# Patient Record
Sex: FEMALE | Race: WHITE | NOT HISPANIC OR LATINO | Employment: OTHER | URBAN - METROPOLITAN AREA
[De-identification: names, ages, dates, MRNs, and addresses within clinical notes are randomized per-mention and may not be internally consistent; named-entity substitution may affect disease eponyms.]

---

## 2017-05-17 ENCOUNTER — APPOINTMENT (OUTPATIENT)
Dept: ADMISSIONS | Facility: MEDICAL CENTER | Age: 47
End: 2017-05-17
Attending: COLON & RECTAL SURGERY
Payer: MEDICAID

## 2017-05-24 ENCOUNTER — HOSPITAL ENCOUNTER (OUTPATIENT)
Facility: MEDICAL CENTER | Age: 47
End: 2017-05-24
Attending: COLON & RECTAL SURGERY | Admitting: COLON & RECTAL SURGERY
Payer: MEDICAID

## 2017-05-24 VITALS
RESPIRATION RATE: 16 BRPM | BODY MASS INDEX: 26.1 KG/M2 | OXYGEN SATURATION: 97 % | HEART RATE: 64 BPM | HEIGHT: 68 IN | WEIGHT: 172.18 LBS | TEMPERATURE: 97.1 F

## 2017-05-24 PROBLEM — K62.82 ANAL DYSPLASIA: Status: ACTIVE | Noted: 2017-05-24

## 2017-05-24 LAB
HCG UR QL: NEGATIVE
SP GR UR REFRACTOMETRY: 1.03

## 2017-05-24 PROCEDURE — 160009 HCHG ANES TIME/MIN: Performed by: COLON & RECTAL SURGERY

## 2017-05-24 PROCEDURE — 160002 HCHG RECOVERY MINUTES (STAT): Performed by: COLON & RECTAL SURGERY

## 2017-05-24 PROCEDURE — 160036 HCHG PACU - EA ADDL 30 MINS PHASE I: Performed by: COLON & RECTAL SURGERY

## 2017-05-24 PROCEDURE — 160035 HCHG PACU - 1ST 60 MINS PHASE I: Performed by: COLON & RECTAL SURGERY

## 2017-05-24 PROCEDURE — 502240 HCHG MISC OR SUPPLY RC 0272: Performed by: COLON & RECTAL SURGERY

## 2017-05-24 PROCEDURE — 501838 HCHG SUTURE GENERAL: Performed by: COLON & RECTAL SURGERY

## 2017-05-24 PROCEDURE — 160046 HCHG PACU - 1ST 60 MINS PHASE II: Performed by: COLON & RECTAL SURGERY

## 2017-05-24 PROCEDURE — A9270 NON-COVERED ITEM OR SERVICE: HCPCS

## 2017-05-24 PROCEDURE — 700102 HCHG RX REV CODE 250 W/ 637 OVERRIDE(OP)

## 2017-05-24 PROCEDURE — 160025 RECOVERY II MINUTES (STATS): Performed by: COLON & RECTAL SURGERY

## 2017-05-24 PROCEDURE — 160047 HCHG PACU  - EA ADDL 30 MINS PHASE II: Performed by: COLON & RECTAL SURGERY

## 2017-05-24 PROCEDURE — A6403 STERILE GAUZE>16 <= 48 SQ IN: HCPCS | Performed by: COLON & RECTAL SURGERY

## 2017-05-24 PROCEDURE — 160027 HCHG SURGERY MINUTES - 1ST 30 MINS LEVEL 2: Performed by: COLON & RECTAL SURGERY

## 2017-05-24 PROCEDURE — 88305 TISSUE EXAM BY PATHOLOGIST: CPT | Mod: 59

## 2017-05-24 PROCEDURE — 160048 HCHG OR STATISTICAL LEVEL 1-5: Performed by: COLON & RECTAL SURGERY

## 2017-05-24 PROCEDURE — 81025 URINE PREGNANCY TEST: CPT

## 2017-05-24 PROCEDURE — 501499 HCHG SURG-I-LOOP, MINI (BLUE): Performed by: COLON & RECTAL SURGERY

## 2017-05-24 PROCEDURE — 700101 HCHG RX REV CODE 250

## 2017-05-24 PROCEDURE — 700111 HCHG RX REV CODE 636 W/ 250 OVERRIDE (IP)

## 2017-05-24 RX ORDER — OXYCODONE HCL 5 MG/5 ML
SOLUTION, ORAL ORAL
Status: COMPLETED
Start: 2017-05-24 | End: 2017-05-24

## 2017-05-24 RX ORDER — SODIUM CHLORIDE, SODIUM LACTATE, POTASSIUM CHLORIDE, CALCIUM CHLORIDE 600; 310; 30; 20 MG/100ML; MG/100ML; MG/100ML; MG/100ML
1000 INJECTION, SOLUTION INTRAVENOUS
Status: COMPLETED | OUTPATIENT
Start: 2017-05-24 | End: 2017-05-24

## 2017-05-24 RX ORDER — LIDOCAINE HYDROCHLORIDE 10 MG/ML
INJECTION, SOLUTION INFILTRATION; PERINEURAL
Status: COMPLETED
Start: 2017-05-24 | End: 2017-05-24

## 2017-05-24 RX ORDER — BUPIVACAINE HYDROCHLORIDE AND EPINEPHRINE 5; 5 MG/ML; UG/ML
INJECTION, SOLUTION EPIDURAL; INTRACAUDAL; PERINEURAL
Status: DISCONTINUED | OUTPATIENT
Start: 2017-05-24 | End: 2017-05-24 | Stop reason: HOSPADM

## 2017-05-24 RX ORDER — LIDOCAINE HYDROCHLORIDE 10 MG/ML
0.5 INJECTION, SOLUTION INFILTRATION; PERINEURAL
Status: DISCONTINUED | OUTPATIENT
Start: 2017-05-24 | End: 2017-05-24 | Stop reason: HOSPADM

## 2017-05-24 RX ORDER — LIDOCAINE AND PRILOCAINE 25; 25 MG/G; MG/G
1 CREAM TOPICAL
Status: DISCONTINUED | OUTPATIENT
Start: 2017-05-24 | End: 2017-05-24 | Stop reason: HOSPADM

## 2017-05-24 RX ORDER — OXYCODONE HYDROCHLORIDE AND ACETAMINOPHEN 5; 325 MG/1; MG/1
1-2 TABLET ORAL EVERY 4 HOURS PRN
Qty: 15 TAB | Refills: 0 | Status: SHIPPED | OUTPATIENT
Start: 2017-05-24 | End: 2018-12-17

## 2017-05-24 RX ORDER — ACETIC ACID 0.25 G/100ML
IRRIGANT IRRIGATION
Status: DISCONTINUED | OUTPATIENT
Start: 2017-05-24 | End: 2017-05-24 | Stop reason: HOSPADM

## 2017-05-24 RX ADMIN — OXYCODONE HYDROCHLORIDE 5 MG: 5 SOLUTION ORAL at 10:00

## 2017-05-24 RX ADMIN — SODIUM CHLORIDE, SODIUM LACTATE, POTASSIUM CHLORIDE, CALCIUM CHLORIDE 1000 ML: 600; 310; 30; 20 INJECTION, SOLUTION INTRAVENOUS at 07:25

## 2017-05-24 RX ADMIN — LIDOCAINE HYDROCHLORIDE: 10 INJECTION, SOLUTION INFILTRATION; PERINEURAL at 07:25

## 2017-05-24 ASSESSMENT — PAIN SCALES - GENERAL
PAINLEVEL_OUTOF10: 0

## 2017-05-24 NOTE — IP AVS SNAPSHOT
5/24/2017    Analia Wu  [address]  [facility] [address]    Dear Analia:    [facility] wants to ensure your discharge home is safe and you or your loved ones have had all of your questions answered regarding your care after you leave the hospital.    Below is a list of resources and contact information should you have any questions regarding your hospital stay, follow-up instructions, or active medical symptoms.    Questions or Concerns Regarding… Contact   Medical Questions Related to Your Discharge  (7 days a week, 8am-5pm) Contact a Nurse Care Coordinator   [phone]   Medical Questions Not Related to Your Discharge  (24 hours a day / 7 days a week)  Contact the Nurse Health Line   [phone]    Medications or Discharge Instructions Refer to your discharge packet   or contact your [facility] Primary Care Provider   [phone]   Follow-up Appointment(s) Schedule your appointment via [employer]   or contact Scheduling [phone]   Billing Review your statement via [employer]  or contact Billing [phone]   Medical Records Review your records via [employer]   or contact Medical Records [phone]     You may receive a telephone call within two days of discharge. This call is to make certain you understand your discharge instructions and have the opportunity to have any questions answered. You can also easily access your medical information, test results and upcoming appointments via the [employer] free online health management tool. You can learn more and sign up at [facility]. For assistance setting up your [employer] account, please call [phone].    Once again, we want to ensure your discharge home is safe and that you have a clear understanding of any next steps in your care. If you have any questions or concerns, please do not hesitate to contact us, we are here for you. Thank you for choosing [facility] for your healthcare needs.    Sincerely,    Your [facility]  Team

## 2017-05-24 NOTE — IP AVS SNAPSHOT
" Home Care Instructions                                                                                                                Name:Analia Wu  Medical Record Number:[id number]  CSN: [id number]    YOB: 1970   Age: 46 y.o.  Sex: female  HT:1.715 m (5' 7.5\") WT: 78.1 kg (172 lb 2.9 oz)          Admit Date: 5/24/2017     Discharge Date:   Today's Date: 5/24/2017  Attending Doctor:  Billy Gamboa M.D.                  Allergies:  Other food; Dilaudid; Latex; Tape; and Other environmental                Discharge Instructions         ACTIVITY: Rest and take it easy for the first 24 hours.  A responsible adult is recommended to remain with you during that time.  It is normal to feel sleepy.  We encourage you to not do anything that requires balance, judgment or coordination.    MILD FLU-LIKE SYMPTOMS ARE NORMAL. YOU MAY EXPERIENCE GENERALIZED MUSCLE ACHES, THROAT IRRITATION, HEADACHE AND/OR SOME NAUSEA.    FOR 24 HOURS DO NOT:  Drive, operate machinery or run household appliances.  Drink beer or alcoholic beverages.   Make important decisions or sign legal documents.    SPECIAL INSTRUCTIONS:   D/C instructions:     1. DIET: Upon discharge from the hospital start with light fluids then resume your normal preoperative diet. Depending on how you are feeling and whether you have nausea or not, you may wish to stay with a bland diet for the first few days. However, you can advance this as quickly as you feel ready. Avoid foods that you know will cause constipation. Stick with soft, bland foods that have been easy to digest in the past.     2. ACTIVITIES: Limit your activity for the first 5-7 days following surgery. You may drive whenever you are off pain medications and are able to perform the activities needed to drive, i.e. turning, bending, twisting, etc.     3. BATHING/WOUND CARE: If your wound contains packing, when the packing falls out, you may leave it out or remove it yourself the day after " surgery. If there is a drain in place this will stay in place until your follow up appointment. You will pass some blood and mucus for the first week or more, with or without bowel movements. Temporary loss of bowel control and changes in rectal sensation is common due to swelling. You can wear a pad or disposable undergarment to protect your clothes. Lumps or tags always form and these are best left alone for twelve months, as they usually resolve with time. You may get the wound wet at any time after leaving the hospital. See instructions for Sitz baths below.     4. BOWEL FUNCTION: It is very important to keep your bowel movements soft. Pain mediation and lack of activity will causes constipation. Take a stool softener either prescribed or over the counter and make sure you are drinking about 64 oz (1/2 gallon) of water everyday. Taking a fiber supplement like Benefiber or metamucil on a daily basis also helps. Continue this practice even after you have fully recovered to keep your stools soft and to avoid straining. If you have not moved your bowels by day 3 after surgery; take Lindsay lax, Milk of magnesia, or another laxative until you have a bowel movement. DO NOT use suppositories or enemas     6. PAIN MEDICATION: You can expect a lot of pain after Anorectal surgery. The effects of the local anesthesia used during your surgery may wear off as early as about six hours following surgery. Make sure you get your pain medication prescription filled. Please take these as directed and do not wait until the pain is excruciating. Frequent Sitz baths (see instructions below) to help with pain and spasms. It is important to remember not to take medications on an empty stomach as this may cause nausea. Topical ointments may be prescribed or over the counter may be mildly helpful as well. All of these methods may help you feel more comfortable, but none of them will completely relieve the pain. You should notice a very slow  gradual improvement over the first few weeks. Only time will help you heal and relieve the pain. If pain becomes severe and you are unable to control it with the above methods you may need to go to the emergency room for IV pain control. If you need a pain medication refill please call the office during business hours.     7.CALL IF YOU HAVE: (1) Fevers to more than 101.50 F, (2) Unusual chest or leg pain, (3) Drainage or fluid from incision that may be foul smelling, increased tenderness or soreness at the wound or the wound edges are no longer together, redness or swelling at the incision site. Please do not hesitate to call with any other questions.     8. APPOINTMENT: Contact our office at [phone] for a follow-up appointment in 3-4 weeks following your procedure.     SITZ BATHS:     First, you prepare some things such as large shallow plastic container, hot water, towels, and blanket. You can find large bowl which is approximately 8 inches depth. Another option is using bathtub but you may not be comfortable since sitz bath is more for use in submerging the buttocks and hip area not the legs. You can also buy the sitz bathtub which is available in the market. The bathtub products come in various options. They are equipped with different features. You just need to find the best product which matches with your needs. By using sitz bathtub, sitz bath at home can be done easier.     If you are using bowl or basin, you should place the bowl or basin in the bathtub or on a towel on the floor. It is better for you to use the easiest one for you to get up from. Next, you can fill the bowl one third full of medium hot water. You can use your elbow to test the water temperature. Make sure that the water temperature is in the comfortable level. You should not use your hands since they can tolerate higher temperature.     During sitz bath, you should keep a towel and blanket since they are handy to solve the spillage.  They are also useful when you need warmth. To start the bath, you can lower yourself in the tub gently. Sit in the tub for 10-20 minutes or until the water cools considerably. If it becomes uncomfortable, you have to get out of the bath.     You can use sitz bath with hot water, cold water, or alternating between the two for maximum healing ability.     If you have any additional questions, please do not hesitate to call the office and speak to either myself or the physician on call.     Office address:   [address] [place] [address], [address], [place] [address]     [facility]   [phone]          You should CALL YOUR PHYSICIAN if you develop:  Fever greater than 101 degrees F.  Pain not relieved by medication, or persistent nausea or vomiting.  Excessive bleeding (blood soaking through dressing) or unexpected drainage from the wound.  Extreme redness or swelling around the incision site, drainage of pus or foul smelling drainage.  Inability to urinate or empty your bladder within 8 hours.  Problems with breathing or chest pain.    You should call 911 if you develop problems with breathing or chest pain.  If you are unable to contact your doctor or surgical center, you should go to the nearest emergency room or urgent care center.  Physician's telephone #: Dr. Gamboa: [phone]    If any questions arise, call your doctor.  If your doctor is not available, please feel free to call the Surgical Center at [phone].  The Center is open Monday through Friday from 7AM to 7PM.  You can also call the [facility] HOTLINE open 24 hours/day, 7 days/week and speak to a nurse at [phone], or toll free at [phone].    A registered nurse may call you a few days after your surgery to see how you are doing after your procedure.    MEDICATIONS: Resume taking daily medication.  Take prescribed pain medication with food.  If no medication is prescribed, you may take non-aspirin pain medication if  needed.  PAIN MEDICATION CAN BE VERY CONSTIPATING.  Take a stool softener or laxative such as senokot, pericolace, or milk of magnesia if needed.    Prescription given for PERCOCET (pain).  Last pain medication given at 10:00 (next dose due anytime after 2:00).    If your physician has prescribed pain medication that includes Acetaminophen (Tylenol), do not take additional Acetaminophen (Tylenol) while taking the prescribed medication.    Depression / Suicide Risk    As you are discharged from this [facility] facility, it is important to learn how to keep safe from harming yourself.    Recognize the warning signs:  · Abrupt changes in personality, positive or negative- including increase in energy   · Giving away possessions  · Change in eating patterns- significant weight changes-  positive or negative  · Change in sleeping patterns- unable to sleep or sleeping all the time   · Unwillingness or inability to communicate  · Depression  · Unusual sadness, discouragement and loneliness  · Talk of wanting to die  · Neglect of personal appearance   · Rebelliousness- reckless behavior  · Withdrawal from people/activities they love  · Confusion- inability to concentrate     If you or a loved one observes any of these behaviors or has concerns about self-harm, here's what you can do:  · Talk about it- your feelings and reasons for harming yourself  · Remove any means that you might use to hurt yourself (examples: pills, rope, extension cords, firearm)  · Get professional help from the community (Mental Health, Substance Abuse, psychological counseling)  · Do not be alone:Call your Safe Contact- someone whom you trust who will be there for you.  · Call your local CRISIS HOTLINE 516-9599 or 982-287-1316  · Call your local Children's Mobile Crisis Response Team [place] (383) 936-2373 or www.Brand[employer]  · Call the toll free National Suicide Prevention Hotlines   · National Suicide Prevention Lifeline 222-605-NVVP  (6508)  · [facility] Network 800-SUICIDE (694-0038)       Medication List      START taking these medications        Instructions    Morning Afternoon Evening Bedtime    oxycodone-acetaminophen 5-325 MG Tabs   Commonly known as:  PERCOCET        Take 1-2 Tabs by mouth every four hours as needed.   Dose:  1-2 Tab                          CONTINUE taking these medications        Instructions    Morning Afternoon Evening Bedtime    citalopram 20 MG Tabs   Commonly known as:  CELEXA        Take 20 mg by mouth every day.   Dose:  20 mg                        montelukast 10 MG Tabs   Commonly known as:  SINGULAIR        Take 10 mg by mouth every day.   Dose:  10 mg                             Where to Get Your Medications      You can get these medications from any pharmacy     Bring a paper prescription for each of these medications    - oxycodone-acetaminophen 5-325 MG Tabs            Medication Information     Above and/or attached are the medications your physician expects you to take upon discharge. Review all of your home medications and newly ordered medications with your doctor and/or pharmacist. Follow medication instructions as directed by your doctor and/or pharmacist. Please keep your medication list with you and share with your physician. Update the information when medications are discontinued, doses are changed, or new medications (including over-the-counter products) are added; and carry medication information at all times in the event of emergency situations.        Resources     Quit Smoking / Tobacco Use:    I understand the use of any tobacco products increases my chance of suffering from future heart disease or stroke and could cause other illnesses which may shorten my life. Quitting the use of tobacco products is the single most important thing I can do to improve my health. For further information on smoking / tobacco cessation call a Toll Free Quit Line at 1-778.237.8121 (*National Cancer  [place]) or 1-114.720.8053 (American Lung Association) or you can access the web based program at www.lungusa.org.    [place] Tobacco Users Help Line:  (739) 806-6598       Toll Free: 1-264.200.2490  Quit Tobacco Program [facility] (076)344-1966    Crisis Hotline:    [place] Crisis Hotline:  9-902-CFZJXFU or 1-128.622.7804    [place] Crisis Hotline:    1-835.206.4499 or 690-966-5331    Discharge Survey:   Thank you for choosing [facility]. We hope we did everything we could to make your hospital stay a pleasant one. You may be receiving a survey and we would appreciate your time and participation in answering the questions. Your input is very valuable to us in our efforts to improve our service to our patients and their families.            Signatures     My signature on this form indicates that:    1. I acknowledge receipt and understanding of these Home Care Instruction.  2. My questions regarding this information have been answered to my satisfaction.  3. I have formulated a plan with my discharge nurse to obtain my prescribed medications for home.    __________________________________      __________________________________                   Patient Signature                                 Guardian/Responsible Adult Signature      __________________________________                 __________       ________                       Nurse Signature                                               Date                 Time

## 2017-05-24 NOTE — DISCHARGE INSTRUCTIONS
ACTIVITY: Rest and take it easy for the first 24 hours.  A responsible adult is recommended to remain with you during that time.  It is normal to feel sleepy.  We encourage you to not do anything that requires balance, judgment or coordination.    MILD FLU-LIKE SYMPTOMS ARE NORMAL. YOU MAY EXPERIENCE GENERALIZED MUSCLE ACHES, THROAT IRRITATION, HEADACHE AND/OR SOME NAUSEA.    FOR 24 HOURS DO NOT:  Drive, operate machinery or run household appliances.  Drink beer or alcoholic beverages.   Make important decisions or sign legal documents.    SPECIAL INSTRUCTIONS:   D/C instructions:     1. DIET: Upon discharge from the hospital start with light fluids then resume your normal preoperative diet. Depending on how you are feeling and whether you have nausea or not, you may wish to stay with a bland diet for the first few days. However, you can advance this as quickly as you feel ready. Avoid foods that you know will cause constipation. Stick with soft, bland foods that have been easy to digest in the past.     2. ACTIVITIES: Limit your activity for the first 5-7 days following surgery. You may drive whenever you are off pain medications and are able to perform the activities needed to drive, i.e. turning, bending, twisting, etc.     3. BATHING/WOUND CARE: If your wound contains packing, when the packing falls out, you may leave it out or remove it yourself the day after surgery. If there is a drain in place this will stay in place until your follow up appointment. You will pass some blood and mucus for the first week or more, with or without bowel movements. Temporary loss of bowel control and changes in rectal sensation is common due to swelling. You can wear a pad or disposable undergarment to protect your clothes. Lumps or tags always form and these are best left alone for twelve months, as they usually resolve with time. You may get the wound wet at any time after leaving the hospital. See instructions for Ethan  baths below.     4. BOWEL FUNCTION: It is very important to keep your bowel movements soft. Pain mediation and lack of activity will causes constipation. Take a stool softener either prescribed or over the counter and make sure you are drinking about 64 oz (1/2 gallon) of water everyday. Taking a fiber supplement like Benefiber or metamucil on a daily basis also helps. Continue this practice even after you have fully recovered to keep your stools soft and to avoid straining. If you have not moved your bowels by day 3 after surgery; take Lindsay lax, Milk of magnesia, or another laxative until you have a bowel movement. DO NOT use suppositories or enemas     6. PAIN MEDICATION: You can expect a lot of pain after Anorectal surgery. The effects of the local anesthesia used during your surgery may wear off as early as about six hours following surgery. Make sure you get your pain medication prescription filled. Please take these as directed and do not wait until the pain is excruciating. Frequent Sitz baths (see instructions below) to help with pain and spasms. It is important to remember not to take medications on an empty stomach as this may cause nausea. Topical ointments may be prescribed or over the counter may be mildly helpful as well. All of these methods may help you feel more comfortable, but none of them will completely relieve the pain. You should notice a very slow gradual improvement over the first few weeks. Only time will help you heal and relieve the pain. If pain becomes severe and you are unable to control it with the above methods you may need to go to the emergency room for IV pain control. If you need a pain medication refill please call the office during business hours.     7.CALL IF YOU HAVE: (1) Fevers to more than 101.50 F, (2) Unusual chest or leg pain, (3) Drainage or fluid from incision that may be foul smelling, increased tenderness or soreness at the wound or the wound edges are no longer  together, redness or swelling at the incision site. Please do not hesitate to call with any other questions.     8. APPOINTMENT: Contact our office at [phone] for a follow-up appointment in 3-4 weeks following your procedure.     SITZ BATHS:     First, you prepare some things such as large shallow plastic container, hot water, towels, and blanket. You can find large bowl which is approximately 8 inches depth. Another option is using bathtub but you may not be comfortable since sitz bath is more for use in submerging the buttocks and hip area not the legs. You can also buy the sitz bathtub which is available in the market. The bathtub products come in various options. They are equipped with different features. You just need to find the best product which matches with your needs. By using sitz bathtub, sitz bath at home can be done easier.     If you are using bowl or basin, you should place the bowl or basin in the bathtub or on a towel on the floor. It is better for you to use the easiest one for you to get up from. Next, you can fill the bowl one third full of medium hot water. You can use your elbow to test the water temperature. Make sure that the water temperature is in the comfortable level. You should not use your hands since they can tolerate higher temperature.     During sitz bath, you should keep a towel and blanket since they are handy to solve the spillage. They are also useful when you need warmth. To start the bath, you can lower yourself in the tub gently. Sit in the tub for 10-20 minutes or until the water cools considerably. If it becomes uncomfortable, you have to get out of the bath.     You can use sitz bath with hot water, cold water, or alternating between the two for maximum healing ability.     If you have any additional questions, please do not hesitate to call the office and speak to either myself or the physician on call.     Office address:    [address], [address], [place] [address]      [facility]   [phone]          You should CALL YOUR PHYSICIAN if you develop:  Fever greater than 101 degrees F.  Pain not relieved by medication, or persistent nausea or vomiting.  Excessive bleeding (blood soaking through dressing) or unexpected drainage from the wound.  Extreme redness or swelling around the incision site, drainage of pus or foul smelling drainage.  Inability to urinate or empty your bladder within 8 hours.  Problems with breathing or chest pain.    You should call 911 if you develop problems with breathing or chest pain.  If you are unable to contact your doctor or surgical center, you should go to the nearest emergency room or urgent care center.  Physician's telephone #: Dr. Gamboa: [phone]    If any questions arise, call your doctor.  If your doctor is not available, please feel free to call the Surgical Center at [phone].  The Center is open Monday through Friday from 7AM to 7PM.  You can also call the [facility] HOTLINE open 24 hours/day, 7 days/week and speak to a nurse at [phone], or toll free at [phone].    A registered nurse may call you a few days after your surgery to see how you are doing after your procedure.    MEDICATIONS: Resume taking daily medication.  Take prescribed pain medication with food.  If no medication is prescribed, you may take non-aspirin pain medication if needed.  PAIN MEDICATION CAN BE VERY CONSTIPATING.  Take a stool softener or laxative such as senokot, pericolace, or milk of magnesia if needed.    Prescription given for PERCOCET (pain).  Last pain medication given at 10:00 (next dose due anytime after 2:00).    If your physician has prescribed pain medication that includes Acetaminophen (Tylenol), do not take additional Acetaminophen (Tylenol) while taking the prescribed medication.    Depression / Suicide Risk    As you are discharged from this [facility] facility, it is important to learn how  to keep safe from harming yourself.    Recognize the warning signs:  · Abrupt changes in personality, positive or negative- including increase in energy   · Giving away possessions  · Change in eating patterns- significant weight changes-  positive or negative  · Change in sleeping patterns- unable to sleep or sleeping all the time   · Unwillingness or inability to communicate  · Depression  · Unusual sadness, discouragement and loneliness  · Talk of wanting to die  · Neglect of personal appearance   · Rebelliousness- reckless behavior  · Withdrawal from people/activities they love  · Confusion- inability to concentrate     If you or a loved one observes any of these behaviors or has concerns about self-harm, here's what you can do:  · Talk about it- your feelings and reasons for harming yourself  · Remove any means that you might use to hurt yourself (examples: pills, rope, extension cords, firearm)  · Get professional help from the community (Mental Health, Substance Abuse, psychological counseling)  · Do not be alone:Call your Safe Contact- someone whom you trust who will be there for you.  · Call your local CRISIS HOTLINE 269-0191 or 154-736-2585  · Call your local Children's Mobile Crisis Response Team [place] (998) 063-8013 or www.[employer]  · Call the toll free National Suicide Prevention Hotlines   · National Suicide Prevention Lifeline 545-674-RJED (4674)  · National Hope Line Network 800-SUICIDE (910-2183)

## 2017-05-24 NOTE — OP REPORT
DATE OF SERVICE:  05/24/2017    PREOPERATIVE DIAGNOSIS:  Perianal high-grade dysplasia.    POSTOPERATIVE DIAGNOSIS:  Perianal high-grade dysplasia.    OPERATION PERFORMED:  1.  Examination under anesthesia.  2.  High resolution anoscopy.  3.  Wide excision of dysplasia area of left posterolateral perianal tissue.  4.  Biopsies of posterior midline and right lateral perianal tissue anoderm.    SURGEON:  Billy Gamboa MD    ANESTHESIOLOGIST:  Samson Ashby MD.    INDICATIONS FOR PROCEDURE:  The patient has a longstanding history of perianal   and vulvar dysplasia with wide excision procedures performed overtime and in   stages.  At the most recent anorectal examination and excision, she had only a   residual positive margin in the left posterolateral perianal tissue.  She   comes today for surveillance high resolution anoscopy with excision and   biopsies of any suspicious areas and with particular emphasis on the left   posterolateral perianal region.    DETAILS OF PROCEDURE:  After an extensive informed consent discussion process,   the patient was brought to the operating room and she was placed in a supine   position on the operating table.  After induction of general anesthesia, she   was repositioned into lithotomy with Yellofin stirrups, sequential compression   stockings and appropriate padding.  The anorectal region was prepped and   draped in usual fashion with preparation of acetic acid, followed by Lugol's   iodine.  A high resolution anoscopy was then performed.  Careful examination   demonstrated no areas that were highly suspicious for neoplasia/dysplasia,   although the small patch of irregular discolored skin in the left   posterolateral area exhibited moderate suspicion.    Bupivacaine with epinephrine was used to infiltrate the skin and subcutaneous   tissues.  This area of the left posterolateral perianal tissues, was an   irregular area of perhaps 1.5 cm in maximum dimension.  This was widely    excised with clear margin using a 15-blade scalpel and passed off the field   for pathology.  The defect was touched up with cautery and closed with   intermittent Vicryl and chromic sutures.    The only other areas of any suspicion and all were an area 1 cm directly   posterior from the dentate line.  A small excisional biopsy was obtained with   the Metzenbaum scissors.  Lastly a small bump, an irregular area just at the   outside of the dentate line on the right perirectal lateral margin was   similarly excised with that Metzenbaum scissors and passed off the field for   pathology.  Additional bupivacaine was applied.  Careful rectal exam was   performed, which showed no other abnormalities.  Examination of the vulva,   which showed no obvious abnormalities.  Ointment and sterile dressings were   applied.    Needle, sponge and instrument counts were correct.    ESTIMATED BLOOD LOSS:  Minimal.    COMPLICATIONS:  None.       ____________________________________     MD VEENA DUKES / VIVIANA    DD:  05/24/2017 08:53:25  DT:  05/24/2017 10:47:10    D#:  [id number]  Job#:  [id number]    cc: MARCO CARTER MD

## 2018-05-11 ENCOUNTER — HOSPITAL ENCOUNTER (OUTPATIENT)
Dept: LAB | Facility: MEDICAL CENTER | Age: 48
End: 2018-05-11
Attending: NURSE PRACTITIONER
Payer: MEDICAID

## 2018-05-11 LAB
ALBUMIN SERPL BCP-MCNC: 4.4 G/DL (ref 3.2–4.9)
ALBUMIN/GLOB SERPL: 1.5 G/DL
ALP SERPL-CCNC: 89 U/L (ref 30–99)
ALT SERPL-CCNC: 14 U/L (ref 2–50)
ANION GAP SERPL CALC-SCNC: 7 MMOL/L (ref 0–11.9)
AST SERPL-CCNC: 18 U/L (ref 12–45)
BASOPHILS # BLD AUTO: 0.6 % (ref 0–1.8)
BASOPHILS # BLD: 0.03 K/UL (ref 0–0.12)
BILIRUB SERPL-MCNC: 0.6 MG/DL (ref 0.1–1.5)
BUN SERPL-MCNC: 11 MG/DL (ref 8–22)
CALCIUM SERPL-MCNC: 10 MG/DL (ref 8.5–10.5)
CHLORIDE SERPL-SCNC: 105 MMOL/L (ref 96–112)
CHOLEST SERPL-MCNC: 209 MG/DL (ref 100–199)
CO2 SERPL-SCNC: 27 MMOL/L (ref 20–33)
CREAT SERPL-MCNC: 0.79 MG/DL (ref 0.5–1.4)
EOSINOPHIL # BLD AUTO: 0.08 K/UL (ref 0–0.51)
EOSINOPHIL NFR BLD: 1.5 % (ref 0–6.9)
ERYTHROCYTE [DISTWIDTH] IN BLOOD BY AUTOMATED COUNT: 51.2 FL (ref 35.9–50)
GLOBULIN SER CALC-MCNC: 3 G/DL (ref 1.9–3.5)
GLUCOSE SERPL-MCNC: 92 MG/DL (ref 65–99)
HCT VFR BLD AUTO: 40.3 % (ref 37–47)
HDLC SERPL-MCNC: 63 MG/DL
HGB BLD-MCNC: 12.2 G/DL (ref 12–16)
IMM GRANULOCYTES # BLD AUTO: 0 K/UL (ref 0–0.11)
IMM GRANULOCYTES NFR BLD AUTO: 0 % (ref 0–0.9)
LDLC SERPL CALC-MCNC: 127 MG/DL
LYMPHOCYTES # BLD AUTO: 1.42 K/UL (ref 1–4.8)
LYMPHOCYTES NFR BLD: 26.1 % (ref 22–41)
MCH RBC QN AUTO: 25.4 PG (ref 27–33)
MCHC RBC AUTO-ENTMCNC: 30.3 G/DL (ref 33.6–35)
MCV RBC AUTO: 84 FL (ref 81.4–97.8)
MONOCYTES # BLD AUTO: 0.52 K/UL (ref 0–0.85)
MONOCYTES NFR BLD AUTO: 9.5 % (ref 0–13.4)
NEUTROPHILS # BLD AUTO: 3.4 K/UL (ref 2–7.15)
NEUTROPHILS NFR BLD: 62.3 % (ref 44–72)
NRBC # BLD AUTO: 0 K/UL
NRBC BLD-RTO: 0 /100 WBC
PLATELET # BLD AUTO: 245 K/UL (ref 164–446)
PMV BLD AUTO: 10.3 FL (ref 9–12.9)
POTASSIUM SERPL-SCNC: 4.2 MMOL/L (ref 3.6–5.5)
PROT SERPL-MCNC: 7.4 G/DL (ref 6–8.2)
RBC # BLD AUTO: 4.8 M/UL (ref 4.2–5.4)
SODIUM SERPL-SCNC: 139 MMOL/L (ref 135–145)
T4 FREE SERPL-MCNC: 0.65 NG/DL (ref 0.53–1.43)
TRIGL SERPL-MCNC: 93 MG/DL (ref 0–149)
TSH SERPL DL<=0.005 MIU/L-ACNC: 2.39 UIU/ML (ref 0.38–5.33)
WBC # BLD AUTO: 5.5 K/UL (ref 4.8–10.8)

## 2018-05-11 PROCEDURE — 85025 COMPLETE CBC W/AUTO DIFF WBC: CPT

## 2018-05-11 PROCEDURE — 36415 COLL VENOUS BLD VENIPUNCTURE: CPT

## 2018-05-11 PROCEDURE — 80061 LIPID PANEL: CPT

## 2018-05-11 PROCEDURE — 84443 ASSAY THYROID STIM HORMONE: CPT

## 2018-05-11 PROCEDURE — 80053 COMPREHEN METABOLIC PANEL: CPT

## 2018-05-11 PROCEDURE — 84439 ASSAY OF FREE THYROXINE: CPT

## 2018-09-05 ENCOUNTER — HOSPITAL ENCOUNTER (OUTPATIENT)
Dept: HOSPITAL 8 - CFH | Age: 48
Discharge: HOME | End: 2018-09-05
Attending: NURSE PRACTITIONER
Payer: MEDICAID

## 2018-09-05 DIAGNOSIS — M48.02: Primary | ICD-10-CM

## 2018-09-05 PROCEDURE — 72050 X-RAY EXAM NECK SPINE 4/5VWS: CPT

## 2018-10-23 ENCOUNTER — HOSPITAL ENCOUNTER (OUTPATIENT)
Dept: HOSPITAL 8 - CFH | Age: 48
Discharge: HOME | End: 2018-10-23
Attending: NEUROLOGICAL SURGERY
Payer: MEDICAID

## 2018-10-23 DIAGNOSIS — M48.02: ICD-10-CM

## 2018-10-23 DIAGNOSIS — M50.221: Primary | ICD-10-CM

## 2018-10-23 DIAGNOSIS — M25.78: ICD-10-CM

## 2018-10-23 PROCEDURE — 72141 MRI NECK SPINE W/O DYE: CPT

## 2018-11-08 ENCOUNTER — PHYSICAL THERAPY (OUTPATIENT)
Dept: PHYSICAL THERAPY | Facility: REHABILITATION | Age: 48
End: 2018-11-08
Attending: NEUROLOGICAL SURGERY
Payer: MEDICAID

## 2018-11-08 DIAGNOSIS — M79.602 PAIN IN LEFT ARM: ICD-10-CM

## 2018-11-08 PROCEDURE — 97162 PT EVAL MOD COMPLEX 30 MIN: CPT

## 2018-11-08 ASSESSMENT — ENCOUNTER SYMPTOMS
ALLEVIATING FACTORS: BATH
QUALITY: TINGLING
PAIN SCALE: 7
EXACERBATED BY: GRIPPING
PAIN LOCATION: LEFT ARM AND HAND
PAIN SCALE AT HIGHEST: 7
PAIN SCALE AT LOWEST: 7
QUALITY: ACHING
PAIN TIMING: CONSTANT

## 2018-11-08 NOTE — OP THERAPY EVALUATION
Outpatient Physical Therapy  INITIAL EVALUATION    [facility] Physical Therapy [address]  [address]  [place] [address]  Phone:  [phone]  Fax:  [phone]    Date of Evaluation: 2018    Patient: Analia Wu  YOB: 1970  MRN: [id number]     Referring Provider: Leon Pozo M.D.  [address]  [facility] [address]  [place] [address]   Referring Diagnosis Pain in right arm [M79.601]     Time Calculation  Start time: 1300  Stop time: 1400 Time Calculation (min): 60 minutes     Physical Therapy Occurrence Codes    Date physical therapy care plan established or reviewed:  18   Date physical therapy treatment started:  18          Chief Complaint: neck and arm problem  Visit Diagnoses     ICD-10-CM   1. Pain in left arm M79.602         Subjective:   History of Present Illness:     Date of onset:  2017    Mechanism of injury:  1 year onset of pain left shoulder, scapula and arm to fingers 3-5  with N/T.  Symptoms have remained the same over 1 year and are constant.  + dizziness, + headaches.  Taking Tramadol in PM .  X-Ray:  Bulging disk per patient(image results not available online)      Cervical anterior fusion  had PT  Sleep disturbance:  Not disrupted  Pain:     Current pain ratin    At best pain ratin    At worst pain ratin    Location:  Left arm and hand    Quality:  Aching and tingling    Pain timing:  Constant    Relieving factors:  Bath (epson salt bath)    Aggravating factors:  Gripping (opening a sealed jar or bottle, turning head)    Progression:  Stable  Social Support:     Lives in:  Apartment    Lives with:  Adult children  Hand dominance:  Left  Diagnostic Tests:     X-ray: abnormal    Treatments:     Previous treatment:  Injection treatment  Activities of Daily Living:     Patient reported ADL status: Independent with all ADLS  Symptoms do not limit ADLS    Patient Goals:     Patient goals for therapy:  Decreased pain  "and increased motion      Past Medical History:   Diagnosis Date   • Anesthesia     nausea and vommiting,  \"shallow breathing\"   • Anxiety    • Arrhythmia    • ASTHMA     inhalers prn   • Carpal tunnel syndrome    • Pain 11-26-12    neck, left knee, 6-7/10   • Panic attack    • Psychiatric problem     depression, PTSD, anxiety   • Seasonal allergies      Past Surgical History:   Procedure Laterality Date   • ANAL FISTULECTOMY  5/24/2017    Procedure: ANAL FISTULECTOMY FOR: WIDE EXCISION ANAL DYSPLASIA;  Surgeon: Billy Villegas M.D.;  Location: SURGERY [facility];  Service:    • RECTAL EXPLORATION  9/2/2016    Procedure: RECTAL EXPLORATION eua, AND BIOPSY;  Surgeon: Billy Villegas M.D.;  Location: SURGERY [facility];  Service:    • VULVECTOMY RADICAL Bilateral 9/2/2016    Procedure: VULVECTOMY RADICAL;  Surgeon: Reese Carroll M.D.;  Location: SURGERY [facility];  Service:    • RECTAL EXPLORATION  1/28/2015    Performed by Billy Villegas M.D. at SURGERY [facility]   • EXAM UNDER ANESTHESIA  5/21/2014    Performed by Billy Villegas M.D. at SURGERY [facility]   • RECTAL EXPLORATION  5/21/2014    Performed by Billy Villegas M.D. at SURGERY [facility]   • WIDE EXCISION  12/19/2012    Performed by Billy Villegas M.D. at [facility]   • WIDE EXCISION  2/29/2012    Performed by BILLY VILLEGAS at SURGERY [facility]   • WIDE EXCISION  9/7/2011    Performed by BILLY VILLEGAS at SURGERY [facility]   • CERVICAL DISK AND FUSION ANTERIOR  6/27/2011    Performed by YEIMI LEVY at SURGERY [facility]   • HEMORRHOIDECTOMY  8/4/2010    Performed by BILLY VILLEGAS at SURGERY [facility]   • WIDE EXCISION  8/4/2010    Performed by BILLY VILLEGAS at [facility]   • VULVECTOMY PARTIAL  October 2007   • RECTAL BIOPSY  2007     Social History   Substance Use Topics   • Smoking status: Former Smoker     Packs/day: 1.00     Years: 25.00     Types: Cigarettes     Quit date: " 10/4/2007   • Smokeless tobacco: Never Used      Comment: 2007   • Alcohol use No     Family and Occupational History     Social History   • Marital status: Single     Spouse name: N/A   • Number of children: N/A   • Years of education: N/A       Objective     Observations   Central spine     Positive for Dowager's hump.    Postural Observations  Seated posture: fair  Standing posture: fair    Additional Postural Observation Details  Prominent C7    Shoulder Screen    Shoulder active range of motion within functional limits.  Shoulder strength within functional limits.  Shoulder joint mobility within functional limits.    Neurological Testing     Reflexes   Left   Deltoid (C5): trace (1+)  Biceps (C5/C6): trace (1+)    Right   Deltoid (C5): normal (2+)  Biceps (C5/C6): normal (2+)    Myotome testing   Cervical (left)   C0-1 (flexion): 5  C1-2 (neck flexion): 5  C3 (neck sidebend): 5  C4 (shoulder shrug): 5  C5 (deltoid): 4-  C6 (biceps): 4-  C7 (triceps): 5  C8 (thumb extension): 5  T1 (intrinsics): 5    Cervical (right)   All right cervical myotomes within normal limits    Dermatome testing   Cervical (left)   C0-1 (top of head): intact  C1-2 (side of head): intact  C3 (lateral neck): intact  C4 (top of AC joint): intact  C5 (lateral deltoid): intact  C6 (thumb): decreased  C7 (middle finger): decreased  C8 (little finger): decreased  T1 (ulnar antecubital): decreased    Cervical (right)   All right cervical dermatomes intact    Active Range of Motion     Additional Active Range of Motion Details  Rot L:  50 increased N/T left arm  Rot R 75    Min limited ff Increased shoulder pain  Mod limited (fixed) extension increased shoulder pain  Cervical fusion C4-5      Joint Play   Spine     Additional joint play details:   Hypomobile and painful C7-T4        Ananda Cervical Test     Sitting repeated motions:   Pre-test symptoms: 5/10 VAS left arm    Retraction in sitting     Symptoms during testing: decreases     Symptoms after testing: better  Repeat retraction in sitting     Symptoms during testing: decreases    Symptoms after testing: better  Retraction with extension in sitting     Symptoms during testing: decreases    Symptoms after testing: no effect  Repeat retraction with extension in sitting     Symptoms during testing: decreases    Symptoms after testing: better        Therapeutic Exercises (CPT 21663):     1. Repeated cervical retraction, 10 x 5    2. Sleeping posture with cervical roll    3. Repeated retraction extension, 2 x 10      Time-based treatments/modalities:          Assessment, Response and Plan:   Assessment details:  Patient presents with 1 year  insidious onset of chronic left UE and hand paresthesia and pain which increases with gripping and using left UE for ADLS. Patient S/P anterior cervical fusion 2012.  Symptoms decrease  with repeated lower cervical/upper thoracic extension/retraction.  Posture dysfunction and sedentary lifestyle are contributing to symptoms..  Prognosis: good    Goals:   Short Term Goals:   Centralization of symptoms  25% decreased symptoms with ADLS using left UE  Short term goal time span:  2-4 weeks      Long Term Goals:    >50% decreased symptoms with ADLS  Long term goal time span:  4-6 weeks    Plan:   Therapy options:  Physical therapy treatment to continue  Planned therapy interventions:  Neuromuscular Re-education (CPT 12758), E Stim Unattended (CPT 06287) and Therapeutic Exercise (CPT 90490)  Frequency:  2x week  Duration in weeks:  8  Duration in visits:  12  Discussed with:  Patient  Plan details:  1-2 x week       Functional Limitation G-Codes and Severity Modifiers  Quickdash General Total Score: 22.73   Current:     Goal:       Referring provider co-signature:  I have reviewed this plan of care and my co-signature certifies the need for services.  Certification Dates:   From 11/8/2018    To 1/5/2019    Physician Signature: ________________________________ Date:  ______________

## 2018-11-27 ENCOUNTER — PHYSICAL THERAPY (OUTPATIENT)
Dept: PHYSICAL THERAPY | Facility: REHABILITATION | Age: 48
End: 2018-11-27
Attending: NEUROLOGICAL SURGERY
Payer: MEDICAID

## 2018-11-27 DIAGNOSIS — M79.602 PAIN IN LEFT ARM: ICD-10-CM

## 2018-11-27 PROCEDURE — 97014 ELECTRIC STIMULATION THERAPY: CPT

## 2018-11-27 PROCEDURE — 97110 THERAPEUTIC EXERCISES: CPT

## 2018-11-27 NOTE — OP THERAPY DAILY TREATMENT
Outpatient Physical Therapy  DAILY TREATMENT     [facility] Physical Therapy [address].  [address]  [place] [address]  Phone:  [phone]  Fax:  [phone]    Date: 11/27/2018    Patient: Analia Wu  YOB: 1970  MRN: [id number]     Time Calculation  Start time: 1400  Stop time: 1445 Time Calculation (min): 45 minutes     Chief Complaint: left arm problem  Visit #: 2    SUBJECTIVE:  Left elbow pain intermittent; depends on the day, being more mindful of posture    OBJECTIVE:  Current objective measures:           Therapeutic Exercises (CPT 64914):     1. Cervical retraction, 4 x 30 sec    2. Foam roller cervical stab with alt shoulder flexion, horizontal abduction, x 15 each    3. Foam roller pec stretch, 3 x 30 sec    4. Cervical retraction/extension seated, x 15    5. [place] orange tband, 4 x 30 sec half roller    6. Wall angels, x 20     Therapeutic Treatments and Modalities:     1. E Stim Unattended (CPT 45092), Congolese scapular retraction prone cervical neutral with MHP    Time-based treatments/modalities:          Pain rating before treatment: 3  Pain rating after treatment: 3    ASSESSMENT:   Response to treatment: repeated retraction/extension abolishes symptoms, improved postural awareness    PLAN/RECOMMENDATIONS:   Plan for treatment: therapy treatment to continue next visit.  Planned interventions for next visit: continue with current treatment.

## 2018-12-11 ENCOUNTER — PHYSICAL THERAPY (OUTPATIENT)
Dept: PHYSICAL THERAPY | Facility: REHABILITATION | Age: 48
End: 2018-12-11
Attending: NEUROLOGICAL SURGERY
Payer: MEDICAID

## 2018-12-11 DIAGNOSIS — M79.602 PAIN IN LEFT ARM: ICD-10-CM

## 2018-12-11 PROCEDURE — 97110 THERAPEUTIC EXERCISES: CPT

## 2018-12-11 PROCEDURE — 97014 ELECTRIC STIMULATION THERAPY: CPT

## 2018-12-11 NOTE — OP THERAPY DAILY TREATMENT
Outpatient Physical Therapy  DAILY TREATMENT     [facility] Physical Therapy [address].  [address]  [place] [address]  Phone:  [phone]  Fax:  [phone]    Date: 12/11/2018    Patient: Analia Wu  YOB: 1970  MRN: [id number]     Time Calculation  Start time: 1400  Stop time: 1450 Time Calculation (min): 50 minutes     Chief Complaint: No chief complaint on file.    Visit #: 3    SUBJECTIVE: been doing well, sick last week, still 1/10 VAS lateral left elbow      OBJECTIVE:  Current objective measures:           Therapeutic Exercises (CPT 17796):     1. Cervical retraction, 4 x 30 sec    2. Foam roller cervical stab with alt shoulder flexion, horizontal abduction, x 15 each    3. Foam roller pec stretch, 3 x 30 sec    4. Cervical retraction/extension seated, x 15    5. [place] orange tband, 4 x 30 sec half roller    6. Wall angels, x 20     7. High, mid, low rows , x 20 pink tband    8. Lat pull with neutral cervical spine, x 15    Therapeutic Treatments and Modalities:     1. E Stim Unattended (CPT 15414), [place] scapular retraction prone cervical neutral with MHP    Time-based treatments/modalities:  Therapeutic exercise minutes (CPT 68302): 30 minutes       Pain rating before treatment: 0  Pain rating after treatment: 0    ASSESSMENT:   Response to treatment: abolished symptoms with repeated cervical retraction/extension in sitting    PLAN/RECOMMENDATIONS:   Plan for treatment: therapy treatment to continue next visit. UPOC for new year auth.  Planned interventions for next visit: continue with current treatment.

## 2018-12-17 DIAGNOSIS — Z01.812 PRE-OPERATIVE LABORATORY EXAMINATION: ICD-10-CM

## 2018-12-17 LAB — HCG SERPL QL: NEGATIVE

## 2018-12-17 PROCEDURE — 84703 CHORIONIC GONADOTROPIN ASSAY: CPT

## 2018-12-17 PROCEDURE — 36415 COLL VENOUS BLD VENIPUNCTURE: CPT

## 2018-12-18 ENCOUNTER — PHYSICAL THERAPY (OUTPATIENT)
Dept: PHYSICAL THERAPY | Facility: REHABILITATION | Age: 48
End: 2018-12-18
Attending: NEUROLOGICAL SURGERY
Payer: MEDICAID

## 2018-12-18 DIAGNOSIS — M79.602 PAIN IN LEFT ARM: ICD-10-CM

## 2018-12-18 PROCEDURE — 97110 THERAPEUTIC EXERCISES: CPT

## 2018-12-18 PROCEDURE — 97014 ELECTRIC STIMULATION THERAPY: CPT

## 2018-12-18 NOTE — OP THERAPY DAILY TREATMENT
Outpatient Physical Therapy  DAILY TREATMENT     [facility] Physical [address]  [address]  [place] [address]  Phone:  [phone]  Fax:  [phone]    Date: 12/18/2018    Patient: Analia Wu  YOB: 1970  MRN: [id number]     Time Calculation  Start time: 1345  Stop time: 1430 Time Calculation (min): 45 minutes     Chief Complaint: No chief complaint on file.    Visit #: 4    SUBJECTIVE: Increased lateral and medial elbow pain secondary to sleeping on arm.      OBJECTIVE:  Current objective measures:           Therapeutic Exercises (CPT 64730):     1. Cervical retraction, 4 x 30 sec    2. Foam roller cervical stab with alt shoulder flexion, horizontal abduction, x 15 each    3. Foam roller pec stretch, 3 x 30 sec    4. Cervical retraction/extension seated, x 15    5. [place] orange tband, 4 x 30 sec half roller    6. Wall angels, x 20     7. High, mid, low rows , x 20 pink tband    8. Lat pull with neutral cervical spine, x 15    Therapeutic Treatments and Modalities:     1. E Stim Unattended (CPT 48019), [place] scapular retraction prone cervical neutral with MHP    Time-based treatments/modalities:  Therapeutic exercise minutes (CPT 50213): 30 minutes       Pain rating before treatment: 1  Pain rating after treatment: 1    ASSESSMENT:   Response to treatment: decreased elbow and forearm paresthesia post treatment.  Patient fatigues quickly    PLAN/RECOMMENDATIONS:   Plan for treatment: therapy treatment to continue next visit.  Planned interventions for next visit: continue with current treatment.

## 2018-12-19 ENCOUNTER — HOSPITAL ENCOUNTER (OUTPATIENT)
Facility: MEDICAL CENTER | Age: 48
End: 2018-12-19
Attending: COLON & RECTAL SURGERY | Admitting: COLON & RECTAL SURGERY
Payer: MEDICAID

## 2018-12-19 VITALS
OXYGEN SATURATION: 95 % | WEIGHT: 183.64 LBS | TEMPERATURE: 96.8 F | BODY MASS INDEX: 27.83 KG/M2 | HEIGHT: 68 IN | HEART RATE: 61 BPM | RESPIRATION RATE: 14 BRPM

## 2018-12-19 LAB — PATHOLOGY CONSULT NOTE: NORMAL

## 2018-12-19 PROCEDURE — 700101 HCHG RX REV CODE 250

## 2018-12-19 PROCEDURE — 700111 HCHG RX REV CODE 636 W/ 250 OVERRIDE (IP)

## 2018-12-19 PROCEDURE — 160035 HCHG PACU - 1ST 60 MINS PHASE I: Performed by: COLON & RECTAL SURGERY

## 2018-12-19 PROCEDURE — A9270 NON-COVERED ITEM OR SERVICE: HCPCS

## 2018-12-19 PROCEDURE — 160002 HCHG RECOVERY MINUTES (STAT): Performed by: COLON & RECTAL SURGERY

## 2018-12-19 PROCEDURE — 160046 HCHG PACU - 1ST 60 MINS PHASE II: Performed by: COLON & RECTAL SURGERY

## 2018-12-19 PROCEDURE — 160048 HCHG OR STATISTICAL LEVEL 1-5: Performed by: COLON & RECTAL SURGERY

## 2018-12-19 PROCEDURE — 160009 HCHG ANES TIME/MIN: Performed by: COLON & RECTAL SURGERY

## 2018-12-19 PROCEDURE — 501838 HCHG SUTURE GENERAL: Performed by: COLON & RECTAL SURGERY

## 2018-12-19 PROCEDURE — 700102 HCHG RX REV CODE 250 W/ 637 OVERRIDE(OP)

## 2018-12-19 PROCEDURE — 160027 HCHG SURGERY MINUTES - 1ST 30 MINS LEVEL 2: Performed by: COLON & RECTAL SURGERY

## 2018-12-19 PROCEDURE — 160025 RECOVERY II MINUTES (STATS): Performed by: COLON & RECTAL SURGERY

## 2018-12-19 PROCEDURE — 88305 TISSUE EXAM BY PATHOLOGIST: CPT

## 2018-12-19 RX ORDER — SODIUM CHLORIDE, SODIUM LACTATE, POTASSIUM CHLORIDE, CALCIUM CHLORIDE 600; 310; 30; 20 MG/100ML; MG/100ML; MG/100ML; MG/100ML
INJECTION, SOLUTION INTRAVENOUS CONTINUOUS
Status: DISCONTINUED | OUTPATIENT
Start: 2018-12-19 | End: 2018-12-19 | Stop reason: HOSPADM

## 2018-12-19 RX ORDER — METOPROLOL TARTRATE 1 MG/ML
1 INJECTION, SOLUTION INTRAVENOUS
Status: DISCONTINUED | OUTPATIENT
Start: 2018-12-19 | End: 2018-12-19 | Stop reason: HOSPADM

## 2018-12-19 RX ORDER — RISPERIDONE 2 MG/1
2 TABLET ORAL
COMMUNITY

## 2018-12-19 RX ORDER — HYDRALAZINE HYDROCHLORIDE 20 MG/ML
5 INJECTION INTRAMUSCULAR; INTRAVENOUS
Status: DISCONTINUED | OUTPATIENT
Start: 2018-12-19 | End: 2018-12-19 | Stop reason: HOSPADM

## 2018-12-19 RX ORDER — BUPIVACAINE HYDROCHLORIDE AND EPINEPHRINE 5; 5 MG/ML; UG/ML
INJECTION, SOLUTION EPIDURAL; INTRACAUDAL; PERINEURAL
Status: DISCONTINUED | OUTPATIENT
Start: 2018-12-19 | End: 2018-12-19 | Stop reason: HOSPADM

## 2018-12-19 RX ORDER — HALOPERIDOL 5 MG/ML
1 INJECTION INTRAMUSCULAR
Status: DISCONTINUED | OUTPATIENT
Start: 2018-12-19 | End: 2018-12-19 | Stop reason: HOSPADM

## 2018-12-19 RX ORDER — LORAZEPAM 2 MG/ML
0.5 INJECTION INTRAMUSCULAR
Status: DISCONTINUED | OUTPATIENT
Start: 2018-12-19 | End: 2018-12-19 | Stop reason: HOSPADM

## 2018-12-19 RX ORDER — ONDANSETRON 2 MG/ML
4 INJECTION INTRAMUSCULAR; INTRAVENOUS
Status: DISCONTINUED | OUTPATIENT
Start: 2018-12-19 | End: 2018-12-19 | Stop reason: HOSPADM

## 2018-12-19 RX ORDER — SCOLOPAMINE TRANSDERMAL SYSTEM 1 MG/1
PATCH, EXTENDED RELEASE TRANSDERMAL
Status: DISCONTINUED
Start: 2018-12-19 | End: 2018-12-19 | Stop reason: HOSPADM

## 2018-12-19 RX ORDER — SODIUM CHLORIDE, SODIUM LACTATE, POTASSIUM CHLORIDE, CALCIUM CHLORIDE 600; 310; 30; 20 MG/100ML; MG/100ML; MG/100ML; MG/100ML
INJECTION, SOLUTION INTRAVENOUS ONCE
Status: COMPLETED | OUTPATIENT
Start: 2018-12-19 | End: 2018-12-19

## 2018-12-19 RX ORDER — LIDOCAINE HYDROCHLORIDE 10 MG/ML
INJECTION, SOLUTION INFILTRATION; PERINEURAL
Status: COMPLETED
Start: 2018-12-19 | End: 2018-12-19

## 2018-12-19 RX ORDER — DIPHENHYDRAMINE HYDROCHLORIDE 50 MG/ML
12.5 INJECTION INTRAMUSCULAR; INTRAVENOUS
Status: DISCONTINUED | OUTPATIENT
Start: 2018-12-19 | End: 2018-12-19 | Stop reason: HOSPADM

## 2018-12-19 RX ADMIN — LIDOCAINE HYDROCHLORIDE 0.5 ML: 10 INJECTION, SOLUTION INFILTRATION; PERINEURAL at 07:15

## 2018-12-19 RX ADMIN — Medication 0.5 ML: at 07:15

## 2018-12-19 RX ADMIN — SODIUM CHLORIDE, SODIUM LACTATE, POTASSIUM CHLORIDE, CALCIUM CHLORIDE: 600; 310; 30; 20 INJECTION, SOLUTION INTRAVENOUS at 07:15

## 2018-12-19 ASSESSMENT — PAIN SCALES - GENERAL
PAINLEVEL_OUTOF10: 0
PAINLEVEL_OUTOF10: 0
PAINLEVEL_OUTOF10: 2
PAINLEVEL_OUTOF10: 0

## 2018-12-19 NOTE — OR NURSING
1010- Patient arrived in PACU II alert and oriented. Vital signs are within normal limits for the patient. Patient reports pain 0/10. Unable to visualize surgical site, dressing in clean and dry. Discussed discharge plan of care and patient expressed understanding. All needs met at this time.    1025- Provided patient is with discharge education. All questions answered.    1035- Patient feels ready to be discharged and meets discharge criteria set by Dr. Gamboa. Patient is going to get dressed. Patient's friend, Rodolfo, is on the way to pick her up. PIV removed.    1053- Patient discharged home via wheelchair.

## 2018-12-19 NOTE — OP REPORT
DATE OF SERVICE:  12/19/2018    PREOPERATIVE DIAGNOSES:  History of anal and perianal high-grade dysplasia   with prior wide excisions.    POSTOPERATIVE DIAGNOSES:  1.  Scar tissue, right posterolateral anal canal, irregular tissue, likely   scar.  2.  Other areas of likely scar tissue, low suspicion for neoplasia.    OPERATIONS PERFORMED:  1.  Examination under anesthesia.  2.  Proctoscopy.  3.  Excision of intrarectal/anal canal, right posterolateral lesion.  4.  Posterior anal margin biopsy.  5.  Left lateral perianal or anodermal biopsying.    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    DETAILS OF PROCEDURE:  After an extensive informed consent discussion process,   the patient was brought to the operating room.  She was placed in a supine   position on the operating table.  After induction of general anesthesia, the   anorectal region was prepped and draped in the usual fashion.  After   administration of intravenous antibiotics, a very careful inspection was   performed.  This demonstrated what appeared to be generally normal healed scar   throughout the extensive prior wide excision regions all around the anus,   anoderm and perianal tissues.  There is a small area in the posterior midline   that was subtly irregular with a bit of pigmentation differential and an   excisional biopsy was obtained here at the anal margin and sent for pathology.    The site was touched up with cautery.  On the left lateral anodermal   approximately 2.5-3 cm from the dentate line, a similar area was excised for   biopsy and the site was touched up with cautery.    On proctoscopy, Ehsan-Miller anoscopy, the mucosa appeared generally normal.    On the right anal canal or distal  rectum or just right posterolateral, there   was somewhat firm and irregular whitish area of tissue that is probably scar   or simply hypertrophic tissue, but this area was excised with Metzenbaum   scissors and sent off the field for pathology.  The site was touched up with   cautery and a single 3-0 chromic suture was placed there for hemostasis.    Bupivacaine with epinephrine was infiltrated into all the biopsy sites.    Careful reinspection demonstrated no other suspicious areas.    COUNTS:  Needle, sponge and instrument counts were correct.    ESTIMATED BLOOD LOSS:  Minimal.    COMPLICATIONS:  None.       ____________________________________     MD VEENA DUKES / VIVIANA    DD:  12/19/2018 09:27:19  DT:  12/19/2018 10:06:01    D#:  [id number]  Job#:  [id number]

## 2018-12-19 NOTE — DISCHARGE INSTRUCTIONS
ACTIVITY: Rest and take it easy for the first 24 hours.  A responsible adult is recommended to remain with you during that time.  It is normal to feel sleepy.  We encourage you to not do anything that requires balance, judgment or coordination.    MILD FLU-LIKE SYMPTOMS ARE NORMAL. YOU MAY EXPERIENCE GENERALIZED MUSCLE ACHES, THROAT IRRITATION, HEADACHE AND/OR SOME NAUSEA.    FOR 24 HOURS DO NOT:  Drive, operate machinery or run household appliances.  Drink beer or alcoholic beverages.   Make important decisions or sign legal documents.    SPECIAL INSTRUCTIONS:   Anorectal Procedure Post-Op Instructions:    D/C instructions:    1. DIET: Upon discharge from the hospital start with light fluids then resume your normal preoperative diet. Depending on how you are feeling and whether you have nausea or not, you may wish to stay with a bland diet for the first few days. However, you can advance this as quickly as you feel ready.  Avoid foods that you know will cause constipation.  Stick with soft, bland foods that have been easy to digest in the past.    2. ACTIVITIES: Limit your activity for the first 5-7 days following surgery.  You may drive whenever you are off pain medications and are able to perform the activities needed to drive, i.e. turning, bending, twisting, etc.    3. BATHING/WOUND CARE: If your wound contains packing, when the packing falls out, you may leave it out or remove it yourself the day after surgery. If there is a drain in place this will stay in place until your follow up appointment. You will pass some blood and mucus for the first week or more, with or without bowel movements.  Temporary loss of bowel control and changes in rectal sensation is common due to swelling.  You can wear a pad or disposable undergarment to protect your clothes.  Lumps or tags always form and these are best left alone for twelve months, as they usually resolve with time.  You may get the wound wet at any time after  leaving the hospital. See instructions for Sitz baths below.    4. BOWEL FUNCTION: It is very important to keep your bowel movements soft.  Pain mediation and lack of activity will causes constipation.  Take a stool softener either prescribed or over the counter and make sure you are drinking about 64 oz (1/2 gallon) of water everyday.  Taking a fiber supplement like Benefiber or metamucil on a daily basis also helps.  Continue this practice even after you have fully recovered to keep your stools soft and to avoid straining.  If you have not moved your bowels by day 3 after surgery; take Lindsay lax, Milk of magnesia, or another laxative until you have a bowel movement.  DO NOT use suppositories or enemas    6. PAIN MEDICATION: You can expect a lot of pain after Anorectal surgery.  The effects of the local anesthesia used during your surgery may wear off as early as about six hours following surgery.  Frequent Sitz baths (see instructions below) to help with pain and spasms. It is important to remember not to take medications on an empty stomach as this may cause nausea.  Topical ointments may be prescribed or over the counter may be mildly helpful as well.  All of these methods may help you feel more comfortable, but none of them will completely relieve the pain.  You should notice a very slow gradual improvement over the first few weeks.  Only time will help you heal and relieve the pain.  If pain becomes severe and you are unable to control it with the above methods you may need to go to the emergency room for IV pain control.  If you need a pain medication refill please call the office during business hours.    7.CALL IF YOU HAVE: (1) Fevers to more than 101.50 F, (2) Unusual chest or leg pain, (3) Drainage or fluid from incision that may be foul smelling, increased tenderness or soreness at the wound or the wound edges are no longer together, redness or swelling at the incision site. Please do not hesitate to call  with any other questions.     8. APPOINTMENT: Contact our office at [phone] for a follow-up appointment in 3-4 weeks following your procedure.    SITZ BATHS:    First, you prepare some things such as large shallow plastic container, hot water, towels, and blanket. You can find large bowl which is approximately 8 inches depth. Another option is using bathtub but you may not be comfortable since sitz bath is more for use in submerging the buttocks and hip area not the legs. You can also buy the sitz bathtub which is available in the market. The bathtub products come in various options. They are equipped with different features. You just need to find the best product which matches with your needs. By using sitz bathtub, sitz bath at home can be done easier.     If you are using bowl or basin, you should place the bowl or basin in the bathtub or on a towel on the floor. It is better for you to use the easiest one for you to get up from. Next, you can fill the bowl one third full of medium hot water. You can use your elbow to test the water temperature. Make sure that the water temperature is in the comfortable level. You should not use your hands since they can tolerate higher temperature.     During sitz bath, you should keep a towel and blanket since they are handy to solve the spillage. They are also useful when you need warmth. To start the bath, you can lower yourself in the tub gently. Sit in the tub for 10-20 minutes or until the water cools considerably. If it becomes uncomfortable, you have to get out of the bath.     You can use sitz bath with hot water, cold water, or alternating between the two for maximum healing ability.     If you have any additional questions, please do not hesitate to call the office and speak to either myself or the physician on call.    Office address:  [address] [place], [place] [address]e [facility], [address], [place] [address]    [facility]  [phone]    DIET: To avoid nausea,  slowly advance diet as tolerated, avoiding spicy or greasy foods for the first day.  Add more substantial food to your diet according to your physician's instructions.  INCREASE FLUIDS AND FIBER TO AVOID CONSTIPATION.    FOLLOW-UP APPOINTMENT:  A follow-up appointment should be arranged with your doctor in 1-2 weeks; call to schedule.    You should CALL YOUR PHYSICIAN if you develop:  Fever greater than 101 degrees F.  Pain not relieved by medication, or persistent nausea or vomiting.  Excessive bleeding (blood soaking through dressing) or unexpected drainage from the wound.  Extreme redness or swelling around the incision site, drainage of pus or foul smelling drainage.  Inability to urinate or empty your bladder within 8 hours.  Problems with breathing or chest pain.    You should call 911 if you develop problems with breathing or chest pain.  If you are unable to contact your doctor or surgical center, you should go to the nearest emergency room or urgent care center.  Physician's telephone #: [phone]    If any questions arise, call your doctor.  If your doctor is not available, please feel free to call the Surgical Center at [phone].  The Center is open Monday through Friday from 7AM to 7PM.  You can also call the [employer] HOTLINE open 24 hours/day, 7 days/week and speak to a nurse at [phone], or toll free at [phone].    A registered nurse may call you a few days after your surgery to see how you are doing after your procedure.    MEDICATIONS: Resume taking daily medication.  Take prescribed pain medication with food.  If no medication is prescribed, you may take non-aspirin pain medication if needed.  PAIN MEDICATION CAN BE VERY CONSTIPATING.  Take a stool softener or laxative such as senokot, pericolace, or milk of magnesia if needed.      If your physician has prescribed pain medication that includes Acetaminophen (Tylenol), do not take additional Acetaminophen (Tylenol) while taking  the prescribed medication.    Depression / Suicide Risk    As you are discharged from this [facility] facility, it is important to learn how to keep safe from harming yourself.    Recognize the warning signs:  · Abrupt changes in personality, positive or negative- including increase in energy   · Giving away possessions  · Change in eating patterns- significant weight changes-  positive or negative  · Change in sleeping patterns- unable to sleep or sleeping all the time   · Unwillingness or inability to communicate  · Depression  · Unusual sadness, discouragement and loneliness  · Talk of wanting to die  · Neglect of personal appearance   · Rebelliousness- reckless behavior  · Withdrawal from people/activities they love  · Confusion- inability to concentrate     If you or a loved one observes any of these behaviors or has concerns about self-harm, here's what you can do:  · Talk about it- your feelings and reasons for harming yourself  · Remove any means that you might use to hurt yourself (examples: pills, rope, extension cords, firearm)  · Get professional help from the community (Mental Health, Substance Abuse, psychological counseling)  · Do not be alone:Call your Safe Contact- someone whom you trust who will be there for you.  · Call your local CRISIS HOTLINE 630-2507 or 050-748-1185  · Call your local Children's Mobile Crisis Response Team [place] (010) 007-3804 or www.Com[employer]  · Call the toll free National Suicide Prevention Hotlines   · National Suicide Prevention Lifeline 128-906-HTUQ (6295)  · National Hope Line Network 800-SUICIDE (791-3602)

## 2018-12-19 NOTE — PROGRESS NOTES
Med rec updated and complete  Allergies reviewed  Pt reports no vitamins.  Pt reports no antibiotics in the last 30 days.

## 2018-12-20 ENCOUNTER — HOSPITAL ENCOUNTER (OUTPATIENT)
Dept: LAB | Facility: MEDICAL CENTER | Age: 48
End: 2018-12-20
Attending: SPECIALIST
Payer: MEDICAID

## 2018-12-20 PROCEDURE — 87624 HPV HI-RISK TYP POOLED RSLT: CPT

## 2018-12-20 PROCEDURE — 88175 CYTOPATH C/V AUTO FLUID REDO: CPT

## 2018-12-21 LAB
CYTOLOGY REG CYTOL: NORMAL
HPV HR 12 DNA CVX QL NAA+PROBE: NEGATIVE
HPV16 DNA SPEC QL NAA+PROBE: NEGATIVE
HPV18 DNA SPEC QL NAA+PROBE: NEGATIVE
SPECIMEN SOURCE: NORMAL

## 2019-01-03 ENCOUNTER — PHYSICAL THERAPY (OUTPATIENT)
Dept: PHYSICAL THERAPY | Facility: REHABILITATION | Age: 49
End: 2019-01-03
Attending: NEUROLOGICAL SURGERY
Payer: MEDICAID

## 2019-01-03 DIAGNOSIS — M79.602 PAIN IN LEFT ARM: ICD-10-CM

## 2019-01-03 PROCEDURE — 97014 ELECTRIC STIMULATION THERAPY: CPT

## 2019-01-03 PROCEDURE — 97110 THERAPEUTIC EXERCISES: CPT

## 2019-01-03 NOTE — OP THERAPY DAILY TREATMENT
Outpatient Physical Therapy  DAILY TREATMENT     [facility] Physical Therapy [address].  [address]  [place] [address]  Phone:  [phone]  Fax:  [phone]    Date: 01/03/2019    Patient: Analia Wu  YOB: 1970  MRN: [id number]     Time Calculation  Start time: 1300  Stop time: 1345 Time Calculation (min): 45 minutes     Chief Complaint: No chief complaint on file.    Visit #: 5    SUBJECTIVE:  Doing well    OBJECTIVE:  Current objective measures: cervical ROM symetrical 80 degrees asymptomatic          Therapeutic Exercises (CPT 82187):     1. Cervical retraction, 4 x 30 sec    2. Foam roller cervical stab with alt shoulder flexion, horizontal abduction, x 15 each    3. Foam roller pec stretch, 3 x 30 sec    4. Cervical retraction/extension seated, x 15    5. [place] orange tband, 4 x 30 sec half roller    6. Wall angels, x 20     7. High, mid, low rows , x 20 pink tband    8. Lat pull with neutral cervical spine, x 15    Therapeutic Treatments and Modalities:     1. E Stim Unattended (CPT 40811), Citizen of [place] scapular retraction prone cervical neutral with MHP    Time-based treatments/modalities:         ASSESSMENT:   Response to treatment: asymptomatic and Independent with home program.  Patient has met all goals and is appropriate for discharge    PLAN/RECOMMENDATIONS:   Plan for treatment: therapy treatment to continue next visit.  Planned interventions for next visit: continue with current treatment.

## 2019-01-03 NOTE — OP THERAPY DISCHARGE SUMMARY
Outpatient Physical Therapy  DISCHARGE SUMMARY NOTE      [facility] Physical Therapy [address]  [address]  [place] [address]  Phone:  [phone]  Fax:  [phone]    Date of Visit: 2019    Patient: Analia Wu  YOB: 1970  MRN: [id number]     Referring Provider: Leon Pozo M.D.  [address]  [facility] [address]  [place] [address]   Referring Diagnosis Pain in left arm [M79.602]     Physical Therapy Occurrence Codes    Date of onset of impairment:  18   Date physical therapy care plan established or reviewed:  18   Date physical therapy treatment started:  18          Functional Limitations and Severity Modifiers      Goal:     Discharge:         Your patient is being discharged from Physical Therapy with the following comments:   · Goals met    Comments:  Patient is asymptomatic with all ADLS and is Independent with home exercise program.  Patient's cervical AROM increased from 50 degrees to 75 degrees     Limitations Remainin% restricted FWD cervical flexion and extension  Upper trap dominance    Recommendations:  Discharge with home program    Sonia Segura PT, MSPT    Date: 1/3/2019

## 2019-03-12 ENCOUNTER — NON-PROVIDER VISIT (OUTPATIENT)
Dept: NEUROLOGY | Facility: MEDICAL CENTER | Age: 49
End: 2019-03-12
Payer: MEDICAID

## 2019-03-12 DIAGNOSIS — M50.20 CERVICAL DISC DISPLACEMENT: ICD-10-CM

## 2019-03-12 DIAGNOSIS — M54.2 NECK PAIN: ICD-10-CM

## 2019-03-12 PROCEDURE — 95886 MUSC TEST DONE W/N TEST COMP: CPT | Performed by: SPECIALIST

## 2019-03-12 PROCEDURE — 95910 NRV CNDJ TEST 7-8 STUDIES: CPT | Performed by: SPECIALIST

## 2019-03-12 NOTE — PROCEDURES
"NERVE CONDUCTION STUDIES AND ELECTROMYOGRAPHY REPORT        03/12/19      Referring provider: ROXANN Bella      SUMMARY OF PATIENT'S CLINICAL HISTORY,PHYSICAL EXAM, AND RATIONALE FOR TESTING:    Ms. Analia Wu 48 y.o. presenting with left upper extremity numbness.    Past Medical History is significant for :   Past Medical History:   Diagnosis Date   • Anesthesia     nausea and vommiting,  \"shallow breathing\"   • Anxiety    • Arrhythmia    • ASTHMA     does not have any inhalers   • Cancer (HCC)     vulva   • Carpal tunnel syndrome    • Infectious disease 2007    Mrsa infection   • Pain 11-26-12    neck, left knee, 6-7/10   • Panic attack    • Psychiatric problem     depression, PTSD, anxiety   • Seasonal allergies          The electrodiagnostic studies were performed to evaluate for possible cervical radiculopathy versus peripheral neuropathy.      ELECTRODIAGNOSTIC EXAMINATION:  Nerve conduction studies (NCS) and electromyography (EMG) are utilized to evaluate direct or indirect damage to the peripheral nervous system. NCS are performed to measure the nerve(s) response(s) to electrostimulation across a given nerve segment. EMG evaluates the passive and active electrical activity of the muscle(s) in question.  Muscles are innervated by specific peripheral nerves and roots. Often times, several nerves the muscle to be examined in order to determine the presence or absence of the disease process. Furthermore, nerves and muscles may need to be tested in a [id number] comparison, as well as in additional extremities, as this may be crucial in characterizing the extent of the disease process, which may be diffuse or isolated and of varying degree of severity. The extent of the neurodiagnostic exam is justified as it may help arrive to a proper diagnosis, which ultimately may contribute to better management of the patient. Therefore, the nerves to muscles examined during the study were medically " necessary.    Unless otherwise noted, temperature of the extremity(s) study was monitored before and during the examination and remained between 32 and 36 degrees C for the upper extremities, and between 30 and 36 degrees C for the lower extremities.      NERVE CONDUCTION STUDIES:  Sensory nerves  - Bilateral Median sensory nerves were examined.The responses were within normal limits.  - Bilateral Ulnar sensory nerves were examined. The responses were within normal limits.  Motor nerves:  - Bilateral Median motor nerves were examined. Recording electrodes placed at the Abductor Pollicis Brevis muscles. The response was abnormal on the left with an onset latency of 4.5 ms and normal on the right with an onset latency of 3.0 ms.  - Bilateral Ulnar motor nerves were examined. Recording electrodes placed at the Abductor Digiti Minimi muscles. The responses were within normal limits.            ELECTROMYOGRAPHY:  The study was performed the concentric needle electrode. Fibrillation and fasciculation activity is graded by convention from none (0) to continuous (4+).  Needle electrode examination was performed in the following muscles: Bilateral deltoid, biceps, triceps, abductor pollicis brevis, and abductor digiti minimi.  The muscles tested demonstrated normal inserctional activity, normal motor unit morphology and recruitment. There were no elements suggestive of active denervation.    Nerve Conduction Studies     Stim Site NR Peak (ms) Norm Peak (ms) O-P Amp (µV) Norm O-P Amp Site1 Site2 Delta-P (ms) Dist (cm) [place] (m/s) Norm Troy (m/s)   Left Median Anti Sensory (2nd Digit)   Wrist    3.3 <3.4 22.4 >20 Wrist 2nd Digit 3.3 14.0 *42 >50   Right Median Anti Sensory (2nd Digit)   Wrist    3.0 <3.4 48.6 >20 Wrist 2nd Digit 3.0 14.0 *47 >50   Left Ulnar Anti Sensory (5th Digit)   Wrist    2.9 <3.1 17.4 >12 Wrist 5th Digit 2.9 14.0 *48 >50   Right Ulnar Anti Sensory (5th Digit)   Wrist    2.8 <3.1 41.9 >12 Wrist 5th Digit 2.8  14.0 50 >50        Stim Site NR Onset (ms) Norm Onset (ms) O-P Amp (mV) Norm O-P Amp Site1 Site2 Delta-0 (ms) Dist (cm) Troy (m/s) Norm Troy (m/s)   Left Median Motor (Abd Poll Brev)   Wrist    *4.5 <3.9 9.6 >6 Elbow Wrist 5.0 27.0 54 >50   Elbow    9.5  9.2          Right Median Motor (Abd Poll Brev)   Wrist    3.0 <3.9 10.7 >6 Elbow Wrist 4.9 25.0 51 >50   Elbow    7.9  6.7          Left Ulnar Motor (Abd Dig Min)   Wrist    2.5 <3.1 12.2 >7 B Elbow Wrist 3.3 17.0 52 >50   B Elbow    5.8  10.8  A Elbow B Elbow 1.5 10.0 67    A Elbow    7.3  9.6          Right Ulnar Motor (Abd Dig Min)   Wrist    2.4 <3.1 10.6 >7 B Elbow Wrist 3.6 20.0 56 >50   B Elbow    6.0  8.1  A Elbow B Elbow 1.3 10.0 77    A Elbow    7.3  7.6                                Electromyography     Side Muscle Nerve Root Ins Act Fibs Psw Amp Dur Poly Recrt Int Pat Comment   Right Deltoid Axillary C5-6 Nml Nml Nml Nml Nml 0 Nml Nml    Right Biceps Musculocut C5-6 Nml Nml Nml Nml Nml 0 Nml Nml    Right Triceps Radial C6-7-8 Nml Nml Nml Nml Nml 0 Nml Nml    Right Abd Poll Brev Median C8-T1 Nml Nml Nml Nml Nml 0 Nml Nml    Right Abd Dig Min Ulnar C8-T1 Nml Nml Nml Nml Nml 0 Nml Nml    Left Deltoid Axillary C5-6 Nml Nml Nml Nml Nml 0 Nml Nml    Left Biceps Musculocut C5-6 Nml Nml Nml Nml Nml 0 Nml Nml    Left Triceps Radial C6-7-8 Nml Nml Nml Nml Nml 0 Nml Nml          DIAGNOSTIC INTERPRETATION: Extensive electrodiagnostic studies were performed to the bilateral upper extremities.  The results are as follows:    1.  Left carpal tunnel syndrome which is mild electrophysiologically and not associated with motor unit changes in median nerve supplied hand muscles.    2.  No evidence of radiculopathy, selected muscle studied left upper extremity.    3.  Normal EMG and nerve conduction study right upper extremity.            ANTONIO Armendariz M.D.

## 2019-04-04 ENCOUNTER — HOSPITAL ENCOUNTER (OUTPATIENT)
Dept: HOSPITAL 8 - CFH | Age: 49
Discharge: HOME | End: 2019-04-04
Attending: NURSE PRACTITIONER
Payer: MEDICAID

## 2019-04-04 DIAGNOSIS — Z12.31: Primary | ICD-10-CM

## 2019-04-04 PROCEDURE — 77063 BREAST TOMOSYNTHESIS BI: CPT

## 2019-05-22 ENCOUNTER — HOSPITAL ENCOUNTER (OUTPATIENT)
Dept: LAB | Facility: MEDICAL CENTER | Age: 49
End: 2019-05-22
Attending: NURSE PRACTITIONER
Payer: MEDICAID

## 2019-05-22 LAB
ALBUMIN SERPL BCP-MCNC: 4.3 G/DL (ref 3.2–4.9)
ALBUMIN/GLOB SERPL: 1.4 G/DL
ALP SERPL-CCNC: 92 U/L (ref 30–99)
ALT SERPL-CCNC: 27 U/L (ref 2–50)
ANION GAP SERPL CALC-SCNC: 6 MMOL/L (ref 0–11.9)
AST SERPL-CCNC: 21 U/L (ref 12–45)
BASOPHILS # BLD AUTO: 0.6 % (ref 0–1.8)
BASOPHILS # BLD: 0.03 K/UL (ref 0–0.12)
BILIRUB SERPL-MCNC: 0.4 MG/DL (ref 0.1–1.5)
BUN SERPL-MCNC: 13 MG/DL (ref 8–22)
CALCIUM SERPL-MCNC: 9.8 MG/DL (ref 8.5–10.5)
CHLORIDE SERPL-SCNC: 108 MMOL/L (ref 96–112)
CHOLEST SERPL-MCNC: 230 MG/DL (ref 100–199)
CO2 SERPL-SCNC: 28 MMOL/L (ref 20–33)
CREAT SERPL-MCNC: 0.73 MG/DL (ref 0.5–1.4)
EOSINOPHIL # BLD AUTO: 0.06 K/UL (ref 0–0.51)
EOSINOPHIL NFR BLD: 1.3 % (ref 0–6.9)
ERYTHROCYTE [DISTWIDTH] IN BLOOD BY AUTOMATED COUNT: 45.7 FL (ref 35.9–50)
FASTING STATUS PATIENT QL REPORTED: NORMAL
GLOBULIN SER CALC-MCNC: 3 G/DL (ref 1.9–3.5)
GLUCOSE SERPL-MCNC: 102 MG/DL (ref 65–99)
HCT VFR BLD AUTO: 43.4 % (ref 37–47)
HDLC SERPL-MCNC: 56 MG/DL
HGB BLD-MCNC: 13.6 G/DL (ref 12–16)
IMM GRANULOCYTES # BLD AUTO: 0.01 K/UL (ref 0–0.11)
IMM GRANULOCYTES NFR BLD AUTO: 0.2 % (ref 0–0.9)
LDLC SERPL CALC-MCNC: 155 MG/DL
LYMPHOCYTES # BLD AUTO: 1.34 K/UL (ref 1–4.8)
LYMPHOCYTES NFR BLD: 28.3 % (ref 22–41)
MCH RBC QN AUTO: 28.4 PG (ref 27–33)
MCHC RBC AUTO-ENTMCNC: 31.3 G/DL (ref 33.6–35)
MCV RBC AUTO: 90.6 FL (ref 81.4–97.8)
MONOCYTES # BLD AUTO: 0.41 K/UL (ref 0–0.85)
MONOCYTES NFR BLD AUTO: 8.6 % (ref 0–13.4)
NEUTROPHILS # BLD AUTO: 2.89 K/UL (ref 2–7.15)
NEUTROPHILS NFR BLD: 61 % (ref 44–72)
NRBC # BLD AUTO: 0 K/UL
NRBC BLD-RTO: 0 /100 WBC
PLATELET # BLD AUTO: 203 K/UL (ref 164–446)
PMV BLD AUTO: 10.3 FL (ref 9–12.9)
POTASSIUM SERPL-SCNC: 4.3 MMOL/L (ref 3.6–5.5)
PROT SERPL-MCNC: 7.3 G/DL (ref 6–8.2)
RBC # BLD AUTO: 4.79 M/UL (ref 4.2–5.4)
SODIUM SERPL-SCNC: 142 MMOL/L (ref 135–145)
TRIGL SERPL-MCNC: 93 MG/DL (ref 0–149)
TSH SERPL DL<=0.005 MIU/L-ACNC: 1.38 UIU/ML (ref 0.38–5.33)
WBC # BLD AUTO: 4.7 K/UL (ref 4.8–10.8)

## 2019-05-22 PROCEDURE — 84443 ASSAY THYROID STIM HORMONE: CPT

## 2019-05-22 PROCEDURE — 85025 COMPLETE CBC W/AUTO DIFF WBC: CPT

## 2019-05-22 PROCEDURE — 80053 COMPREHEN METABOLIC PANEL: CPT

## 2019-05-22 PROCEDURE — 80061 LIPID PANEL: CPT

## 2019-05-22 PROCEDURE — 36415 COLL VENOUS BLD VENIPUNCTURE: CPT

## 2019-09-28 ENCOUNTER — HOSPITAL ENCOUNTER (OUTPATIENT)
Dept: RADIOLOGY | Facility: MEDICAL CENTER | Age: 49
End: 2019-09-28
Attending: PHYSICIAN ASSISTANT
Payer: MEDICAID

## 2019-09-28 DIAGNOSIS — G50.0 TRIGEMINAL NEURALGIA: ICD-10-CM

## 2019-09-28 PROCEDURE — 70544 MR ANGIOGRAPHY HEAD W/O DYE: CPT

## 2020-07-21 ENCOUNTER — HOSPITAL ENCOUNTER (OUTPATIENT)
Dept: LAB | Facility: MEDICAL CENTER | Age: 50
End: 2020-07-21
Attending: NURSE PRACTITIONER
Payer: MEDICAID

## 2020-07-21 LAB
ALBUMIN SERPL BCP-MCNC: 4.8 G/DL (ref 3.2–4.9)
ALBUMIN/GLOB SERPL: 1.9 G/DL
ALP SERPL-CCNC: 97 U/L (ref 30–99)
ALT SERPL-CCNC: 29 U/L (ref 2–50)
ANION GAP SERPL CALC-SCNC: 11 MMOL/L (ref 7–16)
AST SERPL-CCNC: 21 U/L (ref 12–45)
BASOPHILS # BLD AUTO: 0.2 % (ref 0–1.8)
BASOPHILS # BLD: 0.01 K/UL (ref 0–0.12)
BILIRUB SERPL-MCNC: 0.5 MG/DL (ref 0.1–1.5)
BUN SERPL-MCNC: 11 MG/DL (ref 8–22)
CALCIUM SERPL-MCNC: 10 MG/DL (ref 8.5–10.5)
CHLORIDE SERPL-SCNC: 107 MMOL/L (ref 96–112)
CHOLEST SERPL-MCNC: 209 MG/DL (ref 100–199)
CO2 SERPL-SCNC: 25 MMOL/L (ref 20–33)
CREAT SERPL-MCNC: 0.76 MG/DL (ref 0.5–1.4)
EOSINOPHIL # BLD AUTO: 0.06 K/UL (ref 0–0.51)
EOSINOPHIL NFR BLD: 1.2 % (ref 0–6.9)
ERYTHROCYTE [DISTWIDTH] IN BLOOD BY AUTOMATED COUNT: 42.5 FL (ref 35.9–50)
FASTING STATUS PATIENT QL REPORTED: NORMAL
GLOBULIN SER CALC-MCNC: 2.5 G/DL (ref 1.9–3.5)
GLUCOSE SERPL-MCNC: 105 MG/DL (ref 65–99)
HCT VFR BLD AUTO: 45.8 % (ref 37–47)
HDLC SERPL-MCNC: 52 MG/DL
HGB BLD-MCNC: 15 G/DL (ref 12–16)
IMM GRANULOCYTES # BLD AUTO: 0.01 K/UL (ref 0–0.11)
IMM GRANULOCYTES NFR BLD AUTO: 0.2 % (ref 0–0.9)
LDLC SERPL CALC-MCNC: 133 MG/DL
LYMPHOCYTES # BLD AUTO: 1.24 K/UL (ref 1–4.8)
LYMPHOCYTES NFR BLD: 25.2 % (ref 22–41)
MCH RBC QN AUTO: 30.8 PG (ref 27–33)
MCHC RBC AUTO-ENTMCNC: 32.8 G/DL (ref 33.6–35)
MCV RBC AUTO: 94 FL (ref 81.4–97.8)
MONOCYTES # BLD AUTO: 0.4 K/UL (ref 0–0.85)
MONOCYTES NFR BLD AUTO: 8.1 % (ref 0–13.4)
NEUTROPHILS # BLD AUTO: 3.2 K/UL (ref 2–7.15)
NEUTROPHILS NFR BLD: 65.1 % (ref 44–72)
NRBC # BLD AUTO: 0 K/UL
NRBC BLD-RTO: 0 /100 WBC
PLATELET # BLD AUTO: 224 K/UL (ref 164–446)
PMV BLD AUTO: 9.9 FL (ref 9–12.9)
POTASSIUM SERPL-SCNC: 4.1 MMOL/L (ref 3.6–5.5)
PROT SERPL-MCNC: 7.3 G/DL (ref 6–8.2)
RBC # BLD AUTO: 4.87 M/UL (ref 4.2–5.4)
SODIUM SERPL-SCNC: 143 MMOL/L (ref 135–145)
TRIGL SERPL-MCNC: 119 MG/DL (ref 0–149)
TSH SERPL DL<=0.005 MIU/L-ACNC: 1.56 UIU/ML (ref 0.38–5.33)
WBC # BLD AUTO: 4.9 K/UL (ref 4.8–10.8)

## 2020-07-21 PROCEDURE — 80053 COMPREHEN METABOLIC PANEL: CPT

## 2020-07-21 PROCEDURE — 36415 COLL VENOUS BLD VENIPUNCTURE: CPT

## 2020-07-21 PROCEDURE — 80061 LIPID PANEL: CPT

## 2020-07-21 PROCEDURE — 85025 COMPLETE CBC W/AUTO DIFF WBC: CPT

## 2020-07-21 PROCEDURE — 84443 ASSAY THYROID STIM HORMONE: CPT

## 2020-08-11 ENCOUNTER — OCCUPATIONAL THERAPY (OUTPATIENT)
Dept: OCCUPATIONAL THERAPY | Facility: REHABILITATION | Age: 50
End: 2020-08-11
Attending: PHYSICIAN ASSISTANT
Payer: MEDICAID

## 2020-08-11 DIAGNOSIS — G56.00 CARPAL TUNNEL SYNDROME, UNSPECIFIED LATERALITY: ICD-10-CM

## 2020-08-11 PROCEDURE — 97166 OT EVAL MOD COMPLEX 45 MIN: CPT

## 2020-08-11 SDOH — ECONOMIC STABILITY: GENERAL: QUALITY OF LIFE: FAIR

## 2020-08-11 ASSESSMENT — ENCOUNTER SYMPTOMS
PAIN LOCATION: LEFT DORSAL FOREARM
PAIN SCALE: 4
PAIN TIMING: INTERMITTENT
QUALITY: TINGLING
EXACERBATED BY: GRIPPING
EXACERBATED BY: ACTIVITY

## 2020-08-11 NOTE — OP THERAPY EVALUATION
"  Outpatient Occupational Therapy  HAND THERAPY INITIAL EVALUATION    [facility] Occupational Therapy [address]  [address]  [place] [address]  Phone:  [phone]  Fax:  [phone]    Date of Evaluation: 2020    Patient: Analia Wu  YOB: 1970  MRN: [id number]     Referring Provider: Christine Barrera P.A.-C.  [address] [place] [address]   Referring Diagnosis Carpal tunnel syndrome, unspecified upper limb [G56.00]     Time Calculation    Start time: 0330  Stop time: 0420 Time Calculation (min): 50 minutes             Chief Complaint: Carpal Tunnel (left)    Visit Diagnoses     ICD-10-CM   1. Carpal tunnel syndrome, unspecified laterality  G56.00       Subjective:   History of Present Illness:     Date of onset:  9/15/2019    Mechanism of injury:  Pt reports symptoms described as \"numbness and mild pain\" located over her left dorsal forearm which began approximately 1 year ago and is affecting her ability to manage her smart phone and engage in recreational activities such as candace.  Quality of life:  Fair  Prior level of function:  Independent ADLs, IADLs, and recreational activities.  Pain:     Current pain ratin    Location:  Left dorsal forearm    Quality:  Tingling    Pain timing:  Intermittent    Aggravating factors:  Gripping and activity (repetitive activity, using smart phone)    Progression:  Unchanged  Social Support:     Lives in:  Apartment    Lives with: 1 cat, 2 grandchildren.  Hand dominance:  Left  Treatments:     Previous treatment:  Physical therapy    Current treatment:  Brace (wears only at night for the past 4-5 months)  Activities of Daily Living:     Patient reported ADL status: Independent ADLs/IADLs and recreational activities with intermittent paresthesias  Patient Goals:     Patient goals for therapy:  Decreased pain, return to sport/leisure activities, increased strength and independence with ADLs/IADLs      Past Medical " "History:   Diagnosis Date   • Anesthesia     nausea and vommiting,  \"shallow breathing\"   • Anxiety    • Arrhythmia    • ASTHMA     does not have any inhalers   • Cancer (HCC)     vulva   • Carpal tunnel syndrome    • Infectious disease 2007    Mrsa infection   • Pain 11-26-12    neck, left knee, 6-7/10   • Panic attack    • Psychiatric problem     depression, PTSD, anxiety   • Seasonal allergies      Past Surgical History:   Procedure Laterality Date   • RECTAL EXPLORATION  12/19/2018    Procedure: RECTAL EXPLORATION - FOR HIGH RESOLUTION ANOSCOPY;  Surgeon: Billy Villegas M.D.;  Location: [facility];  Service: General   • ANAL FISTULECTOMY  5/24/2017    Procedure: ANAL FISTULECTOMY FOR: WIDE EXCISION ANAL DYSPLASIA;  Surgeon: Billy Villegas M.D.;  Location: [facility]ERY [facility];  Service:    • RECTAL EXPLORATION  9/2/2016    Procedure: RECTAL EXPLORATION eua, AND BIOPSY;  Surgeon: Billy Villegas M.D.;  Location: SUR[facility];  Service:    • VULVECTOMY RADICAL Bilateral 9/2/2016    Procedure: VULVECTOMY RADICAL;  Surgeon: Reese Carroll M.D.;  Location: [facility];  Service:    • RECTAL EXPLORATION  1/28/2015    Performed by Billy Villegas M.D. at SUR[facility]ERY [facility]   • EXAM UNDER ANESTHESIA  5/21/2014    Performed by Billy Villegas M.D. at [facility]   • RECTAL EXPLORATION  5/21/2014    Performed by Billy Villegas M.D. at SUR[facility]ERY [facility]   • WIDE EXCISION  12/19/2012    Performed by Billy Villegas M.D. at SUR[facility]   • WIDE EXCISION  2/29/2012    Performed by BILLY VILLEGAS at [facility]   • WIDE EXCISION  9/7/2011    Performed by BILLY VILLEGAS at SUR[facility]ERY [facility]   • CERVICAL DISK AND FUSION ANTERIOR  6/27/2011    Performed by YEIMI LEVY at SURGERY [facility]   • WIDE EXCISION  8/4/2010    Performed by BILLY VILLEGAS at [facility]   • HEMORRHOIDECTOMY  8/4/2010    Performed by BILLY VILLEGAS at SUR[facility]ERY " [facility]   • VULVECTOMY PARTIAL  2007   • RECTAL BIOPSY     • OTHER NEUROLOGICAL SURG      neck fusion     Social History     Tobacco Use   • Smoking status: Former Smoker     Packs/day: 1.00     Years: 25.00     Pack years: 25.00     Types: Cigarettes     Quit date: 10/4/2007     Years since quittin.8   • Smokeless tobacco: Never Used   • Tobacco comment:    Substance Use Topics   • Alcohol use: No     Family and Occupational History     Socioeconomic History   • Marital status: Single     Spouse name: Not on file   • Number of children: Not on file   • Years of education: Not on file   • Highest education level: Not on file   Occupational History   • Not on file       Objective   Observations   Additional Observation Details  Left elbow to digits with no observable erythema, edema, atrophy, skin or joint abnormalities.  Vascular intact.    Neurological Testing   Sensation   Wrist/Hand   Left   Paresthesia: light touch    Comments   Left light touch: paresthesias radial nerve distribution dorsal forearm.  no reports of paresthesias median nerve distribution           Tenderness     Left Wrist/Hand   Tenderness in the common extensor tendon and lateral epicondyle. No tenderness in the medial epicondyle.     Left Elbow   Tenderness in the common extensor tendon and lateral epicondyle. No tenderness in the medial epicondyle.     Active Range of Motion     Left Wrist   Normal active range of motion    Left Thumb     Normal active range of motion  Opposition: WNL    Left Digits    Normal active range of motion    Left Elbow   Normal active range of motion    Strength     Left Wrist/Hand   Normal wrist strength     (2nd hand position)     Trial 1: 55    Thumb Strength  Key/Lateral Pinch     Trial 1: 12    Right Wrist/Hand      (2nd hand position)     Trial 1: 65    Thumb Strength   Key/Lateral Pinch     Trial 1: 13    Left Elbow   Normal strength    Tests     Left Wrist/Hand   Positive  extrinsic extensor tightness and extrinsic flexor tightness.   Negative Finkelstein's, intrinsic muscle tightness, Phalen's sign, Tinel's sign (median nerve) and Tinel's sign (radial tunnel).     Right Wrist/Hand   Negative Phalen's sign and Tinel's sign (median nerve).     Left Elbow   Positive Cozen's.   Negative elbow flexion and Tinel's sign (cubital tunnel).     Additional Tests Details  Mild SF paresthesias during elbow flexion test.        Exercises/Treatments  Time-based treatments/modalities:         Assessment and Plan:   Problem list/assessment: abnormal or restricted ROM, decreased HEP knowledge, decreased sensation, decreased strength, limited ADL's and pain    Assessment details:  Pt is an anxious 49 y.o female who presents to outpatient OT functioning below baseline secondary to soft tissue restrictions in her left extrinsic extensors/flexors, decreased left  strength, and intermittent paresthesias/pain located over her left dorsal forearm which she reports increases when she engages in repetitive activities such as using her smart phone and candace.  Pt did not test positive or demonstrate symptoms consistent with CTS but did test positive for left lateral epicondylitis.     Goals:   Short Term Goals: decrease pain, increase ADL independence, increase soft tissue/skin mobility, increase strength and independent with HEP performance  Short term goal timespan:  2-4 weeks    Long Term Goals:   Pt will perform her ADL, IADL and recreational activities with <= 2/10 pain and only mild occasional paresthesias  Pt will increase her left  strength >= 5 lbs for opening jars and carrying grocery bags  Pt will be independent positioning and non-pharmacological pain management techniques   Pt will independently incorporate principles of joint protection, body mechanics and ergonomics into daily routine  Pt will be independent HEP for stretching and strengthening  Pt will score 80/80 on the UEFI  Long term  goal timespan:  2-4 weeks    Plan:   Occupational/Hand Therapy options:  Occupational therapy treatment to continue  Planned therapy interventions:  Home exercise training, pain management, AROM, A/AROM, PROM, passive stretch, graded resistive tasks to increase strength and patient/family/caregiver education  Other planned modality interventions: ultrasound, moist hot pack  Prognosis: good    Frequency:  2x week  Duration in weeks:  4  Duration in visits:  8  Discussed with:  Patient  Patient/caregiver understanding of therapy treatment and goals: Good understanding of therapy treatment and goals      Functional Assessment Used  OT Functional Assessment Tool Used: UEFI  OT Functional Assessment Score: 76/80     Referring provider co-signature:  I have reviewed this plan of care and my co-signature certifies the need for services.    Certification Period: 08/11/2020 to  10/13/20    Physician Signature: ________________________________ Date: ______________

## 2020-08-25 ENCOUNTER — OCCUPATIONAL THERAPY (OUTPATIENT)
Dept: OCCUPATIONAL THERAPY | Facility: REHABILITATION | Age: 50
End: 2020-08-25
Attending: PHYSICIAN ASSISTANT
Payer: MEDICAID

## 2020-08-25 DIAGNOSIS — G56.00 CARPAL TUNNEL SYNDROME, UNSPECIFIED LATERALITY: ICD-10-CM

## 2020-08-25 PROCEDURE — 97110 THERAPEUTIC EXERCISES: CPT

## 2020-08-25 NOTE — OP THERAPY DAILY TREATMENT
"  Outpatient Occupational Therapy  DAILY TREATMENT     [facility] Occupational Therapy [address].  [address]  [place] [address]  Phone:  [phone]  Fax:  [phone]    Date: 08/25/2020    Patient: Analia Wu  YOB: 1970  MRN: [id number]     Time Calculation  Start time: 1130  Stop time: 1200 Time Calculation (min): 30 minutes         Chief Complaint: Arm Problem (left extensor tightness)    Visit #: 2    SUBJECTIVE: \"My left forearm still feels tight\"    OBJECTIVE:  Current objective measures:   Left Cozen's Test: positive for lateral epicondylitis        Therapeutic Exercises (CPT 01216):     1. Left forearm to wrist    Therapeutic Exercise Summary:  Left wrist brace: volar metal bar adjusted for more neutral wrist position, pt reported splint felt comfortable.  To wear at night only.  TFM to common extensor origin, STM to extrinsic extensors with and without Guasha tool combined with active composite flexion/gross extension x 20 reps, active wrist flexion with 3 sec holds in flexed position, return to neutral x 12 reps.  Self-wrist stretches into flexion/extension x 30 seconds alternating 2 reps.  Active stretching with elbow flexed 90 degrees, flex wrist for 15 second holds, return to neutral x 10 reps.  To perform 3 x day.   Instructed on self TFM to common extensor origin to perform 5 minutes 2 x day.      Time-based treatments/modalities:  Therapeutic exercise minutes (CPT 08505): 30 minutes        ASSESSMENT:   Response to treatment:  Pt making good progress today with her left extrinsic extensor soft tissue mobility and flexibility in response to STM and graded stretches/exercises described above with reports of decreased stiffness following interventions.  HEP updated with good understanding.    PLAN/RECOMMENDATIONS:   Plan for treatment: therapy treatment to continue next visit.  Planned interventions for next visit: continue with current treatment and " therapeutic exercise (CPT 24850)

## 2020-08-27 ENCOUNTER — APPOINTMENT (OUTPATIENT)
Dept: OCCUPATIONAL THERAPY | Facility: REHABILITATION | Age: 50
End: 2020-08-27
Attending: PHYSICIAN ASSISTANT
Payer: MEDICAID

## 2020-09-01 ENCOUNTER — OCCUPATIONAL THERAPY (OUTPATIENT)
Dept: OCCUPATIONAL THERAPY | Facility: REHABILITATION | Age: 50
End: 2020-09-01
Attending: PHYSICIAN ASSISTANT
Payer: MEDICAID

## 2020-09-01 DIAGNOSIS — G56.00 CARPAL TUNNEL SYNDROME, UNSPECIFIED LATERALITY: ICD-10-CM

## 2020-09-01 PROCEDURE — 97110 THERAPEUTIC EXERCISES: CPT

## 2020-09-01 NOTE — OP THERAPY DAILY TREATMENT
"  Outpatient Occupational Therapy  DAILY TREATMENT     [facility] Occupational Therapy [address].  [address]  [place] [address]  Phone:  [phone]  Fax:  [phone]    Date: 09/01/2020    Patient: Analia Wu  YOB: 1970  MRN: [id number]     Time Calculation  Start time: 1130  Stop time: 1200 Time Calculation (min): 30 minutes         Chief Complaint: Arm Problem (left lateral epicondylitis)    Visit #: 3    SUBJECTIVE: \"The brace is more comfortable\"    OBJECTIVE:  Current objective measures:   Left Cozen's Test: positive for lateral epicondylitis but decreased intensity        Therapeutic Exercises (CPT 69378):     1. LUE    Therapeutic Exercise Summary:  TFM to common extensor origin, STM to extrinsic extensors with and without Guasha tool combined with active composite flexion/gross extension x 20 reps, active wrist flexion with 3 sec holds in flexed position, return to neutral x 15 reps.  Active stretching with elbow flexed 90 degrees and forearm pronated for active wrist flexion for 15 second holds, return to neutral x 10 reps.  To perform 3 x day.  Pronation/supination with shoulder at 90 degrees of abduction.with 2lb weight x 10 reps of 2 second holds.  To perform 2 x day.  Static radial nerve glide x 30 seconds, adding slight contralateral head tilt for an additional 30 seconds..        Time-based treatments/modalities:  Therapeutic exercise minutes (CPT 20728): 30 minutes          ASSESSMENT:   Response to treatment:  Pt making progress with her left extrinsic extensor soft tissue mobility and strength for her ADLs and IADLs in response to stretching and graded active exercises described above with decreased reports of discomfort.  HEP updated with good understanding.    PLAN/RECOMMENDATIONS:   Plan for treatment: therapy treatment to continue next visit.  Planned interventions for next visit: continue with current treatment and therapeutic exercise (CPT " 17969)

## 2020-09-03 ENCOUNTER — OCCUPATIONAL THERAPY (OUTPATIENT)
Dept: OCCUPATIONAL THERAPY | Facility: REHABILITATION | Age: 50
End: 2020-09-03
Attending: PHYSICIAN ASSISTANT
Payer: MEDICAID

## 2020-09-03 DIAGNOSIS — G56.00 CARPAL TUNNEL SYNDROME, UNSPECIFIED LATERALITY: ICD-10-CM

## 2020-09-03 PROCEDURE — 97110 THERAPEUTIC EXERCISES: CPT

## 2020-09-08 ENCOUNTER — OCCUPATIONAL THERAPY (OUTPATIENT)
Dept: OCCUPATIONAL THERAPY | Facility: REHABILITATION | Age: 50
End: 2020-09-08
Attending: PHYSICIAN ASSISTANT
Payer: MEDICAID

## 2020-09-08 DIAGNOSIS — G56.00 CARPAL TUNNEL SYNDROME, UNSPECIFIED LATERALITY: ICD-10-CM

## 2020-09-08 PROCEDURE — 97110 THERAPEUTIC EXERCISES: CPT

## 2020-09-08 NOTE — OP THERAPY DAILY TREATMENT
"  Outpatient Occupational Therapy  DAILY TREATMENT     [facility] Occupational Therapy [address]  [address]  [place] [address]  Phone:  [phone]  Fax:  [phone]    Date: 09/08/2020    Patient: Analia Wu  YOB: 1970  MRN: [id number]     Time Calculation  Start time: 1100  Stop time: 1130 Time Calculation (min): 30 minutes         Chief Complaint: Arm Problem (left lateral epicondylitis)    Visit #: 5    SUBJECTIVE:  \"My left forearm is better\"    OBJECTIVE:  Current objective measures:   Left Cozen's Test: positive for lateral epicondylitis but with decreased intensity  Left extrinsic extensor soft tissue mobility: mild tightness           Therapeutic Exercises (CPT 97542):     1. LUE    Therapeutic Exercise Summary:  Ultrasound to left common extensor origin and extrinsic extensors: Applicator size 5cm2, Frequency 3.3 MHz, Amplitude 2.5 W/cm2 x 5 minutes.  TFM to common extensor origin, STM to extrinsic extensors with and without Guasha tool combined with active composite flexion/gross extension x 20 reps with wrist in flexed position, active wrist flexion/extension with 3 sec holds in each position x 15 reps.  Self-wrist stretches into flexion/extension x 30 seconds.   Active stretching of wrist into flexion with elbow extended, forearm pronated, shoulder at 90 degrees of ff with 15 second holds in flexed position x 10 reps.  Wrist strengthening with elbow flexed and 1lb weight x 15 reps, 2 sets.      Time-based treatments/modalities:  Therapeutic exercise minutes (CPT 65374): 30 minutes          ASSESSMENT:   Response to treatment:  Pt making progress with her left extrinsic extensor soft tissue mobility, flexibility, and strength for her daily routine in response to ultrasound, STM, and graded exercises described above.  HEP updated with good understanding.    PLAN/RECOMMENDATIONS:   Plan for treatment: therapy treatment to continue next visit.  Planned " interventions for next visit: continue with current treatment and therapeutic exercise (CPT 97273)

## 2020-09-10 ENCOUNTER — OCCUPATIONAL THERAPY (OUTPATIENT)
Dept: OCCUPATIONAL THERAPY | Facility: REHABILITATION | Age: 50
End: 2020-09-10
Attending: PHYSICIAN ASSISTANT
Payer: MEDICAID

## 2020-09-10 DIAGNOSIS — G56.00 CARPAL TUNNEL SYNDROME, UNSPECIFIED LATERALITY: ICD-10-CM

## 2020-09-10 PROCEDURE — 97110 THERAPEUTIC EXERCISES: CPT

## 2020-09-10 NOTE — OP THERAPY DISCHARGE SUMMARY
Outpatient Occupational Therapy  DISCHARGE SUMMARY NOTE    [facility] Occupational Therapy [address]  [address]  [place] [address]  Phone:  [phone]  Fax:  [phone]    Date of Visit: 09/10/2020    Patient: Analia Wu  YOB: 1970  MRN: [id number]     Referring Provider: Christine Barrera P.A.-C.  [address] [place] [address]   Referring Diagnosis: Carpal tunnel syndrome, unspecified upper limb [G56.00]     Occupational Therapy Occurrence Codes           Functional Assessment Used  OT Functional Assessment Tool Used: UEFI  OT Functional Assessment Score: 79/80     Your patient is being discharged from Occupational Therapy with the following comments:   · Goals met    Comments:  Pt has actively participated in her skilled OT program and has made excellent progress to fully meet the goals set in her initial plan of care.  Pt has made steady gains with her left extrinsic soft tissue mobility, flexibility, and  strength to independently perform her ADLs, IADLs, and recreational activities without reports of pain or paresthesias.  Pt now tests negative for left lateral epicondylitis.  Pt is independent with her HEP and she is safe for d/c.    Limitations Remaining:  None    Recommendations:  D/C to HEP.  D/C OT.    Kyle Chatterjee MS,OTR/L    Date: 9/10/2020

## 2020-09-10 NOTE — OP THERAPY DAILY TREATMENT
"  Outpatient Occupational Therapy  DAILY TREATMENT     [facility] Occupational Therapy [address].  [address]  [place] [address]  Phone:  [phone]  Fax:  [phone]    Date: 09/10/2020    Patient: Analia Wu  YOB: 1970  MRN: [id number]     Time Calculation  Start time: 1100  Stop time: 1130 Time Calculation (min): 30 minutes         Chief Complaint: Arm Problem (left lateral epicondylitis)    Visit #: 6    SUBJECTIVE: \"I'm not having any pain\"    OBJECTIVE:  Current objective measures:   Left Cozen's Test: negative for lateral epicondylitis, common extensor origin with no TTP  Left extrinsic flexor/extensor soft tissue mobility: normal  Tinel's Sign at wrist: negative bilaterally  Phalen's Test: negative bilaterally  Pain: 0/10  Sensation: no reports of paresthesias  BUE AROM WNL, strength 5/5   strength:  Left: 75lbs, Right: 65lbs  ADLs/IADLs/recreational activities: Independent  Positioning: occasionally wearing wrist brace at night only, instructed to wean off.  HEP:  Independent     OT Functional Assessment Tool Used: UEFI  OT Functional Assessment Score: 79/80     Therapeutic Exercises (CPT 60023):     1. OSCARE    Therapeutic Exercise Summary:  Reviewed and/or performed examples of all exercises in current HEP including: passive/active wrist stretches into flexion/extension, wrist strengthening flexion/extension using 2lb weight g.e plane x 15 reps, pronation/supination using 2lb weight with shoulder at 90 degrees of abduction, TFM to common extensor origin, static radial nerve glide x 30 seconds, adding slight contralateral heat tilt for an additional 30 seconds.  To perform strengthening exercises 3 x week, stretching daily prior to candace.      Time-based treatments/modalities:  Therapeutic exercise minutes (CPT 07144): 30 minutes        ASSESSMENT:   Response to treatment:  Pt has actively participated in her skilled OT program and has made excellent progress " to fully meet the goals set in her initial plan of care.  Pt has made steady gains with her left extrinsic soft tissue mobility, flexibility, and  strength to independently perform her ADLs, IADLs, and recreational activities without reports of pain or paresthesias.  Pt now tests negative for left lateral epicondylitis.  Pt is independent with her HEP and she is safe for d/c.    PLAN/RECOMMENDATIONS:   Plan for treatment: discharge patient due to accomplished goals.  Planned interventions for next visit: D/C to HEP.  D/C OT.

## 2021-05-24 ENCOUNTER — APPOINTMENT (OUTPATIENT)
Dept: PHYSICAL THERAPY | Facility: REHABILITATION | Age: 51
End: 2021-05-24
Attending: PHYSICIAN ASSISTANT
Payer: MEDICAID

## 2021-05-27 ENCOUNTER — APPOINTMENT (OUTPATIENT)
Dept: PHYSICAL THERAPY | Facility: REHABILITATION | Age: 51
End: 2021-05-27
Attending: PHYSICIAN ASSISTANT
Payer: MEDICAID

## 2021-07-08 ENCOUNTER — PHYSICAL THERAPY (OUTPATIENT)
Dept: PHYSICAL THERAPY | Facility: REHABILITATION | Age: 51
End: 2021-07-08
Attending: PHYSICIAN ASSISTANT
Payer: MEDICAID

## 2021-07-08 DIAGNOSIS — G50.0 TRIGEMINAL NEURALGIA: ICD-10-CM

## 2021-07-08 PROCEDURE — 97161 PT EVAL LOW COMPLEX 20 MIN: CPT

## 2021-07-08 SDOH — ECONOMIC STABILITY: GENERAL: QUALITY OF LIFE: EXCELLENT

## 2021-07-08 ASSESSMENT — ENCOUNTER SYMPTOMS
ALLEVIATING FACTORS: PAIN MEDICATION
QUALITY: NUMBNESS
PAIN SCALE: 4
MIGRAINE HEADACHES: 1
PAIN SCALE AT HIGHEST: 7
PAIN SCALE AT LOWEST: 3
PAIN TIMING: CONSTANT
PAIN LOCATION: L SIDE OF FACE
QUALITY: BURNING

## 2021-07-08 NOTE — OP THERAPY DISCHARGE SUMMARY
Outpatient Physical Therapy  DISCHARGE SUMMARY NOTE      [facility] Physical Therapy [place] [address]  [address]  [place] [address]  Phone:  [phone]  Fax:  [phone]    Date of Visit: 07/08/2021    Patient: Analia Wu  YOB: 1970  MRN: [id number]     Referring Provider: Christine Barrera P.A.-C.  [address] [address]   Referring Diagnosis Trigeminal neuralgia [G50.0]         Functional Assessment Used        Your patient is being discharged from Physical Therapy with the following comments:   · Pt not appropriate for physical therapy intervention    Comments:  Analia Wu is a 50 y.o. female who presents to skilled physical therapist initial evaluation with current complaints of L sided trigeminal neuralgia related to history of migraines. The pt does not present with any objective impairments or functional limitations that would be appropriate for physical therapy intervention. The pt would benefit from referral to another specialist to determine cause of her current symptoms and to establish treatment plan of care. Pt will be discharged from physical therapy intervention at this time.     Limitations Remaining:  Pt with L sided trigeminal neuralgia.     Recommendations:  Recommend that pt follow up with pain management next week.     Lnida Roberto, PT    Date: 7/8/2021

## 2021-07-08 NOTE — OP THERAPY EVALUATION
"  Outpatient Physical Therapy  INITIAL EVALUATION    [facility] [address]  [address]  [place] [address]  Phone:  [phone]  Fax:  [phone]    Date of Evaluation: 2021    Patient: Analia Wu  YOB: 1970  MRN: [id number]     Referring Provider: Christine Barrera P.A.-C.  [address] [id number]   Referring Diagnosis Trigeminal neuralgia [G50.0]     Time Calculation  Start time: 1115  Stop time: 1130 Time Calculation (min): 15 minutes         Chief Complaint: Headache    Visit Diagnoses     ICD-10-CM   1. Trigeminal neuralgia  G50.0       Date of onset of impairment: 2021    Subjective:   History of Present Illness:     Date of onset:  2021    Mechanism of injury:  The pt states that she has been feeling a burning and tingling sensation in the L side of her face for several years. The pt has a history of migraine for at least 5-6 years. The pt states that she believes that her migraines are related to the pain she gets in her face. The pt is taking gabapentin but has been off it for at least a month because she hasn't gotten her prescription refilled. She reports a \"dullness\" in the area. Pt maybe getting a procedure with a needle for the trigeminal neuralgia, is unsure why she is in physical therapy. The gabapentin does seem to help but she is not getting the prescription refilled, uncertain of reason.   Quality of life:  Excellent  Prior level of function:  Pt independent with all IADLs, no functional limitations secondary to trigeminal neuralgia  Headaches:  migraine headaches  Headache comments: Pt has not had migraine in several months  Ear problems: none  Sleep disturbance:  Not disrupted  Pain:     Current pain ratin    At best pain rating:  3    At worst pain ratin    Location:  L side of face    Quality:  Numbness and burning    Pain timing:  Constant    Relieving factors:  Pain medication    Exacerbated " "by: Migraine.    Progression:  Stable  Diagnostic Tests:     MRI studies: normal    Treatments:     None        Past Medical History:   Diagnosis Date   • Anesthesia     nausea and vommiting,  \"shallow breathing\"   • Anxiety    • Arrhythmia    • ASTHMA     does not have any inhalers   • Cancer (HCC)     vulva   • Carpal tunnel syndrome    • Infectious disease 2007    Mrsa infection   • Pain 11-26-12    neck, left knee, 6-7/10   • Panic attack    • Psychiatric problem     depression, PTSD, anxiety   • Seasonal allergies      Past Surgical History:   Procedure Laterality Date   • RECTAL EXPLORATION  12/19/2018    Procedure: RECTAL EXPLORATION - FOR HIGH RESOLUTION ANOSCOPY;  Surgeon: Billy Villegas M.D.;  Location: SURGERY [facility];  Service: General   • ANAL FISTULECTOMY  5/24/2017    Procedure: ANAL FISTULECTOMY FOR: WIDE EXCISION ANAL DYSPLASIA;  Surgeon: Billy Villegas M.D.;  Location: SURGERY [facility];  Service:    • RECTAL EXPLORATION  9/2/2016    Procedure: RECTAL EXPLORATION eua, AND BIOPSY;  Surgeon: Billy Villegas M.D.;  Location: SURGERY [facility];  Service:    • VULVECTOMY RADICAL Bilateral 9/2/2016    Procedure: VULVECTOMY RADICAL;  Surgeon: Reese Carroll M.D.;  Location: SURGERY [facility];  Service:    • RECTAL EXPLORATION  1/28/2015    Performed by Billy Villegas M.D. at [facility]   • EXAM UNDER ANESTHESIA  5/21/2014    Performed by Billy Villegas M.D. at [facility]   • RECTAL EXPLORATION  5/21/2014    Performed by Billy Villegas M.D. at [facility]   • WIDE EXCISION  12/19/2012    Performed by Billy Villegas M.D. at [facility]   • WIDE EXCISION  2/29/2012    Performed by BILLY VILLEGAS at [facility]   • WIDE EXCISION  9/7/2011    Performed by BILLY VILLEGAS at [facility]   • CERVICAL DISK AND FUSION ANTERIOR  6/27/2011    Performed by YEIMI LEVY at [facility]   • WIDE EXCISION  8/4/2010    " Performed by BRYANT VILLEGAS at SURGERY [facility]   • HEMORRHOIDECTOMY  2010    Performed by BRYANT VILLEGAS at SUR[facility]ERY [facility]   • VULVECTOMY PARTIAL  2007   • RECTAL BIOPSY     • OTHER NEUROLOGICAL SURG      neck fusion     Social History     Tobacco Use   • Smoking status: Former Smoker     Packs/day: 1.00     Years: 25.00     Pack years: 25.00     Types: Cigarettes     Quit date: 10/4/2007     Years since quittin.7   • Smokeless tobacco: Never Used   • Tobacco comment:    Substance Use Topics   • Alcohol use: No     Family and Occupational History     Socioeconomic History   • Marital status: Single     Spouse name: Not on file   • Number of children: Not on file   • Years of education: Not on file   • Highest education level: Not on file   Occupational History   • Not on file       Objective     Observations     Additional Observation Details  Facial nerve motor testing: WNL    Postural Observations  Seated posture: good  Standing posture: good        Cervical Screen    Cervical range of motion within normal limits  Thoracic Screen    Thoracic range of motion within normal limits  Shoulder Screen    Shoulder active range of motion within functional limits.      Exercises/Treatment  Time-based treatments/modalities:           Assessment, Response and Plan:   Impairments: hypersensitivity    Assessment details:  Analia Wu is a 50 y.o. female who presents to skilled physical therapist initial evaluation with current complaints of L sided trigeminal neuralgia related to history of migraines. The pt does not present with any objective impairments or functional limitations that would be appropriate for physical therapy intervention. The pt would benefit from referral to another specialist to determine cause of her current symptoms and to establish treatment plan of care. Pt will be discharged from physical therapy intervention at this time.   Goals:   Short Term Goals:   1. Pt  will receive referral from pain management to headache specialist or dentist for further evaluation of her symptoms      Long Term Goals:    No established long term goals, 1x visit only    Plan:   Therapy options:  Referred to other specialist (Recommend referral to headache specialist or denist that specializes in trigeminal neuralgia)  Frequency:  1x week  Duration in weeks:  1  Duration in visits:  1  Discussed with:  Patient  Plan details:  Discussed pt's current clinical presentation and explanation of reason for current discharge from physical therapy intervention. Pt expressed understanding.       Functional Assessment Used        Referring provider co-signature:  I have reviewed this plan of care and my co-signature certifies the need for services.    Certification Period: 07/08/2021 to  07/15/21    Physician Signature: ________________________________ Date: ______________

## 2021-12-07 ENCOUNTER — PRE-ADMISSION TESTING (OUTPATIENT)
Dept: ADMISSIONS | Facility: MEDICAL CENTER | Age: 51
End: 2021-12-07
Attending: COLON & RECTAL SURGERY
Payer: MEDICAID

## 2021-12-07 VITALS — BODY MASS INDEX: 28.41 KG/M2 | WEIGHT: 181 LBS | HEIGHT: 67 IN

## 2021-12-07 RX ORDER — GABAPENTIN 300 MG/1
300 CAPSULE ORAL 2 TIMES DAILY
COMMUNITY
Start: 2021-11-13

## 2021-12-07 RX ORDER — VENLAFAXINE HYDROCHLORIDE 75 MG/1
75 CAPSULE, EXTENDED RELEASE ORAL DAILY
COMMUNITY
Start: 2021-11-29

## 2021-12-07 ASSESSMENT — FIBROSIS 4 INDEX: FIB4 SCORE: 0.91

## 2021-12-08 ENCOUNTER — HOSPITAL ENCOUNTER (OUTPATIENT)
Facility: MEDICAL CENTER | Age: 51
End: 2021-12-08
Attending: COLON & RECTAL SURGERY | Admitting: COLON & RECTAL SURGERY
Payer: MEDICAID

## 2021-12-08 VITALS
WEIGHT: 181.66 LBS | RESPIRATION RATE: 16 BRPM | OXYGEN SATURATION: 94 % | TEMPERATURE: 98.6 F | DIASTOLIC BLOOD PRESSURE: 85 MMHG | SYSTOLIC BLOOD PRESSURE: 117 MMHG | HEART RATE: 106 BPM | HEIGHT: 67 IN | BODY MASS INDEX: 28.51 KG/M2

## 2021-12-08 LAB
EXTERNAL QUALITY CONTROL: NORMAL
SARS-COV+SARS-COV-2 AG RESP QL IA.RAPID: POSITIVE
SARS-COV-2 RNA RESP QL NAA+PROBE: NOTDETECTED
SPECIMEN SOURCE: NORMAL

## 2021-12-08 PROCEDURE — U0003 INFECTIOUS AGENT DETECTION BY NUCLEIC ACID (DNA OR RNA); SEVERE ACUTE RESPIRATORY SYNDROME CORONAVIRUS 2 (SARS-COV-2) (CORONAVIRUS DISEASE [COVID-19]), AMPLIFIED PROBE TECHNIQUE, MAKING USE OF HIGH THROUGHPUT TECHNOLOGIES AS DESCRIBED BY CMS-2020-01-R: HCPCS

## 2021-12-08 PROCEDURE — U0005 INFEC AGEN DETEC AMPLI PROBE: HCPCS

## 2021-12-08 PROCEDURE — 87426 SARSCOV CORONAVIRUS AG IA: CPT | Performed by: COLON & RECTAL SURGERY

## 2021-12-08 RX ORDER — SODIUM CHLORIDE, SODIUM LACTATE, POTASSIUM CHLORIDE, CALCIUM CHLORIDE 600; 310; 30; 20 MG/100ML; MG/100ML; MG/100ML; MG/100ML
INJECTION, SOLUTION INTRAVENOUS CONTINUOUS
Status: DISCONTINUED | OUTPATIENT
Start: 2021-12-08 | End: 2021-12-08 | Stop reason: HOSPADM

## 2021-12-08 ASSESSMENT — FIBROSIS 4 INDEX: FIB4 SCORE: 0.91

## 2021-12-08 NOTE — DISCHARGE INSTRUCTIONS
COVID-19  COVID-19 is a respiratory infection that is caused by a virus called severe acute respiratory syndrome coronavirus 2 (SARS-CoV-2). The disease is also known as coronavirus disease or novel coronavirus. In some people, the virus may not cause any symptoms. In others, it may cause a serious infection. The infection can get worse quickly and can lead to complications, such as:  · Pneumonia, or infection of the lungs.  · Acute respiratory distress syndrome or ARDS. This is fluid build-up in the lungs.  · Acute respiratory failure. This is a condition in which there is not enough oxygen passing from the lungs to the body.  · Sepsis or septic shock. This is a serious bodily reaction to an infection.  · Blood clotting problems.  · Secondary infections due to bacteria or fungus.  The virus that causes COVID-19 is contagious. This means that it can spread from person to person through droplets from coughs and sneezes (respiratory secretions).  What are the causes?  This illness is caused by a virus. You may catch the virus by:  · Breathing in droplets from an infected person's cough or sneeze.  · Touching something, like a table or a doorknob, that was exposed to the virus (contaminated) and then touching your mouth, nose, or eyes.  What increases the risk?  Risk for infection  You are more likely to be infected with this virus if you:  · Live in or travel to an area with a COVID-19 outbreak.  · Come in contact with a sick person who recently traveled to an area with a COVID-19 outbreak.  · Provide care for or live with a person who is infected with COVID-19.  Risk for serious illness  You are more likely to become seriously ill from the virus if you:  · Are 65 years of age or older.  · Have a long-term disease that lowers your body's ability to fight infection (immunocompromised).  · Live in a nursing home or long-term care facility.  · Have a long-term (chronic) disease such as:  ? Chronic lung disease, including  chronic obstructive pulmonary disease or asthma  ? Heart disease.  ? Diabetes.  ? Chronic kidney disease.  ? Liver disease.  · Are obese.  What are the signs or symptoms?  Symptoms of this condition can range from mild to severe. Symptoms may appear any time from 2 to 14 days after being exposed to the virus. They include:  · A fever.  · A cough.  · Difficulty breathing.  · Chills.  · Muscle pains.  · A sore throat.  · Loss of taste or smell.  Some people may also have stomach problems, such as nausea, vomiting, or diarrhea.  Other people may not have any symptoms of COVID-19.  How is this diagnosed?  This condition may be diagnosed based on:  · Your signs and symptoms, especially if:  ? You live in an area with a COVID-19 outbreak.  ? You recently traveled to or from an area where the virus is common.  ? You provide care for or live with a person who was diagnosed with COVID-19.  · A physical exam.  · Lab tests, which may include:  ? A nasal swab to take a sample of fluid from your nose.  ? A throat swab to take a sample of fluid from your throat.  ? A sample of mucus from your lungs (sputum).  ? Blood tests.  · Imaging tests, which may include, X-rays, CT scan, or ultrasound.  How is this treated?  At present, there is no medicine to treat COVID-19. Medicines that treat other diseases are being used on a trial basis to see if they are effective against COVID-19.  Your health care provider will talk with you about ways to treat your symptoms. For most people, the infection is mild and can be managed at home with rest, fluids, and over-the-counter medicines.  Treatment for a serious infection usually takes places in a hospital intensive care unit (ICU). It may include one or more of the following treatments. These treatments are given until your symptoms improve.  · Receiving fluids and medicines through an IV.  · Supplemental oxygen. Extra oxygen is given through a tube in the nose, a face mask, or a  mckenna.  · Positioning you to lie on your stomach (prone position). This makes it easier for oxygen to get into the lungs.  · Continuous positive airway pressure (CPAP) or bi-level positive airway pressure (BPAP) machine. This treatment uses mild air pressure to keep the airways open. A tube that is connected to a motor delivers oxygen to the body.  · Ventilator. This treatment moves air into and out of the lungs by using a tube that is placed in your windpipe.  · Tracheostomy. This is a procedure to create a hole in the neck so that a breathing tube can be inserted.  · Extracorporeal membrane oxygenation (ECMO). This procedure gives the lungs a chance to recover by taking over the functions of the heart and lungs. It supplies oxygen to the body and removes carbon dioxide.  Follow these instructions at home:  Lifestyle  · If you are sick, stay home except to get medical care. Your health care provider will tell you how long to stay home. Call your health care provider before you go for medical care.  · Rest at home as told by your health care provider.  · Do not use any products that contain nicotine or tobacco, such as cigarettes, e-cigarettes, and chewing tobacco. If you need help quitting, ask your health care provider.  · Return to your normal activities as told by your health care provider. Ask your health care provider what activities are safe for you.  General instructions  · Take over-the-counter and prescription medicines only as told by your health care provider.  · Drink enough fluid to keep your urine pale yellow.  · Keep all follow-up visits as told by your health care provider. This is important.  How is this prevented?    There is no vaccine to help prevent COVID-19 infection. However, there are steps you can take to protect yourself and others from this virus.  To protect yourself:   · Do not travel to areas where COVID-19 is a risk. The areas where COVID-19 is reported change often. To identify  high-risk areas and travel restrictions, check the [facility] travel website: wwwnc.cdc.gov/travel/notices  · If you live in, or must travel to, an area where COVID-19 is a risk, take precautions to avoid infection.  ? Stay away from people who are sick.  ? Wash your hands often with soap and water for 20 seconds. If soap and water are not available, use an alcohol-based hand .  ? Avoid touching your mouth, face, eyes, or nose.  ? Avoid going out in public, follow guidance from your state and local health authorities.  ? If you must go out in public, wear a cloth face covering or face mask.  ? Disinfect objects and surfaces that are frequently touched every day. This may include:  § Counters and tables.  § Doorknobs and light switches.  § Sinks and faucets.  § Electronics, such as phones, remote controls, keyboards, computers, and tablets.  To protect others:  If you have symptoms of COVID-19, take steps to prevent the virus from spreading to others.  · If you think you have a COVID-19 infection, contact your health care provider right away. Tell your health care team that you think you may have a COVID-19 infection.  · Stay home. Leave your house only to seek medical care. Do not use public transport.  · Do not travel while you are sick.  · Wash your hands often with soap and water for 20 seconds. If soap and water are not available, use alcohol-based hand .  · Stay away from other members of your household. Let healthy household members care for children and pets, if possible. If you have to care for children or pets, wash your hands often and wear a mask. If possible, stay in your own room, separate from others. Use a different bathroom.  · Make sure that all people in your household wash their hands well and often.  · Cough or sneeze into a tissue or your sleeve or elbow. Do not cough or sneeze into your hand or into the air.  · Wear a cloth face covering or face mask.  Where to find more  information  · Centers for Disease Control and Prevention: www.cdc.gov/coronavirus/2019-ncov/index.html  · World Health Organization: www.who.int/health-topics/coronavirus  Contact a health care provider if:  · You live in or have traveled to an area where COVID-19 is a risk and you have symptoms of the infection.  · You have had contact with someone who has COVID-19 and you have symptoms of the infection.  Get help right away if:  · You have trouble breathing.  · You have pain or pressure in your chest.  · You have confusion.  · You have bluish lips and fingernails.  · You have difficulty waking from sleep.  · You have symptoms that get worse.  These symptoms may represent a serious problem that is an emergency. Do not wait to see if the symptoms will go away. Get medical help right away. Call your local emergency services (911 in the [place] Do not drive yourself to the hospital. Let the emergency medical personnel know if you think you have COVID-19.  Summary  · COVID-19 is a respiratory infection that is caused by a virus. It is also known as coronavirus disease or novel coronavirus. It can cause serious infections, such as pneumonia, acute respiratory distress syndrome, acute respiratory failure, or sepsis.  · The virus that causes COVID-19 is contagious. This means that it can spread from person to person through droplets from coughs and sneezes.  · You are more likely to develop a serious illness if you are 65 years of age or older, have a weak immunity, live in a nursing home, or have chronic disease.  · There is no medicine to treat COVID-19. Your health care provider will talk with you about ways to treat your symptoms.  · Take steps to protect yourself and others from infection. Wash your hands often and disinfect objects and surfaces that are frequently touched every day. Stay away from people who are sick and wear a mask if you are sick.  This information is not intended to replace advice given to you by  your health care provider. Make sure you discuss any questions you have with your health care provider.  Document Released: 01/23/2020 Document Revised: 05/14/2020 Document Reviewed: 01/23/2020  Elsevier Patient Education © 2020 [employer]

## 2021-12-08 NOTE — OR NURSING
0650 - pt's COVID test came back positive. Dr. Gamboa, Dr. Gansert and OR notified. Pt states she has not been around anyone who was positive; denies symptoms.  Per Dr. Gamboa, case is cancelled and pt will be rescheduled at appropriate time.     0700 - Pt informed of the above. PCR swab sent to lab per protocol.    0720 - COVID discharge instructions provided to pt. Pt walked out, no further needs.

## 2021-12-20 ENCOUNTER — APPOINTMENT (OUTPATIENT)
Dept: RADIOLOGY | Facility: IMAGING CENTER | Age: 51
End: 2021-12-20
Attending: NURSE PRACTITIONER
Payer: MEDICAID

## 2021-12-20 ENCOUNTER — OFFICE VISIT (OUTPATIENT)
Dept: URGENT CARE | Facility: CLINIC | Age: 51
End: 2021-12-20

## 2021-12-20 VITALS
RESPIRATION RATE: 16 BRPM | DIASTOLIC BLOOD PRESSURE: 76 MMHG | HEART RATE: 118 BPM | HEIGHT: 67 IN | OXYGEN SATURATION: 98 % | TEMPERATURE: 96.8 F | SYSTOLIC BLOOD PRESSURE: 120 MMHG | BODY MASS INDEX: 28.09 KG/M2 | WEIGHT: 179 LBS

## 2021-12-20 DIAGNOSIS — S82.891A CLOSED FRACTURE OF RIGHT ANKLE, INITIAL ENCOUNTER: ICD-10-CM

## 2021-12-20 DIAGNOSIS — S99.911A INJURY OF RIGHT ANKLE, INITIAL ENCOUNTER: ICD-10-CM

## 2021-12-20 PROCEDURE — 73610 X-RAY EXAM OF ANKLE: CPT | Mod: TC,RT | Performed by: FAMILY MEDICINE

## 2021-12-20 PROCEDURE — 99203 OFFICE O/P NEW LOW 30 MIN: CPT | Performed by: NURSE PRACTITIONER

## 2021-12-20 RX ORDER — IBUPROFEN 800 MG/1
800 TABLET ORAL EVERY 8 HOURS PRN
Qty: 30 TABLET | Refills: 0 | Status: SHIPPED | OUTPATIENT
Start: 2021-12-20

## 2021-12-20 ASSESSMENT — FIBROSIS 4 INDEX: FIB4 SCORE: 0.91

## 2021-12-20 ASSESSMENT — PAIN SCALES - GENERAL: PAINLEVEL: 6=MODERATE PAIN

## 2021-12-21 PROBLEM — G47.00 INSOMNIA: Status: ACTIVE | Noted: 2021-12-21

## 2021-12-21 PROBLEM — Z87.19 HISTORY OF GASTROINTESTINAL DISEASE: Status: ACTIVE | Noted: 2021-12-21

## 2021-12-21 PROBLEM — N89.3 DYSPLASIA OF VAGINA: Status: ACTIVE | Noted: 2021-12-21

## 2021-12-21 RX ORDER — CITALOPRAM HYDROBROMIDE 10 MG/1
TABLET ORAL
COMMUNITY
End: 2022-01-12

## 2021-12-21 RX ORDER — VENLAFAXINE 100 MG/1
100 TABLET ORAL DAILY
COMMUNITY
End: 2022-01-12

## 2021-12-21 RX ORDER — RISPERIDONE 0.25 MG/1
TABLET ORAL
COMMUNITY
End: 2022-01-12

## 2021-12-21 ASSESSMENT — ENCOUNTER SYMPTOMS
DIZZINESS: 0
MYALGIAS: 0
SHORTNESS OF BREATH: 0
SORE THROAT: 0
NUMBNESS: 0
EYE PAIN: 0
VOMITING: 0
CHILLS: 0
FALLS: 1
NAUSEA: 0
INABILITY TO BEAR WEIGHT: 0
LOSS OF SENSATION: 0
FEVER: 0
LOSS OF MOTION: 0

## 2021-12-21 NOTE — PROGRESS NOTES
Subjective:   Analia Wu is a 51 y.o. female who presents for Ankle Injury (fell on ice, leg went behind her, DOI 12/15)      Ankle Injury   The incident occurred 3 to 5 days ago. The incident occurred at home. The injury mechanism was a fall. The pain is present in the right ankle. The quality of the pain is described as aching. The pain is at a severity of 7/10. The pain is moderate. The pain has been constant since onset. Pertinent negatives include no inability to bear weight, loss of motion, loss of sensation or numbness. She reports no foreign bodies present. The symptoms are aggravated by movement, palpation and weight bearing. She has tried rest, NSAIDs and acetaminophen for the symptoms. The treatment provided no relief.       Review of Systems   Constitutional: Negative for chills and fever.   HENT: Negative for sore throat.    Eyes: Negative for pain.   Respiratory: Negative for shortness of breath.    Cardiovascular: Negative for chest pain.   Gastrointestinal: Negative for nausea and vomiting.   Genitourinary: Negative for hematuria.   Musculoskeletal: Positive for falls and joint pain. Negative for myalgias.   Skin: Negative for rash.   Neurological: Negative for dizziness and numbness.       Medications:    • gabapentin Caps  • ibuprofen Tabs  • LAXATIVE PO  • risperiDONE Tabs  • venlafaxine XR Cp24    Allergies: Other food, Dilaudid [hydromorphone hcl], Latex, Tape, and Other environmental    Problem List: Analia Wu does not have any pertinent problems on file.    Surgical History:  Past Surgical History:   Procedure Laterality Date   • RECTAL EXPLORATION  12/19/2018    Procedure: RECTAL EXPLORATION - FOR HIGH RESOLUTION ANOSCOPY;  Surgeon: Billy Gamboa M.D.;  Location: [facility];  Service: General   • ANAL FISTULECTOMY  5/24/2017    Procedure: ANAL FISTULECTOMY FOR: WIDE EXCISION ANAL DYSPLASIA;  Surgeon: Billy Gamboa M.D.;  Location: [facility];   "Service:    • RECTAL EXPLORATION  9/2/2016    Procedure: RECTAL EXPLORATION eua, AND BIOPSY;  Surgeon: Billy Villegas M.D.;  Location: SUR[facility]ERY [facility];  Service:    • VULVECTOMY RADICAL Bilateral 9/2/2016    Procedure: VULVECTOMY RADICAL;  Surgeon: Reese Carroll M.D.;  Location: SURGERY [facility];  Service:    • RECTAL EXPLORATION  1/28/2015    Performed by Billy Villegas M.D. at SURGERY [facility]   • EXAM UNDER ANESTHESIA  5/21/2014    Performed by Billy Villegas M.D. at SUR[facility]ERY [facility]   • RECTAL EXPLORATION  5/21/2014    Performed by Billy Villegas M.D. at SUR[facility]ERY [facility]   • WIDE EXCISION  12/19/2012    Performed by Billy Villegas M.D. at [facility]   • WIDE EXCISION  2/29/2012    Performed by BILLY VILLEGAS at SURGERY [facility]   • WIDE EXCISION  9/7/2011    Performed by BILLY VILLEGAS at SURGERY [facility]   • CERVICAL DISK AND FUSION ANTERIOR  6/27/2011    Performed by YEIMI LEVY at SURGERY [facility]   • WIDE EXCISION  8/4/2010    Performed by BILLY VILLEGAS at SUR[facility]ERY [facility]   • HEMORRHOIDECTOMY  8/4/2010    Performed by BILLY VILLEGAS at [facility]   • VULVECTOMY PARTIAL  October 2007   • RECTAL BIOPSY  2007   • OTHER NEUROLOGICAL SURG      neck fusion       Past Social Hx: Analia Wu  reports that she quit smoking about 14 years ago. Her smoking use included cigarettes. She has a 25.00 pack-year smoking history. She has never used smokeless tobacco. She reports that she does not drink alcohol and does not use drugs.     Past Family Hx:  Analia Wu family history includes Cancer in her maternal aunt; Diabetes in her mother; Stroke in her mother.     Problem list, medications, and allergies reviewed by myself today in Epic.     Objective:     /76   Pulse (!) 118   Temp 36 °C (96.8 °F) (Temporal)   Resp 16   Ht 1.702 m (5' 7\")   Wt 81.2 kg (179 lb)   LMP 12/17/2018   SpO2 98%   BMI 28.04 kg/m² "     Physical Exam  Constitutional:       Appearance: Normal appearance. She is not ill-appearing or toxic-appearing.   HENT:      Head: Normocephalic.      Right Ear: External ear normal.      Left Ear: External ear normal.      Nose: Nose normal.      Mouth/Throat:      Lips: Pink.      Mouth: Mucous membranes are moist.   Eyes:      General: Lids are normal.         Right eye: No discharge.         Left eye: No discharge.   Pulmonary:      Effort: Pulmonary effort is normal. No accessory muscle usage or respiratory distress.   Musculoskeletal:      Cervical back: Full passive range of motion without pain.      Right lower leg: Normal.      Right ankle: Swelling present. Tenderness present over the lateral malleolus. Decreased range of motion.      Right foot: Normal.   Skin:     Coloration: Skin is not pale.   Neurological:      Mental Status: She is alert and oriented to person, place, and time.   Psychiatric:         Mood and Affect: Mood normal.         Thought Content: Thought content normal.         Assessment/Plan:     Diagnosis and associated orders:     1. Injury of right ankle, initial encounter  DX-ANKLE 3+ VIEWS RIGHT    Referral to Orthopedics    ibuprofen (MOTRIN) 800 MG Tab   2. Closed fracture of right ankle, initial encounter  Referral to Orthopedics    ibuprofen (MOTRIN) 800 MG Tab      Comments/MDM:     I independently reviewed the patient's imaging and agree with the interpretation of the radiologist.    Fracture of the distal lateral malleolus with overlying soft tissue edema.      Patient is a 51-year-old female present the stated above, patient having acute fracture noted on x-ray, placed in a tall walking cam boot and provided crutches.  Advised toe-touch weightbearing until evaluated by specialist.  Encouraged to rest, elevate when at rest.  Differential diagnosis, natural history, supportive care, and indications for immediate follow-up discussed.          This does not include time spent  on separately billable procedures/tests.  Please note that this dictation was created using voice recognition software. I have made a reasonable attempt to correct obvious errors, but I expect that there are errors of grammar and possibly content that I did not discover before finalizing the note.    This note was electronically signed by Sánchez ALVAREZ.

## 2022-01-01 ENCOUNTER — HOSPITAL ENCOUNTER (OUTPATIENT)
Facility: MEDICAL CENTER | Age: 52
End: 2022-01-01
Attending: COLON & RECTAL SURGERY | Admitting: COLON & RECTAL SURGERY
Payer: MEDICAID

## 2022-01-05 ENCOUNTER — APPOINTMENT (OUTPATIENT)
Dept: ADMISSIONS | Facility: MEDICAL CENTER | Age: 52
End: 2022-01-05
Payer: MEDICAID

## 2022-01-12 ENCOUNTER — PRE-ADMISSION TESTING (OUTPATIENT)
Dept: ADMISSIONS | Facility: MEDICAL CENTER | Age: 52
End: 2022-01-12
Attending: COLON & RECTAL SURGERY
Payer: MEDICAID

## 2022-01-12 DIAGNOSIS — Z01.812 PRE-OPERATIVE LABORATORY EXAMINATION: ICD-10-CM

## 2022-01-12 PROCEDURE — U0003 INFECTIOUS AGENT DETECTION BY NUCLEIC ACID (DNA OR RNA); SEVERE ACUTE RESPIRATORY SYNDROME CORONAVIRUS 2 (SARS-COV-2) (CORONAVIRUS DISEASE [COVID-19]), AMPLIFIED PROBE TECHNIQUE, MAKING USE OF HIGH THROUGHPUT TECHNOLOGIES AS DESCRIBED BY CMS-2020-01-R: HCPCS

## 2022-01-12 PROCEDURE — C9803 HOPD COVID-19 SPEC COLLECT: HCPCS

## 2022-01-12 PROCEDURE — U0005 INFEC AGEN DETEC AMPLI PROBE: HCPCS

## 2022-01-13 LAB
SARS-COV-2 RNA RESP QL NAA+PROBE: NOTDETECTED
SPECIMEN SOURCE: NORMAL

## 2022-01-14 NOTE — OR NURSING
Meredith from Dr. Gamboa office called and stated patient's daughter has a fever with possible exposure. Notified Ginny Sanders Preadmit manager, she stated surgery to be postponed 14 days from daughters symptoms. Meredith notifed, pt has to do pcr test prior to rescheduled surgery.

## 2022-09-26 ENCOUNTER — PHYSICAL THERAPY (OUTPATIENT)
Dept: PHYSICAL THERAPY | Facility: REHABILITATION | Age: 52
End: 2022-09-26
Attending: NURSE PRACTITIONER
Payer: MEDICAID

## 2022-09-26 DIAGNOSIS — R20.0 LEFT UPPER EXTREMITY NUMBNESS: ICD-10-CM

## 2022-09-26 DIAGNOSIS — M54.2 CERVICAL PAIN: ICD-10-CM

## 2022-09-26 PROCEDURE — 97162 PT EVAL MOD COMPLEX 30 MIN: CPT

## 2022-09-26 SDOH — ECONOMIC STABILITY: GENERAL: QUALITY OF LIFE: GOOD

## 2022-09-26 ASSESSMENT — ENCOUNTER SYMPTOMS
AWAKENINGS PER NIGHT: 2
EXACERBATED BY: GRIPPING
ALLEVIATING FACTORS: POSITION CHANGE
QUALITY: NUMBNESS
EXACERBATED BY: FATIGUE
PAIN TIMING: WHEN ACTIVE

## 2022-09-26 NOTE — OP THERAPY EVALUATION
"  Outpatient Physical Therapy  INITIAL EVALUATION    [facility] Physical Therapy [address].  [address]  [place] [address]  Phone:  [phone]  Fax:  [phone]    Date of Evaluation: 09/26/2022    Patient: Analia Wu  YOB: 1970  MRN: [id number]     Referring Provider: ROXANN Montana  [address] [address]   Referring Diagnosis Radiculopathy, cervical region [M54.12]     Time Calculation                 Chief Complaint: Neck Pain and Arm Problem    Visit Diagnoses     ICD-10-CM   1. Cervical pain  M54.2   2. Left upper extremity numbness  R20.0       Date of onset of impairment: 2/26/2022    Subjective:   History of Present Illness:     Date of onset:  2/26/2022    Mechanism of injury:  Pt reports 6-7months ago, difficulty holding razor in L hand, writing with L hand, typing L hand will go after 15min.  2021 July came in for PT, resolved but will flare up at times.  Takes 1 min to resolve, will prop UE next to self to relieve s/s.  Past surgical hx 2012 Dr. Pozo fusion C4-5  Quality of life:  Good  Headaches:  tension headaches  Ear problems: none  Sleep disturbance:  Interrupted sleep (due to med management)  # Times/Night awakened:  2  Pain:     Location:  Thumb and index L hand numbness, resolves within 1 min    Quality:  Numbness    Pain timing:  When active    Relieving factors:  Position change    Aggravating factors:  Fatigue and gripping    Progression:  Improving    Pain Comments::  Reports shaking has stopped but has increased numbness in hand  Social Support:     Lives with:  Adult children  Hand dominance:  Left  Treatments:     Previous treatment:  Massage and physical therapy  Activities of Daily Living:     Patient reported ADL status: indep  Patient Goals:     Patient goals for therapy:  Increased strength    Past Medical History:   Diagnosis Date    Anesthesia     ponv,  \"shallow breathing\"    Anxiety     ASTHMA     does not have " any inhalers    Cancer (HCC)     vulva    Carpal tunnel syndrome     Heart murmur     pt reports she has never had any problems    Infectious disease 2007    Mrsa infection    Pain 11-26-12    neck, left knee, 6-7/10    Panic attack     Psychiatric problem     depression, PTSD, anxiety    Seasonal allergies      Past Surgical History:   Procedure Laterality Date    RECTAL EXPLORATION  12/19/2018    Procedure: RECTAL EXPLORATION - FOR HIGH RESOLUTION ANOSCOPY;  Surgeon: Billy Villegas M.D.;  Location: SUR[facility]ERY [facility];  Service: General    ANAL FISTULECTOMY  5/24/2017    Procedure: ANAL FISTULECTOMY FOR: WIDE EXCISION ANAL DYSPLASIA;  Surgeon: Billy Villegas M.D.;  Location: SURGERY [facility];  Service:     RECTAL EXPLORATION  9/2/2016    Procedure: RECTAL EXPLORATION eua, AND BIOPSY;  Surgeon: Billy Villegas M.D.;  Location: SURGERY [facility];  Service:     VULVECTOMY RADICAL Bilateral 9/2/2016    Procedure: VULVECTOMY RADICAL;  Surgeon: Reese Carroll M.D.;  Location: SURGERY [facility];  Service:     RECTAL EXPLORATION  1/28/2015    Performed by Billy Villegas M.D. at SURGERY [facility]    EXAM UNDER ANESTHESIA  5/21/2014    Performed by Billy Villegas M.D. at SUR[facility]ERY [facility]    RECTAL EXPLORATION  5/21/2014    Performed by Billy Villegas M.D. at SUR[facility]ERY [facility]    WIDE EXCISION  12/19/2012    Performed by Billy Villegas M.D. at SUR[facility]ERY [facility]    WIDE EXCISION  2/29/2012    Performed by BILLY VILLEGAS at SURGERY [facility]    WIDE EXCISION  9/7/2011    Performed by BILLY VILLEGAS at SUR[facility]ERY [facility]    CERVICAL DISK AND FUSION ANTERIOR  6/27/2011    Performed by YEIMI LEVY at SURGERY [facility]    WIDE EXCISION  8/4/2010    Performed by BILLY VILLEGAS at SUR[facility]ERY [facility]    HEMORRHOIDECTOMY  8/4/2010    Performed by BILLY VILLEGAS at SUR[facility]ERY [facility]    VULVECTOMY PARTIAL  October 2007    RECTAL BIOPSY  2007    OTHER NEUROLOGICAL SURG       neck fusion     Social History     Tobacco Use    Smoking status: Former     Packs/day: 1.00     Years: 25.00     Pack years: 25.00     Types: Cigarettes     Quit date: 10/4/2007     Years since quitting: 15.0    Smokeless tobacco: Never    Tobacco comments:     2007   Substance Use Topics    Alcohol use: No     Family and Occupational History     Socioeconomic History    Marital status: Single     Spouse name: Not on file    Number of children: Not on file    Years of education: Not on file    Highest education level: Not on file   Occupational History    Not on file       Objective     Neurological Testing     Myotome testing   Cervical (left)   C7 (triceps): 4  C8 (thumb extension): 4      Dermatome testing   Cervical (left)   C7 (middle finger): decreased  C8 (little finger): decreased      Additional Neurological Details  C6: Strength wrist ext: 4/5    Palpation   Left   Hypertonic in the cervical paraspinals, infraspinatus, levator scapulae and upper trapezius.   Trigger point to cervical paraspinals, infraspinatus and levator scapulae.     Right   Hypertonic in the cervical paraspinals and upper trapezius.   Tenderness of the cervical paraspinals.   Trigger point to cervical paraspinals.     Active Range of Motion     Cervical Spine   Flexion: within functional limits  Extension: decreased  Retraction: decreased  Left lateral flexion: decreased  Right lateral flexion: decreased  Left rotation: within functional limits  Right rotation: within functional limits    Additional Active Range of Motion Details  *pinching L C7-T1 area flex, ext, rotation R & L    Passive Range of Motion     Cervical Spine   Extension: decreased  Retraction: decreased  Left lateral flexion: decreased  Right lateral flexion: decreased  Left rotation: decreased  Right rotation: decreased    Joint Play   Spine     Central PA Eckerman        C2: hypomobile       C3: hypomobile       C4: hypomobile       C5: hypomobile       C6:  hypomobile       C7: hypomobile      Tests     Left Shoulder   Positive Spurling's sign.       Therapeutic Treatments and Modalities:     Therapeutic Treatment and Modalities Summary: NO [id number] on spinal curves, nerve root pain/s/s presentation pertinent to pt, edu on self awareness to spinal posture while performing ADL's and having any s/s.  Time-based treatments/modalities:           Assessment, Response and Plan:   Impairments: abnormal muscle tone and pain with function    Assessment details:  Pt is 51 yo female presents with L hand/index finger numbness with writing, shaving and using her phone.  Pertinent findings weakness in C6-8 myotomes, altered sensation C6 and C8 dermatomes, impaired cervical ROM, hypertonic cervical and parascapular musculature.  Pt would benefit from skilled PT to address these barriers.  Barriers to therapy:  Age  Prognosis: good    Goals:   Short Term Goals:   1. Pt will have inc cervical ROM to allow her be on her phone w/o inc s/s  2.  Will demo indep with HEP to allow her to manage s/s.  Short term goal time span:  2-4 weeks      Long Term Goals:    1. Pt will have increased ROM w/o pain to allow her to shower w/o s/s.  2. Pt will have increased UE strength to be indep w/ writing w/o s.s.  Long term goal time span:  6-8 weeks    Plan:   Therapy options:  Physical therapy treatment to continue  Planned therapy interventions:  E Stim Unattended (CPT 31733), Manual Therapy (CPT 25525), Neuromuscular Re-education (CPT 34790), Therapeutic Activities (CPT 56364) and Therapeutic Exercise (CPT 75839)  Frequency:  2x week  Duration in weeks:  8  Discussed with:  Patient    Functional Assessment Used        Referring provider co-signature:  I have reviewed this plan of care and my co-signature certifies the need for services.    Certification Period: 09/26/2022 to  12/29/22    Physician Signature: ________________________________ Date: ______________

## 2022-10-11 ENCOUNTER — APPOINTMENT (OUTPATIENT)
Dept: PHYSICAL THERAPY | Facility: REHABILITATION | Age: 52
End: 2022-10-11
Attending: NURSE PRACTITIONER
Payer: MEDICAID

## 2022-10-18 ENCOUNTER — APPOINTMENT (OUTPATIENT)
Dept: PHYSICAL THERAPY | Facility: REHABILITATION | Age: 52
End: 2022-10-18
Attending: NURSE PRACTITIONER
Payer: MEDICAID

## 2022-10-18 NOTE — OP THERAPY DAILY TREATMENT
"  Outpatient Physical Therapy  DAILY TREATMENT     [facility] Physical Therapy [address]  [address]  [place] [address]  Phone:  [phone]  Fax:  [phone]    Date: 10/18/2022    Patient: Analia Wu  YOB: 1970  MRN: [id number]     Time Calculation                   Chief Complaint: No chief complaint on file.    Visit #: 2    SUBJECTIVE:  ***    OBJECTIVE:  Current objective measures: ***        Exercises/Treatment  Time-based treatments/modalities:           Pain rating (1-10) before treatment:  {PAIN NUMBERS_1-10:45051}  Pain rating (1-10) after treatment:  {PAIN NUMBERS_1-10:04866}    ASSESSMENT:   Response to treatment: ***    PLAN/RECOMMENDATIONS:   Plan for treatment: {AMB OP THERAPY - THERAPY PLAN:[id number]::\"therapy treatment to continue next visit\"}.  Planned interventions for next visit: {PT PLANNED THERAPY INTERVENTIONS:463367200::\"continue with current treatment\"}.         "

## 2022-10-25 ENCOUNTER — PHYSICAL THERAPY (OUTPATIENT)
Dept: PHYSICAL THERAPY | Facility: REHABILITATION | Age: 52
End: 2022-10-25
Attending: NURSE PRACTITIONER
Payer: MEDICAID

## 2022-10-25 DIAGNOSIS — M54.2 CERVICAL PAIN: ICD-10-CM

## 2022-10-25 DIAGNOSIS — R20.0 LEFT UPPER EXTREMITY NUMBNESS: ICD-10-CM

## 2022-10-25 PROCEDURE — 97014 ELECTRIC STIMULATION THERAPY: CPT

## 2022-10-25 PROCEDURE — 97110 THERAPEUTIC EXERCISES: CPT

## 2022-10-25 PROCEDURE — 97112 NEUROMUSCULAR REEDUCATION: CPT

## 2022-10-25 NOTE — OP THERAPY DAILY TREATMENT
Outpatient Physical Therapy  DAILY TREATMENT     [facility] Physical Therapy [address]  [address]  [place] [address]  Phone:  [phone]  Fax:  [phone]    Date: 10/25/2022    Patient: Analia Wu  YOB: 1970  MRN: [id number]     Time Calculation    Start time: 1045  Stop time: 1145 Time Calculation (min): 60 minutes         Chief Complaint: Neck Problem and Hand Injury    Visit #: 2    SUBJECTIVE:  Tingling and numb in L hand will numb when reading too long C 6-8 distribution & median N distribution.  Feels about the same intensity, freq daily 1x/day to intermittent.  Spends most the day propped up on pillows reading on phone. In room most days except for meal times.    OBJECTIVE:  Current objective measures: Cervical AROM    Flex : decreased pain L side UT/LS area  Ext: dec + compression L c p/s, improved with added retraction  Rotation R: decr> L rotation WFL   SB R >L no pain stiffness      Therapeutic Exercises (CPT 24974):     1. UEB, x4', to inc UE perfusion    2. Median N st, Tensioners & glides, 1 x 10 ea-HEP and HO given    Therapeutic Treatments and Modalities:     1. Neuromuscular Re-education (CPT 10299), see below    Therapeutic Treatment and Modalities Summary: L UE median N : resistracted ROM at shoulder ER, elbow ext and wrist ext; + pain in elbow, forearm and hand     L Radial N + tension L forearm WFL    L ulnar N: +  tension L lat forearm     IASTM L median N filleting and lifting to inc median N ROM.  Passive tensioners & gliders performed 1 x 10 ea      Time-based treatments/modalities:      ASSESSMENT:   Response to treatment: L median N tested following IASTM and stretching, demo inc ROM, decr pain in all locations.    PLAN/RECOMMENDATIONS:   Plan for treatment: Assess central involvement, postural strengtheing, positioning edu in bed therapy treatment to continue next visit.  Planned interventions for next visit: continue with current  treatment.

## 2022-10-25 NOTE — OP THERAPY PROGRESS SUMMARY
Outpatient Physical Therapy  PROGRESS SUMMARY NOTE      [facility] Physical Therapy [address].  [address]  [place] [address]  Phone:  [phone]  Fax:  [phone]    Date of Visit: 10/25/2022    Patient: Analia Wu  YOB: 1970  MRN: [id number]     Referring Provider: ROXANN Montana  [address] [place] [address]   Referring Diagnosis Radiculopathy, cervical region [M54.12]     Visit Diagnoses     ICD-10-CM   1. Cervical pain  M54.2   2. Left upper extremity numbness  R20.0       Rehab Potential: good    Progress Report Period: 9/26/2022 to 10/25/2022          Objective Findings and Assessment:   Patient progression towards goals: Pt has made limited progress d/t cancellations of appointments.  Responded well to treatment today, agreeable and reporting compliance as able.     Objective findings and assessment details: Demo + neural tension median N, positive response to therapy, impaired cervical ROM, and impaired posture.  Will benefit from ongoing PT to address these impairments.    Goals:   Short Term Goals:   Short Term Goals:   1. Pt will have inc cervical ROM to allow her be on her phone w/o inc s/s-Not met, continue  2.  Will demo indep with HEP to allow her to manage s/s-not met, continue  Short term goal time span:  2-4 weeks        Short term goal time span:  2-4 weeks      Long Term Goals:    Long Term Goals:    1. Pt will have increased ROM w/o pain to allow her to shower w/o s/s.-not met, continue  2. Pt will have increased UE strength to be indep w/ writing w/o s.s.-not met, continue  Long term goal time span:  6-8 weeks    Long term goal time span:  4-6 weeks    Plan:   Planned therapy interventions:  E Stim Unattended (CPT 87426), Mechanical Traction (CPT 52739), Therapeutic Exercise (CPT 02624) and Therapeutic Activities (CPT 85495)  Frequency:  2x week  Duration in weeks:  8    Referring provider co-signature:  I have reviewed this  plan of care and my co-signature certifies the need for services.     Certification Period: 10/25/2022 to 12/20/22    Physician Signature: ________________________________ Date: ______________

## 2022-11-01 ENCOUNTER — APPOINTMENT (OUTPATIENT)
Dept: PHYSICAL THERAPY | Facility: REHABILITATION | Age: 52
End: 2022-11-01
Attending: NURSE PRACTITIONER
Payer: MEDICAID

## 2022-11-08 ENCOUNTER — PHYSICAL THERAPY (OUTPATIENT)
Dept: PHYSICAL THERAPY | Facility: REHABILITATION | Age: 52
End: 2022-11-08
Attending: NURSE PRACTITIONER
Payer: MEDICAID

## 2022-11-08 DIAGNOSIS — R20.0 LEFT UPPER EXTREMITY NUMBNESS: ICD-10-CM

## 2022-11-08 DIAGNOSIS — M54.2 CERVICAL PAIN: ICD-10-CM

## 2022-11-08 PROCEDURE — 97014 ELECTRIC STIMULATION THERAPY: CPT

## 2022-11-08 PROCEDURE — 97112 NEUROMUSCULAR REEDUCATION: CPT

## 2022-11-08 PROCEDURE — 97110 THERAPEUTIC EXERCISES: CPT

## 2022-11-08 NOTE — OP THERAPY DAILY TREATMENT
Outpatient Physical Therapy  DAILY TREATMENT     [facility] Physical Therapy [address].  [address]  [place] [address]  Phone:  [phone]  Fax:  [phone]    Date: 11/08/2022    Patient: Analia Wu  YOB: 1970  MRN: [id number]     Time Calculation    Start time: 1330  Stop time: 1430 Time Calculation (min): 60 minutes         Chief Complaint: Neck Pain and Arm Problem    Visit #: 3    SUBJECTIVE:  Feeling great overall.  Reports overall feeling very good, decreased neck pain and decreased numbness L UE, only happening 1x/day vs constant.     OBJECTIVE:  Current objective measures: Reassessed HEP, demo early L scap elevation and rests in inc elevation vs R .     Therapeutic Exercises (CPT 40292):     2. Median N      Therapeutic Exercise Summary: Median N -Tensioner & glides- 2 x 10 ea- reviewed cues for full ROM and no compensation with L shoulder elevation.    Scap strengthening: shld ext, rows, tricep press, bicep curls 3 x 10 ea-pink tband-HEP & HO    Therapeutic Treatments and Modalities:     1. Neuromuscular Re-education (CPT 38358), see below    Therapeutic Treatment and Modalities Summary: Cervical strengthening: DNF 3 x30s, unable to perform DNE  PA glides C1-4 Grade 2-4 to decr pain & inc ROM to improve upper cervical alignment.  Cervical PROM: Rotation L >R restriction, Ext with cervical retraction x 5 ea direction.      Time-based treatments/modalities:      ASSESSMENT:   Response to treatment: Demo improving posture and awareness to restrictions.  No central concerns regardin hand numbness, appears to be peripheral in nature. Cont to improve s/s with good compliance of HEP.    PLAN/RECOMMENDATIONS:   Plan for treatment: postural strengtheing, positioning edu in bed therapy treatment to continue next visit.  Planned interventions for next visit: continue with current treatment.

## 2022-11-15 ENCOUNTER — PHYSICAL THERAPY (OUTPATIENT)
Dept: PHYSICAL THERAPY | Facility: REHABILITATION | Age: 52
End: 2022-11-15
Attending: NURSE PRACTITIONER
Payer: MEDICAID

## 2022-11-15 DIAGNOSIS — R20.0 LEFT UPPER EXTREMITY NUMBNESS: ICD-10-CM

## 2022-11-15 DIAGNOSIS — M54.2 CERVICAL PAIN: ICD-10-CM

## 2022-11-15 PROCEDURE — 97110 THERAPEUTIC EXERCISES: CPT

## 2022-11-15 PROCEDURE — 97112 NEUROMUSCULAR REEDUCATION: CPT

## 2022-11-15 NOTE — OP THERAPY DAILY TREATMENT
Outpatient Physical Therapy  DAILY TREATMENT     [facility] Physical Therapy [address]  [address]  [place] [address]  Phone:  [phone]  Fax:  [phone]    Date: 11/15/2022    Patient: Analia Wu  YOB: 1970  MRN: [id number]     Time Calculation    Start time: 1350  Stop time: 1430 Time Calculation (min): 40 minutes         Chief Complaint: Neck Problem and Arm Problem    Visit #: 4    SUBJECTIVE:  Feels the same, numbness comes and goes in L hand 1st-3rd fingers. Reports still primarily sitting in bed propped with pillows, holding phone L hand.      OBJECTIVE:  Current objective measures:   Reviewed and practiced propped supported sitting in bed, utilizing lumbar roll, pillow under knees and sitting upright on isch tubs.    Reviewed/reinforced daily prone on elbows x 10', press ups x 10, edu on spine/disc/nerve health.    Therapeutic Exercises (CPT 03995):     2. Median N      Therapeutic Exercise Summary: Median N -Tensioner & glides- 1 x 10 ea- noted to not have any compensatory movements.  Slight catch L elbow/ulnar N.    Scap strengthening: shld ext, rows, tricep press, bicep curls 3 x 10 ea-green tband, cues at L scap dep/stab with inc difficulty    Therapeutic Treatments and Modalities:     1. Neuromuscular Re-education (CPT 44279), see below    Therapeutic Treatment and Modalities Summary: Median N tensioners performed 10 x2 with catch at elbow with elbow ext .  IASTM L ulnar N: filletting, lifting and hooking.         Time-based treatments/modalities:      ASSESSMENT:   Response to treatment: Median N tensioner following w/o catch at elbow.      PLAN/RECOMMENDATIONS:   Plan for treatment: low trap/middle trap strengthening, progress cervical ext mobilization with towel assist therapy treatment to continue next visit.  Planned interventions for next visit: continue with current treatment.

## 2022-11-22 ENCOUNTER — APPOINTMENT (OUTPATIENT)
Dept: PHYSICAL THERAPY | Facility: REHABILITATION | Age: 52
End: 2022-11-22
Attending: NURSE PRACTITIONER
Payer: MEDICAID

## 2022-11-29 ENCOUNTER — PHYSICAL THERAPY (OUTPATIENT)
Dept: PHYSICAL THERAPY | Facility: REHABILITATION | Age: 52
End: 2022-11-29
Attending: NURSE PRACTITIONER
Payer: MEDICAID

## 2022-11-29 DIAGNOSIS — R20.0 LEFT UPPER EXTREMITY NUMBNESS: ICD-10-CM

## 2022-11-29 DIAGNOSIS — M54.2 CERVICAL PAIN: ICD-10-CM

## 2022-11-29 PROCEDURE — 97110 THERAPEUTIC EXERCISES: CPT

## 2022-11-29 PROCEDURE — 97112 NEUROMUSCULAR REEDUCATION: CPT

## 2022-11-29 NOTE — OP THERAPY PROGRESS SUMMARY
Outpatient Physical Therapy  PROGRESS SUMMARY NOTE      [facility] Physical Therapy [address]  [address]  [place] [address]  Phone:  [phone]  Fax:  [phone]    Date of Visit: 11/29/2022    Patient: Analia Wu  YOB: 1970  MRN: [id number]     Referring Provider: ROXANN Montana  [address] [place] [address]   Referring Diagnosis Radiculopathy, cervical region [M54.12]     Visit Diagnoses     ICD-10-CM   1. Cervical pain  M54.2   2. Left upper extremity numbness  R20.0       Rehab Potential: good    Progress Report Period: 9/26/2022-11/29/2022        Objective Findings and Assessment:   Patient progression towards goals: Pt demo good progress toward all goals. Only intermittently experiencing numbness, improving postural strength & awareness and decreased neck pain. Cont to have impaired ROM, UT overuse and int numbness which places at higher risk for return of s/s.    Objective findings and assessment details: Cervical AROM: flex WFL  Ext: WFL cues to retract  SB L restricted  > R   Rotation R: 75%, L: 70% no pain with AROM    Goals:   Short Term Goals:   1. Pt will have inc cervical ROM to allow her be on her phone w/o inc s/s-met  2.  Will demo indep with HEP to allow her to manage s/s.-MET  Short term goal time span:  2-4 weeks      Short term goal time span:  2-4 weeks      Long Term Goals:    1. Pt will have increased ROM w/o pain to allow her to shower w/o s/s-MET , dull pain in L hand, no numbness or shaking  2. Pt will have increased UE strength to be indep w/ writing w/o s/s -not met, continue  Long term goal time span:  6-8 weeks      Long term goal time span:  6-8 weeks    Plan:   Planned therapy interventions:  E Stim Unattended (CPT 13200), Neuromuscular Re-education (CPT 06490), Therapeutic Exercise (CPT 26805) and Therapeutic Activities (CPT 38971)  Frequency:  1x week  Duration in weeks:  8    Referring provider co-signature:  I  have reviewed this plan of care and my co-signature certifies the need for services.     Certification Period: 11/29/2022 to 01/24/23    Physician Signature: ________________________________ Date: ______________

## 2022-11-29 NOTE — OP THERAPY DAILY TREATMENT
Outpatient Physical Therapy  DAILY TREATMENT     [facility] Physical Therapy [address].  [address]  [place] [address]  Phone:  [phone]  Fax:  [phone]    Date: 11/29/2022    Patient: Analia Wu  YOB: 1970  MRN: [id number]     Time Calculation    Start time: 1300  Stop time: 1350 Time Calculation (min): 50 minutes         Chief Complaint: Neck Pain and Arm Injury    Visit #: 5    SUBJECTIVE:  Numbness is much improved, only every 3 days or so.     Pain is gone from L elbow while performing nerve glides.     OBJECTIVE:  Current objective measures:   Reinforced supported midline posture    Reviewed/reinforced daily prone on elbows x 10', press ups x 10, edu on spine/disc/nerve health.    Therapeutic Exercises (CPT 46983):     1. Scap strengthening:  shld ext, rows, tricep press, bicep curls, progressed 10reps x 3 sets, -green tband, cues at L scap decr UT overuse    2. Median N, Tensioner & glides- 2 x 10 ea- noted to not have any compensatory movements., No pain or catch at elbow    3. Postural retraining:, chin tucks at wall with scap, spinal stab on wall, 5 iso x 5 reps-HEP, B ER at 0 with stab on wall, 10 x 3 rep-HEP      Therapeutic Exercise Summary: Edu on self progression of reps to inc endurance    Therapeutic Treatments and Modalities:     1. Neuromuscular Re-education (CPT 86950), see below    Therapeutic Treatment and Modalities Summary: Median N tensioners performed 10 x2 with catch at elbow with elbow ext .  IASTM L ulnar N, triceps: filletting, lifting and hooking.         Time-based treatments/modalities:      ASSESSMENT:   Response to treatment: Improving cervical ROM and L UE dec compensatory R UT hiking.    PLAN/RECOMMENDATIONS:   Plan for treatment: progress cervical ext mobilization with towel assist, low trap/middle trap strengthening, therapy treatment to continue next visit.  Planned interventions for next visit: continue with current  treatment.

## 2022-11-29 NOTE — OP THERAPY DISCHARGE SUMMARY
Outpatient Physical Therapy  DISCHARGE SUMMARY NOTE      [facility] Physical Therapy [address].  [address]  [place] [address]  Phone:  [phone]  Fax:  [phone]    Date of Visit: 11/29/2022    Patient: Analia Wu  YOB: 1970  MRN: [id number]     Referring Provider: ELGIN MontanaPDORIS  [address] [address]   Referring Diagnosis Radiculopathy, cervical region [M54.12]         Functional Assessment Used        Your patient is being discharged from Physical Therapy with the following comments:   {THERAPY DISCHARGE REASON:[id number]:p}    Comments:  ***     Limitations Remaining:  ***    Recommendations:  ***    Andreina Valdivia, RASHID    Date: 11/29/2022

## 2024-05-25 ENCOUNTER — OFFICE VISIT (OUTPATIENT)
Dept: URGENT CARE | Facility: CLINIC | Age: 54
End: 2024-05-25
Payer: MEDICAID

## 2024-05-25 VITALS
OXYGEN SATURATION: 98 % | WEIGHT: 207.6 LBS | HEIGHT: 67 IN | BODY MASS INDEX: 32.58 KG/M2 | DIASTOLIC BLOOD PRESSURE: 74 MMHG | TEMPERATURE: 97.5 F | RESPIRATION RATE: 12 BRPM | SYSTOLIC BLOOD PRESSURE: 128 MMHG | HEART RATE: 100 BPM

## 2024-05-25 DIAGNOSIS — R11.2 NAUSEA AND VOMITING, UNSPECIFIED VOMITING TYPE: ICD-10-CM

## 2024-05-25 PROCEDURE — 99213 OFFICE O/P EST LOW 20 MIN: CPT | Performed by: FAMILY MEDICINE

## 2024-05-25 PROCEDURE — 3074F SYST BP LT 130 MM HG: CPT | Performed by: FAMILY MEDICINE

## 2024-05-25 PROCEDURE — 3078F DIAST BP <80 MM HG: CPT | Performed by: FAMILY MEDICINE

## 2024-05-25 RX ORDER — ONDANSETRON 4 MG/1
4 TABLET, ORALLY DISINTEGRATING ORAL EVERY 8 HOURS PRN
Qty: 10 TABLET | Refills: 0 | Status: SHIPPED | OUTPATIENT
Start: 2024-05-25 | End: 2024-05-27

## 2024-05-25 RX ORDER — ONDANSETRON 2 MG/ML
4 INJECTION INTRAMUSCULAR; INTRAVENOUS ONCE
Status: COMPLETED | OUTPATIENT
Start: 2024-05-25 | End: 2024-05-25

## 2024-05-25 RX ADMIN — ONDANSETRON 4 MG: 2 INJECTION INTRAMUSCULAR; INTRAVENOUS at 11:22

## 2024-05-25 NOTE — PROGRESS NOTES
"Chief Complaint   Patient presents with    Emesis     Has had symptoms for 4-5 days        Chief Complaint   Patient presents with    Emesis     Has had symptoms for 4-5 days            Emesis  This is a new problem. The current episode started in the past 3-4 days. The problem occurs 2-3 times per day. The problem has been unchanged.  no blood in emesis.        Denies - fever, chills, abd pain, diarrhea.         Pertinent negatives include no abdominal pain, chills, coughing, fever, headaches, or weight loss. Nausea worse with eating    Past Medical History:   Diagnosis Date    Anesthesia     ponv,  \"shallow breathing\"    Anxiety     ASTHMA     does not have any inhalers    Cancer (HCC)     vulva    Carpal tunnel syndrome     Heart murmur     pt reports she has never had any problems    Infectious disease     Mrsa infection    Pain 12    neck, left knee, 6-7/10    Panic attack     Psychiatric problem     depression, PTSD, anxiety    Seasonal allergies        Social History     Tobacco Use    Smoking status: Former     Current packs/day: 0.00     Average packs/day: 1 pack/day for 25.0 years (25.0 ttl pk-yrs)     Types: Cigarettes     Start date: 10/4/1982     Quit date: 10/4/2007     Years since quittin.6    Smokeless tobacco: Never    Tobacco comments:        Vaping Use    Vaping status: Never Used   Substance Use Topics    Alcohol use: No    Drug use: No                  Review of Systems   Constitutional: Negative for fever, chills and weight loss.   Respiratory: Negative for cough and wheezing.    Cardiovascular: Negative for chest pain.   Gastrointestinal:   Negative for  blood in stool.   Neurological: Negative for dizziness and headaches.   All other systems reviewed and are negative.         Objective:     /74 (BP Location: Left arm, Patient Position: Sitting)   Pulse 100   Temp 36.4 °C (97.5 °F) (Temporal)   Resp 12   Ht 1.702 m (5' 7\")   Wt 94.2 kg (207 lb 9.6 oz)   SpO2 98% "       Physical Exam   Constitutional: pt is oriented to person, place, and time and appears well-developed. No distress.   HENT:   Head: Normocephalic and atraumatic.   Mouth/Throat: No oropharyngeal exudate.   Eyes: Conjunctivae are normal. No scleral icterus.   Cardiovascular: Normal rate, regular rhythm and normal heart sounds.    Pulmonary/Chest: Effort normal and breath sounds normal. No respiratory distress. Pt has no wheezes. Pt has no rales.   Abdominal: Normal appearance and bowel sounds are normal. There is no splenomegaly or hepatomegaly. There is no tenderness. There is no rebound, no guarding, no CVA tenderness and no tenderness at McBurney's point.   Lymphadenopathy:     Pt has no cervical adenopathy.   Neurological: pt is alert and oriented to person, place, and time.   Skin: Skin is warm. Pt is not diaphoretic. No erythema.   Psychiatric:  behavior is normal.   Nursing note and vitals reviewed.              Assessment/Plan:         1. 1. Nausea and vomiting, unspecified vomiting type   Nausea resolved after 4mg zofran, IM    Able to pass PO challenge      Suspect viral gastritis    BRAT diet x 24 hr    Push clear fluids at home - no dairy or etoh      Differential diagnosis, natural history, supportive care, and indications for immediate follow-up discussed. All questions answered. Patient agrees with the plan of care.     Follow-up as needed if symptoms worsen or fail to improve to PCP, Urgent care or Emergency Room.     I have personally reviewed prior external notes and test results pertinent to today's visit.  I have independently reviewed and interpreted all diagnostics ordered during this urgent care acute visit.     - ondansetron (Zofran) syringe/vial injection 4 mg     - ondansetron (ZOFRAN) 4 MG Tab tablet; Take 1 Tab by mouth every four hours as needed for Nausea/Vomiting.  Dispense: 20 Tab; Refill: 1    F/u in 2 d if sx not improved

## 2024-05-27 ENCOUNTER — HOSPITAL ENCOUNTER (INPATIENT)
Facility: MEDICAL CENTER | Age: 54
LOS: 3 days | End: 2024-05-30
Attending: EMERGENCY MEDICINE | Admitting: STUDENT IN AN ORGANIZED HEALTH CARE EDUCATION/TRAINING PROGRAM
Payer: MEDICAID

## 2024-05-27 ENCOUNTER — APPOINTMENT (OUTPATIENT)
Dept: RADIOLOGY | Facility: MEDICAL CENTER | Age: 54
End: 2024-05-27
Attending: STUDENT IN AN ORGANIZED HEALTH CARE EDUCATION/TRAINING PROGRAM
Payer: MEDICAID

## 2024-05-27 ENCOUNTER — APPOINTMENT (OUTPATIENT)
Dept: RADIOLOGY | Facility: MEDICAL CENTER | Age: 54
End: 2024-05-27
Attending: EMERGENCY MEDICINE
Payer: MEDICAID

## 2024-05-27 ENCOUNTER — OFFICE VISIT (OUTPATIENT)
Dept: URGENT CARE | Facility: CLINIC | Age: 54
End: 2024-05-27
Payer: MEDICAID

## 2024-05-27 ENCOUNTER — APPOINTMENT (OUTPATIENT)
Dept: CARDIOLOGY | Facility: MEDICAL CENTER | Age: 54
End: 2024-05-27
Attending: STUDENT IN AN ORGANIZED HEALTH CARE EDUCATION/TRAINING PROGRAM
Payer: MEDICAID

## 2024-05-27 VITALS
BODY MASS INDEX: 32.33 KG/M2 | OXYGEN SATURATION: 97 % | HEART RATE: 116 BPM | SYSTOLIC BLOOD PRESSURE: 122 MMHG | RESPIRATION RATE: 23 BRPM | TEMPERATURE: 97.6 F | WEIGHT: 206 LBS | DIASTOLIC BLOOD PRESSURE: 80 MMHG | HEIGHT: 67 IN

## 2024-05-27 DIAGNOSIS — R11.2 NAUSEA AND VOMITING, UNSPECIFIED VOMITING TYPE: ICD-10-CM

## 2024-05-27 DIAGNOSIS — I50.21 ACUTE SYSTOLIC HEART FAILURE (HCC): ICD-10-CM

## 2024-05-27 DIAGNOSIS — R09.02 HYPOXIA: ICD-10-CM

## 2024-05-27 DIAGNOSIS — I50.9 ACUTE CONGESTIVE HEART FAILURE, UNSPECIFIED HEART FAILURE TYPE (HCC): ICD-10-CM

## 2024-05-27 DIAGNOSIS — R42 LIGHTHEADEDNESS: ICD-10-CM

## 2024-05-27 DIAGNOSIS — R06.09 EXERTIONAL DYSPNEA: ICD-10-CM

## 2024-05-27 DIAGNOSIS — I51.3 LEFT VENTRICULAR THROMBUS: ICD-10-CM

## 2024-05-27 PROBLEM — E66.9 CLASS 1 OBESITY IN ADULT: Status: ACTIVE | Noted: 2024-05-27

## 2024-05-27 PROBLEM — E66.811 CLASS 1 OBESITY IN ADULT: Status: ACTIVE | Noted: 2024-05-27

## 2024-05-27 PROBLEM — J96.01 ACUTE RESPIRATORY FAILURE WITH HYPOXIA (HCC): Status: ACTIVE | Noted: 2024-05-27

## 2024-05-27 PROBLEM — F39 MOOD DISORDER (HCC): Status: ACTIVE | Noted: 2024-05-27

## 2024-05-27 PROBLEM — R79.89 ELEVATED LFTS: Status: ACTIVE | Noted: 2024-05-27

## 2024-05-27 LAB
ALBUMIN SERPL BCP-MCNC: 4.2 G/DL (ref 3.2–4.9)
ALBUMIN/GLOB SERPL: 1.6 G/DL
ALP SERPL-CCNC: 110 U/L (ref 30–99)
ALT SERPL-CCNC: 121 U/L (ref 2–50)
AMORPH CRY #/AREA URNS HPF: PRESENT /HPF
AMPHET UR QL SCN: NEGATIVE
ANION GAP SERPL CALC-SCNC: 19 MMOL/L (ref 7–16)
APPEARANCE UR: CLEAR
AST SERPL-CCNC: 69 U/L (ref 12–45)
BACTERIA #/AREA URNS HPF: ABNORMAL /HPF
BARBITURATES UR QL SCN: NEGATIVE
BASOPHILS # BLD AUTO: 0.2 % (ref 0–1.8)
BASOPHILS # BLD: 0.02 K/UL (ref 0–0.12)
BENZODIAZ UR QL SCN: NEGATIVE
BILIRUB SERPL-MCNC: 1.6 MG/DL (ref 0.1–1.5)
BILIRUB UR QL STRIP.AUTO: ABNORMAL
BUN SERPL-MCNC: 21 MG/DL (ref 8–22)
BZE UR QL SCN: NEGATIVE
CALCIUM ALBUM COR SERPL-MCNC: 9.2 MG/DL (ref 8.5–10.5)
CALCIUM SERPL-MCNC: 9.4 MG/DL (ref 8.5–10.5)
CANNABINOIDS UR QL SCN: NEGATIVE
CHLORIDE SERPL-SCNC: 104 MMOL/L (ref 96–112)
CO2 SERPL-SCNC: 17 MMOL/L (ref 20–33)
COLOR UR: ABNORMAL
CREAT SERPL-MCNC: 0.98 MG/DL (ref 0.5–1.4)
CRP SERPL HS-MCNC: 1.34 MG/DL (ref 0–0.75)
D DIMER PPP IA.FEU-MCNC: 2.62 UG/ML (FEU) (ref 0–0.5)
EKG IMPRESSION: NORMAL
EKG IMPRESSION: NORMAL
EOSINOPHIL # BLD AUTO: 0.11 K/UL (ref 0–0.51)
EOSINOPHIL NFR BLD: 1.1 % (ref 0–6.9)
EPI CELLS #/AREA URNS HPF: ABNORMAL /HPF
ERYTHROCYTE [DISTWIDTH] IN BLOOD BY AUTOMATED COUNT: 50.6 FL (ref 35.9–50)
ERYTHROCYTE [SEDIMENTATION RATE] IN BLOOD BY WESTERGREN METHOD: 3 MM/HOUR (ref 0–25)
EST. AVERAGE GLUCOSE BLD GHB EST-MCNC: 120 MG/DL
FENTANYL UR QL: NEGATIVE
FLUAV RNA SPEC QL NAA+PROBE: NEGATIVE
FLUBV RNA SPEC QL NAA+PROBE: NEGATIVE
GFR SERPLBLD CREATININE-BSD FMLA CKD-EPI: 68 ML/MIN/1.73 M 2
GLOBULIN SER CALC-MCNC: 2.7 G/DL (ref 1.9–3.5)
GLUCOSE SERPL-MCNC: 126 MG/DL (ref 65–99)
GLUCOSE UR STRIP.AUTO-MCNC: NEGATIVE MG/DL
HAV IGM SERPL QL IA: NORMAL
HBA1C MFR BLD: 5.8 % (ref 4–5.6)
HBV CORE IGM SER QL: NORMAL
HBV SURFACE AG SER QL: NORMAL
HCG SERPL QL: NEGATIVE
HCT VFR BLD AUTO: 45.5 % (ref 37–47)
HCV AB SER QL: NORMAL
HGB BLD-MCNC: 14.5 G/DL (ref 12–16)
HYALINE CASTS #/AREA URNS LPF: ABNORMAL /LPF
IMM GRANULOCYTES # BLD AUTO: 0.04 K/UL (ref 0–0.11)
IMM GRANULOCYTES NFR BLD AUTO: 0.4 % (ref 0–0.9)
KETONES UR STRIP.AUTO-MCNC: 15 MG/DL
LACTATE SERPL-SCNC: 1.3 MMOL/L (ref 0.5–2)
LEUKOCYTE ESTERASE UR QL STRIP.AUTO: NEGATIVE
LIPASE SERPL-CCNC: 76 U/L (ref 11–82)
LIPASE SERPL-CCNC: 76 U/L (ref 11–82)
LYMPHOCYTES # BLD AUTO: 1.1 K/UL (ref 1–4.8)
LYMPHOCYTES NFR BLD: 11.1 % (ref 22–41)
MCH RBC QN AUTO: 31.3 PG (ref 27–33)
MCHC RBC AUTO-ENTMCNC: 31.9 G/DL (ref 32.2–35.5)
MCV RBC AUTO: 98.3 FL (ref 81.4–97.8)
METHADONE UR QL SCN: NEGATIVE
MICRO URNS: ABNORMAL
MONOCYTES # BLD AUTO: 0.97 K/UL (ref 0–0.85)
MONOCYTES NFR BLD AUTO: 9.8 % (ref 0–13.4)
NEUTROPHILS # BLD AUTO: 7.66 K/UL (ref 1.82–7.42)
NEUTROPHILS NFR BLD: 77.4 % (ref 44–72)
NITRITE UR QL STRIP.AUTO: NEGATIVE
NRBC # BLD AUTO: 0 K/UL
NRBC BLD-RTO: 0 /100 WBC (ref 0–0.2)
NT-PROBNP SERPL IA-MCNC: 4169 PG/ML (ref 0–125)
OPIATES UR QL SCN: NEGATIVE
OXYCODONE UR QL SCN: NEGATIVE
PCP UR QL SCN: NEGATIVE
PH UR STRIP.AUTO: 6 [PH] (ref 5–8)
PLATELET # BLD AUTO: 264 K/UL (ref 164–446)
PMV BLD AUTO: 9.6 FL (ref 9–12.9)
POTASSIUM SERPL-SCNC: 4.1 MMOL/L (ref 3.6–5.5)
PROPOXYPH UR QL SCN: NEGATIVE
PROT SERPL-MCNC: 6.9 G/DL (ref 6–8.2)
PROT UR QL STRIP: 100 MG/DL
RBC # BLD AUTO: 4.63 M/UL (ref 4.2–5.4)
RBC # URNS HPF: ABNORMAL /HPF
RBC UR QL AUTO: NEGATIVE
RSV RNA SPEC QL NAA+PROBE: NEGATIVE
SARS-COV-2 RNA RESP QL NAA+PROBE: NOTDETECTED
SODIUM SERPL-SCNC: 140 MMOL/L (ref 135–145)
SP GR UR STRIP.AUTO: >=1.03
TROPONIN T SERPL-MCNC: 27 NG/L (ref 6–19)
TROPONIN T SERPL-MCNC: 29 NG/L (ref 6–19)
UROBILINOGEN UR STRIP.AUTO-MCNC: >=8 MG/DL
WBC # BLD AUTO: 9.9 K/UL (ref 4.8–10.8)
WBC #/AREA URNS HPF: ABNORMAL /HPF

## 2024-05-27 PROCEDURE — 80307 DRUG TEST PRSMV CHEM ANLYZR: CPT

## 2024-05-27 PROCEDURE — 71045 X-RAY EXAM CHEST 1 VIEW: CPT

## 2024-05-27 PROCEDURE — 99223 1ST HOSP IP/OBS HIGH 75: CPT | Performed by: STUDENT IN AN ORGANIZED HEALTH CARE EDUCATION/TRAINING PROGRAM

## 2024-05-27 PROCEDURE — 85652 RBC SED RATE AUTOMATED: CPT

## 2024-05-27 PROCEDURE — 80074 ACUTE HEPATITIS PANEL: CPT

## 2024-05-27 PROCEDURE — 93010 ELECTROCARDIOGRAM REPORT: CPT | Performed by: INTERNAL MEDICINE

## 2024-05-27 PROCEDURE — A9270 NON-COVERED ITEM OR SERVICE: HCPCS | Performed by: STUDENT IN AN ORGANIZED HEALTH CARE EDUCATION/TRAINING PROGRAM

## 2024-05-27 PROCEDURE — 83605 ASSAY OF LACTIC ACID: CPT

## 2024-05-27 PROCEDURE — 85025 COMPLETE CBC W/AUTO DIFF WBC: CPT

## 2024-05-27 PROCEDURE — 93005 ELECTROCARDIOGRAM TRACING: CPT | Performed by: EMERGENCY MEDICINE

## 2024-05-27 PROCEDURE — 84703 CHORIONIC GONADOTROPIN ASSAY: CPT

## 2024-05-27 PROCEDURE — 83690 ASSAY OF LIPASE: CPT

## 2024-05-27 PROCEDURE — 94640 AIRWAY INHALATION TREATMENT: CPT

## 2024-05-27 PROCEDURE — 87040 BLOOD CULTURE FOR BACTERIA: CPT | Mod: 91

## 2024-05-27 PROCEDURE — 80053 COMPREHEN METABOLIC PANEL: CPT

## 2024-05-27 PROCEDURE — 700102 HCHG RX REV CODE 250 W/ 637 OVERRIDE(OP): Performed by: STUDENT IN AN ORGANIZED HEALTH CARE EDUCATION/TRAINING PROGRAM

## 2024-05-27 PROCEDURE — 81001 URINALYSIS AUTO W/SCOPE: CPT

## 2024-05-27 PROCEDURE — 0241U HCHG SARS-COV-2 COVID-19 NFCT DS RESP RNA 4 TRGT ED POC: CPT

## 2024-05-27 PROCEDURE — C9113 INJ PANTOPRAZOLE SODIUM, VIA: HCPCS | Performed by: STUDENT IN AN ORGANIZED HEALTH CARE EDUCATION/TRAINING PROGRAM

## 2024-05-27 PROCEDURE — 36415 COLL VENOUS BLD VENIPUNCTURE: CPT

## 2024-05-27 PROCEDURE — 96374 THER/PROPH/DIAG INJ IV PUSH: CPT

## 2024-05-27 PROCEDURE — 770020 HCHG ROOM/CARE - TELE (206)

## 2024-05-27 PROCEDURE — 700101 HCHG RX REV CODE 250: Performed by: STUDENT IN AN ORGANIZED HEALTH CARE EDUCATION/TRAINING PROGRAM

## 2024-05-27 PROCEDURE — 71275 CT ANGIOGRAPHY CHEST: CPT

## 2024-05-27 PROCEDURE — 83036 HEMOGLOBIN GLYCOSYLATED A1C: CPT

## 2024-05-27 PROCEDURE — 700111 HCHG RX REV CODE 636 W/ 250 OVERRIDE (IP): Mod: UD | Performed by: EMERGENCY MEDICINE

## 2024-05-27 PROCEDURE — 93005 ELECTROCARDIOGRAM TRACING: CPT

## 2024-05-27 PROCEDURE — 85379 FIBRIN DEGRADATION QUANT: CPT

## 2024-05-27 PROCEDURE — 700117 HCHG RX CONTRAST REV CODE 255: Performed by: STUDENT IN AN ORGANIZED HEALTH CARE EDUCATION/TRAINING PROGRAM

## 2024-05-27 PROCEDURE — 93005 ELECTROCARDIOGRAM TRACING: CPT | Performed by: STUDENT IN AN ORGANIZED HEALTH CARE EDUCATION/TRAINING PROGRAM

## 2024-05-27 PROCEDURE — 76705 ECHO EXAM OF ABDOMEN: CPT

## 2024-05-27 PROCEDURE — 700111 HCHG RX REV CODE 636 W/ 250 OVERRIDE (IP): Mod: JZ | Performed by: STUDENT IN AN ORGANIZED HEALTH CARE EDUCATION/TRAINING PROGRAM

## 2024-05-27 PROCEDURE — 99285 EMERGENCY DEPT VISIT HI MDM: CPT

## 2024-05-27 PROCEDURE — 83880 ASSAY OF NATRIURETIC PEPTIDE: CPT

## 2024-05-27 PROCEDURE — 84484 ASSAY OF TROPONIN QUANT: CPT

## 2024-05-27 PROCEDURE — 86140 C-REACTIVE PROTEIN: CPT

## 2024-05-27 RX ORDER — PROMETHAZINE HYDROCHLORIDE 25 MG/1
12.5-25 SUPPOSITORY RECTAL EVERY 4 HOURS PRN
Status: DISCONTINUED | OUTPATIENT
Start: 2024-05-27 | End: 2024-05-30 | Stop reason: HOSPADM

## 2024-05-27 RX ORDER — IPRATROPIUM BROMIDE AND ALBUTEROL SULFATE 2.5; .5 MG/3ML; MG/3ML
3 SOLUTION RESPIRATORY (INHALATION)
Status: DISCONTINUED | OUTPATIENT
Start: 2024-05-27 | End: 2024-05-30 | Stop reason: HOSPADM

## 2024-05-27 RX ORDER — PROCHLORPERAZINE EDISYLATE 5 MG/ML
10 INJECTION INTRAMUSCULAR; INTRAVENOUS ONCE
Status: COMPLETED | OUTPATIENT
Start: 2024-05-27 | End: 2024-05-27

## 2024-05-27 RX ORDER — MORPHINE SULFATE 4 MG/ML
2-4 INJECTION INTRAVENOUS
Status: DISCONTINUED | OUTPATIENT
Start: 2024-05-27 | End: 2024-05-30 | Stop reason: HOSPADM

## 2024-05-27 RX ORDER — VENLAFAXINE HYDROCHLORIDE 75 MG/1
75 CAPSULE, EXTENDED RELEASE ORAL DAILY
COMMUNITY
End: 2024-06-24

## 2024-05-27 RX ORDER — FUROSEMIDE 10 MG/ML
40 INJECTION INTRAMUSCULAR; INTRAVENOUS
Status: DISCONTINUED | OUTPATIENT
Start: 2024-05-27 | End: 2024-05-29

## 2024-05-27 RX ORDER — NITROGLYCERIN 0.4 MG/1
0.4 TABLET SUBLINGUAL
Status: DISCONTINUED | OUTPATIENT
Start: 2024-05-27 | End: 2024-05-30 | Stop reason: HOSPADM

## 2024-05-27 RX ORDER — ONDANSETRON 2 MG/ML
4 INJECTION INTRAMUSCULAR; INTRAVENOUS EVERY 4 HOURS PRN
Status: DISCONTINUED | OUTPATIENT
Start: 2024-05-27 | End: 2024-05-30 | Stop reason: HOSPADM

## 2024-05-27 RX ORDER — LABETALOL HYDROCHLORIDE 5 MG/ML
10 INJECTION, SOLUTION INTRAVENOUS EVERY 4 HOURS PRN
Status: DISCONTINUED | OUTPATIENT
Start: 2024-05-27 | End: 2024-05-30 | Stop reason: HOSPADM

## 2024-05-27 RX ORDER — PROMETHAZINE HYDROCHLORIDE 25 MG/1
12.5-25 TABLET ORAL EVERY 4 HOURS PRN
Status: DISCONTINUED | OUTPATIENT
Start: 2024-05-27 | End: 2024-05-30 | Stop reason: HOSPADM

## 2024-05-27 RX ORDER — PROCHLORPERAZINE EDISYLATE 5 MG/ML
5-10 INJECTION INTRAMUSCULAR; INTRAVENOUS EVERY 4 HOURS PRN
Status: DISCONTINUED | OUTPATIENT
Start: 2024-05-27 | End: 2024-05-30 | Stop reason: HOSPADM

## 2024-05-27 RX ORDER — ATORVASTATIN CALCIUM 40 MG/1
40 TABLET, FILM COATED ORAL EVERY EVENING
Status: DISCONTINUED | OUTPATIENT
Start: 2024-05-27 | End: 2024-05-30 | Stop reason: HOSPADM

## 2024-05-27 RX ORDER — VENLAFAXINE HYDROCHLORIDE 37.5 MG/1
75 CAPSULE, EXTENDED RELEASE ORAL DAILY
Status: DISCONTINUED | OUTPATIENT
Start: 2024-05-28 | End: 2024-05-30 | Stop reason: HOSPADM

## 2024-05-27 RX ORDER — IPRATROPIUM BROMIDE AND ALBUTEROL SULFATE 2.5; .5 MG/3ML; MG/3ML
3 SOLUTION RESPIRATORY (INHALATION) ONCE
Status: COMPLETED | OUTPATIENT
Start: 2024-05-27 | End: 2024-05-27

## 2024-05-27 RX ORDER — CARVEDILOL 3.12 MG/1
3.12 TABLET ORAL 2 TIMES DAILY WITH MEALS
Status: DISCONTINUED | OUTPATIENT
Start: 2024-05-27 | End: 2024-05-29

## 2024-05-27 RX ORDER — HEPARIN SODIUM 5000 [USP'U]/ML
5000 INJECTION, SOLUTION INTRAVENOUS; SUBCUTANEOUS EVERY 8 HOURS
Status: DISCONTINUED | OUTPATIENT
Start: 2024-05-27 | End: 2024-05-27

## 2024-05-27 RX ORDER — AMOXICILLIN 250 MG
2 CAPSULE ORAL EVERY EVENING
Status: DISCONTINUED | OUTPATIENT
Start: 2024-05-27 | End: 2024-05-30 | Stop reason: HOSPADM

## 2024-05-27 RX ORDER — METOCLOPRAMIDE HYDROCHLORIDE 5 MG/ML
10 INJECTION INTRAMUSCULAR; INTRAVENOUS EVERY 6 HOURS
Status: COMPLETED | OUTPATIENT
Start: 2024-05-27 | End: 2024-05-27

## 2024-05-27 RX ORDER — PANTOPRAZOLE SODIUM 40 MG/10ML
40 INJECTION, POWDER, LYOPHILIZED, FOR SOLUTION INTRAVENOUS 2 TIMES DAILY
Status: COMPLETED | OUTPATIENT
Start: 2024-05-27 | End: 2024-05-30

## 2024-05-27 RX ORDER — ASPIRIN 81 MG/1
81 TABLET ORAL DAILY
Status: DISCONTINUED | OUTPATIENT
Start: 2024-05-27 | End: 2024-05-29

## 2024-05-27 RX ORDER — ONDANSETRON 4 MG/1
4 TABLET, ORALLY DISINTEGRATING ORAL EVERY 4 HOURS PRN
Status: DISCONTINUED | OUTPATIENT
Start: 2024-05-27 | End: 2024-05-30 | Stop reason: HOSPADM

## 2024-05-27 RX ORDER — HEPARIN SODIUM 5000 [USP'U]/ML
5000 INJECTION, SOLUTION INTRAVENOUS; SUBCUTANEOUS EVERY 8 HOURS
Status: DISCONTINUED | OUTPATIENT
Start: 2024-05-27 | End: 2024-05-28

## 2024-05-27 RX ORDER — GABAPENTIN 300 MG/1
300 CAPSULE ORAL 3 TIMES DAILY
Status: DISCONTINUED | OUTPATIENT
Start: 2024-05-27 | End: 2024-05-30 | Stop reason: HOSPADM

## 2024-05-27 RX ORDER — MONTELUKAST SODIUM 10 MG/1
10 TABLET ORAL NIGHTLY
Status: DISCONTINUED | OUTPATIENT
Start: 2024-05-27 | End: 2024-05-28

## 2024-05-27 RX ORDER — POLYETHYLENE GLYCOL 3350 17 G/17G
1 POWDER, FOR SOLUTION ORAL
Status: DISCONTINUED | OUTPATIENT
Start: 2024-05-27 | End: 2024-05-30 | Stop reason: HOSPADM

## 2024-05-27 RX ORDER — CARIPRAZINE 4.5 MG/1
4.5 CAPSULE, GELATIN COATED ORAL EVERY EVENING
COMMUNITY
End: 2024-06-24

## 2024-05-27 RX ADMIN — MOMETASONE FUROATE AND FORMOTEROL FUMARATE DIHYDRATE 2 PUFF: 200; 5 AEROSOL RESPIRATORY (INHALATION) at 20:54

## 2024-05-27 RX ADMIN — METOCLOPRAMIDE 10 MG: 5 INJECTION, SOLUTION INTRAMUSCULAR; INTRAVENOUS at 17:19

## 2024-05-27 RX ADMIN — IOHEXOL 50 ML: 350 INJECTION, SOLUTION INTRAVENOUS at 16:36

## 2024-05-27 RX ADMIN — METOCLOPRAMIDE 10 MG: 5 INJECTION, SOLUTION INTRAMUSCULAR; INTRAVENOUS at 23:53

## 2024-05-27 RX ADMIN — PANTOPRAZOLE SODIUM 40 MG: 40 INJECTION, POWDER, FOR SOLUTION INTRAVENOUS at 17:19

## 2024-05-27 RX ADMIN — CARVEDILOL 3.12 MG: 3.12 TABLET, FILM COATED ORAL at 17:19

## 2024-05-27 RX ADMIN — GABAPENTIN 300 MG: 300 CAPSULE ORAL at 17:20

## 2024-05-27 RX ADMIN — ATORVASTATIN CALCIUM 40 MG: 40 TABLET, FILM COATED ORAL at 17:20

## 2024-05-27 RX ADMIN — PROCHLORPERAZINE EDISYLATE 10 MG: 5 INJECTION INTRAMUSCULAR; INTRAVENOUS at 12:31

## 2024-05-27 RX ADMIN — MONTELUKAST 10 MG: 10 TABLET, FILM COATED ORAL at 20:54

## 2024-05-27 RX ADMIN — CARIPRAZINE 4.5 MG: 1.5 CAPSULE, GELATIN COATED ORAL at 17:20

## 2024-05-27 RX ADMIN — ASPIRIN 81 MG: 81 TABLET, COATED ORAL at 17:20

## 2024-05-27 RX ADMIN — FUROSEMIDE 40 MG: 10 INJECTION INTRAMUSCULAR; INTRAVENOUS at 17:23

## 2024-05-27 RX ADMIN — HEPARIN SODIUM 5000 UNITS: 5000 INJECTION, SOLUTION INTRAVENOUS; SUBCUTANEOUS at 23:53

## 2024-05-27 RX ADMIN — IPRATROPIUM BROMIDE AND ALBUTEROL SULFATE 3 ML: 2.5; .5 SOLUTION RESPIRATORY (INHALATION) at 18:32

## 2024-05-27 ASSESSMENT — ENCOUNTER SYMPTOMS
WHEEZING: 0
DOUBLE VISION: 0
FEVER: 0
FOCAL WEAKNESS: 0
WEAKNESS: 1
DIZZINESS: 0
PALPITATIONS: 1
SHORTNESS OF BREATH: 1
COUGH: 0
MYALGIAS: 1
DEPRESSION: 0
BRUISES/BLEEDS EASILY: 0
HEADACHES: 0
BLURRED VISION: 0
HEARTBURN: 1
CHILLS: 0
NAUSEA: 1
VOMITING: 1
ABDOMINAL PAIN: 1

## 2024-05-27 ASSESSMENT — LIFESTYLE VARIABLES: SUBSTANCE_ABUSE: 0

## 2024-05-27 ASSESSMENT — FIBROSIS 4 INDEX: FIB4 SCORE: 1.28

## 2024-05-27 ASSESSMENT — PAIN DESCRIPTION - PAIN TYPE: TYPE: ACUTE PAIN

## 2024-05-27 NOTE — ASSESSMENT & PLAN NOTE
Requires 2 L, was on RA  CTA negative for PE    Due to CHF exacerbation  Started on iv lasix  Wean down oxygen

## 2024-05-27 NOTE — ED NOTES
Patient ambulatory to the restroom and back, steady gait.  's and O2 sats down to 87 after ambulating.  Patient reports mild exertional sob and states she has had a congested cough.  ERP notified.

## 2024-05-27 NOTE — ED PROVIDER NOTES
"ER Provider Note    Scribed for Siva Tran D.O. by Shree Quiroz. 5/27/2024  11:42 AM    Primary Care Provider: ROXANN Bella    CHIEF COMPLAINT  Chief Complaint   Patient presents with    N/V     Patient reports N/V x 1 week, worsening over the last few days. Patient reports she was seen at  two days ago and prescribed zofran, which she reports has not helped at all. Patient reports she has been unable to keep food or liquid down for two days.       HPI/ROS    Analia Wu is a 53 y.o. female who presents to the Emergency Department for evaluation of nausea and vomiting, onset 1 week ago. The patient reports she is now vomiting every 30 minutes, prompting her to present to the ED. She states she presented to [facility] 2 days ago, and earlier today for the same symptoms, and was advised to present to [facility] ED for further evaluation. She reports she was prescribed Zofran by Urgent Care 2 days ago, but notes it has offered no symptom relief. She denies abdominal pain, diarrhea, constipation, fevers, chills, alcohol use on regular basis, history of marijuana use, or congestion.    ROS as per HPI.    PAST MEDICAL HISTORY  Past Medical History:   Diagnosis Date    Anesthesia     ponv,  \"shallow breathing\"    Anxiety     ASTHMA     does not have any inhalers    Cancer (HCC)     vulva    Carpal tunnel syndrome     Heart murmur     pt reports she has never had any problems    Infectious disease 2007    Mrsa infection    Pain 11-26-12    neck, left knee, 6-7/10    Panic attack     Psychiatric problem     depression, PTSD, anxiety    Seasonal allergies        SURGICAL HISTORY  Past Surgical History:   Procedure Laterality Date    RECTAL EXPLORATION  12/19/2018    Procedure: RECTAL EXPLORATION - FOR HIGH RESOLUTION ANOSCOPY;  Surgeon: Billy Gamboa M.D.;  Location: SURGERY [facility];  Service: General    ANAL FISTULECTOMY  5/24/2017    Procedure: ANAL FISTULECTOMY FOR: WIDE EXCISION ANAL " DYSPLASIA;  Surgeon: Billy Villegas M.D.;  Location: SURGERY [facility];  Service:     RECTAL EXPLORATION  9/2/2016    Procedure: RECTAL EXPLORATION eua, AND BIOPSY;  Surgeon: Billy Villegas M.D.;  Location: SURGERY [facility];  Service:     VULVECTOMY RADICAL Bilateral 9/2/2016    Procedure: VULVECTOMY RADICAL;  Surgeon: Reese Carroll M.D.;  Location: SURGERY [facility];  Service:     RECTAL EXPLORATION  1/28/2015    Performed by Billy Villegas M.D. at SURGERY [facility]    EXAM UNDER ANESTHESIA  5/21/2014    Performed by Billy Villegas M.D. at SURGERY [facility]    RECTAL EXPLORATION  5/21/2014    Performed by Billy Villegas M.D. at SURGERY [facility]    WIDE EXCISION  12/19/2012    Performed by Billy Villegas M.D. at SURGERY [facility]    WIDE EXCISION  2/29/2012    Performed by BILLY VILLEGAS at SURGERY [facility]    WIDE EXCISION  9/7/2011    Performed by BILLY VILLEGAS at SURGERY [facility]    CERVICAL DISK AND FUSION ANTERIOR  6/27/2011    Performed by YEIMI LEVY at SURGERY [facility]    WIDE EXCISION  8/4/2010    Performed by BILLY VILLEGAS at SURGERY [facility]    HEMORRHOIDECTOMY  8/4/2010    Performed by BILLY VILLEGAS at SURGERY [facility]    VULVECTOMY PARTIAL  October 2007    RECTAL BIOPSY  2007    OTHER NEUROLOGICAL SURG      neck fusion       FAMILY HISTORY  Family History   Problem Relation Age of Onset    Diabetes Mother     Stroke Mother     Cancer Maternal Aunt     Lung Disease Neg Hx     Heart Disease Neg Hx        SOCIAL HISTORY   reports that she quit smoking about 16 years ago. Her smoking use included cigarettes. She started smoking about 41 years ago. She has a 25 pack-year smoking history. She has never used smokeless tobacco. She reports that she does not drink alcohol and does not use drugs.    CURRENT MEDICATIONS  Previous Medications    CARIPRAZINE HCL (VRAYLAR) 4.5 MG CAP    Take 4.5 mg by mouth every evening.    GABAPENTIN  "(NEURONTIN) 300 MG CAP    Take 300 mg by mouth 3 times a day.    VENLAFAXINE XR (EFFEXOR XR) 75 MG CAPSULE SR 24 HR    Take 75 mg by mouth every day.       ALLERGIES  Latex, Other food, Dilaudid [hydromorphone hcl], Tape, and Other environmental    PHYSICAL EXAM  /70   Pulse (!) 106   Temp 36.9 °C (98.5 °F) (Temporal)   Resp 17   Ht 1.702 m (5' 7\")   Wt 93.4 kg (205 lb 14.6 oz)   LMP 12/17/2018 (Approximate)   SpO2 97%   BMI 32.25 kg/m²     General: No acute distress.  HENT: Normocephalic, Mucus membranes are moist.   Chest: Lungs have even and unlabored respirations, Clear to auscultation.   Cardiovascular: Regular rate and regular rhythm, No peripheral cyanosis.  Abdomen: Non distended.  Neuro: Awake, Conversive, Able to relay recent events.  Psychiatric: Calm and cooperative.         INITIAL ASSESSMENT  Patient has been vomiting for a week, and states she vomits every 30 minutes currently. However, her fluid status on exam does not look bad. Will give IV fluids, and will evaluate for dehydration and electrolyte problems through labs. Zofran has not been effective, will use compazine.     ED Observation Status? No; Patient does not meet criteria for ED Observation.     DIAGNOSTIC STUDIES    Labs:   Results for orders placed or performed during the hospital encounter of 05/27/24   CBC WITH DIFFERENTIAL   Result Value Ref Range    WBC 9.9 4.8 - 10.8 K/uL    RBC 4.63 4.20 - 5.40 M/uL    Hemoglobin 14.5 12.0 - 16.0 g/dL    Hematocrit 45.5 37.0 - 47.0 %    MCV 98.3 (H) 81.4 - 97.8 fL    MCH 31.3 27.0 - 33.0 pg    MCHC 31.9 (L) 32.2 - 35.5 g/dL    RDW 50.6 (H) 35.9 - 50.0 fL    Platelet Count 264 164 - 446 K/uL    MPV 9.6 9.0 - 12.9 fL    Neutrophils-Polys 77.40 (H) 44.00 - 72.00 %    Lymphocytes 11.10 (L) 22.00 - 41.00 %    Monocytes 9.80 0.00 - 13.40 %    Eosinophils 1.10 0.00 - 6.90 %    Basophils 0.20 0.00 - 1.80 %    Immature Granulocytes 0.40 0.00 - 0.90 %    Nucleated RBC 0.00 0.00 - 0.20 /100 WBC "    Neutrophils (Absolute) 7.66 (H) 1.82 - 7.42 K/uL    Lymphs (Absolute) 1.10 1.00 - 4.80 K/uL    Monos (Absolute) 0.97 (H) 0.00 - 0.85 K/uL    Eos (Absolute) 0.11 0.00 - 0.51 K/uL    Baso (Absolute) 0.02 0.00 - 0.12 K/uL    Immature Granulocytes (abs) 0.04 0.00 - 0.11 K/uL    NRBC (Absolute) 0.00 K/uL   COMP METABOLIC PANEL   Result Value Ref Range    Sodium 140 135 - 145 mmol/L    Potassium 4.1 3.6 - 5.5 mmol/L    Chloride 104 96 - 112 mmol/L    Co2 17 (L) 20 - 33 mmol/L    Anion Gap 19.0 (H) 7.0 - 16.0    Glucose 126 (H) 65 - 99 mg/dL    Bun 21 8 - 22 mg/dL    Creatinine 0.98 0.50 - 1.40 mg/dL    Calcium 9.4 8.5 - 10.5 mg/dL    Correct Calcium 9.2 8.5 - 10.5 mg/dL    AST(SGOT) 69 (H) 12 - 45 U/L    ALT(SGPT) 121 (H) 2 - 50 U/L    Alkaline Phosphatase 110 (H) 30 - 99 U/L    Total Bilirubin 1.6 (H) 0.1 - 1.5 mg/dL    Albumin 4.2 3.2 - 4.9 g/dL    Total Protein 6.9 6.0 - 8.2 g/dL    Globulin 2.7 1.9 - 3.5 g/dL    A-G Ratio 1.6 g/dL   LIPASE   Result Value Ref Range    Lipase 76 11 - 82 U/L   HCG QUAL SERUM   Result Value Ref Range    Beta-Hcg Qualitative Serum Negative Negative   URINALYSIS,CULTURE IF INDICATED    Specimen: Urine   Result Value Ref Range    Color Dark Yellow     Character Clear     Specific Gravity >=1.030 <1.035    Ph 6.0 5.0 - 8.0    Glucose Negative Negative mg/dL    Ketones 15 (A) Negative mg/dL    Protein 100 (A) Negative mg/dL    Bilirubin Moderate (A) Negative    Urobilinogen, Urine >=8.0 Negative    Nitrite Negative Negative    Leukocyte Esterase Negative Negative    Occult Blood Negative Negative    Micro Urine Req Microscopic    ESTIMATED GFR   Result Value Ref Range    GFR (CKD-EPI) 68 >60 mL/min/1.73 m 2   URINE MICROSCOPIC (W/UA)   Result Value Ref Range    WBC 0-2 /hpf    RBC 0-2 /hpf    Bacteria Few (A) None /hpf    Epithelial Cells Few /hpf    Amorphous Crystal Present /hpf    Hyaline Cast 3-5 (A) /lpf   proBrain Natriuretic Peptide, NT   Result Value Ref Range    NT-proBNP 4169 (H)  0 - 125 pg/mL   TROPONIN   Result Value Ref Range    Troponin T 27 (H) 6 - 19 ng/L   LACTIC ACID   Result Value Ref Range    Lactic Acid 1.3 0.5 - 2.0 mmol/L   Troponin in two (2) hours   Result Value Ref Range    Troponin T 29 (H) 6 - 19 ng/L   Urine Drug Screen   Result Value Ref Range    Amphetamines Urine Negative Negative    Barbiturates Negative Negative    Benzodiazepines Negative Negative    Cocaine Metabolite Negative Negative    Fentanyl, Urine Negative Negative    Methadone Negative Negative    Opiates Negative Negative    Oxycodone Negative Negative    Phencyclidine -Pcp Negative Negative    Propoxyphene Negative Negative    Cannabinoid Metab Negative Negative   D-Dimer   Result Value Ref Range    D-Dimer 2.62 (H) 0.00 - 0.50 ug/mL (FEU)   HEPATITIS PANEL ACUTE(4 COMPONENTS)   Result Value Ref Range    Hepatitis B Surface Antigen Non-Reactive Non-Reactive    Hepatitis B Cors Ab,IgM Non-Reactive Non-Reactive    Hepatitis A Virus Ab, IgM Non-Reactive Non-Reactive    Hepatitis C Antibody Non-Reactive Non-Reactive   CRP QUANTITIVE (NON-CARDIAC)   Result Value Ref Range    Stat C-Reactive Protein 1.34 (H) 0.00 - 0.75 mg/dL   LIPASE   Result Value Ref Range    Lipase 76 11 - 82 U/L   EKG   Result Value Ref Range    Report       [facility] Emergency Dept.    Test Date:  2024  Pt Name:    LAI MALONE                Department: ER  MRN:        [id number]                      Room:  Gender:     Female                       Technician: [id number]  :        [id number]                   Requested By:ER TRIAGE PROTOCOL  Order #:    [id number]                    Reading MD:    Measurements  Intervals                                Axis  Rate:       114                          P:          71  MN:         139                          QRS:        -39  QRSD:       149                          T:          85  QT:         371  QTc:        512    Interpretive Statements  Sinus tachycardia  Probable left  atrial enlargement  Left bundle branch block  Compared to ECG 07/11/2011 04:10:39  Left bundle-branch block now present  Sinus rhythm no longer present  Left ventricular hypertrophy no longer present     POC CoV-2, FLU A/B, RSV by PCR   Result Value Ref Range    POC Influenza A RNA, PCR Negative Negative    POC Influenza B RNA, PCR Negative Negative    POC RSV, by PCR Negative Negative    POC SARS-CoV-2, PCR NotDetected        EKG:   I have independently interpreted the above EKG as seen above.     Radiology:   The attending emergency physician has independently interpreted the diagnostic imaging associated with this visit and am waiting the final reading from the radiologist.   Preliminary interpretation is as follows: Chest x-ray no pneumonia  Radiologist interpretation:   US-RUQ   Final Result      1.  There is cholelithiasis with gallbladder wall thickening but no biliary dilatation.   2.  The liver is heterogeneously echogenic, most compatible with fatty infiltration, however other hepatocellular disease processes are in the differential diagnosis.      DX-CHEST-PORTABLE (1 VIEW)   Final Result      1.  Mildly enlarged cardiac silhouette but no acute cardiopulmonary process.      EC-ECHOCARDIOGRAM COMPLETE W/O CONT    (Results Pending)   CT-CTA CHEST PULMONARY ARTERY W/ RECONS    (Results Pending)   US-EXTREMITY VENOUS LOWER BILAT    (Results Pending)         COURSE & MEDICAL DECISION MAKING     COURSE AND PLAN  11:42 AM - Patient seen and examined at bedside. Discussed plan of care, including lab work, diagnostic imaging, and medication for her nausea and vomiting. Patient agrees to the plan of care. The patient will be medicated with Compazine 10 mg injection. Ordered for EKG, UA, HCG Qual Serum, Lipase, CMP, and CBC w/ diff to evaluate her symptoms.     12:39 PM - Ordered for POCT CoV-2, Flu A/B, and RSV by PCR, pBNP, Troponin, and DX-Chest to further evaluate.     2:27 PM - Ordered for US-RUQ.     2:50 PM -  I discussed the patient's case and the above findings with Dr. Lopez (Hospitalist) who agreed to hospitalize the patient. Patient's care was transferred at this time.      ED Summary: Patient presents to the emergency department has been having nausea and vomiting.  She reported to me no abdominal pain.  She was medicated for nausea.  And her nausea did improve.  When walking back from the restroom she started developing exertional dyspnea and hypoxemia.  She was placed on supplemental O2 and this did improve her symptoms.  With that chest x-ray and cardiac evaluation was done.  Troponin is minimally elevated but her BNP is significantly elevated.  This is concerning for congestive heart failure.  I spoke with the hospitalist for admission the patient will be admitted.  I did put in a ultrasound of the gallbladder for the hospitalist to follow-up with.      DISPOSITION AND DISCUSSIONS  I have discussed management of the patient with the following physicians and SUE's: Dr. Lopez (Hospitalist)     Discussion of management with other QHP or appropriate source(s): None    Barriers to care at this time, including but not limited to: None     DISPOSITION:  Patient will be hospitalized by Dr. Lopez in guarded condition.      FINAL DIAGNOSIS  1. Acute congestive heart failure, unspecified heart failure type (HCC)    2. Exertional dyspnea    3. Nausea and vomiting, unspecified vomiting type    4. Hypoxia        Shree CHIN (Scribkassandra), am scribing for, and in the presence of, Siva Tran D.O..    Electronically signed by: Shree Quiroz (Josefibkassandra), 5/27/2024    ISiva D.O. personally performed the services described in this documentation, as scribed by Shree Quiroz in my presence, and it is both accurate and complete.     The note accurately reflects work and decisions made by me.  Siva Tran D.O.  5/27/2024  4:29 PM

## 2024-05-27 NOTE — ED TRIAGE NOTES
"Chief Complaint   Patient presents with    N/V     Patient reports N/V x 1 week, worsening over the last few days. Patient reports she was seen at  two days ago and prescribed zofran, which she reports has not helped at all. Patient reports she has been unable to keep food or liquid down for two days.       54 yo female to triage for above complaint. Patient denies chest pain, but tachycardia noted. EKG complete. Patient endorses occasional SO on exertion after vomiting episodes.  Protocol ordered    Pt is alert and oriented, speaking in full sentences, follows commands and responds appropriately to questions.     Patient placed back in lobby and educated on triage process. Asked to inform RN of any changes.    /89   Pulse (!) 134   Temp 36.7 °C (98.1 °F) (Temporal)   Resp 18   Ht 1.702 m (5' 7\")   Wt 93.4 kg (205 lb 14.6 oz)   LMP 12/17/2018 (Approximate)   SpO2 96%   BMI 32.25 kg/m²     "

## 2024-05-27 NOTE — ASSESSMENT & PLAN NOTE
Unclear etiology  AT/ALT AST 69/121, bili 1.6  Likely due to congestive hepatopathy    RUQ US cholelithiasis with gallbladder wall thickening but no biliary  dilatation.  HIDA negative for acute cholecystitis, EF 29%    5/29 ast/alt 65/109, bili 1.3  I ordered am CMP to monitor

## 2024-05-27 NOTE — ED NOTES
Med rec updated and complete. Allergies reviewed.   Confirmed name and  date of birth.  Pt denies antibiotic use in last 30 days.  Pt denies anticoagulant and antiplatelet medications.        Home pharmacy  [facility] [phone]      Pt previously transfers her prescriptions from  [facility] .

## 2024-05-27 NOTE — H&P
Hospital Medicine History & Physical Note    Date of Service  5/27/2024    Primary Care Physician  Dorothy Vidal, LJ.    Consultants  None    Code Status  Full Code    Chief Complaint  Chief Complaint   Patient presents with    N/V     Patient reports N/V x 1 week, worsening over the last few days. Patient reports she was seen at  two days ago and prescribed zofran, which she reports has not helped at all. Patient reports she has been unable to keep food or liquid down for two days.       History of Presenting Illness  Analia Wu is a 53 y.o. morbidly obese female with remote history of perianal dysplasia s/p surgery by Dr. Gamboa and Dr. Carroll, mood disorder who presented 5/27/2024 with depression for nausea vomiting.  Patient reported having ongoing nausea and vomiting for the past week.  She denies fever, chills, bleeding or bilious vomiting.  She reported shortness of breath with overt exertion.  She was seen in urgent care, due to intractable nausea and vomiting unable to tolerate oral Zofran, referred to ER for further evaluation.  Blood work revealed elevated LFTs, total bili 1.6.  For unclear reason, troponin T and proBNP checked, found to be elevated.  It was reported that she became hypoxic upon ambulation to go to the bathroom while in the emergency room, placed on 2 L nasal cannula support.  I request ERP to obtain RUQ US to rule out cholecystitis.  Admission requested by ERP.  Therefore, admitted to medicine service for further evaluation and treatment.    I discussed the plan of care with patient, bedside RN, and pharmacy.    Review of Systems  Review of Systems   Constitutional:  Negative for chills and fever.   HENT:  Negative for hearing loss and tinnitus.    Eyes:  Negative for blurred vision and double vision.   Respiratory:  Positive for shortness of breath. Negative for cough and wheezing.    Cardiovascular:  Positive for palpitations. Negative for chest pain.   Gastrointestinal:   Positive for abdominal pain, heartburn, nausea and vomiting.   Genitourinary:  Negative for dysuria and hematuria.   Musculoskeletal:  Positive for myalgias. Negative for joint pain.   Skin:  Negative for itching and rash.   Neurological:  Positive for weakness. Negative for dizziness, focal weakness and headaches.   Endo/Heme/Allergies:  Negative for environmental allergies. Does not bruise/bleed easily.   Psychiatric/Behavioral:  Negative for depression and substance abuse.    All other systems reviewed and are negative.      Past Medical History   has a past medical history of Anesthesia, Anxiety, ASTHMA, Cancer (HCC), Carpal tunnel syndrome, Heart murmur, Infectious disease (2007), Pain (11-26-12), Panic attack, Psychiatric problem, and Seasonal allergies.    Surgical History   has a past surgical history that includes rectal biopsy (2007); wide excision (8/4/2010); cervical disk and fusion anterior (6/27/2011); wide excision (9/7/2011); wide excision (2/29/2012); wide excision (12/19/2012); exam under anesthesia (5/21/2014); rectal exploration (5/21/2014); rectal exploration (1/28/2015); rectal exploration (9/2/2016); anal fistulectomy (5/24/2017); hemorrhoidectomy (8/4/2010); vulvectomy partial (October 2007); vulvectomy radical (Bilateral, 9/2/2016); other neurological surg; and rectal exploration (12/19/2018).     Family History  family history includes Cancer in her maternal aunt; Diabetes in her mother; Stroke in her mother.   Family history reviewed with patient. There is family history that is pertinent to the chief complaint.     Social History   reports that she quit smoking about 16 years ago. Her smoking use included cigarettes. She started smoking about 41 years ago. She has a 25 pack-year smoking history. She has never used smokeless tobacco. She reports that she does not drink alcohol and does not use drugs.    Allergies  Allergies   Allergen Reactions    Latex Shortness of Breath and Itching     "Other Food Hives and Rash     Splenda    Tape      \"makes skin turn red\"  Paper tape ok    Hydromorphone Vomiting and Nausea    Other Environmental      Dial soap --  Skin itching       Medications  Prior to Admission Medications   Prescriptions Last Dose Informant Patient Reported? Taking?   Bisacodyl (LAXATIVE PO)  Patient Yes No   Sig: Take 2 Tabs by mouth every day.   Multiple Vitamin (MULTIVITAMIN PO)  Patient Yes No   Sig: Take 1 Tablet by mouth every day.   Patient not taking: Reported on 5/27/2024   gabapentin (NEURONTIN) 300 MG Cap  Patient Yes No   Sig: Take 300 mg by mouth 2 times a day. Pt takes 300 mg in the morning and 600 mg in the evening   Patient not taking: Reported on 5/27/2024   ibuprofen (MOTRIN) 800 MG Tab  Patient No No   Sig: Take 1 Tablet by mouth every 8 hours as needed for Moderate Pain.   Patient not taking: Reported on 5/27/2024   ondansetron (ZOFRAN ODT) 4 MG TABLET DISPERSIBLE   No No   Sig: Take 1 Tablet by mouth every 8 hours as needed for Nausea/Vomiting.   Patient not taking: Reported on 5/27/2024   risperiDONE (RISPERDAL) 2 MG Tab  Patient Yes No   Sig: Take 2 mg by mouth every bedtime.   Patient not taking: Reported on 5/27/2024   venlafaxine XR (EFFEXOR XR) 75 MG CAPSULE SR 24 HR  Patient Yes No   Sig: Take 75 mg by mouth every day.   Patient not taking: Reported on 5/27/2024      Facility-Administered Medications: None       Physical Exam  Temp:  [36.4 °C (97.6 °F)-36.9 °C (98.5 °F)] 36.5 °C (97.7 °F)  Pulse:  [] 129  Resp:  [16-28] 18  BP: (103-126)/(58-89) 116/88  SpO2:  [83 %-97 %] 92 %  Blood Pressure: 107/72   Temperature: 36.7 °C (98.1 °F)   Pulse: 91   Respiration: (!) 22   Pulse Oximetry: 92 %       Physical Exam  Vitals and nursing note reviewed.   Constitutional:       General: She is not in acute distress.     Appearance: She is obese.   HENT:      Head: Normocephalic and atraumatic.      Nose: Nose normal.      Mouth/Throat:      Mouth: Mucous membranes are " moist.      Pharynx: Oropharynx is clear.   Eyes:      General: No scleral icterus.     Extraocular Movements: Extraocular movements intact.   Cardiovascular:      Rate and Rhythm: Normal rate and regular rhythm.      Pulses: Normal pulses.      Heart sounds:      No friction rub.   Pulmonary:      Effort: No respiratory distress.      Breath sounds: Rales present. No wheezing.   Chest:      Chest wall: No tenderness.   Abdominal:      General: There is distension.      Tenderness: There is no abdominal tenderness. There is no guarding or rebound.   Musculoskeletal:         General: Normal range of motion.      Cervical back: Neck supple. No tenderness.      Right lower leg: No edema.      Left lower leg: No edema.   Skin:     General: Skin is warm and dry.      Capillary Refill: Capillary refill takes less than 2 seconds.   Neurological:      General: No focal deficit present.      Mental Status: She is alert and oriented to person, place, and time.   Psychiatric:         Mood and Affect: Mood normal.         Laboratory:  Recent Labs     05/27/24  1119   WBC 9.9   RBC 4.63   HEMOGLOBIN 14.5   HEMATOCRIT 45.5   MCV 98.3*   MCH 31.3   MCHC 31.9*   RDW 50.6*   PLATELETCT 264   MPV 9.6     Recent Labs     05/27/24  1119   SODIUM 140   POTASSIUM 4.1   CHLORIDE 104   CO2 17*   GLUCOSE 126*   BUN 21   CREATININE 0.98   CALCIUM 9.4     Recent Labs     05/27/24  1119 05/27/24  1509   ALTSGPT 121*  --    ASTSGOT 69*  --    ALKPHOSPHAT 110*  --    TBILIRUBIN 1.6*  --    LIPASE 76 76   GLUCOSE 126*  --          Recent Labs     05/27/24  1119   NTPROBNP 4169*         Recent Labs     05/27/24  1119 05/27/24  1509   TROPONINT 27* 29*       Imaging:  CT-CTA CHEST PULMONARY ARTERY W/ RECONS   Final Result      1.  There is no CT evidence of acute pulmonary embolism.   2.  There is global enlargement of the heart. No pericardial effusion.   3.  Minimal dependent right pleural effusion.   4.  Minimal mosaic attenuation left upper  lobe which can be seen with bronchiolitis or asthma.            US-RUQ   Final Result      1.  There is cholelithiasis with gallbladder wall thickening but no biliary dilatation.   2.  The liver is heterogeneously echogenic, most compatible with fatty infiltration, however other hepatocellular disease processes are in the differential diagnosis.      DX-CHEST-PORTABLE (1 VIEW)   Final Result      1.  Mildly enlarged cardiac silhouette but no acute cardiopulmonary process.      EC-ECHOCARDIOGRAM COMPLETE W/O CONT    (Results Pending)   US-EXTREMITY VENOUS LOWER BILAT    (Results Pending)             X-Ray:  I have personally reviewed the images and compared with prior images.  EKG:  I have personally reviewed the images and compared with prior images.    Assessment/Plan:  Justification for Admission Status  I anticipate this patient will require at least 2 months hospitalization, therefore appropriate for inpatient status.      * Congestive heart failure of unknown etiology (HCC)- (present on admission)  Assessment & Plan  Suspected new onset  Diuresis as tolerated -Lasix 40 mg IV daily  Optimize future meds able to tolerate  Check echo    Acute respiratory failure with hypoxia (HCC)  Assessment & Plan  On 2 L, sinus tachycardia, elevated D-dimer  --Check CTA chest, ultrasound LE Doppler  Diuresis  Pulmonary hygiene  Check procalcitonin  Negative COVID/influenza  Check echo    Elevated LFTs  Assessment & Plan  Unclear etiology  ?congestive hepatopathy  Check hepatitis panel  No definitive cholecystitis seen on RUQ US    Intractable nausea and vomiting  Assessment & Plan  Supportive antiemetic  Scheduled IV Reglan, IV Protonix  Advance diet as tolerated    Asthma- (present on admission)  Assessment & Plan  CT features suggestive of asthma  Pulmonary hygiene  Singulair    Mood disorder (HCC)  Assessment & Plan  Continue home SSRI    Class 1 obesity in adult  Assessment & Plan  Diet and lifestyle modiciations  Body mass  index is 32.25 kg/m².          VTE prophylaxis: heparin ppx

## 2024-05-27 NOTE — ED NOTES
Patient desats to 83% on RA while sleeping, placed on O2 2L via NC. Patient drinking water and tolerating po well at this time.  States nausea has improved.

## 2024-05-27 NOTE — ED NOTES
Patient ambulatory to the restroom and back, steady gait.  Patient awaiting a bed assignment.  Patient updated.

## 2024-05-27 NOTE — ED NOTES
EKG performed, reviewed by Dr. Giles, and placed into the pt's chart. Pt labs collected by phlebotomist and roomed to [facility]. Chart up for ERP.

## 2024-05-27 NOTE — PROGRESS NOTES
"Subjective:   Analia Wu is a 53 y.o. female who presents for Nausea (X 3 day/ pt was here 5/24/24 does not feel better )      HPI  The patient presents to the Urgent Care with complaints of continued nausea and vomiting onset approximately 1 week.  She reports of nausea and vomiting every day approximately every 30 minutes.  She has not had the symptoms before.  Reports that shortness of breath after vomiting.  She has not eaten anything in 2 days.  She was seen here 2 days ago and she states that Zofran medication is not helping at all.  Her symptoms are worse after eating, however she has not eaten anything in the last couple days.  Unable to tolerate fluids.  The last time she was able to keep fluids down was here in the clinic.  Small sips.  The last few days she has been having a very difficult time keeping fluids down.  She is feeling lightheaded.  Denies any fever, chills, recent illness, cough, congestion, chest pain, abdominal pain, hematemesis, diarrhea. Denies illicit drug use. Denies marijuana use. Drinks alcohol occasionally but not since being sick. Daughter had an episode of vomiting last week. No recent traveling. No abdominal surgeries.       Past Medical History:   Diagnosis Date    Anesthesia     ponv,  \"shallow breathing\"    Anxiety     ASTHMA     does not have any inhalers    Cancer (HCC)     vulva    Carpal tunnel syndrome     Heart murmur     pt reports she has never had any problems    Infectious disease 2007    Mrsa infection    Pain 11-26-12    neck, left knee, 6-7/10    Panic attack     Psychiatric problem     depression, PTSD, anxiety    Seasonal allergies      Allergies   Allergen Reactions    Latex Shortness of Breath and Itching    Other Food Hives and Rash     Splenda    Dilaudid [Hydromorphone Hcl] Vomiting    Tape      \"makes skin turn red\"  Paper tape ok    Other Environmental      Dial soap --  Skin itching        Objective:     /80   Pulse (!) 116   Temp 36.4 " "°C (97.6 °F)   Resp (!) 23   Ht 1.702 m (5' 7\")   Wt 93.4 kg (206 lb)   LMP 12/17/2018 (Approximate)   SpO2 97%   BMI 32.26 kg/m²     Physical Exam  Vitals reviewed.   Constitutional:       General: She is not in acute distress.     Appearance: Normal appearance. She is not ill-appearing or toxic-appearing.   HENT:      Mouth/Throat:      Mouth: Mucous membranes are dry. No oral lesions.      Pharynx: Oropharynx is clear.   Eyes:      Conjunctiva/sclera: Conjunctivae normal.   Cardiovascular:      Rate and Rhythm: Regular rhythm. Tachycardia present.      Heart sounds: Normal heart sounds.   Pulmonary:      Effort: Pulmonary effort is normal.   Abdominal:      General: Abdomen is flat. Bowel sounds are normal. There is no distension.      Palpations: Abdomen is soft. There is no mass.      Tenderness: There is abdominal tenderness (mid abdomen). There is no guarding or rebound.       Musculoskeletal:      Cervical back: Neck supple. No rigidity.   Skin:     General: Skin is warm and dry.   Neurological:      General: No focal deficit present.      Mental Status: She is alert and oriented to person, place, and time.   Psychiatric:         Mood and Affect: Mood normal.         Behavior: Behavior normal.         Diagnosis and associated orders:     1. Nausea and vomiting, unspecified vomiting type    2. Lightheadedness       Comments/MDM:     This is a 53-year-old female who returns to the urgent care with complaints of continued nausea and vomiting for 1 week.  Nausea and vomiting every 30 minutes.  She is having a very difficult time keeping fluids down in the last few days.  The Zofran from previous urgent care visit 2 days ago is not helping anymore.  Her symptoms are worse with eating.  She has not eaten in 2 days.  No drug use or alcohol use.  See full history above.  On exam, she is mildly tachycardic.  BP reassuring.  She is afebrile.  Mild mid abdominal tenderness on exam without rebound or guarding.  " Discussed with patient most likely viral gastroenteritis, however I am concerned for dehydration.  At this point, due to the severity of her nausea and vomiting for 1 week I feel patient requires higher level evaluation and care in the emergency room.  Therefore, is highly recommended she present immediately to the ER.  The patient verbalized understanding and agreed to plan of care.  She is otherwise stable.  [facility]        1 acute issue with unknown prognosis that poses a threat to bodily function or life.    Please note that this dictation was created using voice recognition software. I have made a reasonable attempt to correct obvious errors, but I expect that there are errors of grammar and possibly content that I did not discover before finalizing the note.    This note was electronically signed by Jelani Merida PA-C

## 2024-05-27 NOTE — ASSESSMENT & PLAN NOTE
Suspected new onset  Echo showed EF 25, LV apical thrombus, moderate MR and TR, RVSP 45  proBNP 4169    Continue GDMT,   Patient is hypotensive, I made adjustment over the doses  Continue IV Lasix  Continue telemetry monitor

## 2024-05-28 ENCOUNTER — APPOINTMENT (OUTPATIENT)
Dept: RADIOLOGY | Facility: MEDICAL CENTER | Age: 54
End: 2024-05-28
Attending: NURSE PRACTITIONER
Payer: MEDICAID

## 2024-05-28 ENCOUNTER — APPOINTMENT (OUTPATIENT)
Dept: CARDIOLOGY | Facility: MEDICAL CENTER | Age: 54
End: 2024-05-28
Attending: STUDENT IN AN ORGANIZED HEALTH CARE EDUCATION/TRAINING PROGRAM
Payer: MEDICAID

## 2024-05-28 ENCOUNTER — APPOINTMENT (OUTPATIENT)
Dept: RADIOLOGY | Facility: MEDICAL CENTER | Age: 54
End: 2024-05-28
Attending: STUDENT IN AN ORGANIZED HEALTH CARE EDUCATION/TRAINING PROGRAM
Payer: MEDICAID

## 2024-05-28 PROBLEM — E87.6 HYPOKALEMIA: Status: ACTIVE | Noted: 2024-05-28

## 2024-05-28 PROBLEM — R79.89 ELEVATED TROPONIN: Status: ACTIVE | Noted: 2024-05-28

## 2024-05-28 PROBLEM — I51.3 LEFT VENTRICULAR THROMBUS: Status: ACTIVE | Noted: 2024-05-28

## 2024-05-28 PROBLEM — I50.21 ACUTE SYSTOLIC HEART FAILURE (HCC): Status: ACTIVE | Noted: 2024-05-28

## 2024-05-28 LAB
ALBUMIN SERPL BCP-MCNC: 3.4 G/DL (ref 3.2–4.9)
ALBUMIN SERPL BCP-MCNC: 3.8 G/DL (ref 3.2–4.9)
ALBUMIN/GLOB SERPL: 1.5 G/DL
ALBUMIN/GLOB SERPL: 1.6 G/DL
ALP SERPL-CCNC: 98 U/L (ref 30–99)
ALP SERPL-CCNC: 99 U/L (ref 30–99)
ALT SERPL-CCNC: 103 U/L (ref 2–50)
ALT SERPL-CCNC: 107 U/L (ref 2–50)
ANION GAP SERPL CALC-SCNC: 14 MMOL/L (ref 7–16)
ANION GAP SERPL CALC-SCNC: 16 MMOL/L (ref 7–16)
APTT PPP: 28.2 SEC (ref 24.7–36)
AST SERPL-CCNC: 56 U/L (ref 12–45)
AST SERPL-CCNC: 62 U/L (ref 12–45)
BACTERIA BLD CULT: NORMAL
BACTERIA BLD CULT: NORMAL
BASE EXCESS BLDA CALC-SCNC: 3 MMOL/L (ref -4–3)
BASOPHILS # BLD AUTO: 0.3 % (ref 0–1.8)
BASOPHILS # BLD AUTO: 0.4 % (ref 0–1.8)
BASOPHILS # BLD: 0.02 K/UL (ref 0–0.12)
BASOPHILS # BLD: 0.04 K/UL (ref 0–0.12)
BILIRUB SERPL-MCNC: 1.2 MG/DL (ref 0.1–1.5)
BILIRUB SERPL-MCNC: 1.5 MG/DL (ref 0.1–1.5)
BODY TEMPERATURE: 36.5 CENTIGRADE
BUN SERPL-MCNC: 17 MG/DL (ref 8–22)
BUN SERPL-MCNC: 18 MG/DL (ref 8–22)
CALCIUM ALBUM COR SERPL-MCNC: 8.9 MG/DL (ref 8.5–10.5)
CALCIUM ALBUM COR SERPL-MCNC: 9.1 MG/DL (ref 8.5–10.5)
CALCIUM SERPL-MCNC: 8.4 MG/DL (ref 8.5–10.5)
CALCIUM SERPL-MCNC: 8.9 MG/DL (ref 8.5–10.5)
CHLORIDE SERPL-SCNC: 96 MMOL/L (ref 96–112)
CHLORIDE SERPL-SCNC: 98 MMOL/L (ref 96–112)
CHOLEST SERPL-MCNC: 153 MG/DL (ref 100–199)
CO2 SERPL-SCNC: 22 MMOL/L (ref 20–33)
CO2 SERPL-SCNC: 25 MMOL/L (ref 20–33)
CREAT SERPL-MCNC: 1.04 MG/DL (ref 0.5–1.4)
CREAT SERPL-MCNC: 1.08 MG/DL (ref 0.5–1.4)
EKG IMPRESSION: NORMAL
EOSINOPHIL # BLD AUTO: 0.2 K/UL (ref 0–0.51)
EOSINOPHIL # BLD AUTO: 0.22 K/UL (ref 0–0.51)
EOSINOPHIL NFR BLD: 2.3 % (ref 0–6.9)
EOSINOPHIL NFR BLD: 2.7 % (ref 0–6.9)
ERYTHROCYTE [DISTWIDTH] IN BLOOD BY AUTOMATED COUNT: 47.4 FL (ref 35.9–50)
ERYTHROCYTE [DISTWIDTH] IN BLOOD BY AUTOMATED COUNT: 48.9 FL (ref 35.9–50)
GFR SERPLBLD CREATININE-BSD FMLA CKD-EPI: 61 ML/MIN/1.73 M 2
GFR SERPLBLD CREATININE-BSD FMLA CKD-EPI: 64 ML/MIN/1.73 M 2
GLOBULIN SER CALC-MCNC: 2.3 G/DL (ref 1.9–3.5)
GLOBULIN SER CALC-MCNC: 2.4 G/DL (ref 1.9–3.5)
GLUCOSE BLD STRIP.AUTO-MCNC: 118 MG/DL (ref 65–99)
GLUCOSE SERPL-MCNC: 115 MG/DL (ref 65–99)
GLUCOSE SERPL-MCNC: 97 MG/DL (ref 65–99)
HCO3 BLDA-SCNC: 27 MMOL/L (ref 17–25)
HCT VFR BLD AUTO: 40 % (ref 37–47)
HCT VFR BLD AUTO: 41.1 % (ref 37–47)
HDLC SERPL-MCNC: 23 MG/DL
HGB BLD-MCNC: 13.4 G/DL (ref 12–16)
HGB BLD-MCNC: 13.5 G/DL (ref 12–16)
IMM GRANULOCYTES # BLD AUTO: 0.03 K/UL (ref 0–0.11)
IMM GRANULOCYTES # BLD AUTO: 0.04 K/UL (ref 0–0.11)
IMM GRANULOCYTES NFR BLD AUTO: 0.4 % (ref 0–0.9)
IMM GRANULOCYTES NFR BLD AUTO: 0.4 % (ref 0–0.9)
INHALED O2 FLOW RATE: 6 L/MIN
INR PPP: 1.22 (ref 0.87–1.13)
LACTATE SERPL-SCNC: 1 MMOL/L (ref 0.5–2)
LDLC SERPL CALC-MCNC: 113 MG/DL
LV EJECT FRACT  99904: 25
LV EJECT FRACT MOD 2C 99903: 29.17
LV EJECT FRACT MOD 4C 99902: 17.85
LV EJECT FRACT MOD BP 99901: 23.48
LYMPHOCYTES # BLD AUTO: 1.14 K/UL (ref 1–4.8)
LYMPHOCYTES # BLD AUTO: 1.35 K/UL (ref 1–4.8)
LYMPHOCYTES NFR BLD: 11.7 % (ref 22–41)
LYMPHOCYTES NFR BLD: 18.2 % (ref 22–41)
MAGNESIUM SERPL-MCNC: 1.8 MG/DL (ref 1.5–2.5)
MCH RBC QN AUTO: 31.3 PG (ref 27–33)
MCH RBC QN AUTO: 31.5 PG (ref 27–33)
MCHC RBC AUTO-ENTMCNC: 32.6 G/DL (ref 32.2–35.5)
MCHC RBC AUTO-ENTMCNC: 33.8 G/DL (ref 32.2–35.5)
MCV RBC AUTO: 92.8 FL (ref 81.4–97.8)
MCV RBC AUTO: 96.5 FL (ref 81.4–97.8)
MONOCYTES # BLD AUTO: 0.89 K/UL (ref 0–0.85)
MONOCYTES # BLD AUTO: 1.12 K/UL (ref 0–0.85)
MONOCYTES NFR BLD AUTO: 11.5 % (ref 0–13.4)
MONOCYTES NFR BLD AUTO: 12 % (ref 0–13.4)
NEUTROPHILS # BLD AUTO: 4.91 K/UL (ref 1.82–7.42)
NEUTROPHILS # BLD AUTO: 7.18 K/UL (ref 1.82–7.42)
NEUTROPHILS NFR BLD: 66.4 % (ref 44–72)
NEUTROPHILS NFR BLD: 73.7 % (ref 44–72)
NRBC # BLD AUTO: 0 K/UL
NRBC # BLD AUTO: 0 K/UL
NRBC BLD-RTO: 0 /100 WBC (ref 0–0.2)
NRBC BLD-RTO: 0 /100 WBC (ref 0–0.2)
NT-PROBNP SERPL IA-MCNC: 2285 PG/ML (ref 0–125)
PCO2 BLDA: 36.9 MMHG (ref 26–37)
PCO2 TEMP ADJ BLDA: 36.1 MMHG (ref 26–37)
PH BLDA: 7.47 [PH] (ref 7.4–7.5)
PH TEMP ADJ BLDA: 7.48 [PH] (ref 7.4–7.5)
PHOSPHATE SERPL-MCNC: 3.7 MG/DL (ref 2.5–4.5)
PLATELET # BLD AUTO: 209 K/UL (ref 164–446)
PLATELET # BLD AUTO: 234 K/UL (ref 164–446)
PMV BLD AUTO: 9.3 FL (ref 9–12.9)
PMV BLD AUTO: 9.4 FL (ref 9–12.9)
PO2 BLDA: 119.6 MMHG (ref 64–87)
PO2 TEMP ADJ BLDA: 116.5 MMHG (ref 64–87)
POTASSIUM SERPL-SCNC: 3.1 MMOL/L (ref 3.6–5.5)
POTASSIUM SERPL-SCNC: 3.7 MMOL/L (ref 3.6–5.5)
PROCALCITONIN SERPL-MCNC: 0.13 NG/ML
PROT SERPL-MCNC: 5.7 G/DL (ref 6–8.2)
PROT SERPL-MCNC: 6.2 G/DL (ref 6–8.2)
PROTHROMBIN TIME: 15.6 SEC (ref 12–14.6)
RBC # BLD AUTO: 4.26 M/UL (ref 4.2–5.4)
RBC # BLD AUTO: 4.31 M/UL (ref 4.2–5.4)
SAO2 % BLDA: 98.2 % (ref 93–99)
SIGNIFICANT IND 70042: NORMAL
SIGNIFICANT IND 70042: NORMAL
SITE SITE: NORMAL
SITE SITE: NORMAL
SODIUM SERPL-SCNC: 132 MMOL/L (ref 135–145)
SODIUM SERPL-SCNC: 139 MMOL/L (ref 135–145)
SOURCE SOURCE: NORMAL
SOURCE SOURCE: NORMAL
TRIGL SERPL-MCNC: 86 MG/DL (ref 0–149)
TROPONIN T SERPL-MCNC: 34 NG/L (ref 6–19)
UFH PPP CHRO-ACNC: 0.96 IU/ML
UFH PPP CHRO-ACNC: <0.1 IU/ML
UFH PPP CHRO-ACNC: >1.1 IU/ML
WBC # BLD AUTO: 7.4 K/UL (ref 4.8–10.8)
WBC # BLD AUTO: 9.7 K/UL (ref 4.8–10.8)

## 2024-05-28 PROCEDURE — 82962 GLUCOSE BLOOD TEST: CPT

## 2024-05-28 PROCEDURE — 84484 ASSAY OF TROPONIN QUANT: CPT

## 2024-05-28 PROCEDURE — 700102 HCHG RX REV CODE 250 W/ 637 OVERRIDE(OP)

## 2024-05-28 PROCEDURE — 85730 THROMBOPLASTIN TIME PARTIAL: CPT

## 2024-05-28 PROCEDURE — 36415 COLL VENOUS BLD VENIPUNCTURE: CPT

## 2024-05-28 PROCEDURE — 700111 HCHG RX REV CODE 636 W/ 250 OVERRIDE (IP): Performed by: STUDENT IN AN ORGANIZED HEALTH CARE EDUCATION/TRAINING PROGRAM

## 2024-05-28 PROCEDURE — 71045 X-RAY EXAM CHEST 1 VIEW: CPT

## 2024-05-28 PROCEDURE — 93356 MYOCRD STRAIN IMG SPCKL TRCK: CPT | Performed by: INTERNAL MEDICINE

## 2024-05-28 PROCEDURE — 770020 HCHG ROOM/CARE - TELE (206)

## 2024-05-28 PROCEDURE — 99255 IP/OBS CONSLTJ NEW/EST HI 80: CPT | Performed by: INTERNAL MEDICINE

## 2024-05-28 PROCEDURE — C9113 INJ PANTOPRAZOLE SODIUM, VIA: HCPCS | Performed by: STUDENT IN AN ORGANIZED HEALTH CARE EDUCATION/TRAINING PROGRAM

## 2024-05-28 PROCEDURE — 84100 ASSAY OF PHOSPHORUS: CPT

## 2024-05-28 PROCEDURE — A9270 NON-COVERED ITEM OR SERVICE: HCPCS

## 2024-05-28 PROCEDURE — 85610 PROTHROMBIN TIME: CPT

## 2024-05-28 PROCEDURE — 93005 ELECTROCARDIOGRAM TRACING: CPT | Performed by: STUDENT IN AN ORGANIZED HEALTH CARE EDUCATION/TRAINING PROGRAM

## 2024-05-28 PROCEDURE — 83735 ASSAY OF MAGNESIUM: CPT

## 2024-05-28 PROCEDURE — 700102 HCHG RX REV CODE 250 W/ 637 OVERRIDE(OP): Performed by: STUDENT IN AN ORGANIZED HEALTH CARE EDUCATION/TRAINING PROGRAM

## 2024-05-28 PROCEDURE — 80053 COMPREHEN METABOLIC PANEL: CPT

## 2024-05-28 PROCEDURE — A9270 NON-COVERED ITEM OR SERVICE: HCPCS | Performed by: STUDENT IN AN ORGANIZED HEALTH CARE EDUCATION/TRAINING PROGRAM

## 2024-05-28 PROCEDURE — 82803 BLOOD GASES ANY COMBINATION: CPT

## 2024-05-28 PROCEDURE — 93010 ELECTROCARDIOGRAM REPORT: CPT | Performed by: INTERNAL MEDICINE

## 2024-05-28 PROCEDURE — 83880 ASSAY OF NATRIURETIC PEPTIDE: CPT

## 2024-05-28 PROCEDURE — 93356 MYOCRD STRAIN IMG SPCKL TRCK: CPT

## 2024-05-28 PROCEDURE — 80061 LIPID PANEL: CPT

## 2024-05-28 PROCEDURE — 99291 CRITICAL CARE FIRST HOUR: CPT | Performed by: STUDENT IN AN ORGANIZED HEALTH CARE EDUCATION/TRAINING PROGRAM

## 2024-05-28 PROCEDURE — 84145 PROCALCITONIN (PCT): CPT

## 2024-05-28 PROCEDURE — 85025 COMPLETE CBC W/AUTO DIFF WBC: CPT

## 2024-05-28 PROCEDURE — 93306 TTE W/DOPPLER COMPLETE: CPT | Mod: 26 | Performed by: INTERNAL MEDICINE

## 2024-05-28 PROCEDURE — 83605 ASSAY OF LACTIC ACID: CPT

## 2024-05-28 PROCEDURE — 700105 HCHG RX REV CODE 258: Performed by: NURSE PRACTITIONER

## 2024-05-28 PROCEDURE — 85520 HEPARIN ASSAY: CPT | Mod: 91

## 2024-05-28 RX ORDER — SODIUM CHLORIDE 9 MG/ML
250 INJECTION, SOLUTION INTRAVENOUS ONCE
Status: COMPLETED | OUTPATIENT
Start: 2024-05-28 | End: 2024-05-28

## 2024-05-28 RX ORDER — MAGNESIUM SULFATE HEPTAHYDRATE 40 MG/ML
2 INJECTION, SOLUTION INTRAVENOUS ONCE
Status: COMPLETED | OUTPATIENT
Start: 2024-05-28 | End: 2024-05-28

## 2024-05-28 RX ORDER — HEPARIN SODIUM 1000 [USP'U]/ML
80 INJECTION, SOLUTION INTRAVENOUS; SUBCUTANEOUS ONCE
Status: COMPLETED | OUTPATIENT
Start: 2024-05-28 | End: 2024-05-28

## 2024-05-28 RX ORDER — IPRATROPIUM BROMIDE AND ALBUTEROL SULFATE 2.5; .5 MG/3ML; MG/3ML
3 SOLUTION RESPIRATORY (INHALATION)
Status: DISCONTINUED | OUTPATIENT
Start: 2024-05-28 | End: 2024-05-30

## 2024-05-28 RX ORDER — HEPARIN SODIUM 1000 [USP'U]/ML
40 INJECTION, SOLUTION INTRAVENOUS; SUBCUTANEOUS PRN
Status: ACTIVE | OUTPATIENT
Start: 2024-05-28 | End: 2024-05-29

## 2024-05-28 RX ORDER — LOSARTAN POTASSIUM 25 MG/1
25 TABLET ORAL EVERY EVENING
Status: DISCONTINUED | OUTPATIENT
Start: 2024-05-28 | End: 2024-05-29

## 2024-05-28 RX ORDER — POTASSIUM CHLORIDE 20 MEQ/1
40 TABLET, EXTENDED RELEASE ORAL 2 TIMES DAILY
Status: COMPLETED | OUTPATIENT
Start: 2024-05-28 | End: 2024-05-28

## 2024-05-28 RX ORDER — HEPARIN SODIUM 5000 [USP'U]/100ML
0-30 INJECTION, SOLUTION INTRAVENOUS CONTINUOUS
Status: DISPENSED | OUTPATIENT
Start: 2024-05-28 | End: 2024-05-29

## 2024-05-28 RX ADMIN — CARVEDILOL 3.12 MG: 3.12 TABLET, FILM COATED ORAL at 07:38

## 2024-05-28 RX ADMIN — HEPARIN SODIUM 5800 UNITS: 1000 INJECTION, SOLUTION INTRAVENOUS; SUBCUTANEOUS at 10:58

## 2024-05-28 RX ADMIN — SODIUM CHLORIDE 250 ML: 9 INJECTION, SOLUTION INTRAVENOUS at 20:28

## 2024-05-28 RX ADMIN — VENLAFAXINE HYDROCHLORIDE 75 MG: 37.5 CAPSULE, EXTENDED RELEASE ORAL at 05:43

## 2024-05-28 RX ADMIN — ASPIRIN 81 MG: 81 TABLET, COATED ORAL at 05:43

## 2024-05-28 RX ADMIN — POTASSIUM CHLORIDE 40 MEQ: 1500 TABLET, EXTENDED RELEASE ORAL at 08:45

## 2024-05-28 RX ADMIN — PANTOPRAZOLE SODIUM 40 MG: 40 INJECTION, POWDER, FOR SOLUTION INTRAVENOUS at 16:32

## 2024-05-28 RX ADMIN — HEPARIN SODIUM 5000 UNITS: 5000 INJECTION, SOLUTION INTRAVENOUS; SUBCUTANEOUS at 05:43

## 2024-05-28 RX ADMIN — SODIUM CHLORIDE 250 ML: 9 INJECTION, SOLUTION INTRAVENOUS at 22:00

## 2024-05-28 RX ADMIN — MAGNESIUM SULFATE HEPTAHYDRATE 2 G: 2 INJECTION, SOLUTION INTRAVENOUS at 13:34

## 2024-05-28 RX ADMIN — ATORVASTATIN CALCIUM 40 MG: 40 TABLET, FILM COATED ORAL at 16:32

## 2024-05-28 RX ADMIN — GABAPENTIN 300 MG: 300 CAPSULE ORAL at 13:33

## 2024-05-28 RX ADMIN — HEPARIN SODIUM 18 UNITS/KG/HR: 5000 INJECTION, SOLUTION INTRAVENOUS at 10:45

## 2024-05-28 RX ADMIN — CARVEDILOL 3.12 MG: 3.12 TABLET, FILM COATED ORAL at 16:32

## 2024-05-28 RX ADMIN — MOMETASONE FUROATE AND FORMOTEROL FUMARATE DIHYDRATE 2 PUFF: 200; 5 AEROSOL RESPIRATORY (INHALATION) at 07:38

## 2024-05-28 RX ADMIN — POTASSIUM CHLORIDE 40 MEQ: 1500 TABLET, EXTENDED RELEASE ORAL at 16:32

## 2024-05-28 RX ADMIN — GABAPENTIN 300 MG: 300 CAPSULE ORAL at 16:32

## 2024-05-28 RX ADMIN — CARIPRAZINE 4.5 MG: 1.5 CAPSULE, GELATIN COATED ORAL at 16:32

## 2024-05-28 RX ADMIN — LOSARTAN POTASSIUM 25 MG: 25 TABLET, FILM COATED ORAL at 17:18

## 2024-05-28 RX ADMIN — GABAPENTIN 300 MG: 300 CAPSULE ORAL at 05:43

## 2024-05-28 RX ADMIN — PANTOPRAZOLE SODIUM 40 MG: 40 INJECTION, POWDER, FOR SOLUTION INTRAVENOUS at 05:43

## 2024-05-28 RX ADMIN — MOMETASONE FUROATE AND FORMOTEROL FUMARATE DIHYDRATE 2 PUFF: 200; 5 AEROSOL RESPIRATORY (INHALATION) at 21:05

## 2024-05-28 RX ADMIN — FUROSEMIDE 40 MG: 10 INJECTION INTRAMUSCULAR; INTRAVENOUS at 05:43

## 2024-05-28 ASSESSMENT — COGNITIVE AND FUNCTIONAL STATUS - GENERAL
MOBILITY SCORE: 24
SUGGESTED CMS G CODE MODIFIER DAILY ACTIVITY: CH
DAILY ACTIVITIY SCORE: 24
SUGGESTED CMS G CODE MODIFIER MOBILITY: CH

## 2024-05-28 ASSESSMENT — ENCOUNTER SYMPTOMS
EYES NEGATIVE: 1
CHILLS: 0
BRUISES/BLEEDS EASILY: 0
CARDIOVASCULAR NEGATIVE: 1
CONSTITUTIONAL NEGATIVE: 1
ABDOMINAL PAIN: 0
GASTROINTESTINAL NEGATIVE: 1
PALPITATIONS: 0
MUSCULOSKELETAL NEGATIVE: 1
FEVER: 0
NEUROLOGICAL NEGATIVE: 1
VOMITING: 0
NAUSEA: 0
SHORTNESS OF BREATH: 0
PSYCHIATRIC NEGATIVE: 1
DIZZINESS: 0
HEADACHES: 0
COUGH: 0
MYALGIAS: 0
RESPIRATORY NEGATIVE: 1
NERVOUS/ANXIOUS: 0
WEAKNESS: 0

## 2024-05-28 ASSESSMENT — FIBROSIS 4 INDEX: FIB4 SCORE: 1.43

## 2024-05-28 ASSESSMENT — PAIN DESCRIPTION - PAIN TYPE: TYPE: ACUTE PAIN

## 2024-05-28 NOTE — PROGRESS NOTES
Monitor Summary  Rhythm: SR-ST  Rate:   Ectopy: (O) PVC, 6bts of PSVT  .15 / .10 / .39

## 2024-05-28 NOTE — CARE PLAN
Problem: Care Map:  Day 1 Optimal Outcome for the Heart Failure Patient  Goal: Day 1:  Optimal Care of the heart failure patient  Outcome: Progressing     Problem: Knowledge Deficit - Standard  Goal: Patient and family/care givers will demonstrate understanding of plan of care, disease process/condition, diagnostic tests and medications  Outcome: Progressing   The patient is Stable - Low risk of patient condition declining or worsening    Shift Goals  Clinical Goals: Monitor labs and VS  Patient Goals: Rest    Progress made toward(s) clinical / shift goals:  HYDA scan, nausea    Patient is not progressing towards the following goals:       SW Following  For Discharge Planning    [facility] SW was informed that peer to peer was completed yesterday, 10/12, and that final decision should be received today.       SW to continue to follow for discharge planning needs pending further medical management.         Christie Lindo, [facility]  Medical Social Worker   ( phone)   ( pager)

## 2024-05-28 NOTE — CONSULTS
Cardiology Initial Consultation    Date of Service  5/28/2024    Referring Physician  Adore Walsh M.D.    Reason for Consultation  Dilated cardiomyopathy, left ventricular thrombus.    History of Presenting Illness  Analia Wu is a 53 y.o. female with a past medical history of pulmonary stenosis previously, previous tobacco abuse, no family history of coronary artery disease followed by Leon Reid who presented 5/27/2024 with nausea and vomiting. She underwent an echocardiogram which revealed severely reduced left ventricular systolic function with left ventricular thrombus. She has been started on IV heparin. She is clinically doing well from cardiac standpoint. She denies fatigue, chest pain, shortness of breath, palpitations, lower extremity edema, dizziness or syncope.     Review of Systems  Review of Systems   Constitutional: Negative.  Negative for chills and fever.   HENT: Negative.  Negative for hearing loss.    Eyes: Negative.    Respiratory: Negative.  Negative for cough and shortness of breath.    Cardiovascular: Negative.  Negative for chest pain, palpitations and leg swelling.   Gastrointestinal: Negative.  Negative for abdominal pain, nausea and vomiting.   Genitourinary: Negative.  Negative for dysuria and urgency.   Musculoskeletal: Negative.  Negative for myalgias.   Skin: Negative.  Negative for rash.   Neurological: Negative.  Negative for dizziness, weakness and headaches.   Hematological:  Does not bruise/bleed easily.   Psychiatric/Behavioral: Negative.  The patient is not nervous/anxious.        Past Medical History   has a past medical history of Anesthesia, Anxiety, ASTHMA, Cancer (HCC), Carpal tunnel syndrome, Heart murmur, Infectious disease (2007), Pain (11-26-12), Panic attack, Psychiatric problem, and Seasonal allergies.    She has no past medical history of Breast cancer (HCC) or COPD.    Surgical History   has a past surgical history that includes rectal biopsy  "(2007); wide excision (8/4/2010); cervical disk and fusion anterior (6/27/2011); wide excision (9/7/2011); wide excision (2/29/2012); wide excision (12/19/2012); exam under anesthesia (5/21/2014); rectal exploration (5/21/2014); rectal exploration (1/28/2015); rectal exploration (9/2/2016); anal fistulectomy (5/24/2017); hemorrhoidectomy (8/4/2010); vulvectomy partial (October 2007); vulvectomy radical (Bilateral, 9/2/2016); other neurological surg; and rectal exploration (12/19/2018).    Family History  family history includes Cancer in her maternal aunt; Diabetes in her mother; Stroke in her mother.    Social History   reports that she quit smoking about 16 years ago. Her smoking use included cigarettes. She started smoking about 41 years ago. She has a 25 pack-year smoking history. She has never used smokeless tobacco. She reports that she does not drink alcohol and does not use drugs.    Medications  Prior to Admission Medications   Prescriptions Last Dose Informant Patient Reported? Taking?   Cariprazine HCl (VRAYLAR) 4.5 MG Cap 5/26/2024 at 2000 Patient Yes Yes   Sig: Take 4.5 mg by mouth every evening.   gabapentin (NEURONTIN) 300 MG Cap 5/27/2024 at 0730 Patient Yes Yes   Sig: Take 300 mg by mouth 3 times a day.   venlafaxine XR (EFFEXOR XR) 75 MG CAPSULE SR 24 HR 5/27/2024 at 0730 Patient Yes Yes   Sig: Take 75 mg by mouth every day.      Facility-Administered Medications: None       Allergies  Allergies   Allergen Reactions    Latex Shortness of Breath and Itching    Other Food Hives and Rash     Splenda    Tape      \"makes skin turn red\"  Paper tape ok    Hydromorphone Vomiting and Nausea    Other Environmental      Dial soap --  Skin itching       Vital signs in last 24 hours  Temp:  [36.5 °C (97.7 °F)-36.9 °C (98.5 °F)] 36.6 °C (97.9 °F)  Pulse:  [] 93  Resp:  [16-28] 16  BP: (103-133)/(58-89) 116/76  SpO2:  [83 %-97 %] 90 %    Physical Exam  Physical Exam  Constitutional:       Appearance: Normal " appearance. She is well-developed and normal weight.   HENT:      Head: Normocephalic and atraumatic.      Mouth/Throat:      Mouth: Mucous membranes are moist.   Eyes:      Extraocular Movements: Extraocular movements intact.      Conjunctiva/sclera: Conjunctivae normal.   Cardiovascular:      Rate and Rhythm: Normal rate and regular rhythm.      Pulses: Normal pulses.      Heart sounds: Normal heart sounds.   Pulmonary:      Effort: Pulmonary effort is normal.      Breath sounds: Normal breath sounds.   Abdominal:      General: Bowel sounds are normal.      Palpations: Abdomen is soft.   Musculoskeletal:         General: Normal range of motion.      Cervical back: Normal range of motion and neck supple.   Skin:     General: Skin is warm and dry.   Neurological:      General: No focal deficit present.      Mental Status: She is alert and oriented to person, place, and time. Mental status is at baseline.   Psychiatric:         Mood and Affect: Mood normal.         Behavior: Behavior normal.         Thought Content: Thought content normal.         Judgment: Judgment normal.         Lab Review  Lab Results   Component Value Date/Time    WBC 7.4 05/28/2024 01:15 AM    RBC 4.26 05/28/2024 01:15 AM    HEMOGLOBIN 13.4 05/28/2024 01:15 AM    HEMATOCRIT 41.1 05/28/2024 01:15 AM    MCV 96.5 05/28/2024 01:15 AM    MCH 31.5 05/28/2024 01:15 AM    MCHC 32.6 05/28/2024 01:15 AM    MPV 9.4 05/28/2024 01:15 AM      Lab Results   Component Value Date/Time    SODIUM 139 05/28/2024 01:15 AM    POTASSIUM 3.1 (L) 05/28/2024 01:15 AM    CHLORIDE 98 05/28/2024 01:15 AM    CO2 25 05/28/2024 01:15 AM    GLUCOSE 97 05/28/2024 01:15 AM    BUN 18 05/28/2024 01:15 AM    CREATININE 1.04 05/28/2024 01:15 AM    CREATININE 0.8 10/05/2007 04:50 AM      Lab Results   Component Value Date/Time    ASTSGOT 56 (H) 05/28/2024 01:15 AM    ALTSGPT 103 (H) 05/28/2024 01:15 AM     Lab Results   Component Value Date/Time    CHOLSTRLTOT 153 05/28/2024 01:15 AM      (H) 05/28/2024 01:15 AM    HDL 23 (A) 05/28/2024 01:15 AM    TRIGLYCERIDE 86 05/28/2024 01:15 AM    TROPONINT 34 (H) 05/28/2024 01:15 AM       Recent Labs     05/27/24  1119   NTPROBNP 4169*       Cardiac Imaging and Procedures Review  CARDIAC STUDIES/PROCEDURES:    CTA OF CHEST (05/27/24)  1.  There is no CT evidence of acute pulmonary embolism.  2.  There is global enlargement of the heart. No pericardial effusion.  3.  Minimal dependent right pleural effusion.  4.  Minimal mosaic attenuation left upper lobe which can be seen with bronchiolitis or asthma.  (study result reviewed)    ECHOCARDIOGRAM CONCLUSIONS (05/08/24)  Dilated LV with severely reduced systolic function, estimated LVEF 25%.    Global hypokinesis with regional variation  Grade II LV diastolic dysfunction  LV apical thrombus is present: 0.9 x 1.5 cm, semi-mobile.  Normal RV size with mildly reduced systolic function  Reduced estimated resting stroke-volume 29ml and cardiac output per   Doppler assessment  Mildly dilated left atrium  Moderate MR  Moderate TR  Trivial pericardial fusion  Estimated RVSP 45 mmHg  Normal IVC size  (study result reviewed)     EKG performed on (05/28/24) was reviewed: EKG personally interpreted shows sinus rhythm with left bundle branch block.     Assessment/Plan  Dilated cardiomyopathy, left ventricular thrombus: She is a 53 y.o. female with a past medical history of pulmonary stenosis previously, who presented with nausea and vomiting. She underwent an echocardiogram which revealed severely reduced left ventricular systolic function with left ventricular thrombus. She has been started on IV heparin. We will perform cardiac catheterization to rule out ischemic origin and start chronic anticoagulation following. She understands the risks and benefits and agrees with plan.    Thank you for allowing me to participate in the care of this patient.    I will continue to follow this patient    Please contact me with  any questions.    Everton Pickens M.D.   Cardiologist, [facility]  [phone]

## 2024-05-28 NOTE — PROGRESS NOTES
Bedside report received from off going RN/tech: Neelima, assumed care of patient.     Fall Risk Score: LOW RISK  Fall risk interventions in place: Place yellow fall risk ID band on patient, Provide patient/family education based on risk assessment, Educate patient/family to call staff for assistance when getting out of bed, Place fall precaution signage outside patient door, and Place patient in room close to nursing station  Bed type: Regular (Adonis Score less than 17 interventions in place)  Patient on cardiac monitor: Yes  IVF/IV medications: Not Applicable   Oxygen: Room Air  Bedside sitter: Not Applicable   Isolation: Not applicable'

## 2024-05-28 NOTE — CARE PLAN
The patient is Stable - Low risk of patient condition declining or worsening    Shift Goals  Clinical Goals: Monitor labs and VS  Patient Goals: Rest    Progress made toward(s) clinical / shift goals:    Problem: Care Map:  Day 1 Optimal Outcome for the Heart Failure Patient  Goal: Day 1:  Optimal Care of the heart failure patient  Outcome: Progressing  Note: I/Os documented. Echo scheduled for 5/28     Problem: Respiratory  Goal: Patient will achieve/maintain optimum respiratory ventilation and gas exchange  Outcome: Progressing  Note: Assess and monitor pulmonary status. Encourage ambulation, turning, coughing and deep breathing. Educate and encourage use of IS as needed.          Patient is not progressing towards the following goals:

## 2024-05-28 NOTE — PROGRESS NOTES
4 Eyes Skin Assessment Completed by ROJELIO Hough and PÉREZ Wisdom.    Head WDL  Ears WDL  Nose WDL  Mouth WDL  Neck WDL  Breast/Chest WDL  Shoulder Blades WDL  Spine WDL  (R) Arm/Elbow/Hand WDL  (L) Arm/Elbow/Hand WDL  Abdomen WDL  Groin WDL  Scrotum/Coccyx/Buttocks WDL  (R) Leg WDL  (L) Leg WDL  (R) Heel/Foot/Toe WDL  (L) Heel/Foot/Toe WDL          Devices In Places Blood Pressure Cuff and Pulse Ox      Interventions In Place Pillows and Pressure Redistribution Mattress    Possible Skin Injury No    Pictures Uploaded Into Epic N/A  Wound Consult Placed N/A  RN Wound Prevention Protocol Ordered No

## 2024-05-28 NOTE — PROGRESS NOTES
Bedside report received from Alyx ORETZ. Pt A&Ox4. SR 96 on the monitors. POC discussed with pt. Pt verbalizes understanding. Call light and belongings within reach. Bed locked and in lowest position. Alarm and fall precautions in place.       Fall Risk Score: LOW RISK  Fall risk interventions in place: Provide patient/family education based on risk assessment and Educate patient/family to call staff for assistance when getting out of bed  Bed type: Regular (Adonis Score less than 17 interventions in place)  Patient on cardiac monitor: Yes  IVF/IV medications: Not Applicable   Oxygen: Room Air  Bedside sitter: Not Applicable   Isolation: Not applicable

## 2024-05-29 ENCOUNTER — APPOINTMENT (OUTPATIENT)
Dept: RADIOLOGY | Facility: MEDICAL CENTER | Age: 54
End: 2024-05-29
Attending: STUDENT IN AN ORGANIZED HEALTH CARE EDUCATION/TRAINING PROGRAM
Payer: MEDICAID

## 2024-05-29 ENCOUNTER — APPOINTMENT (OUTPATIENT)
Dept: CARDIOLOGY | Facility: MEDICAL CENTER | Age: 54
End: 2024-05-29
Payer: MEDICAID

## 2024-05-29 LAB
ACT BLD: 165 SEC (ref 74–137)
ALBUMIN SERPL BCP-MCNC: 3.8 G/DL (ref 3.2–4.9)
ALBUMIN/GLOB SERPL: 1.7 G/DL
ALP SERPL-CCNC: 109 U/L (ref 30–99)
ALT SERPL-CCNC: 109 U/L (ref 2–50)
ANION GAP SERPL CALC-SCNC: 13 MMOL/L (ref 7–16)
AST SERPL-CCNC: 65 U/L (ref 12–45)
BILIRUB SERPL-MCNC: 1.3 MG/DL (ref 0.1–1.5)
BUN SERPL-MCNC: 16 MG/DL (ref 8–22)
CALCIUM ALBUM COR SERPL-MCNC: 8.9 MG/DL (ref 8.5–10.5)
CALCIUM SERPL-MCNC: 8.7 MG/DL (ref 8.5–10.5)
CHLORIDE SERPL-SCNC: 96 MMOL/L (ref 96–112)
CO2 SERPL-SCNC: 23 MMOL/L (ref 20–33)
CREAT SERPL-MCNC: 1.12 MG/DL (ref 0.5–1.4)
ERYTHROCYTE [DISTWIDTH] IN BLOOD BY AUTOMATED COUNT: 46.5 FL (ref 35.9–50)
GFR SERPLBLD CREATININE-BSD FMLA CKD-EPI: 58 ML/MIN/1.73 M 2
GLOBULIN SER CALC-MCNC: 2.3 G/DL (ref 1.9–3.5)
GLUCOSE SERPL-MCNC: 112 MG/DL (ref 65–99)
HCT VFR BLD AUTO: 41.2 % (ref 37–47)
HGB BLD-MCNC: 14 G/DL (ref 12–16)
MAGNESIUM SERPL-MCNC: 2.2 MG/DL (ref 1.5–2.5)
MCH RBC QN AUTO: 31.6 PG (ref 27–33)
MCHC RBC AUTO-ENTMCNC: 34 G/DL (ref 32.2–35.5)
MCV RBC AUTO: 93 FL (ref 81.4–97.8)
PHOSPHATE SERPL-MCNC: 3.9 MG/DL (ref 2.5–4.5)
PLATELET # BLD AUTO: 238 K/UL (ref 164–446)
PMV BLD AUTO: 9.3 FL (ref 9–12.9)
POTASSIUM SERPL-SCNC: 3.9 MMOL/L (ref 3.6–5.5)
PROT SERPL-MCNC: 6.1 G/DL (ref 6–8.2)
RBC # BLD AUTO: 4.43 M/UL (ref 4.2–5.4)
SODIUM SERPL-SCNC: 132 MMOL/L (ref 135–145)
UFH PPP CHRO-ACNC: 0.48 IU/ML
UFH PPP CHRO-ACNC: 0.67 IU/ML
UFH PPP CHRO-ACNC: >1.1 IU/ML
WBC # BLD AUTO: 9.1 K/UL (ref 4.8–10.8)

## 2024-05-29 PROCEDURE — 99232 SBSQ HOSP IP/OBS MODERATE 35: CPT | Performed by: INTERNAL MEDICINE

## 2024-05-29 PROCEDURE — 700117 HCHG RX CONTRAST REV CODE 255: Performed by: INTERNAL MEDICINE

## 2024-05-29 PROCEDURE — 700101 HCHG RX REV CODE 250

## 2024-05-29 PROCEDURE — 94640 AIRWAY INHALATION TREATMENT: CPT

## 2024-05-29 PROCEDURE — A9270 NON-COVERED ITEM OR SERVICE: HCPCS | Performed by: INTERNAL MEDICINE

## 2024-05-29 PROCEDURE — 85347 COAGULATION TIME ACTIVATED: CPT

## 2024-05-29 PROCEDURE — 4A023N7 MEASUREMENT OF CARDIAC SAMPLING AND PRESSURE, LEFT HEART, PERCUTANEOUS APPROACH: ICD-10-PCS | Performed by: INTERNAL MEDICINE

## 2024-05-29 PROCEDURE — 85520 HEPARIN ASSAY: CPT

## 2024-05-29 PROCEDURE — C9113 INJ PANTOPRAZOLE SODIUM, VIA: HCPCS | Performed by: STUDENT IN AN ORGANIZED HEALTH CARE EDUCATION/TRAINING PROGRAM

## 2024-05-29 PROCEDURE — 93458 L HRT ARTERY/VENTRICLE ANGIO: CPT | Mod: 26 | Performed by: INTERNAL MEDICINE

## 2024-05-29 PROCEDURE — 700111 HCHG RX REV CODE 636 W/ 250 OVERRIDE (IP)

## 2024-05-29 PROCEDURE — 84100 ASSAY OF PHOSPHORUS: CPT

## 2024-05-29 PROCEDURE — 700102 HCHG RX REV CODE 250 W/ 637 OVERRIDE(OP): Performed by: STUDENT IN AN ORGANIZED HEALTH CARE EDUCATION/TRAINING PROGRAM

## 2024-05-29 PROCEDURE — 700102 HCHG RX REV CODE 250 W/ 637 OVERRIDE(OP): Performed by: INTERNAL MEDICINE

## 2024-05-29 PROCEDURE — 770020 HCHG ROOM/CARE - TELE (206)

## 2024-05-29 PROCEDURE — 93970 EXTREMITY STUDY: CPT

## 2024-05-29 PROCEDURE — 99152 MOD SED SAME PHYS/QHP 5/>YRS: CPT | Performed by: INTERNAL MEDICINE

## 2024-05-29 PROCEDURE — A9270 NON-COVERED ITEM OR SERVICE: HCPCS | Performed by: STUDENT IN AN ORGANIZED HEALTH CARE EDUCATION/TRAINING PROGRAM

## 2024-05-29 PROCEDURE — 700111 HCHG RX REV CODE 636 W/ 250 OVERRIDE (IP): Performed by: STUDENT IN AN ORGANIZED HEALTH CARE EDUCATION/TRAINING PROGRAM

## 2024-05-29 PROCEDURE — A9270 NON-COVERED ITEM OR SERVICE: HCPCS

## 2024-05-29 PROCEDURE — B2111ZZ FLUOROSCOPY OF MULTIPLE CORONARY ARTERIES USING LOW OSMOLAR CONTRAST: ICD-10-PCS | Performed by: INTERNAL MEDICINE

## 2024-05-29 PROCEDURE — 85027 COMPLETE CBC AUTOMATED: CPT

## 2024-05-29 PROCEDURE — 700101 HCHG RX REV CODE 250: Performed by: NURSE PRACTITIONER

## 2024-05-29 PROCEDURE — 700102 HCHG RX REV CODE 250 W/ 637 OVERRIDE(OP)

## 2024-05-29 PROCEDURE — 700111 HCHG RX REV CODE 636 W/ 250 OVERRIDE (IP): Mod: JZ | Performed by: STUDENT IN AN ORGANIZED HEALTH CARE EDUCATION/TRAINING PROGRAM

## 2024-05-29 PROCEDURE — C1769 GUIDE WIRE: HCPCS

## 2024-05-29 PROCEDURE — 83735 ASSAY OF MAGNESIUM: CPT

## 2024-05-29 PROCEDURE — 700105 HCHG RX REV CODE 258

## 2024-05-29 PROCEDURE — 80053 COMPREHEN METABOLIC PANEL: CPT

## 2024-05-29 PROCEDURE — 99291 CRITICAL CARE FIRST HOUR: CPT | Performed by: STUDENT IN AN ORGANIZED HEALTH CARE EDUCATION/TRAINING PROGRAM

## 2024-05-29 RX ORDER — HEPARIN SODIUM 1000 [USP'U]/ML
INJECTION, SOLUTION INTRAVENOUS; SUBCUTANEOUS
Status: COMPLETED
Start: 2024-05-29 | End: 2024-05-29

## 2024-05-29 RX ORDER — FUROSEMIDE 10 MG/ML
40 INJECTION INTRAMUSCULAR; INTRAVENOUS
Status: DISCONTINUED | OUTPATIENT
Start: 2024-05-29 | End: 2024-05-29

## 2024-05-29 RX ORDER — VERAPAMIL HYDROCHLORIDE 2.5 MG/ML
INJECTION, SOLUTION INTRAVENOUS
Status: COMPLETED
Start: 2024-05-29 | End: 2024-05-29

## 2024-05-29 RX ORDER — SPIRONOLACTONE 25 MG/1
25 TABLET ORAL
Status: DISCONTINUED | OUTPATIENT
Start: 2024-05-29 | End: 2024-05-30 | Stop reason: HOSPADM

## 2024-05-29 RX ORDER — LOSARTAN POTASSIUM 25 MG/1
12.5 TABLET ORAL EVERY EVENING
Status: DISCONTINUED | OUTPATIENT
Start: 2024-05-29 | End: 2024-05-30 | Stop reason: HOSPADM

## 2024-05-29 RX ORDER — PHENYLEPHRINE HCL IN 0.9% NACL 1 MG/10 ML
SYRINGE (ML) INTRAVENOUS
Status: COMPLETED
Start: 2024-05-29 | End: 2024-05-29

## 2024-05-29 RX ORDER — LIDOCAINE HYDROCHLORIDE 20 MG/ML
INJECTION, SOLUTION INFILTRATION; PERINEURAL
Status: COMPLETED
Start: 2024-05-29 | End: 2024-05-29

## 2024-05-29 RX ORDER — SODIUM CHLORIDE 9 MG/ML
250 INJECTION, SOLUTION INTRAVENOUS ONCE
Status: COMPLETED | OUTPATIENT
Start: 2024-05-29 | End: 2024-05-29

## 2024-05-29 RX ORDER — HEPARIN SODIUM 200 [USP'U]/100ML
INJECTION, SOLUTION INTRAVENOUS
Status: COMPLETED
Start: 2024-05-29 | End: 2024-05-29

## 2024-05-29 RX ORDER — DAPAGLIFLOZIN 10 MG/1
10 TABLET, FILM COATED ORAL DAILY
Status: DISCONTINUED | OUTPATIENT
Start: 2024-05-30 | End: 2024-05-30 | Stop reason: HOSPADM

## 2024-05-29 RX ORDER — MIDAZOLAM HYDROCHLORIDE 1 MG/ML
INJECTION INTRAMUSCULAR; INTRAVENOUS
Status: COMPLETED
Start: 2024-05-29 | End: 2024-05-29

## 2024-05-29 RX ORDER — METOPROLOL SUCCINATE 25 MG/1
25 TABLET, EXTENDED RELEASE ORAL
Status: DISCONTINUED | OUTPATIENT
Start: 2024-05-29 | End: 2024-05-30 | Stop reason: HOSPADM

## 2024-05-29 RX ADMIN — SENNOSIDES AND DOCUSATE SODIUM 2 TABLET: 50; 8.6 TABLET ORAL at 17:52

## 2024-05-29 RX ADMIN — IPRATROPIUM BROMIDE AND ALBUTEROL SULFATE 3 ML: 2.5; .5 SOLUTION RESPIRATORY (INHALATION) at 15:35

## 2024-05-29 RX ADMIN — HEPARIN SODIUM 12 UNITS/KG/HR: 5000 INJECTION, SOLUTION INTRAVENOUS at 13:01

## 2024-05-29 RX ADMIN — SPIRONOLACTONE 25 MG: 25 TABLET ORAL at 11:19

## 2024-05-29 RX ADMIN — MIDAZOLAM HYDROCHLORIDE 1 MG: 2 INJECTION, SOLUTION INTRAMUSCULAR; INTRAVENOUS at 10:50

## 2024-05-29 RX ADMIN — FENTANYL CITRATE 50 MCG: 50 INJECTION, SOLUTION INTRAMUSCULAR; INTRAVENOUS at 10:50

## 2024-05-29 RX ADMIN — PANTOPRAZOLE SODIUM 40 MG: 40 INJECTION, POWDER, FOR SOLUTION INTRAVENOUS at 17:53

## 2024-05-29 RX ADMIN — ATORVASTATIN CALCIUM 40 MG: 40 TABLET, FILM COATED ORAL at 17:52

## 2024-05-29 RX ADMIN — HEPARIN SODIUM: 1000 INJECTION, SOLUTION INTRAVENOUS; SUBCUTANEOUS at 10:22

## 2024-05-29 RX ADMIN — APIXABAN 10 MG: 5 TABLET, FILM COATED ORAL at 17:52

## 2024-05-29 RX ADMIN — GABAPENTIN 300 MG: 300 CAPSULE ORAL at 11:19

## 2024-05-29 RX ADMIN — SODIUM CHLORIDE 250 ML: 9 INJECTION, SOLUTION INTRAVENOUS at 13:30

## 2024-05-29 RX ADMIN — CARIPRAZINE 4.5 MG: 1.5 CAPSULE, GELATIN COATED ORAL at 17:52

## 2024-05-29 RX ADMIN — VERAPAMIL HYDROCHLORIDE 2.5 MG: 2.5 INJECTION, SOLUTION INTRAVENOUS at 10:22

## 2024-05-29 RX ADMIN — MOMETASONE FUROATE AND FORMOTEROL FUMARATE DIHYDRATE 2 PUFF: 200; 5 AEROSOL RESPIRATORY (INHALATION) at 07:07

## 2024-05-29 RX ADMIN — METOPROLOL SUCCINATE 25 MG: 25 TABLET, EXTENDED RELEASE ORAL at 11:19

## 2024-05-29 RX ADMIN — HEPARIN SODIUM 2000 UNITS: 200 INJECTION, SOLUTION INTRAVENOUS at 10:22

## 2024-05-29 RX ADMIN — IOHEXOL 18 ML: 350 INJECTION, SOLUTION INTRAVENOUS at 10:49

## 2024-05-29 RX ADMIN — ASPIRIN 81 MG: 81 TABLET, COATED ORAL at 04:21

## 2024-05-29 RX ADMIN — IPRATROPIUM BROMIDE AND ALBUTEROL SULFATE 3 ML: 2.5; .5 SOLUTION RESPIRATORY (INHALATION) at 07:05

## 2024-05-29 RX ADMIN — GABAPENTIN 300 MG: 300 CAPSULE ORAL at 17:52

## 2024-05-29 RX ADMIN — MOMETASONE FUROATE AND FORMOTEROL FUMARATE DIHYDRATE 2 PUFF: 200; 5 AEROSOL RESPIRATORY (INHALATION) at 18:32

## 2024-05-29 RX ADMIN — Medication 100 MCG: at 11:00

## 2024-05-29 RX ADMIN — IPRATROPIUM BROMIDE AND ALBUTEROL SULFATE 3 ML: 2.5; .5 SOLUTION RESPIRATORY (INHALATION) at 18:27

## 2024-05-29 RX ADMIN — NITROGLYCERIN: 20 INJECTION INTRAVENOUS at 10:15

## 2024-05-29 RX ADMIN — FUROSEMIDE 40 MG: 10 INJECTION INTRAMUSCULAR; INTRAVENOUS at 04:21

## 2024-05-29 RX ADMIN — LIDOCAINE HYDROCHLORIDE: 20 INJECTION, SOLUTION INFILTRATION; PERINEURAL at 10:22

## 2024-05-29 RX ADMIN — PANTOPRAZOLE SODIUM 40 MG: 40 INJECTION, POWDER, FOR SOLUTION INTRAVENOUS at 04:21

## 2024-05-29 RX ADMIN — GABAPENTIN 300 MG: 300 CAPSULE ORAL at 04:21

## 2024-05-29 RX ADMIN — VENLAFAXINE HYDROCHLORIDE 75 MG: 37.5 CAPSULE, EXTENDED RELEASE ORAL at 04:20

## 2024-05-29 ASSESSMENT — ENCOUNTER SYMPTOMS
COUGH: 0
ABDOMINAL PAIN: 0
BLOOD IN STOOL: 0
PSYCHIATRIC NEGATIVE: 1
PALPITATIONS: 0
SHORTNESS OF BREATH: 1
CLAUDICATION: 0
CHILLS: 0
NAUSEA: 0
HEADACHES: 0
DIZZINESS: 0
BRUISES/BLEEDS EASILY: 0
SPUTUM PRODUCTION: 0
PND: 0
WEAKNESS: 0
ORTHOPNEA: 0
WHEEZING: 0
MUSCULOSKELETAL NEGATIVE: 1
VOMITING: 0
FEVER: 0

## 2024-05-29 ASSESSMENT — FIBROSIS 4 INDEX: FIB4 SCORE: 1.41

## 2024-05-29 ASSESSMENT — PAIN DESCRIPTION - PAIN TYPE: TYPE: ACUTE PAIN

## 2024-05-29 NOTE — PROGRESS NOTES
Pts BP 79/45. Pt states feeling dizzy. O2 at 84% on the RA. Pt requiring 6L to remain above 90%.Carlton APRN notified.  Orders for STAT CXR and 250mL bolus.    2114 After 250mL bolus pts BP began to drop back down. 79/48.     2126 Carlton APRN to bedside.    2130 Additional 250mL bolus ordered.      2140 BP after bolus 82/52. Carlton APRN notified.     2222 [facility] admitting MD stated to keep pt on tele floor with close RN monitoring.

## 2024-05-29 NOTE — ASSESSMENT & PLAN NOTE
Incidental finding of LV apical thrombus  I started heparin drio    5/29 continue heparin drip  Daily monitor PT PTT and bleeding signs, Hb  Cardio following

## 2024-05-29 NOTE — ASSESSMENT & PLAN NOTE
Troponin 27>29>34  Likely due to new CHF  Plan for [facility] AM  On heparin drip for LV thrombus

## 2024-05-29 NOTE — CARE PLAN
Problem: Knowledge Deficit - Standard  Goal: Patient and family/care givers will demonstrate understanding of plan of care, disease process/condition, diagnostic tests and medications  Description: Target End Date:  1-3 days or as soon as patient condition allows    Document in Patient Education    1.  Patient and family/caregiver oriented to unit, equipment, visitation policy and means for communicating concern  2.  Complete/review Learning Assessment  3.  Assess knowledge level of disease process/condition, treatment plan, diagnostic tests and medications  4.  Explain disease process/condition, treatment plan, diagnostic tests and medications  Outcome: Progressing   The patient is Watcher - Medium risk of patient condition declining or worsening    Shift Goals  Clinical Goals: monitor labs/vitals  Patient Goals: rest    Progress made toward(s) clinical / shift goals:  Pt updated on POC.     Patient is not progressing towards the following goals:

## 2024-05-29 NOTE — PROGRESS NOTES
Bedside report received from off going RN/tech: Bryce ORTEZ, assumed care of patient.     Fall Risk Score: LOW RISK  Fall risk interventions in place: Place yellow fall risk ID band on patient, Provide patient/family education based on risk assessment, Place fall precaution signage outside patient door, Place patient in room close to nursing station, and Utilize bed/chair fall alarm  Bed type: Regular (Adonis Score less than 17 interventions in place)  Patient on cardiac monitor: Yes  IVF/IV medications: Infusion per MAR (List Med(s)) Heparin infusion  Oxygen: How many liters 6L  Bedside sitter: Not Applicable   Isolation: Not applicable

## 2024-05-29 NOTE — PROGRESS NOTES
Bedside report received from night shift RN. Assumed care. Pt is A&O x 4, Pt is in bed resting. Pt denies pain at this time. Pt was updated on plan of care. Pt has call light, personal belongings, and bedside table within reach. Bed is in the lowest position and locked. Will continue to monitor.

## 2024-05-29 NOTE — PROGRESS NOTES
Hospital Medicine Daily Progress Note    Date of Service  5/28/2024    Chief Complaint  Analia Wu is a 53 y.o. female admitted 5/27/2024 with n/v, sob    Hospital Course  Analia Wu is a 53 y.o. morbidly obese female with remote history of perianal dysplasia s/p surgery by Dr. Gamboa and Dr. Carroll, mood disorder who presented 5/27/2024 with depression for intractable nausea vomiting.  She reported shortness of breath with overt exertion.      Blood work revealed elevated LFTs, total bili 1.6.  troponin T and proBNP  elevated.  It was reported that she became hypoxic upon ambulation and placed on 2 L nasal cannula support.     Patient denies alcohol or tobacco or drug use.     proBNP 4169, D dimer 2.6, CTA negative for PE  RUQ US cholelithiasis with gallbladder wall thickening but no biliary  dilatation.    Interval Problem Update  I saw and examined the patient at bedside.   Reported N/V better. Denies CP.   On 2L NC    Echo showed EF 25, LV apical thrombus, moderate MR and TR, RVSP 45    I started heparin drip for LV apical thrombus  I discussed with cardiology regarding new findings of CHFrEF, plan for LHC tomorrow    HIDA negative for acute cholecystitis, EF 29%    Wbc 9.9      I have discussed this patient's plan of care and discharge plan at IDT rounds today with Case Management, Nursing, Nursing leadership, and other members of the IDT team.    Consultants/Specialty  cardiology    Code Status  Full Code    Disposition  The patient is not medically cleared for discharge to home or a post-acute facility.      I have placed the appropriate orders for post-discharge needs.    Review of Systems  ROS     Physical Exam  Temp:  [35.9 °C (96.6 °F)-36.8 °C (98.2 °F)] 36.6 °C (97.9 °F)  Pulse:  [66-98] 66  Resp:  [16-18] 16  BP: ()/(45-76) 82/50  SpO2:  [84 %-98 %] 95 %    Physical Exam  Constitutional:       General: She is in acute distress.      Appearance: She is obese. She is  ill-appearing.   HENT:      Head: Normocephalic.      Nose: Nose normal.   Eyes:      Extraocular Movements: Extraocular movements intact.      Conjunctiva/sclera: Conjunctivae normal.   Cardiovascular:      Rate and Rhythm: Normal rate and regular rhythm.      Heart sounds: Murmur heard.   Pulmonary:      Effort: Pulmonary effort is normal.      Breath sounds: Normal breath sounds. No wheezing or rales.   Abdominal:      General: Bowel sounds are normal.      Palpations: Abdomen is soft.      Tenderness: There is abdominal tenderness.      Comments: RUQ tenderness   Musculoskeletal:         General: No swelling or tenderness.      Cervical back: Normal range of motion and neck supple.   Skin:     General: Skin is warm.   Neurological:      Mental Status: She is alert and oriented to person, place, and time. Mental status is at baseline.   Psychiatric:         Mood and Affect: Mood normal.         Fluids    Intake/Output Summary (Last 24 hours) at 5/28/2024 2109  Last data filed at 5/28/2024 2015  Gross per 24 hour   Intake 580 ml   Output 2400 ml   Net -1820 ml       Laboratory  Recent Labs     05/27/24  1119 05/28/24  0115   WBC 9.9 7.4   RBC 4.63 4.26   HEMOGLOBIN 14.5 13.4   HEMATOCRIT 45.5 41.1   MCV 98.3* 96.5   MCH 31.3 31.5   MCHC 31.9* 32.6   RDW 50.6* 48.9   PLATELETCT 264 209   MPV 9.6 9.4     Recent Labs     05/27/24  1119 05/28/24  0115   SODIUM 140 139   POTASSIUM 4.1 3.1*   CHLORIDE 104 98   CO2 17* 25   GLUCOSE 126* 97   BUN 21 18   CREATININE 0.98 1.04   CALCIUM 9.4 8.9     Recent Labs     05/28/24  1029   APTT 28.2   INR 1.22*         Recent Labs     05/28/24  0115   TRIGLYCERIDE 86   HDL 23*   *       Imaging  NM-BILIARY (HIDA) SCAN WITH CCK   Final Result      No evidence of acute cholecystitis.   The calculated gallbladder ejection fraction is 29 %.      EC-ECHOCARDIOGRAM COMPLETE W/O CONT   Final Result      CT-CTA CHEST PULMONARY ARTERY W/ RECONS   Final Result      1.  There is no CT  evidence of acute pulmonary embolism.   2.  There is global enlargement of the heart. No pericardial effusion.   3.  Minimal dependent right pleural effusion.   4.  Minimal mosaic attenuation left upper lobe which can be seen with bronchiolitis or asthma.            US-RUQ   Final Result      1.  There is cholelithiasis with gallbladder wall thickening but no biliary dilatation.   2.  The liver is heterogeneously echogenic, most compatible with fatty infiltration, however other hepatocellular disease processes are in the differential diagnosis.      DX-CHEST-PORTABLE (1 VIEW)   Final Result      1.  Mildly enlarged cardiac silhouette but no acute cardiopulmonary process.      US-EXTREMITY VENOUS LOWER BILAT    (Results Pending)   CL-LEFT HEART CATHETERIZATION WITH POSSIBLE INTERVENTION    (Results Pending)   DX-CHEST-PORTABLE (1 VIEW)    (Results Pending)        Assessment/Plan  * Congestive heart failure of unknown etiology (HCC)- (present on admission)  Assessment & Plan  Suspected new onset  Diuresis as tolerated -Lasix 40 mg IV daily  Optimize future meds able to tolerate  Check echo    Elevated troponin  Assessment & Plan  Troponin 27>29>34  Likely due to new CHF  Plan for [facility] AM  On heparin drip for LV thrombus    Hypokalemia  Assessment & Plan  replaced    Acute systolic heart failure (HCC)  Assessment & Plan  New findings  Denies hx of CHF  Patient denies alcohol or tobacco or drug use.  proBNP 4169    Echo showed EF 25, LV apical thrombus, moderate MR and TR, RVSP 45  Started on ov lasix  Continue coreg, losartan  Monitor renal function    Left ventricular thrombus  Assessment & Plan  Incidental finding of LV apical thrombus  I started heparin drio  I discussed with cardio  Monitor PT PTT and bleeding signs.    Acute respiratory failure with hypoxia (HCC)  Assessment & Plan  Requires 2 L, was on RA  CTA negative for PE    Due to CHF exacerbation  Started on iv lasix  Wean down oxygen      Mood disorder  (HCC)  Assessment & Plan  Continue home SSRI    Class 1 obesity in adult  Assessment & Plan  Diet and lifestyle modiciations  Body mass index is 32.25 kg/m².      Elevated LFTs  Assessment & Plan  Unclear etiology  AT/ALT AST 69/121, bili 1.6  Likely due to congestive hepatopathy    RUQ US cholelithiasis with gallbladder wall thickening but no biliary  dilatation.  HIDA negative for acute cholecystitis, EF 29%    I ordered am CMP to monitor      Intractable nausea and vomiting  Assessment & Plan  Supportive antiemetic  Scheduled IV Reglan, IV Protonix  Advance diet as tolerated    Pulmonic stenosis, congenital- (present on admission)  Assessment & Plan  hx    Hyperlipidemia- (present on admission)  Assessment & Plan  Continue home statin    Asthma- (present on admission)  Assessment & Plan  CT features suggestive of asthma  Pulmonary hygiene  Singulair         VTE prophylaxis: heparin drip    I have performed a physical exam and reviewed and updated ROS and Plan today (5/28/2024). In review of yesterday's note (5/27/2024), there are no changes except as documented above.    New CHF with critical low EF, decompensated. New findings of LV thrombus requires heparin drip, Needs close monitor PT PTT HB and bleeding signs.   The very real possibility of a deterioration of this patient’s condition required the highest level of my preparedness for sudden, emergent intervention. I provided critical care services, which included medication orders, frequent reevaluations of the patient’s condition and response to treatment, ordering and reviewing test results, and discussing the case with various consultants. The critical care time associated with the care of the patient was 35 minutes. Review chart for interventions. This time is exclusive of any other billable procedures.

## 2024-05-29 NOTE — PROGRESS NOTES
Hospital Medicine Daily Progress Note    Date of Service  5/29/2024    Chief Complaint  Analia Wu is a 53 y.o. female admitted 5/27/2024 with n/v, sob    Hospital Course  Analia Wu is a 53 y.o. morbidly obese female with remote history of perianal dysplasia s/p surgery by Dr. Gamboa and Dr. Carroll, mood disorder who presented 5/27/2024 with depression for intractable nausea vomiting.  She reported shortness of breath with overt exertion.      Blood work revealed elevated LFTs, total bili 1.6.  troponin T and proBNP  elevated.  It was reported that she became hypoxic upon ambulation and placed on 2 L nasal cannula support.     Patient denies alcohol or tobacco or drug use.     proBNP 4169, D dimer 2.6, CTA negative for PE  RUQ US cholelithiasis with gallbladder wall thickening but no biliary  dilatation.  HIDA negative for acute cholecystitis, EF 29%    Echo showed EF 25, LV apical thrombus, moderate MR and TR, RVSP 45    Interval Problem Update  I saw and examined the patient at bedside.   Reported N/V better. Denies CP.   On 2L NC    Episodes of hypotension -I decreased the losartan to 12.5 mg, put the spironolactone on hold.  Continue Toprol    CHF/EF 25 -plan for [facility] today  LV apical thrombus -continue heparin drip    bili 1.6>1.5>1.3      I have discussed this patient's plan of care and discharge plan at IDT rounds today with Case Management, Nursing, Nursing leadership, and other members of the IDT team.    Consultants/Specialty  cardiology    Code Status  Full Code    Disposition  The patient is not medically cleared for discharge to home or a post-acute facility.      I have placed the appropriate orders for post-discharge needs.    Review of Systems  ROS     Physical Exam  Temp:  [36.2 °C (97.2 °F)-36.6 °C (97.9 °F)] 36.6 °C (97.9 °F)  Pulse:  [66-94] 85  Resp:  [16-18] 16  BP: ()/(45-73) 88/46  SpO2:  [84 %-98 %] 91 %    Physical Exam  Constitutional:       General: She is in acute  distress.      Appearance: She is obese. She is ill-appearing.   HENT:      Head: Normocephalic.      Nose: Nose normal.   Eyes:      Extraocular Movements: Extraocular movements intact.      Conjunctiva/sclera: Conjunctivae normal.   Cardiovascular:      Rate and Rhythm: Normal rate and regular rhythm.      Heart sounds: Murmur heard.   Pulmonary:      Effort: Pulmonary effort is normal.      Breath sounds: Normal breath sounds. No wheezing or rales.   Abdominal:      General: Bowel sounds are normal.      Palpations: Abdomen is soft.      Tenderness: There is abdominal tenderness.      Comments: RUQ tenderness   Musculoskeletal:         General: No swelling or tenderness.      Cervical back: Normal range of motion and neck supple.   Skin:     General: Skin is warm.   Neurological:      Mental Status: She is alert and oriented to person, place, and time. Mental status is at baseline.   Psychiatric:         Mood and Affect: Mood normal.         Fluids    Intake/Output Summary (Last 24 hours) at 5/29/2024 1340  Last data filed at 5/29/2024 0800  Gross per 24 hour   Intake 240 ml   Output 2500 ml   Net -2260 ml       Laboratory  Recent Labs     05/28/24  0115 05/28/24  2101 05/29/24  0018   WBC 7.4 9.7 9.1   RBC 4.26 4.31 4.43   HEMOGLOBIN 13.4 13.5 14.0   HEMATOCRIT 41.1 40.0 41.2   MCV 96.5 92.8 93.0   MCH 31.5 31.3 31.6   MCHC 32.6 33.8 34.0   RDW 48.9 47.4 46.5   PLATELETCT 209 234 238   MPV 9.4 9.3 9.3     Recent Labs     05/28/24  0115 05/28/24  2101 05/29/24  0018   SODIUM 139 132* 132*   POTASSIUM 3.1* 3.7 3.9   CHLORIDE 98 96 96   CO2 25 22 23   GLUCOSE 97 115* 112*   BUN 18 17 16   CREATININE 1.04 1.08 1.12   CALCIUM 8.9 8.4* 8.7     Recent Labs     05/28/24  1029   APTT 28.2   INR 1.22*         Recent Labs     05/28/24  0115   TRIGLYCERIDE 86   HDL 23*   *       Imaging  US-EXTREMITY VENOUS LOWER BILAT         DX-CHEST-PORTABLE (1 VIEW)   Final Result         1.  No focal infiltrates.   2.   Cardiomegaly   3.  Perihilar interstitial prominence and bronchial wall cuffing, appearance suggests changes of underlying bronchial inflammation, consider bronchitis.      NM-BILIARY (HIDA) SCAN WITH CCK   Final Result      No evidence of acute cholecystitis.   The calculated gallbladder ejection fraction is 29 %.      EC-ECHOCARDIOGRAM COMPLETE W/O CONT   Final Result      CT-CTA CHEST PULMONARY ARTERY W/ RECONS   Final Result      1.  There is no CT evidence of acute pulmonary embolism.   2.  There is global enlargement of the heart. No pericardial effusion.   3.  Minimal dependent right pleural effusion.   4.  Minimal mosaic attenuation left upper lobe which can be seen with bronchiolitis or asthma.            US-RUQ   Final Result      1.  There is cholelithiasis with gallbladder wall thickening but no biliary dilatation.   2.  The liver is heterogeneously echogenic, most compatible with fatty infiltration, however other hepatocellular disease processes are in the differential diagnosis.      DX-CHEST-PORTABLE (1 VIEW)   Final Result      1.  Mildly enlarged cardiac silhouette but no acute cardiopulmonary process.      CL-LEFT HEART CATHETERIZATION WITH POSSIBLE INTERVENTION    (Results Pending)        Assessment/Plan  * Congestive heart failure of unknown etiology (HCC)- (present on admission)  Assessment & Plan  Suspected new onset  Echo showed EF 25, LV apical thrombus, moderate MR and TR, RVSP 45  proBNP 4169    Continue GDMT,   Patient is hypotensive, I made adjustment over the doses  Continue IV Lasix  Continue telemetry monitor    Elevated troponin  Assessment & Plan  Troponin 27>29>34  Likely due to new CHF  Plan for [facility] AM  On heparin drip for LV thrombus    Hypokalemia  Assessment & Plan  replaced    Acute systolic heart failure (HCC)  Assessment & Plan  New findings  Denies hx of CHF  Patient denies alcohol or tobacco or drug use.  proBNP 4169    Echo showed EF 25, LV apical thrombus, moderate MR and  TR, RVSP 45  Started on ov lasix  Continue coreg, losartan  Monitor renal function    Left ventricular thrombus  Assessment & Plan  Incidental finding of LV apical thrombus  I started heparin drio    5/29 continue heparin drip  Daily monitor PT PTT and bleeding signs, Hb  Cardio following    Acute respiratory failure with hypoxia (HCC)  Assessment & Plan  Requires 2 L, was on RA  CTA negative for PE    Due to CHF exacerbation  Started on iv lasix  Wean down oxygen      Mood disorder (HCC)  Assessment & Plan  Continue home SSRI    Class 1 obesity in adult  Assessment & Plan  Diet and lifestyle modiciations  Body mass index is 32.25 kg/m².      Elevated LFTs  Assessment & Plan  Unclear etiology  AT/ALT AST 69/121, bili 1.6  Likely due to congestive hepatopathy    RUQ US cholelithiasis with gallbladder wall thickening but no biliary  dilatation.  HIDA negative for acute cholecystitis, EF 29%    5/29 ast/alt 65/109, bili 1.3  I ordered am CMP to monitor      Intractable nausea and vomiting  Assessment & Plan  Supportive antiemetic  Scheduled IV Reglan, IV Protonix  Advance diet as tolerated    Pulmonic stenosis, congenital- (present on admission)  Assessment & Plan  hx    Hyperlipidemia- (present on admission)  Assessment & Plan  Continue home statin    Asthma- (present on admission)  Assessment & Plan  CT features suggestive of asthma  Pulmonary hygiene  Singulair         VTE prophylaxis: heparin drip    I have performed a physical exam and reviewed and updated ROS and Plan today (5/29/2024). In review of yesterday's note (5/28/2024), there are no changes except as documented above.    High complexity.  New CHF with a critically low EF.  LV thrombus requires anticoagulation.  Continue heparin drip needs close monitor bleeding signs and decompensation signs.  hypotensive, not a tolerates GDMT well, high risks of rapid decline.   The very real possibility of a deterioration of this patient’s condition required the  highest level of my preparedness for sudden, emergent intervention. I provided critical care services, which included medication orders, frequent reevaluations of the patient’s condition and response to treatment, ordering and reviewing test results, and discussing the case with various consultants. The critical care time associated with the care of the patient was 36 minutes. Review chart for interventions. This time is exclusive of any other billable procedures.      VTE Selection

## 2024-05-29 NOTE — PROGRESS NOTES
Cardiology Follow-up Note    Name:   Analia Wu   YOB: 1970  Age:   53 y.o.  female   MRN:   [id number]        Attending Provider: Dr Adore Walsh M.D.     Chief Complaint: N/V (Patient reports N/V x 1 week, worsening over the last few days. Patient reports she was seen at  two days ago and prescribed zofran, which she reports has not helped at all. Patient reports she has been unable to keep food or liquid down for two days.)       Reason for cardiology consult: Dilated cardiomyopathy, left ventricular thrombus.     Outpatient Cardiologist: previously ROULA Arita    History of Present Illness  Analia Wu is 53 y.o. female with prior medical history significant for  pulmonary stenosis, previous tobacco abuse  who was admitted on 5/27/2024 with nausea and vomiting. She underwent an echocardiogram which revealed severely reduced left ventricular systolic function with left ventricular thrombus.  No prior cardiomyopathy history. She quit smoking about 16 years ago.  She has been having exertional shortness of breath for about a month or 2.  She has been started on IV heparin due to LV thrombus.   She underwent left heart cath 5/29/2024 demonstrating normal-appearing epicardial coronary arteries.    Interim Events   :  - Personal Telemetry interpretation: Sinus rhythm with PVCs and PACs  - Overnight events: Still complaining of some shortness of breath with ambulation to the bathroom.  Denies orthopnea.  Remains on supplemental oxygen.  Had some hypotension last night requiring IV boluses.  - Vitals: 103/63, on 2 L of oxygen  - Labs reviewed: Elevated liver enzymes, sodium 132, creatinine 1.12  - I/O's: 2.6 L out yesterday  - Weight: 200 pounds    Patient had an episode of hypotension in the 70s after left heart cath and after spironolactone and Toprol-XL 25 mg.  Good response to a bolus of 250 mL with return of blood pressure in the 90s.  Patient is asymptomatic during the  "event      Review of Systems   Constitutional:  Negative for chills, fever and malaise/fatigue.   Respiratory:  Positive for shortness of breath (with activity). Negative for cough, sputum production and wheezing.    Cardiovascular:  Negative for chest pain, palpitations, orthopnea, claudication, leg swelling and PND.   Gastrointestinal:  Negative for abdominal pain, blood in stool, nausea and vomiting.   Musculoskeletal: Negative.    Neurological:  Negative for dizziness, weakness and headaches.   Endo/Heme/Allergies:  Does not bruise/bleed easily.   Psychiatric/Behavioral: Negative.          Medical History  Past Medical History:   Diagnosis Date    Anesthesia     ponv,  \"shallow breathing\"    Anxiety     ASTHMA     does not have any inhalers    Cancer (HCC)     vulva    Carpal tunnel syndrome     Heart murmur     pt reports she has never had any problems    Infectious disease     Mrsa infection    Pain 11    neck, left knee, 6-7/10    Panic attack     Psychiatric problem     depression, PTSD, anxiety    Seasonal allergies          Family History   Problem Relation Age of Onset    Diabetes Mother     Stroke Mother     Cancer Maternal Aunt     Lung Disease Neg Hx     Heart Disease Neg Hx          Social History     Tobacco Use    Smoking status: Former     Current packs/day: 0.00     Average packs/day: 1 pack/day for 25.0 years (25.0 ttl pk-yrs)     Types: Cigarettes     Start date: 10/4/1982     Quit date: 10/4/2007     Years since quittin.6    Smokeless tobacco: Never    Tobacco comments:        Vaping Use    Vaping status: Never Used   Substance Use Topics    Alcohol use: No    Drug use: No         Allergies   Allergen Reactions    Latex Shortness of Breath and Itching    Other Food Hives and Rash     Splenda    Tape      \"makes skin turn red\"  Paper tape ok    Hydromorphone Vomiting and Nausea    Other Environmental      Dial soap --  Skin itching         Medications   Scheduled Medications " "  Medication Dose Frequency    metoprolol SR  25 mg Q DAY    [Held by provider] spironolactone  25 mg Q DAY    furosemide  40 mg BID DIURETIC    NS  250 mL Once    losartan  12.5 mg Q EVENING    ipratropium-albuterol  3 mL Q6HRS (RT)    senna-docusate  2 Tablet Q EVENING    pantoprazole  40 mg BID    aspirin  81 mg DAILY    atorvastatin  40 mg Q EVENING    gabapentin  300 mg TID    venlafaxine XR  75 mg DAILY    cariprazine  4.5 mg Q EVENING    mometasone-formoterol  2 Puff BID (RT)           Physical Exam    Body mass index is 31.35 kg/m².     BP (!) 88/46   Pulse 85   Temp 36.6 °C (97.9 °F) (Temporal)   Resp 16   Ht 1.702 m (5' 7\")   Wt 90.8 kg (200 lb 2.8 oz)   SpO2 91%      Vitals:    05/29/24 1125 05/29/24 1140 05/29/24 1155 05/29/24 1325   BP: 102/67 (!) 88/55 (!) 87/53 (!) 88/46   Pulse: 84 72 71 85   Resp: 16 16 16 16   Temp:    36.6 °C (97.9 °F)   TempSrc:    Temporal   SpO2: 92% 92% 91% 91%   Weight:       Height:            Oxygen Therapy:  Pulse Oximetry: 91 %, O2 (LPM): 4, O2 Delivery Device: Nasal Cannula    BP Readings from Last 4 Encounters:   05/29/24 (!) 88/46   05/27/24 122/80   05/25/24 128/74   12/20/21 120/76       Wt Readings from Last 4 Encounters:   05/29/24 90.8 kg (200 lb 2.8 oz)   05/27/24 93.4 kg (206 lb)   05/25/24 94.2 kg (207 lb 9.6 oz)   12/20/21 81.2 kg (179 lb)         Physical Exam  Constitutional:       Appearance: She is obese. She is ill-appearing.   HENT:      Head: Normocephalic.      Mouth/Throat:      Mouth: Mucous membranes are moist.   Cardiovascular:      Rate and Rhythm: Normal rate and regular rhythm.   Pulmonary:      Effort: Pulmonary effort is normal.   Abdominal:      Palpations: Abdomen is soft.   Musculoskeletal:      Right lower leg: No edema.      Left lower leg: No edema.   Skin:     General: Skin is warm and dry.   Neurological:      Mental Status: She is alert and oriented to person, place, and time.   Psychiatric:         Mood and Affect: Mood normal. " "        Behavior: Behavior normal.         Thought Content: Thought content normal.         Judgment: Judgment normal.               Labs (personally reviewed):     Estimated Creatinine Clearance: 66.4 mL/min (by C-G formula based on SCr of 1.12 mg/dL).    No results found for: \"BNPBTYPENAT\"  Recent Labs     05/28/24  0115 05/28/24  2101 05/29/24  0018   CREATININE 1.04 1.08 1.12   BUN 18 17 16   POTASSIUM 3.1* 3.7 3.9   SODIUM 139 132* 132*   CALCIUM 8.9 8.4* 8.7   MAGNESIUM 1.8  --  2.2   CO2 25 22 23   ALBUMIN 3.8 3.4 3.8     Recent Labs     05/28/24  0115 05/28/24  2101 05/29/24  0018   GLUCOSE 97 115* 112*     Recent Labs     05/28/24  0115 05/28/24  1029 05/28/24  2101 05/29/24  0018   ASTSGOT 56*  --  62* 65*   ALTSGPT 103*  --  107* 109*   ALKPHOSPHAT 98  --  99 109*   INR  --  1.22*  --   --      Recent Labs     05/28/24  0115 05/28/24  2101 05/29/24  0018   WBC 7.4 9.7 9.1   HEMOGLOBIN 13.4 13.5 14.0   PLATELETCT 209 234 238     Recent Labs     05/27/24  1119 05/27/24  1509 05/27/24  1549 05/28/24  0115 05/28/24  2101   TROPONINT 27* 29*  --  34*  --    NTPROBNP 4169*  --   --   --  2285*   HBA1C  --   --  5.8*  --   --      Lab Results   Component Value Date/Time    CHOLSTRLTOT 153 05/28/2024 01:15 AM     (H) 05/28/2024 01:15 AM    HDL 23 (A) 05/28/2024 01:15 AM    TRIGLYCERIDE 86 05/28/2024 01:15 AM       Imaging:  US-EXTREMITY VENOUS LOWER BILAT         DX-CHEST-PORTABLE (1 VIEW)   Final Result         1.  No focal infiltrates.   2.  Cardiomegaly   3.  Perihilar interstitial prominence and bronchial wall cuffing, appearance suggests changes of underlying bronchial inflammation, consider bronchitis.      NM-BILIARY (HIDA) SCAN WITH CCK   Final Result      No evidence of acute cholecystitis.   The calculated gallbladder ejection fraction is 29 %.      EC-ECHOCARDIOGRAM COMPLETE W/O CONT   Final Result      CT-CTA CHEST PULMONARY ARTERY W/ RECONS   Final Result      1.  There is no CT evidence of " acute pulmonary embolism.   2.  There is global enlargement of the heart. No pericardial effusion.   3.  Minimal dependent right pleural effusion.   4.  Minimal mosaic attenuation left upper lobe which can be seen with bronchiolitis or asthma.            US-RUQ   Final Result      1.  There is cholelithiasis with gallbladder wall thickening but no biliary dilatation.   2.  The liver is heterogeneously echogenic, most compatible with fatty infiltration, however other hepatocellular disease processes are in the differential diagnosis.      DX-CHEST-PORTABLE (1 VIEW)   Final Result      1.  Mildly enlarged cardiac silhouette but no acute cardiopulmonary process.      CL-LEFT HEART CATHETERIZATION WITH POSSIBLE INTERVENTION    (Results Pending)       Cardiac Imaging and Procedures Review        Echo 5/28/2024  Echocardiography Laboratory     CONCLUSIONS  Dilated LV with severely reduced systolic function, estimated LVEF 25%.    Global hypokinesis with regional variation  Grade II LV diastolic dysfunction  LV apical thrombus is present: 0.9 x 1.5 cm, semi-mobile.  Normal RV size with mildly reduced systolic function  Reduced estimated resting stroke-volume 29ml and cardiac output per   Doppler assessment  Mildly dilated left atrium  Moderate MR  Moderate TR  Trivial pericardial fusion  Estimated RVSP 45 mmHg  Normal IVC size      [facility] 5/29/2024  HEMODYNAMICS:  Aortic pressure: 93/58 mmHg  LVEDP: 18 mmHg  No significant aortic gradient on pullback     CORONARY ANGIOGRAPHY:  The left main coronary artery : Normal-appearing large caliber vessel that bifurcates to LAD and left circumflex  The left anterior descending coronary artery : Large in caliber transapical vessel that gives rise to several small-medium caliber diagonal branches.  Normal-appearing  The left circumflex coronary artery : Normal-appearing large caliber vessel that gives rise to a large high OM1 and large OM 2.  The right coronary artery : Large-caliber  dominant vessel, normal-appearing     IMPRESSION:  1.  Nonischemic cardiomyopathy, normal-appearing epicardial coronary arteries, dominant RCA  2.  Mildly elevated resting LVEDP 18 mmHg (at the LVOT level) without significant transaortic gradient on pullback     RECOMMENDATIONS:  Ongoing goal-directed therapy for nonischemic cardiomyopathy and acute HFrEF  Primary ASCVD prevention  TR band protocol    Principal Problem:    Congestive heart failure of unknown etiology (HCC) (POA: Yes)  Active Problems:    Asthma (POA: Yes)    Hyperlipidemia (POA: Yes)    Pulmonic stenosis, congenital (POA: Yes)      Overview: IMO load March 2020    Intractable nausea and vomiting (POA: Unknown)    Elevated LFTs (POA: Unknown)    Class 1 obesity in adult (POA: Unknown)    Mood disorder (HCC) (POA: Unknown)    Acute respiratory failure with hypoxia (HCC) (POA: Unknown)    Left ventricular thrombus (POA: Unknown)    Acute systolic heart failure (HCC) (POA: Unknown)    Hypokalemia (POA: Unknown)    Elevated troponin (POA: Unknown)  Resolved Problems:    * No resolved hospital problems. *      Assessment and Medical Decision Making:    # Nonischemic cardiomyopathy  Left heart cath demonstrated nonischemic cardiomyopathy with normal-appearing epicardial coronary arteries   LVEF 25 %.  LVEDP 18 mmHg -elevated  Heart failure due to nonischemic causes  Intermittent low blood pressures in the 70s -80s, recovered well with fluid boluses.  -ACE-I/ARB/ARNI: Start losartan 25 mg nightly.  Agree to lower the dose to 12.5 mg  -Evidence Based Beta-blocker: Change carvedilol to Toprol-XL 25 mg daily  -Aldosterone Antagonist: Start spironolactone 25 mg daily  -Diuretic: Increase furosemide to 40 mg IV twice daily  -SGLT2i: Consider Farxiga tomorrow depending on blood pressure and renal function.  -Continue atorvastatin 40 mg  -Labs: Daily BMP  -Daily weights; Strict I+O’s:   -PNA and Flu vaccine:   -Meds-to-beds and HF instructions on discharge   -FU  in HF clinic per below       # LV thrombus   Found on echo 5/28/2024   - transition heparin drip this evening to Eliquis 10 mg twice daily x 7 days, followed by Eliquis 5 mg twice daily    -Can discontinue aspirin 81 mg   Daily CBC          No future appointments.     I personally discussed her case with  Dr Adore Walsh M.D.    Please contact me with any questions.  Thank you for allowing us to participate in the care of Ms. Wu.      Please see Dr. Amato  attestation for further details and MDM.     I, CARLIE Villarreal performed a portion of the service face-to-face with the same  patient on the same date of service INDEPENDENTLY OF Dr. Amato FOR 15 MINUTES. preparing to see the patient, reviewing hospital notes and tests, obtaining history from the patient, performing a medically appropriate exam, counseling and educating the patient, ordering medications/tests/procedures/referrals as clinically indicated, and documenting information in the electronic medical record.    Please note this dictation was created using voice recognition software.  I have made every reasonable attempt to correct obvious errors, but there may be errors of grammar and possibly content that I did not discover before finalizing the note.    CARLIE Villarreal  [facility]  [phone]

## 2024-05-29 NOTE — PROGRESS NOTES
Anti XA drawn at 2101 which should have been drawn at 0030. Reordered another XA to be drawn at 0030.

## 2024-05-29 NOTE — ASSESSMENT & PLAN NOTE
New findings  Denies hx of CHF  Patient denies alcohol or tobacco or drug use.  proBNP 4169    Echo showed EF 25, LV apical thrombus, moderate MR and TR, RVSP 45  Started on ov lasix  Continue coreg, losartan  Monitor renal function

## 2024-05-29 NOTE — PROGRESS NOTES
Patient's BP dropped to 79/47. Pt asymptomatic. Dulce Maria ALVAREZ notified. 250 ml bolus ordered. Pt reacted well to bolus, latest BP 97/48. Will continue to monitor.     1455:  Pt's BP dropped again into the 70's. ANTONIO notified and another 250 ml bolus ordered. Pt asymptomatic. Will continue to monitor.

## 2024-05-29 NOTE — PROCEDURES
Cardiac Catheterization Laboratory Procedure Note    DATE: 5/29/2024    : Porfirio Amos MD, MPH    PROCEDURES PERFORMED:  Left heart catheterization  Coronary angiography  Moderate conscious sedation    INDICATIONS:  Cardiomyopathy, acute HFrEF    CONSENT:  The complete alternatives, risks, and benefits of the procedure were explained to the patient. Informed consent was obtained prior to the procedure.    MEDICATIONS:  Lidocaine  Fentanyl  Midazolam  Nitroglycerin  Verapamil  Heparin    MODERATE CONSCIOUS SEDATION:  I personally supervised the administration of moderate conscious sedation by the nursing staff for 16 minutes.  Start time: 10:33 AM  End time: 10:49 AM    CONTRAST: Omnipaque 18 cc    RADIATION DOSE (Air Kerma): 86 mGy    FLUOROSCOPY TIME: 2.1 minutes    ACCESS:  6-Honduran Glidesheath in the right radial artery    ESTIMATED BLOOD LOSS: <10 cc    COMPLICATIONS: None    PROCEDURE IN DETAIL:  The patient was brought to the cardiac catheterization laboratory in the fasting state. The skin over the right wrist was prepped and draped in the usual sterile fashion. Lidocaine infiltration was used to anesthetize the tissue over the right radial artery. Using the micropuncture technique, a 6-Honduran Glidesheath was inserted in the right radial artery. A 5-Fr Terumo Tiger diagnostic catheter was then advanced over a standard J-wire into the aortic root but it accidentally crossed the AV into the LVOT only hence LVEDP recorded. Patient with known LV apical thrombus and the catheter was not advanced into the mid LV or apex.    This catheter was then used to engage the ostium of the left coronary artery and cineangiograms were obtained in multiple projections for complete evaluation of the left coronary system. This catheter was then used to engage the ostium of the right coronary artery and cineangiograms were obtained in multiple projections for complete evaluation of the right coronary system. Following  completion of coronary angiography, all wires, catheters, and sheaths were removed.  A TR band was placed over the right wrist using the patent hemostasis technique.     HEMODYNAMICS:  Aortic pressure: 93/58 mmHg  LVEDP: 18 mmHg  No significant aortic gradient on pullback    CORONARY ANGIOGRAPHY:  The left main coronary artery : Normal-appearing large caliber vessel that bifurcates to LAD and left circumflex  The left anterior descending coronary artery : Large in caliber transapical vessel that gives rise to several small-medium caliber diagonal branches.  Normal-appearing  The left circumflex coronary artery : Normal-appearing large caliber vessel that gives rise to a large high OM1 and large OM 2.  The right coronary artery : Large-caliber dominant vessel, normal-appearing    IMPRESSION:  1.  Nonischemic cardiomyopathy, normal-appearing epicardial coronary arteries, dominant RCA  2.  Mildly elevated resting LVEDP 18 mmHg (at the LVOT level) without significant transaortic gradient on pullback    RECOMMENDATIONS:  Ongoing goal-directed therapy for nonischemic cardiomyopathy and acute HFrEF  Primary ASCVD prevention  TR band protocol    NOTIFICATION:  The patient's daughter was attempted to be called with updates, left her voicemail message.    Referring Cardiologist - Dr. Amato - was notified.    Porfirio Amos MD, MPH [facility]  Interventional Cardiologist  [facility]   of Clinical Internal Medicine - [facility]

## 2024-05-29 NOTE — CARE PLAN
The patient is Watcher - Medium risk of patient condition declining or worsening    Shift Goals  Clinical Goals: Hemodynamic stability/ monitor labs  Patient Goals: Rest      Problem: Care Map:  Admission Optimal Outcome for the Heart Failure Patient  Goal: Admission:  Optimal Care of the heart failure patient  Outcome: Progressing     Problem: Care Map:  Day 2 Optimal Outcome for the Heart Failure Patient  Goal: Day 2:  Optimal Care of the heart failure patient  Outcome: Progressing     Problem: Hemodynamics  Goal: Patient's hemodynamics, fluid balance and neurologic status will be stable or improve  Outcome: Progressing       Progress made toward(s) clinical / shift goals:      Patient is not progressing towards the following goals:

## 2024-05-30 ENCOUNTER — APPOINTMENT (OUTPATIENT)
Dept: RADIOLOGY | Facility: MEDICAL CENTER | Age: 54
DRG: 252 | End: 2024-05-30
Attending: STUDENT IN AN ORGANIZED HEALTH CARE EDUCATION/TRAINING PROGRAM
Payer: MEDICAID

## 2024-05-30 ENCOUNTER — HOSPITAL ENCOUNTER (INPATIENT)
Facility: MEDICAL CENTER | Age: 54
DRG: 252 | End: 2024-05-30
Attending: STUDENT IN AN ORGANIZED HEALTH CARE EDUCATION/TRAINING PROGRAM | Admitting: INTERNAL MEDICINE
Payer: MEDICAID

## 2024-05-30 ENCOUNTER — PHARMACY VISIT (OUTPATIENT)
Dept: PHARMACY | Facility: MEDICAL CENTER | Age: 54
End: 2024-05-30
Payer: COMMERCIAL

## 2024-05-30 VITALS
DIASTOLIC BLOOD PRESSURE: 63 MMHG | HEIGHT: 67 IN | HEART RATE: 98 BPM | WEIGHT: 202.38 LBS | OXYGEN SATURATION: 96 % | TEMPERATURE: 97.5 F | BODY MASS INDEX: 31.76 KG/M2 | SYSTOLIC BLOOD PRESSURE: 96 MMHG | RESPIRATION RATE: 16 BRPM

## 2024-05-30 DIAGNOSIS — I51.3 LEFT VENTRICULAR THROMBUS: ICD-10-CM

## 2024-05-30 DIAGNOSIS — I63.511 ACUTE ISCHEMIC RIGHT MCA STROKE (HCC): ICD-10-CM

## 2024-05-30 LAB
ABO GROUP BLD: NORMAL
ALBUMIN SERPL BCP-MCNC: 3.7 G/DL (ref 3.2–4.9)
ALBUMIN SERPL BCP-MCNC: 4.6 G/DL (ref 3.2–4.9)
ALBUMIN/GLOB SERPL: 1.5 G/DL
ALBUMIN/GLOB SERPL: 1.6 G/DL
ALP SERPL-CCNC: 107 U/L (ref 30–99)
ALP SERPL-CCNC: 128 U/L (ref 30–99)
ALT SERPL-CCNC: 108 U/L (ref 2–50)
ALT SERPL-CCNC: 92 U/L (ref 2–50)
ANION GAP SERPL CALC-SCNC: 16 MMOL/L (ref 7–16)
ANION GAP SERPL CALC-SCNC: 26 MMOL/L (ref 7–16)
APTT PPP: 29.8 SEC (ref 24.7–36)
AST SERPL-CCNC: 44 U/L (ref 12–45)
AST SERPL-CCNC: 44 U/L (ref 12–45)
BASOPHILS # BLD AUTO: 0.5 % (ref 0–1.8)
BASOPHILS # BLD: 0.05 K/UL (ref 0–0.12)
BILIRUB SERPL-MCNC: 0.7 MG/DL (ref 0.1–1.5)
BILIRUB SERPL-MCNC: 0.8 MG/DL (ref 0.1–1.5)
BLD GP AB SCN SERPL QL: NORMAL
BUN SERPL-MCNC: 21 MG/DL (ref 8–22)
BUN SERPL-MCNC: 21 MG/DL (ref 8–22)
CALCIUM ALBUM COR SERPL-MCNC: 10.1 MG/DL (ref 8.5–10.5)
CALCIUM ALBUM COR SERPL-MCNC: 9.2 MG/DL (ref 8.5–10.5)
CALCIUM SERPL-MCNC: 10.6 MG/DL (ref 8.5–10.5)
CALCIUM SERPL-MCNC: 9 MG/DL (ref 8.5–10.5)
CHLORIDE SERPL-SCNC: 102 MMOL/L (ref 96–112)
CHLORIDE SERPL-SCNC: 98 MMOL/L (ref 96–112)
CO2 SERPL-SCNC: 18 MMOL/L (ref 20–33)
CO2 SERPL-SCNC: 23 MMOL/L (ref 20–33)
CREAT SERPL-MCNC: 1.2 MG/DL (ref 0.5–1.4)
CREAT SERPL-MCNC: 1.22 MG/DL (ref 0.5–1.4)
EOSINOPHIL # BLD AUTO: 0.32 K/UL (ref 0–0.51)
EOSINOPHIL NFR BLD: 3 % (ref 0–6.9)
ERYTHROCYTE [DISTWIDTH] IN BLOOD BY AUTOMATED COUNT: 48 FL (ref 35.9–50)
ERYTHROCYTE [DISTWIDTH] IN BLOOD BY AUTOMATED COUNT: 52.8 FL (ref 35.9–50)
GFR SERPLBLD CREATININE-BSD FMLA CKD-EPI: 53 ML/MIN/1.73 M 2
GFR SERPLBLD CREATININE-BSD FMLA CKD-EPI: 54 ML/MIN/1.73 M 2
GLOBULIN SER CALC-MCNC: 2.4 G/DL (ref 1.9–3.5)
GLOBULIN SER CALC-MCNC: 2.8 G/DL (ref 1.9–3.5)
GLUCOSE BLD STRIP.AUTO-MCNC: 106 MG/DL (ref 65–99)
GLUCOSE SERPL-MCNC: 136 MG/DL (ref 65–99)
GLUCOSE SERPL-MCNC: 75 MG/DL (ref 65–99)
HCT VFR BLD AUTO: 42.9 % (ref 37–47)
HCT VFR BLD AUTO: 51.7 % (ref 37–47)
HGB BLD-MCNC: 14.5 G/DL (ref 12–16)
HGB BLD-MCNC: 16.5 G/DL (ref 12–16)
IMM GRANULOCYTES # BLD AUTO: 0.04 K/UL (ref 0–0.11)
IMM GRANULOCYTES NFR BLD AUTO: 0.4 % (ref 0–0.9)
INR PPP: 1.15 (ref 0.87–1.13)
LYMPHOCYTES # BLD AUTO: 2.08 K/UL (ref 1–4.8)
LYMPHOCYTES NFR BLD: 19.4 % (ref 22–41)
MAGNESIUM SERPL-MCNC: 2.1 MG/DL (ref 1.5–2.5)
MAGNESIUM SERPL-MCNC: 2.6 MG/DL (ref 1.5–2.5)
MCH RBC QN AUTO: 31.4 PG (ref 27–33)
MCH RBC QN AUTO: 31.5 PG (ref 27–33)
MCHC RBC AUTO-ENTMCNC: 31.9 G/DL (ref 32.2–35.5)
MCHC RBC AUTO-ENTMCNC: 33.8 G/DL (ref 32.2–35.5)
MCV RBC AUTO: 93.3 FL (ref 81.4–97.8)
MCV RBC AUTO: 98.3 FL (ref 81.4–97.8)
MONOCYTES # BLD AUTO: 1.35 K/UL (ref 0–0.85)
MONOCYTES NFR BLD AUTO: 12.6 % (ref 0–13.4)
NEUTROPHILS # BLD AUTO: 6.88 K/UL (ref 1.82–7.42)
NEUTROPHILS NFR BLD: 64.1 % (ref 44–72)
NRBC # BLD AUTO: 0 K/UL
NRBC BLD-RTO: 0 /100 WBC (ref 0–0.2)
PLATELET # BLD AUTO: 230 K/UL (ref 164–446)
PLATELET # BLD AUTO: 314 K/UL (ref 164–446)
PMV BLD AUTO: 9.4 FL (ref 9–12.9)
PMV BLD AUTO: 9.8 FL (ref 9–12.9)
POTASSIUM SERPL-SCNC: 3.6 MMOL/L (ref 3.6–5.5)
POTASSIUM SERPL-SCNC: 5.3 MMOL/L (ref 3.6–5.5)
PROT SERPL-MCNC: 6.1 G/DL (ref 6–8.2)
PROT SERPL-MCNC: 7.4 G/DL (ref 6–8.2)
PROTHROMBIN TIME: 14.9 SEC (ref 12–14.6)
RBC # BLD AUTO: 4.6 M/UL (ref 4.2–5.4)
RBC # BLD AUTO: 5.26 M/UL (ref 4.2–5.4)
RH BLD: NORMAL
SODIUM SERPL-SCNC: 141 MMOL/L (ref 135–145)
SODIUM SERPL-SCNC: 142 MMOL/L (ref 135–145)
TROPONIN T SERPL-MCNC: 29 NG/L (ref 6–19)
WBC # BLD AUTO: 10.7 K/UL (ref 4.8–10.8)
WBC # BLD AUTO: 6.8 K/UL (ref 4.8–10.8)

## 2024-05-30 PROCEDURE — 700102 HCHG RX REV CODE 250 W/ 637 OVERRIDE(OP): Performed by: INTERNAL MEDICINE

## 2024-05-30 PROCEDURE — C1760 CLOSURE DEV, VASC: HCPCS

## 2024-05-30 PROCEDURE — 82962 GLUCOSE BLOOD TEST: CPT

## 2024-05-30 PROCEDURE — 700111 HCHG RX REV CODE 636 W/ 250 OVERRIDE (IP): Performed by: STUDENT IN AN ORGANIZED HEALTH CARE EDUCATION/TRAINING PROGRAM

## 2024-05-30 PROCEDURE — 83735 ASSAY OF MAGNESIUM: CPT

## 2024-05-30 PROCEDURE — 90677 PCV20 VACCINE IM: CPT | Performed by: STUDENT IN AN ORGANIZED HEALTH CARE EDUCATION/TRAINING PROGRAM

## 2024-05-30 PROCEDURE — A9270 NON-COVERED ITEM OR SERVICE: HCPCS | Performed by: INTERNAL MEDICINE

## 2024-05-30 PROCEDURE — 71045 X-RAY EXAM CHEST 1 VIEW: CPT

## 2024-05-30 PROCEDURE — 86850 RBC ANTIBODY SCREEN: CPT

## 2024-05-30 PROCEDURE — 99291 CRITICAL CARE FIRST HOUR: CPT

## 2024-05-30 PROCEDURE — 80053 COMPREHEN METABOLIC PANEL: CPT

## 2024-05-30 PROCEDURE — 70498 CT ANGIOGRAPHY NECK: CPT

## 2024-05-30 PROCEDURE — A9270 NON-COVERED ITEM OR SERVICE: HCPCS | Performed by: STUDENT IN AN ORGANIZED HEALTH CARE EDUCATION/TRAINING PROGRAM

## 2024-05-30 PROCEDURE — 0042T CT-CEREBRAL PERFUSION ANALYSIS: CPT

## 2024-05-30 PROCEDURE — 700101 HCHG RX REV CODE 250: Performed by: NURSE PRACTITIONER

## 2024-05-30 PROCEDURE — 85610 PROTHROMBIN TIME: CPT

## 2024-05-30 PROCEDURE — 700102 HCHG RX REV CODE 250 W/ 637 OVERRIDE(OP)

## 2024-05-30 PROCEDURE — 99255 IP/OBS CONSLTJ NEW/EST HI 80: CPT | Performed by: STUDENT IN AN ORGANIZED HEALTH CARE EDUCATION/TRAINING PROGRAM

## 2024-05-30 PROCEDURE — 3E0234Z INTRODUCTION OF SERUM, TOXOID AND VACCINE INTO MUSCLE, PERCUTANEOUS APPROACH: ICD-10-PCS | Performed by: INTERNAL MEDICINE

## 2024-05-30 PROCEDURE — 99292 CRITICAL CARE ADDL 30 MIN: CPT | Performed by: INTERNAL MEDICINE

## 2024-05-30 PROCEDURE — 85730 THROMBOPLASTIN TIME PARTIAL: CPT

## 2024-05-30 PROCEDURE — 94640 AIRWAY INHALATION TREATMENT: CPT

## 2024-05-30 PROCEDURE — 80053 COMPREHEN METABOLIC PANEL: CPT | Mod: 91

## 2024-05-30 PROCEDURE — 99239 HOSP IP/OBS DSCHRG MGMT >30: CPT | Performed by: STUDENT IN AN ORGANIZED HEALTH CARE EDUCATION/TRAINING PROGRAM

## 2024-05-30 PROCEDURE — 70450 CT HEAD/BRAIN W/O DYE: CPT

## 2024-05-30 PROCEDURE — C9113 INJ PANTOPRAZOLE SODIUM, VIA: HCPCS | Performed by: STUDENT IN AN ORGANIZED HEALTH CARE EDUCATION/TRAINING PROGRAM

## 2024-05-30 PROCEDURE — RXMED WILLOW AMBULATORY MEDICATION CHARGE: Performed by: STUDENT IN AN ORGANIZED HEALTH CARE EDUCATION/TRAINING PROGRAM

## 2024-05-30 PROCEDURE — 70496 CT ANGIOGRAPHY HEAD: CPT

## 2024-05-30 PROCEDURE — 700102 HCHG RX REV CODE 250 W/ 637 OVERRIDE(OP): Performed by: STUDENT IN AN ORGANIZED HEALTH CARE EDUCATION/TRAINING PROGRAM

## 2024-05-30 PROCEDURE — 90471 IMMUNIZATION ADMIN: CPT

## 2024-05-30 PROCEDURE — 99291 CRITICAL CARE FIRST HOUR: CPT | Performed by: INTERNAL MEDICINE

## 2024-05-30 PROCEDURE — 85027 COMPLETE CBC AUTOMATED: CPT

## 2024-05-30 PROCEDURE — 86900 BLOOD TYPING SEROLOGIC ABO: CPT

## 2024-05-30 PROCEDURE — 85025 COMPLETE CBC W/AUTO DIFF WBC: CPT

## 2024-05-30 PROCEDURE — 700111 HCHG RX REV CODE 636 W/ 250 OVERRIDE (IP)

## 2024-05-30 PROCEDURE — 83735 ASSAY OF MAGNESIUM: CPT | Mod: 91

## 2024-05-30 PROCEDURE — 03CG3ZZ EXTIRPATION OF MATTER FROM INTRACRANIAL ARTERY, PERCUTANEOUS APPROACH: ICD-10-PCS | Performed by: RADIOLOGY

## 2024-05-30 PROCEDURE — A9270 NON-COVERED ITEM OR SERVICE: HCPCS

## 2024-05-30 PROCEDURE — 36415 COLL VENOUS BLD VENIPUNCTURE: CPT

## 2024-05-30 PROCEDURE — 84484 ASSAY OF TROPONIN QUANT: CPT

## 2024-05-30 PROCEDURE — 700111 HCHG RX REV CODE 636 W/ 250 OVERRIDE (IP): Performed by: RADIOLOGY

## 2024-05-30 PROCEDURE — 700117 HCHG RX CONTRAST REV CODE 255: Performed by: RADIOLOGY

## 2024-05-30 PROCEDURE — 770022 HCHG ROOM/CARE - ICU (200)

## 2024-05-30 PROCEDURE — 700117 HCHG RX CONTRAST REV CODE 255: Performed by: STUDENT IN AN ORGANIZED HEALTH CARE EDUCATION/TRAINING PROGRAM

## 2024-05-30 PROCEDURE — 86901 BLOOD TYPING SEROLOGIC RH(D): CPT

## 2024-05-30 RX ORDER — VENLAFAXINE HYDROCHLORIDE 75 MG/1
75 CAPSULE, EXTENDED RELEASE ORAL DAILY
Status: DISCONTINUED | OUTPATIENT
Start: 2024-05-31 | End: 2024-06-03 | Stop reason: HOSPADM

## 2024-05-30 RX ORDER — ATORVASTATIN CALCIUM 40 MG/1
40 TABLET, FILM COATED ORAL EVERY EVENING
Qty: 30 TABLET | Refills: 0 | Status: SHIPPED | OUTPATIENT
Start: 2024-05-30 | End: 2024-06-06 | Stop reason: SDUPTHER

## 2024-05-30 RX ORDER — METOPROLOL SUCCINATE 25 MG/1
25 TABLET, EXTENDED RELEASE ORAL DAILY
Status: DISCONTINUED | OUTPATIENT
Start: 2024-05-31 | End: 2024-06-03 | Stop reason: HOSPADM

## 2024-05-30 RX ORDER — HYDRALAZINE HYDROCHLORIDE 20 MG/ML
10 INJECTION INTRAMUSCULAR; INTRAVENOUS
Status: DISCONTINUED | OUTPATIENT
Start: 2024-05-30 | End: 2024-05-30

## 2024-05-30 RX ORDER — DAPAGLIFLOZIN 10 MG/1
10 TABLET, FILM COATED ORAL DAILY
Status: DISCONTINUED | OUTPATIENT
Start: 2024-05-31 | End: 2024-06-03 | Stop reason: HOSPADM

## 2024-05-30 RX ORDER — LABETALOL HYDROCHLORIDE 5 MG/ML
10-20 INJECTION, SOLUTION INTRAVENOUS
Status: DISCONTINUED | OUTPATIENT
Start: 2024-05-30 | End: 2024-06-03 | Stop reason: HOSPADM

## 2024-05-30 RX ORDER — DAPAGLIFLOZIN 10 MG/1
10 TABLET, FILM COATED ORAL DAILY
Qty: 30 TABLET | Refills: 0 | Status: SHIPPED | OUTPATIENT
Start: 2024-05-31 | End: 2024-06-06 | Stop reason: SDUPTHER

## 2024-05-30 RX ORDER — LABETALOL HYDROCHLORIDE 5 MG/ML
10 INJECTION, SOLUTION INTRAVENOUS
Status: DISCONTINUED | OUTPATIENT
Start: 2024-05-30 | End: 2024-05-30

## 2024-05-30 RX ORDER — EPTIFIBATIDE 0.75 MG/ML
2 INJECTION, SOLUTION INTRAVENOUS CONTINUOUS
Status: DISPENSED | OUTPATIENT
Start: 2024-05-30 | End: 2024-05-31

## 2024-05-30 RX ORDER — EPTIFIBATIDE 2 MG/ML
180 INJECTION, SOLUTION INTRAVENOUS ONCE
Status: COMPLETED | OUTPATIENT
Start: 2024-05-30 | End: 2024-05-30

## 2024-05-30 RX ORDER — HYDRALAZINE HYDROCHLORIDE 20 MG/ML
10 INJECTION INTRAMUSCULAR; INTRAVENOUS
Status: DISCONTINUED | OUTPATIENT
Start: 2024-05-30 | End: 2024-06-03 | Stop reason: HOSPADM

## 2024-05-30 RX ORDER — ATORVASTATIN CALCIUM 80 MG/1
80 TABLET, FILM COATED ORAL EVERY EVENING
Status: DISCONTINUED | OUTPATIENT
Start: 2024-05-30 | End: 2024-06-03 | Stop reason: HOSPADM

## 2024-05-30 RX ORDER — GABAPENTIN 300 MG/1
300 CAPSULE ORAL 3 TIMES DAILY
Status: DISCONTINUED | OUTPATIENT
Start: 2024-05-30 | End: 2024-06-03 | Stop reason: HOSPADM

## 2024-05-30 RX ORDER — ACETAMINOPHEN 325 MG/1
650 TABLET ORAL EVERY 6 HOURS PRN
Status: DISCONTINUED | OUTPATIENT
Start: 2024-05-30 | End: 2024-06-03 | Stop reason: HOSPADM

## 2024-05-30 RX ORDER — METOPROLOL SUCCINATE 25 MG/1
25 TABLET, EXTENDED RELEASE ORAL DAILY
Qty: 30 TABLET | Refills: 0 | Status: SHIPPED | OUTPATIENT
Start: 2024-05-31 | End: 2024-06-06 | Stop reason: SDUPTHER

## 2024-05-30 RX ORDER — LOSARTAN POTASSIUM 25 MG/1
12.5 TABLET ORAL EVERY EVENING
Qty: 15 TABLET | Refills: 0 | Status: SHIPPED | OUTPATIENT
Start: 2024-05-30 | End: 2024-06-06 | Stop reason: SDUPTHER

## 2024-05-30 RX ORDER — EPTIFIBATIDE 2 MG/ML
INJECTION, SOLUTION INTRAVENOUS
Status: COMPLETED
Start: 2024-05-30 | End: 2024-05-30

## 2024-05-30 RX ORDER — LOSARTAN POTASSIUM 50 MG/1
12.5 TABLET ORAL EVERY EVENING
Status: DISCONTINUED | OUTPATIENT
Start: 2024-05-31 | End: 2024-06-03 | Stop reason: HOSPADM

## 2024-05-30 RX ADMIN — IOHEXOL 50 ML: 300 INJECTION, SOLUTION INTRAVENOUS at 21:15

## 2024-05-30 RX ADMIN — GABAPENTIN 300 MG: 300 CAPSULE ORAL at 11:41

## 2024-05-30 RX ADMIN — IOHEXOL 40 ML: 350 INJECTION, SOLUTION INTRAVENOUS at 18:48

## 2024-05-30 RX ADMIN — DAPAGLIFLOZIN 10 MG: 10 TABLET, FILM COATED ORAL at 06:17

## 2024-05-30 RX ADMIN — EPTIFIBATIDE 2 MCG/KG/MIN: 0.75 INJECTION INTRAVENOUS at 20:24

## 2024-05-30 RX ADMIN — APIXABAN 10 MG: 5 TABLET, FILM COATED ORAL at 04:09

## 2024-05-30 RX ADMIN — PNEUMOCOCCAL 20-VALENT CONJUGATE VACCINE 0.5 ML
2.2; 2.2; 2.2; 2.2; 2.2; 2.2; 2.2; 2.2; 2.2; 2.2; 2.2; 2.2; 2.2; 2.2; 2.2; 2.2; 4.4; 2.2; 2.2; 2.2 INJECTION, SUSPENSION INTRAMUSCULAR at 13:01

## 2024-05-30 RX ADMIN — EPTIFIBATIDE 17000 MCG: 2 INJECTION, SOLUTION INTRAVENOUS at 20:31

## 2024-05-30 RX ADMIN — PANTOPRAZOLE SODIUM 40 MG: 40 INJECTION, POWDER, FOR SOLUTION INTRAVENOUS at 04:11

## 2024-05-30 RX ADMIN — VENLAFAXINE HYDROCHLORIDE 75 MG: 37.5 CAPSULE, EXTENDED RELEASE ORAL at 04:10

## 2024-05-30 RX ADMIN — IOHEXOL 155 ML: 350 INJECTION, SOLUTION INTRAVENOUS at 18:56

## 2024-05-30 RX ADMIN — GABAPENTIN 300 MG: 300 CAPSULE ORAL at 04:09

## 2024-05-30 RX ADMIN — IPRATROPIUM BROMIDE AND ALBUTEROL SULFATE 3 ML: 2.5; .5 SOLUTION RESPIRATORY (INHALATION) at 01:19

## 2024-05-30 RX ADMIN — METOPROLOL SUCCINATE 25 MG: 25 TABLET, EXTENDED RELEASE ORAL at 06:18

## 2024-05-30 RX ADMIN — SPIRONOLACTONE 25 MG: 25 TABLET ORAL at 04:10

## 2024-05-30 SDOH — ECONOMIC STABILITY: TRANSPORTATION INSECURITY
IN THE PAST 12 MONTHS, HAS THE LACK OF TRANSPORTATION KEPT YOU FROM MEDICAL APPOINTMENTS OR FROM GETTING MEDICATIONS?: NO

## 2024-05-30 SDOH — ECONOMIC STABILITY: TRANSPORTATION INSECURITY
IN THE PAST 12 MONTHS, HAS LACK OF RELIABLE TRANSPORTATION KEPT YOU FROM MEDICAL APPOINTMENTS, MEETINGS, WORK OR FROM GETTING THINGS NEEDED FOR DAILY LIVING?: NO

## 2024-05-30 ASSESSMENT — SOCIAL DETERMINANTS OF HEALTH (SDOH)
IN THE PAST 12 MONTHS, HAS THE ELECTRIC, GAS, OIL, OR WATER COMPANY THREATENED TO SHUT OFF SERVICE IN YOUR HOME?: NO
WITHIN THE PAST 12 MONTHS, YOU WORRIED THAT YOUR FOOD WOULD RUN OUT BEFORE YOU GOT THE MONEY TO BUY MORE: NEVER TRUE
WITHIN THE LAST YEAR, HAVE YOU BEEN KICKED, HIT, SLAPPED, OR OTHERWISE PHYSICALLY HURT BY YOUR PARTNER OR EX-PARTNER?: NO
WITHIN THE LAST YEAR, HAVE TO BEEN RAPED OR FORCED TO HAVE ANY KIND OF SEXUAL ACTIVITY BY YOUR PARTNER OR EX-PARTNER?: NO
WITHIN THE LAST YEAR, HAVE YOU BEEN HUMILIATED OR EMOTIONALLY ABUSED IN OTHER WAYS BY YOUR PARTNER OR EX-PARTNER?: NO
WITHIN THE LAST YEAR, HAVE YOU BEEN AFRAID OF YOUR PARTNER OR EX-PARTNER?: NO
WITHIN THE PAST 12 MONTHS, THE FOOD YOU BOUGHT JUST DIDN'T LAST AND YOU DIDN'T HAVE MONEY TO GET MORE: NEVER TRUE

## 2024-05-30 ASSESSMENT — ENCOUNTER SYMPTOMS
NAUSEA: 0
FEVER: 0
NERVOUS/ANXIOUS: 0
DIZZINESS: 1
PALPITATIONS: 0
CHILLS: 0
FLANK PAIN: 0
HEADACHES: 0
FOCAL WEAKNESS: 1
BACK PAIN: 0
DOUBLE VISION: 0
BLOOD IN STOOL: 0
VOMITING: 0
SPEECH CHANGE: 1
SORE THROAT: 0
BRUISES/BLEEDS EASILY: 0
COUGH: 0
NECK PAIN: 0
SHORTNESS OF BREATH: 0
BLURRED VISION: 0
DEPRESSION: 0

## 2024-05-30 ASSESSMENT — PATIENT HEALTH QUESTIONNAIRE - PHQ9
1. LITTLE INTEREST OR PLEASURE IN DOING THINGS: NOT AT ALL
SUM OF ALL RESPONSES TO PHQ9 QUESTIONS 1 AND 2: 0
2. FEELING DOWN, DEPRESSED, IRRITABLE, OR HOPELESS: NOT AT ALL

## 2024-05-30 ASSESSMENT — PAIN DESCRIPTION - PAIN TYPE
TYPE: ACUTE PAIN

## 2024-05-30 ASSESSMENT — LIFESTYLE VARIABLES
DOES PATIENT WANT TO STOP DRINKING: NO
TOTAL SCORE: 0
TOTAL SCORE: 0
EVER HAD A DRINK FIRST THING IN THE MORNING TO STEADY YOUR NERVES TO GET RID OF A HANGOVER: NO
ON A TYPICAL DAY WHEN YOU DRINK ALCOHOL HOW MANY DRINKS DO YOU HAVE: 0
CONSUMPTION TOTAL: NEGATIVE
ALCOHOL_USE: YES
HAVE YOU EVER FELT YOU SHOULD CUT DOWN ON YOUR DRINKING: NO
HAVE PEOPLE ANNOYED YOU BY CRITICIZING YOUR DRINKING: NO
EVER FELT BAD OR GUILTY ABOUT YOUR DRINKING: NO
AVERAGE NUMBER OF DAYS PER WEEK YOU HAVE A DRINK CONTAINING ALCOHOL: 0
TOTAL SCORE: 0
HOW MANY TIMES IN THE PAST YEAR HAVE YOU HAD 5 OR MORE DRINKS IN A DAY: 0

## 2024-05-30 ASSESSMENT — FIBROSIS 4 INDEX
FIB4 SCORE: 0.83
FIB4 SCORE: 1.46

## 2024-05-30 NOTE — CARE PLAN
Problem: Care Map:  Day of Discharge Optimal Outcome for the Heart Failure Patient  Goal: Day of Discharge:  Optimal Care of the heart failure patient  Description: Target End Date:  Prior to discharge or change in level of care  Note: Heart failure education booklet given.  Pt aware of writing down weights and symptom tracker     Problem: Care Map:  Day of Discharge Optimal Outcome for the Heart Failure Patient  Goal: Day of Discharge:  Optimal Care of the heart failure patient  Description: Target End Date:  Prior to discharge or change in level of care  Note: Heart failure education booklet given.  Pt aware of writing down weights and symptom tracker   The patient is Stable - Low risk of patient condition declining or worsening    Shift Goals  Clinical Goals: monitor BP  Patient Goals: rest    Progress made toward(s) clinical / shift goals:      Patient is not progressing towards the following goals:

## 2024-05-30 NOTE — CARE PLAN
Problem: Bronchoconstriction  Goal: Improve in air movement and diminished wheezing  Description: Target End Date:  2 to 3 days    1.  Implement inhaled treatments  2.  Evaluate and manage medication effects  Outcome: Progressing    Patient receiving duo nebs q6 hour, with self described improvement.

## 2024-05-30 NOTE — DISCHARGE INSTRUCTIONS
- Follow up with primary care physician in 1 week.   - Follow up with cardiology  as instructed    - please check your blood pressure before you take metoprolol, losartan and spirolactone, hold the meds if SBP<95 or call your doctor for instruction.     - Please take the medications as instructed    - Go to the local Emergency Department if you have any worsening condition.     HF Patient Discharge Instructions  Monitor your weight daily, and maintain a weight chart, to track your weight changes.   Activity as tolerated, unless your Doctor has ordered otherwise. Other activity order: .  Follow a low fat, low cholesterol, low salt diet unless instructed otherwise by your Doctor. Read the labels on the back of food products and track your intake of fat, cholesterol and salt.   Fluid Restriction No. If a Fluid Restriction has been ordered by your Doctor, measure fluids with a measuring cup to ensure that you are not exceeding the restriction.   No smoking.  Oxygen No. If your Doctor has ordered that you wear Oxygen at home, it is important to wear it as ordered.  Did you receive an explanation from staff on the importance of taking each of your medications and why it is necessary to keep taking them unless your doctor says to stop? Yes  Were all of your questions answered about how to manage your heart failure and what to do if you have increased signs and symptoms after you go home? Yes  Do you feel like your heart failure care team involved you in the care treatment plan and allowed you to make decisions regarding your care while in the hospital and addressed any discharge needs you might have? Yes    See the educational handout provided at discharge for more information on monitoring your daily weight, activity and diet. This also explains more about Heart Failure, symptoms of a flare-up and some of the tests that you have undergone.     Warning Signs of a Flare-Up include:  Swelling in the ankles or lower  legs.  Shortness of breath, while at rest, or while doing normal activities.   Shortness of breath at night when in bed, or coughing in bed.   Requiring more pillows to sleep at night, or needing to sit up at night to sleep.  Feeling weak, dizzy or fatigued.     When to call your Doctor:  Call your Primary Care Physician or Cardiologist if:   You experience any pain radiating to your jaw or neck.  You have any difficulty breathing.  You experience weight gain of 3 lbs in a day or 5 lbs in a week.   You feel any palpitations or irregular heartbeats.  You become dizzy or lose consciousness.   If you have had an angiogram or had a pacemaker or AICD placed, and experience:  Bleeding, drainage or swelling at the surgical / puncture site.  Fever greater than 100.0 F  Shock from internal defibrillator.  Cool and / or numb extremities.     Please access the [facility] My HF Guide/Heart Failure Interactive Workbook:   [email or web address]

## 2024-05-30 NOTE — PROGRESS NOTES
Pt given discharge instructions.  Discussed diet, activity, follow up appointments, JF symptoms and management, and prescriptions provided.  Packet sent with patient and all questions answered. Meds to beds delivered. IV lines removed and hemostasis achieved.

## 2024-05-30 NOTE — DISCHARGE SUMMARY
Discharge Summary    CHIEF COMPLAINT ON ADMISSION  Chief Complaint   Patient presents with    N/V     Patient reports N/V x 1 week, worsening over the last few days. Patient reports she was seen at  two days ago and prescribed zofran, which she reports has not helped at all. Patient reports she has been unable to keep food or liquid down for two days.       Reason for Admission  Sent by      Admission Date  5/27/2024    CODE STATUS  Full Code    HPI & HOSPITAL COURSE  Analia Wu is a 53 y.o. morbidly obese female with remote history of perianal dysplasia s/p surgery by Dr. Gamboa and Dr. Carroll, mood disorder who presented 5/27/2024 with intractable nausea vomiting, shortness of breath with overt exertion.        Patient was found elevated troponin and proBNP 4169.  D dimer 2.6, CTA negative for PE.  She was found hypoxic and required 2 L nasal cannula support. Echo showed EF 25, LV apical thrombus, moderate MR and TR, RVSP 45.  Cardiology consulted, s/p [facility] with normal coronary artery findings.  Patient has been treated with IV Lasix.  Respiratory status improved, she weaned off oxygen.  patient has been started GDMT and will continue at discharge.  Patient had episodes of hypotension.  Lasix discontinued, and the GDMT meds were adjusted.     Incidental findings of left ventricular thrombus.  Patient was treated with heparin drip, transition to oral Eliquis.  She was educated with the bleeding risks and the importance of compliance.  I referred her to the cardiology.     Patient was found Labs revealed elevated LFTs, total bili 1.6. RUQ US cholelithiasis with gallbladder wall thickening but no biliary  dilatation. HIDA negative for acute cholecystitis, EF 29%.  Her elevated liver enzymes and bilirubin likely due to congestive heart failure, bilirubinemia resolved with supportive care.     Therefore, she is discharged in good and stable condition to home with close outpatient follow-up.    The patient met  2-midnight criteria for an inpatient stay at the time of discharge.    Discharge Date  5/30/2024    FOLLOW UP ITEMS POST DISCHARGE  - Follow up with primary care physician in 1 week.   - Follow up with cardiology  as instructed    - please check your blood pressure before you take metoprolol, losartan and spirolactone, hold the meds if SBP<95 or call your doctor for instruction.     - Please take the medications as instructed    - Go to the local Emergency Department if you have any worsening condition.     DISCHARGE DIAGNOSES  Principal Problem:    Congestive heart failure of unknown etiology (HCC) (POA: Yes)  Active Problems:    Asthma (POA: Yes)    Hyperlipidemia (POA: Yes)    Pulmonic stenosis, congenital (POA: Yes)      Overview: IMO load March 2020    Intractable nausea and vomiting (POA: Unknown)    Elevated LFTs (POA: Unknown)    Class 1 obesity in adult (POA: Unknown)    Mood disorder (HCC) (POA: Unknown)    Acute respiratory failure with hypoxia (HCC) (POA: Unknown)    Left ventricular thrombus (POA: Unknown)    Acute systolic heart failure (HCC) (POA: Unknown)    Hypokalemia (POA: Unknown)    Elevated troponin (POA: Unknown)  Resolved Problems:    * No resolved hospital problems. *      FOLLOW UP  Future Appointments   Date Time Provider Department Center   6/6/2024  1:45 PM CARLIE Shi [facility] None     ROX[facility] Bella  [address] [address]  [phone]    Follow up in 1 week(s)  Please call your primary care provider to schedule, recent hospitalization follow up      MEDICATIONS ON DISCHARGE     Medication List        START taking these medications        Instructions   apixaban 5mg Tabs  Start taking on: May 30, 2024  Commonly known as: Eliquis   Take 2 Tablets by mouth 2 times a day for 7 days, THEN 1 Tablet 2 times a day for 30 days. Indications: DVT/PE     atorvastatin 40 MG Tabs  Commonly known as: Lipitor   Take 1 Tablet by mouth every evening for 30 days.  Dose: 40  "mg     dapagliflozin propanediol 10 MG Tabs  Start taking on: May 31, 2024  Commonly known as: Farxiga   Take 1 Tablet by mouth every day for 30 days.  Dose: 10 mg     losartan 25 MG Tabs  Commonly known as: Cozaar   Take one-half (0.5) Tablet by mouth every evening for 30 days.  Dose: 12.5 mg     metoprolol SR 25 MG Tb24  Start taking on: May 31, 2024  Commonly known as: Toprol XL   Take 1 Tablet by mouth every day for 30 days.  Dose: 25 mg            CONTINUE taking these medications        Instructions   gabapentin 300 MG Caps  Commonly known as: Neurontin   Take 300 mg by mouth 3 times a day.  Dose: 300 mg     venlafaxine XR 75 MG Cp24  Commonly known as: Effexor XR   Take 75 mg by mouth every day.  Dose: 75 mg     Vraylar 4.5 MG Caps  Generic drug: Cariprazine HCl   Take 4.5 mg by mouth every evening.  Dose: 4.5 mg              Allergies  Allergies   Allergen Reactions    Latex Shortness of Breath and Itching    Other Food Hives and Rash     Splenda    Tape      \"makes skin turn red\"  Paper tape ok    Hydromorphone Vomiting and Nausea    Other Environmental      Dial soap --  Skin itching       DIET  Orders Placed This Encounter   Procedures    Diet Order Diet: Cardiac     Standing Status:   Standing     Number of Occurrences:   1     Order Specific Question:   Diet:     Answer:   Cardiac [6]       ACTIVITY  As tolerated.  Weight bearing as tolerated    CONSULTATIONS  cardio    PROCEDURES  [facility]    LABORATORY  Lab Results   Component Value Date    SODIUM 141 05/30/2024    POTASSIUM 3.6 05/30/2024    CHLORIDE 102 05/30/2024    CO2 23 05/30/2024    GLUCOSE 136 (H) 05/30/2024    BUN 21 05/30/2024    CREATININE 1.20 05/30/2024    CREATININE 0.8 10/05/2007        Lab Results   Component Value Date    WBC 6.8 05/30/2024    HEMOGLOBIN 14.5 05/30/2024    HEMATOCRIT 42.9 05/30/2024    PLATELETCT 230 05/30/2024        Total time of the discharge process exceeds 34 minutes.  "

## 2024-05-30 NOTE — CARE PLAN
The patient is Stable - Low risk of patient condition declining or worsening    Shift Goals  Clinical Goals: hemodynamic stability  Patient Goals: rest      Problem: Care Map:  Day 2 Optimal Outcome for the Heart Failure Patient  Goal: Day 2:  Optimal Care of the heart failure patient  Outcome: Progressing     Problem: Knowledge Deficit - Standard  Goal: Patient and family/care givers will demonstrate understanding of plan of care, disease process/condition, diagnostic tests and medications  Outcome: Progressing     Problem: Hemodynamics  Goal: Patient's hemodynamics, fluid balance and neurologic status will be stable or improve  Outcome: Progressing

## 2024-05-30 NOTE — FLOWSHEET NOTE
05/30/24 0703   Protocol Assessment   Initial Assessment Yes   Patient History   Pulmonary Diagnosis Smoking history   Procedures Relevant to Respiratory Status no   Home O2 No   Nocturnal CPAP No   Home Treatments/Frequency No   Sleep Apnea Screening   Have you had a sleep study? Yes   Have you been diagnosed with sleep apnea? No   COPD Risk Screening   Do you have a history of COPD? No   Protocol Pathways   Protocol Pathways Bronchodilator Protocol   Bronchodilator Protocol   Med Order With RCP Yes - Initial Order by MD for Therapy - Follow Order for 24 Hours, Reassess Patient   Is this an exacerbation of COPD/Asthma? No   Bronchodilator Indications Strong Subjective / Objective Improvement   Breath Sounds Criteria 1    Benefit Criteria 1    Pulse Criteria 0    Respiratory Rate Criteria 1    S.O.B. Criteria 0    Criteria Total 3   Point Values 0-3 - PRN   Bronchodilator Goals/Outcome Patient at Stable Baseline     Duoneb changed to Q4 PRN Per protocol

## 2024-05-30 NOTE — PROGRESS NOTES
Bedside report received from off going RN/tech: Marion RN, assumed care of patient.     Fall Risk Score: LOW RISK  Fall risk interventions in place: Place yellow fall risk ID band on patient, Provide patient/family education based on risk assessment, Educate patient/family to call staff for assistance when getting out of bed, Place fall precaution signage outside patient door, Place patient in room close to nursing station, and Utilize bed/chair fall alarm  Bed type: Regular (Adonis Score less than 17 interventions in place)  Patient on cardiac monitor: Yes  IVF/IV medications: Not Applicable   Oxygen: How many liters 2L  Bedside sitter: Not Applicable   Isolation: Not applicable

## 2024-05-31 ENCOUNTER — HOSPITAL ENCOUNTER (OUTPATIENT)
Dept: RADIOLOGY | Facility: MEDICAL CENTER | Age: 54
End: 2024-05-31
Attending: RADIOLOGY
Payer: MEDICAID

## 2024-05-31 ENCOUNTER — APPOINTMENT (OUTPATIENT)
Dept: RADIOLOGY | Facility: MEDICAL CENTER | Age: 54
DRG: 252 | End: 2024-05-31
Attending: RADIOLOGY
Payer: MEDICAID

## 2024-05-31 VITALS
WEIGHT: 208.78 LBS | OXYGEN SATURATION: 96 % | HEART RATE: 112 BPM | SYSTOLIC BLOOD PRESSURE: 92 MMHG | BODY MASS INDEX: 32.77 KG/M2 | TEMPERATURE: 97.2 F | RESPIRATION RATE: 19 BRPM | DIASTOLIC BLOOD PRESSURE: 64 MMHG | HEIGHT: 67 IN

## 2024-05-31 LAB
ALBUMIN SERPL BCP-MCNC: 4.1 G/DL (ref 3.2–4.9)
ALBUMIN/GLOB SERPL: 1.6 G/DL
ALP SERPL-CCNC: 107 U/L (ref 30–99)
ALT SERPL-CCNC: 83 U/L (ref 2–50)
ANION GAP SERPL CALC-SCNC: 13 MMOL/L (ref 7–16)
AST SERPL-CCNC: 36 U/L (ref 12–45)
BASOPHILS # BLD AUTO: 0.2 % (ref 0–1.8)
BASOPHILS # BLD: 0.02 K/UL (ref 0–0.12)
BILIRUB SERPL-MCNC: 0.8 MG/DL (ref 0.1–1.5)
BUN SERPL-MCNC: 18 MG/DL (ref 8–22)
CALCIUM ALBUM COR SERPL-MCNC: 9.4 MG/DL (ref 8.5–10.5)
CALCIUM SERPL-MCNC: 9.5 MG/DL (ref 8.5–10.5)
CHLORIDE SERPL-SCNC: 100 MMOL/L (ref 96–112)
CHOLEST SERPL-MCNC: 129 MG/DL (ref 100–199)
CO2 SERPL-SCNC: 26 MMOL/L (ref 20–33)
CREAT SERPL-MCNC: 1.06 MG/DL (ref 0.5–1.4)
EOSINOPHIL # BLD AUTO: 0.28 K/UL (ref 0–0.51)
EOSINOPHIL NFR BLD: 3.1 % (ref 0–6.9)
ERYTHROCYTE [DISTWIDTH] IN BLOOD BY AUTOMATED COUNT: 52.6 FL (ref 35.9–50)
EST. AVERAGE GLUCOSE BLD GHB EST-MCNC: 120 MG/DL
GFR SERPLBLD CREATININE-BSD FMLA CKD-EPI: 62 ML/MIN/1.73 M 2
GLOBULIN SER CALC-MCNC: 2.5 G/DL (ref 1.9–3.5)
GLUCOSE SERPL-MCNC: 107 MG/DL (ref 65–99)
HBA1C MFR BLD: 5.8 % (ref 4–5.6)
HCT VFR BLD AUTO: 45.8 % (ref 37–47)
HDLC SERPL-MCNC: 38 MG/DL
HGB BLD-MCNC: 14.7 G/DL (ref 12–16)
IMM GRANULOCYTES # BLD AUTO: 0.02 K/UL (ref 0–0.11)
IMM GRANULOCYTES NFR BLD AUTO: 0.2 % (ref 0–0.9)
LDLC SERPL CALC-MCNC: 74 MG/DL
LYMPHOCYTES # BLD AUTO: 1.39 K/UL (ref 1–4.8)
LYMPHOCYTES NFR BLD: 15.2 % (ref 22–41)
MCH RBC QN AUTO: 31 PG (ref 27–33)
MCHC RBC AUTO-ENTMCNC: 32.1 G/DL (ref 32.2–35.5)
MCV RBC AUTO: 96.6 FL (ref 81.4–97.8)
MONOCYTES # BLD AUTO: 0.89 K/UL (ref 0–0.85)
MONOCYTES NFR BLD AUTO: 9.8 % (ref 0–13.4)
NEUTROPHILS # BLD AUTO: 6.52 K/UL (ref 1.82–7.42)
NEUTROPHILS NFR BLD: 71.5 % (ref 44–72)
NRBC # BLD AUTO: 0 K/UL
NRBC BLD-RTO: 0 /100 WBC (ref 0–0.2)
PLATELET # BLD AUTO: 256 K/UL (ref 164–446)
PMV BLD AUTO: 9.5 FL (ref 9–12.9)
POTASSIUM SERPL-SCNC: 4.2 MMOL/L (ref 3.6–5.5)
PROT SERPL-MCNC: 6.6 G/DL (ref 6–8.2)
RBC # BLD AUTO: 4.74 M/UL (ref 4.2–5.4)
SODIUM SERPL-SCNC: 139 MMOL/L (ref 135–145)
TRIGL SERPL-MCNC: 84 MG/DL (ref 0–149)
WBC # BLD AUTO: 9.1 K/UL (ref 4.8–10.8)

## 2024-05-31 PROCEDURE — 99254 IP/OBS CNSLTJ NEW/EST MOD 60: CPT | Performed by: HOSPITALIST

## 2024-05-31 PROCEDURE — 700111 HCHG RX REV CODE 636 W/ 250 OVERRIDE (IP): Mod: JZ | Performed by: RADIOLOGY

## 2024-05-31 PROCEDURE — A9270 NON-COVERED ITEM OR SERVICE: HCPCS | Performed by: INTERNAL MEDICINE

## 2024-05-31 PROCEDURE — 70450 CT HEAD/BRAIN W/O DYE: CPT

## 2024-05-31 PROCEDURE — 99233 SBSQ HOSP IP/OBS HIGH 50: CPT | Performed by: STUDENT IN AN ORGANIZED HEALTH CARE EDUCATION/TRAINING PROGRAM

## 2024-05-31 PROCEDURE — 80053 COMPREHEN METABOLIC PANEL: CPT

## 2024-05-31 PROCEDURE — 83036 HEMOGLOBIN GLYCOSYLATED A1C: CPT

## 2024-05-31 PROCEDURE — 700102 HCHG RX REV CODE 250 W/ 637 OVERRIDE(OP): Performed by: INTERNAL MEDICINE

## 2024-05-31 PROCEDURE — 85025 COMPLETE CBC W/AUTO DIFF WBC: CPT

## 2024-05-31 PROCEDURE — 51798 US URINE CAPACITY MEASURE: CPT

## 2024-05-31 PROCEDURE — 770022 HCHG ROOM/CARE - ICU (200)

## 2024-05-31 PROCEDURE — 92610 EVALUATE SWALLOWING FUNCTION: CPT

## 2024-05-31 PROCEDURE — 80061 LIPID PANEL: CPT

## 2024-05-31 RX ORDER — ASPIRIN 81 MG/1
81 TABLET, CHEWABLE ORAL DAILY
Status: DISCONTINUED | OUTPATIENT
Start: 2024-05-31 | End: 2024-06-03 | Stop reason: HOSPADM

## 2024-05-31 RX ADMIN — LOSARTAN POTASSIUM 12.5 MG: 25 TABLET, FILM COATED ORAL at 17:19

## 2024-05-31 RX ADMIN — GABAPENTIN 300 MG: 300 CAPSULE ORAL at 10:02

## 2024-05-31 RX ADMIN — ASPIRIN 81 MG: 81 TABLET, CHEWABLE ORAL at 10:02

## 2024-05-31 RX ADMIN — GABAPENTIN 300 MG: 300 CAPSULE ORAL at 14:52

## 2024-05-31 RX ADMIN — VENLAFAXINE HYDROCHLORIDE 75 MG: 75 CAPSULE, EXTENDED RELEASE ORAL at 05:29

## 2024-05-31 RX ADMIN — DAPAGLIFLOZIN 10 MG: 10 TABLET, FILM COATED ORAL at 05:29

## 2024-05-31 RX ADMIN — APIXABAN 10 MG: 5 TABLET, FILM COATED ORAL at 10:02

## 2024-05-31 RX ADMIN — CARIPRAZINE 4.5 MG: 3 CAPSULE, GELATIN COATED ORAL at 20:55

## 2024-05-31 RX ADMIN — ATORVASTATIN CALCIUM 80 MG: 40 TABLET, FILM COATED ORAL at 17:18

## 2024-05-31 RX ADMIN — EPTIFIBATIDE 2 MCG/KG/MIN: 0.75 INJECTION INTRAVENOUS at 07:31

## 2024-05-31 RX ADMIN — APIXABAN 10 MG: 5 TABLET, FILM COATED ORAL at 20:54

## 2024-05-31 RX ADMIN — GABAPENTIN 300 MG: 300 CAPSULE ORAL at 20:55

## 2024-05-31 RX ADMIN — EPTIFIBATIDE 2 MCG/KG/MIN: 0.75 INJECTION INTRAVENOUS at 01:35

## 2024-05-31 ASSESSMENT — PAIN DESCRIPTION - PAIN TYPE
TYPE: ACUTE PAIN

## 2024-05-31 ASSESSMENT — ENCOUNTER SYMPTOMS
TINGLING: 0
NECK PAIN: 0
COUGH: 0
SPEECH CHANGE: 1
CHILLS: 0
WEAKNESS: 0
NAUSEA: 0
SHORTNESS OF BREATH: 0
HEADACHES: 0
FOCAL WEAKNESS: 0
SPEECH CHANGE: 0
FEVER: 0
NERVOUS/ANXIOUS: 0
ABDOMINAL PAIN: 0
SENSORY CHANGE: 0
VOMITING: 0

## 2024-05-31 ASSESSMENT — LIFESTYLE VARIABLES
CONSUMPTION TOTAL: NEGATIVE
EVER HAD A DRINK FIRST THING IN THE MORNING TO STEADY YOUR NERVES TO GET RID OF A HANGOVER: NO
ALCOHOL_USE: YES
TOTAL SCORE: 0
HAVE PEOPLE ANNOYED YOU BY CRITICIZING YOUR DRINKING: NO
EVER FELT BAD OR GUILTY ABOUT YOUR DRINKING: NO
DOES PATIENT WANT TO STOP DRINKING: NO
HAVE YOU EVER FELT YOU SHOULD CUT DOWN ON YOUR DRINKING: NO
HOW MANY TIMES IN THE PAST YEAR HAVE YOU HAD 5 OR MORE DRINKS IN A DAY: 0
TOTAL SCORE: 0
TOTAL SCORE: 0
AVERAGE NUMBER OF DAYS PER WEEK YOU HAVE A DRINK CONTAINING ALCOHOL: 0
ON A TYPICAL DAY WHEN YOU DRINK ALCOHOL HOW MANY DRINKS DO YOU HAVE: 0

## 2024-05-31 ASSESSMENT — COGNITIVE AND FUNCTIONAL STATUS - GENERAL
SUGGESTED CMS G CODE MODIFIER DAILY ACTIVITY: CH
DAILY ACTIVITIY SCORE: 24
SUGGESTED CMS G CODE MODIFIER MOBILITY: CH
MOBILITY SCORE: 24

## 2024-05-31 ASSESSMENT — SOCIAL DETERMINANTS OF HEALTH (SDOH)

## 2024-05-31 ASSESSMENT — PATIENT HEALTH QUESTIONNAIRE - PHQ9
2. FEELING DOWN, DEPRESSED, IRRITABLE, OR HOPELESS: NOT AT ALL
1. LITTLE INTEREST OR PLEASURE IN DOING THINGS: NOT AT ALL
SUM OF ALL RESPONSES TO PHQ9 QUESTIONS 1 AND 2: 0

## 2024-05-31 NOTE — CONSULTS
"Neurology Initial Consult H&P  Neurohospitalist Service, [facility] Neurosciences    Referring Physician: Perla Varela M.D.    No chief complaint on file.      HPI: Analia Wu is a 53 y.o. HF on Eliquis, HLD, HTN who presents to [facility] after the acute onset of speech difficulties, Lt-facial droop and right upper extremity weakness. Daughter helped provide history. Patient was in her usual state of health when her daughter noticed she was having difficulty finding her medications. When she began asking her mother what was wrong she recognized that her mother wasn't able to make intelligible speech. She thus decided to bring her mother to the hospital for immediate evaluation.     Review of systems: In addition to what is detailed in the HPI above, all other systems reviewed and are negative.    Past Medical History:    has a past medical history of Anesthesia, Anxiety, ASTHMA, Cancer ([facility]), Carpal tunnel syndrome, Heart murmur, Infectious disease (2007), Pain (11-26-12), Panic attack, Psychiatric problem, and Seasonal allergies.    She has no past medical history of Breast cancer (HCC) or COPD.    FHx:  family history includes Cancer in her maternal aunt; Diabetes in her mother; Stroke in her mother.    SHx:   reports that she quit smoking about 16 years ago. Her smoking use included cigarettes. She started smoking about 41 years ago. She has a 25 pack-year smoking history. She has never used smokeless tobacco. She reports that she does not drink alcohol and does not use drugs.    Allergies:  Allergies   Allergen Reactions    Latex Shortness of Breath and Itching    Other Food Hives and Rash     Splenda    Tape      \"makes skin turn red\"  Paper tape ok    Hydromorphone Vomiting and Nausea    Other Environmental      Dial soap --  Skin itching       Medications:  No current facility-administered medications for this encounter.    Current Outpatient Medications:     apixaban (ELIQUIS) 5mg Tab, " Take 2 Tablets by mouth 2 times a day for 7 days, THEN 1 Tablet 2 times a day for 30 days. Indications: DVT/PE, Disp: 88 Tablet, Rfl: 0    [START ON 5/31/2024] dapagliflozin propanediol (FARXIGA) 10 MG Tab, Take 1 Tablet by mouth every day for 30 days., Disp: 30 Tablet, Rfl: 0    atorvastatin (LIPITOR) 40 MG Tab, Take 1 Tablet by mouth every evening for 30 days., Disp: 30 Tablet, Rfl: 0    losartan (COZAAR) 25 MG Tab, Take one-half (0.5) Tablet by mouth every evening for 30 days., Disp: 15 Tablet, Rfl: 0    [START ON 5/31/2024] metoprolol SR (TOPROL XL) 25 MG TABLET SR 24 HR, Take 1 Tablet by mouth every day for 30 days., Disp: 30 Tablet, Rfl: 0    Cariprazine HCl (VRAYLAR) 4.5 MG Cap, Take 4.5 mg by mouth every evening., Disp: , Rfl:     venlafaxine XR (EFFEXOR XR) 75 MG CAPSULE SR 24 HR, Take 75 mg by mouth every day., Disp: , Rfl:     gabapentin (NEURONTIN) 300 MG Cap, Take 300 mg by mouth 3 times a day., Disp: , Rfl:       NEUROLOGICAL EXAM:     MENTAL STATUS: Awake, alert and oriented to person, place, time   LANG/SPEECH: Naming intact, slight impaired repetition, intact comprehension, slowed halting speech, fluent, no dysarthria, able to follow simple commands    CRANIAL NERVES:  II: Pupils equal and reactive, no VF deficits  III, IV, VI: EOM intact, no gaze preference or deviation, no nystagmus.  V: normal sensation in V1, V2, and V3 segments bilaterally  VII: Left facial droop  VIII: normal hearing to speech  IX, X: normal palatal elevation, no uvular deviation  XI: 5/5 head turn and 5/5 shoulder shrug bilaterally  XII: midline tongue protrusion    MOTOR: Antigravity movement in bilateral upper and lower extremities. Right upper extremity with mild weakness 4+/5 strength in elbow flexion/extension 4/5 in right  strength. Remainder of all muscle groups full strength    REFLEXES:  bilateral flexor plantar response, no clonus  SENSORY: Normal to fine touch in all extremities, no extinction to double sided  stimulation (visual & tactile)  COORD: Normal finger to nose, no tremor, no dysmetria  GAIT: Deferred      NIHSS: National Institutes of Health Stroke Scale    [0] 1a:Level of Consciousness    0-alert 1-drowsy   2-stupor   3-coma  [0] 1b:LOC Questions                  0-both  1-one      2-neither  [0] 1c:LOC Commands                   0-both  1-one      2-neither  [0] 2: Best Gaze                     0-nl    1-partial  2-forced  [0] 3: Visual Fields                   0-nl    1-partial  2-complete 3-bilat  [2] 4: Facial Paresis                0-nl    1-minor    2-partial  3-full  MOTOR                       0-nl  [0] 5: Right Arm           1-drift  [0] 6: Left Arm             2-some effort vs gravity  [0] 7: Right Leg           3-no effort vs gravity  [0] 8: Left Leg             4-no movement                             x-untestable  [0] 9: Limb Ataxia                    0-abs   1-1_limb   2-2+_limbs       x-untestable  [0] 10:Sensory                        0-nl    1-partial  2-dense  [1] 11:Best Language/Aphasia         0-nl    1-mild/mod 2-severe   3-mute  [0] 12:Dysarthria                     0-nl    1-mild/mod 2-severe       x-untestable  [1] 13:Neglect/Inattention            0-none  1-partial  2-complete  [4] TOTAL      Objective Data:    Labs:  Lab Results   Component Value Date/Time    PROTHROMBTM 15.6 (H) 05/28/2024 10:29 AM    INR 1.22 (H) 05/28/2024 10:29 AM      Lab Results   Component Value Date/Time    WBC 6.8 05/30/2024 01:55 AM    RBC 4.60 05/30/2024 01:55 AM    HEMOGLOBIN 14.5 05/30/2024 01:55 AM    HEMATOCRIT 42.9 05/30/2024 01:55 AM    MCV 93.3 05/30/2024 01:55 AM    MCH 31.5 05/30/2024 01:55 AM    MCHC 33.8 05/30/2024 01:55 AM    MPV 9.4 05/30/2024 01:55 AM    NEUTSPOLYS 73.70 (H) 05/28/2024 09:01 PM    LYMPHOCYTES 11.70 (L) 05/28/2024 09:01 PM    MONOCYTES 11.50 05/28/2024 09:01 PM    EOSINOPHILS 2.30 05/28/2024 09:01 PM    BASOPHILS 0.40 05/28/2024 09:01 PM    HYPOCHROMIA 1+ 05/05/2014 11:47 AM       Lab Results   Component Value Date/Time    SODIUM 141 05/30/2024 01:55 AM    POTASSIUM 3.6 05/30/2024 01:55 AM    CHLORIDE 102 05/30/2024 01:55 AM    CO2 23 05/30/2024 01:55 AM    GLUCOSE 136 (H) 05/30/2024 01:55 AM    BUN 21 05/30/2024 01:55 AM    CREATININE 1.20 05/30/2024 01:55 AM    CREATININE 0.8 10/05/2007 04:50 AM      Lab Results   Component Value Date/Time    CHOLSTRLTOT 153 05/28/2024 01:15 AM     (H) 05/28/2024 01:15 AM    HDL 23 (A) 05/28/2024 01:15 AM    TRIGLYCERIDE 86 05/28/2024 01:15 AM       Lab Results   Component Value Date/Time    ALKPHOSPHAT 107 (H) 05/30/2024 01:55 AM    ASTSGOT 44 05/30/2024 01:55 AM    ALTSGPT 92 (H) 05/30/2024 01:55 AM    TBILIRUBIN 0.8 05/30/2024 01:55 AM        Imaging/Testing:    I interpreted and/or reviewed the patient's neuroimaging    DX-CHEST-PORTABLE (1 VIEW)    (Results Pending)   CT-HEAD W/O    (Results Pending)   CT-CEREBRAL PERFUSION ANALYSIS    (Results Pending)   CT-CTA HEAD WITH & W/O-POST PROCESS    (Results Pending)   CT-CTA NECK WITH & W/O-POST PROCESSING    (Results Pending)       Assessment and Plan:  lorena Wu is a 53 y.o. HF on Eliquis, HLD, HTN who presents to [facility] after the acute onset of speech difficulties, Lt-facial droop and right upper extremity weakness. Acute head imaging demonstrates Rt-M1 occlusion with perfusion mismatch of 76 cc of penumbra. Patient recently diagnosed with heart failure and was started on loading dose of Eliquis for Lt-ventricular thrombus. Off label TNK was discussed with family however they elected not to pursue IV-thrombolysis secondary to the likely higher and unknown risk of bleeding. Patient was taken for MT and achieved TICI 2c recanalization. One distal lesion in Rt-M2 either spasm or distalized embolism and patient started on Integrilin gtt to be converted to aspirin monotherapy tomorrow.     Problem list:  -- Rt-M1 occlusion    Recommendations:  -- Neurochecks/NIHSS per post-thrombectomy  protocol  -- Expedite MRI brain without contrast, does not need to wait 24 hours  -- Repeat head CT only if there is clinical deterioration  -- S/p TICI 2c: maintain systolic -140; IV cardene preferred  -- Long term BP goal <130/80  -- Stroke labs: HgbA1c and lipid panel  -- Start atorvastatin 80 mg qHS; titrate dose to results of lipid panel, goal LDL <70  -- On Integrilin gtt for distal Rt-M2 narrowing; convert to aspirin monotherapy tomorrow  -- Continue home dose Eliquis 10 mg in the setting of Lt-ventricular thrombus; risk of hemorrhage outweighed by recurrent stroke risk in the setting that she had a embolus with anti-coagulation  -- Cardiology consultation for recommendations on anti-coagulation in the setting of breakthrough stroke on loading dose of Eliquis in the setting of new heart failure and Lt-ventricular thrombus  -- TTE w/o bubble  -- Stroke bridge clinic follow up on discharge  -- SCDs for DVT ppx  -- PT/OT/SLP ok even within first 24 hours      Edi Westbrook III, MD  ABPN Board Certified in Neurology  Vascular Neurology, [facility] Acute Care Services

## 2024-05-31 NOTE — OR SURGEON
Immediate Post- Operative Note        Findings: Right M1 segment       Procedure(s): Mechanical thrombectomy     TICI 2c       Estimated Blood Loss: Less than 5 ml        Complications: None            5/30/2024     8:29 PM     Ino Clay M.D.

## 2024-05-31 NOTE — PROGRESS NOTES
Critical Care Progress Note    Date of admission  5/30/2024    Chief Complaint  Analia Wu is a 53 y.o. 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  A code stroke was called and a CTA of the head showed a right M1 occlusion.  The patient was emergently taken to the IR suite where she was found to have a thrombus that was extricated with a TICI 2c resultant flow.  The patient was also started on Integrilin infusion for a stenosis seen in the M2 segment.  The patient was then admitted to the ICU for ongoing care.     Hospital Course  5/30- R M1 occlusion. MT TICI-2C    Interval Problem Update  Reviewed last 24 hour events:              - No acute events overnight              - Tm: Afebrile              - HR: 90s              - SBP: 90s              - Neuro: RASS -1              - GI: SLP pending              - UOP: 700 mL              - Ventura: No              - Lines: PIV              - PPx: Integrilin --> ASA, Eliquis              - Mobility level 1    Infusions:  Integrilin    Review of Systems  ROS     Vital Signs for last 24 hours   Temp:  [36.5 °C (97.7 °F)-36.9 °C (98.4 °F)] 36.9 °C (98.4 °F)  Pulse:  [] 92  Resp:  [14-27] 19  BP: ()/(52-96) 94/61  SpO2:  [76 %-98 %] 94 %    Hemodynamic parameters for last 24 hours       Respiratory Information for the last 24 hours       Physical Exam   Physical Exam    Medications  Current Facility-Administered Medications   Medication Dose Route Frequency Provider Last Rate Last Admin    eptifibatide 0.75 mg/mL (Integrilin) infusion  2  mcg/kg/min Intravenous Continuous Ino Clay M.D. 14.7 mL/hr at 05/31/24 0135 2 mcg/kg/min at 05/31/24 0135    niCARdipine (Cardene) 25 mg in  mL Standard Infusion  0-15 mg/hr Intravenous Continuous Ino Clay M.D.        acetaminophen (Tylenol) tablet 650 mg  650 mg Oral Q6HRS PRN Kimberly Leary M.D.        atorvastatin (Lipitor) tablet 80 mg  80 mg Oral Q EVENING Kimberly Leary M.D.        labetalol (Normodyne/Trandate) injection 10-20 mg  10-20 mg Intravenous Q10 MIN PRN Kimberly Leary M.D.        hydrALAZINE (Apresoline) injection 10 mg  10 mg Intravenous Q2HRS PRN Kimberly Leary M.D.        gabapentin (Neurontin) capsule 300 mg  300 mg Oral TID Kimberly Leary M.D.        losartan (Cozaar) tablet 12.5 mg  12.5 mg Oral Q EVENING Kimberly Leary M.D.        dapagliflozin propanediol (Farxiga) tablet 10 mg  10 mg Oral DAILY Kimberly Leary M.D.   10 mg at 05/31/24 0529    metoprolol SR (Toprol XL) tablet 25 mg  25 mg Oral DAILY Kimberly Leary M.D.        venlafaxine XR (Effexor XR) capsule 75 mg  75 mg Oral DAILY Kimberly Leary M.D.   75 mg at 05/31/24 0529    apixaban (Eliquis) tablet 10 mg  10 mg Oral BID Kimberly Leary M.D.        Followed by    [START ON 6/7/2024] apixaban (Eliquis) tablet 5 mg  5 mg Oral BID Kimberly Leary M.D.        cariprazine (Vraylar) capsule 4.5 mg  4.5 mg Oral Nightly Kimberly Leary M.D.           Fluids    Intake/Output Summary (Last 24 hours) at 5/31/2024 0705  Last data filed at 5/31/2024 0600  Gross per 24 hour   Intake 141.12 ml   Output 700 ml   Net -558.88 ml       Laboratory  Recent Labs     05/28/24  2101   MLGOD40U 7.47   QRHDFL145U 36.9   [id number] 119.6*   XUTC2HZL 98.2   ARTHCO3 27*   [id number] 6   ARTBE 3         Recent Labs     05/29/24  0018 05/30/24  0155 05/30/24  1832 05/31/24  0420   SODIUM 132* 141 142 139   POTASSIUM 3.9 3.6 5.3 4.2   CHLORIDE 96 102 98 100   CO2 23 23 18* 26   BUN 16 21 21 18   CREATININE  1.12 1.20 1.22 1.06   MAGNESIUM 2.2 2.1 2.6*  --    PHOSPHORUS 3.9  --   --   --    CALCIUM 8.7 9.0 10.6* 9.5     Recent Labs     05/30/24 0155 05/30/24 1832 05/31/24 0420   ALTSGPT 92* 108* 83*   ASTSGOT 44 44 36   ALKPHOSPHAT 107* 128* 107*   TBILIRUBIN 0.8 0.7 0.8   GLUCOSE 136* 75 107*     Recent Labs     05/28/24 2101 05/29/24  0018 05/30/24 0155 05/30/24 1832 05/31/24  0420   WBC 9.7   < > 6.8 10.7 9.1   NEUTSPOLYS 73.70*  --   --  64.10 71.50   LYMPHOCYTES 11.70*  --   --  19.40* 15.20*   MONOCYTES 11.50  --   --  12.60 9.80   EOSINOPHILS 2.30  --   --  3.00 3.10   BASOPHILS 0.40  --   --  0.50 0.20   ASTSGOT 62*   < > 44 44 36   ALTSGPT 107*   < > 92* 108* 83*   ALKPHOSPHAT 99   < > 107* 128* 107*   TBILIRUBIN 1.2   < > 0.8 0.7 0.8    < > = values in this interval not displayed.     Recent Labs     05/28/24  1029 05/28/24 2101 05/30/24 0155 05/30/24 1832 05/31/24  0420   RBC  --    < > 4.60 5.26 4.74   HEMOGLOBIN  --    < > 14.5 16.5* 14.7   HEMATOCRIT  --    < > 42.9 51.7* 45.8   PLATELETCT  --    < > 230 314 256   PROTHROMBTM 15.6*  --   --  14.9*  --    APTT 28.2  --   --  29.8  --    INR 1.22*  --   --  1.15*  --     < > = values in this interval not displayed.       Imaging  {IP IMAGING OPTIONS:7838438}    Assessment/Plan  * Acute ischemic right MCA stroke (HCC)- (present on admission)  Assessment & Plan  Due to right M1 occlusion likely from the LV thrombus   S/p thrombectomy with TICI 2C flow results  Neurological checks every 1 hour  SBP goals > 100 and < 140  Labetalol/hydralazine as needed, nicardipine infusion   ECHO was just performed this week  MRI brain  High intensity statin  PT/OT/SLP  Integrilin infusion for M2 segment stenosis and convert to ASA on 5/31  Neurology and IR following    Acute systolic heart failure (HCC)- (present on admission)  Assessment & Plan  Noted on ECHO from 5/28/2024  Cont Farxiga 10mg PO daily  Cont Metoprolol 25mg XL PO daily  Cont losartan 12.5mg PO  daily  Maintain euvolemia, may need Lasix as needed    Left ventricular thrombus- (present on admission)  Assessment & Plan  Cont loading dose of Eliquis    Pulmonic stenosis, congenital- (present on admission)  Assessment & Plan  Hx of    Hyperlipidemia- (present on admission)  Assessment & Plan  High intensity statin    Mixed anxiety and depressive disorder- (present on admission)  Assessment & Plan  Cont home Venlafaxine 75mg PO daily  Cont home Cariprazine 4.5mg PO every evening         VTE:  {VTE SMARTLIST:832861772}  Ulcer: {ULCER SMARTLIST:876288144}  Lines: {LINE SMARLIST:300443095}    I have performed a physical exam and reviewed and updated ROS and Plan today (5/31/2024). In review of yesterday's note (5/30/2024), there are no changes except as documented above.     Discussed patient condition and risk of morbidity and/or mortality with {Discussed with...:[id number]}  The patient remains critically ill.  Critical care time = *** minutes in directly providing and coordinating critical care and extensive data review.  No time overlap and excludes procedures.

## 2024-05-31 NOTE — H&P
Critical Care History & Physical Note    Date of Service  5/30/2024    Primary Care Physician  LJ Bella.    Consultants  critical care, neurology, and interventional radiology    Specialist Names: Dr. Clay and Dr. Chan Covering    Code Status  Full Code    Chief Complaint  Acute onset of speech changes and left sided weakness      History of Presenting Illness  Analia Wu is a 53 y.o. female with a past medical history significant for mood disorder, perianal dysplasia status post surgery, asthma, hyperlipidemia and recent admission to [facility] on 5/27/2024 to 5/30/2024 for congestive heart failure due to congenital pulmonic stenosis and also found to have a left ventricular thrombus then sent home on loading dose Eliquis who presented back to the later on 5/30/2024 with complaints of sudden onset of speech changes with left-sided extremity weakness.  A code stroke was called and a CTA of the head showed a right M1 occlusion.  The patient was emergently taken to the IR suite where she was found to have a thrombus that was extricated with a TICI 2c resultant flow.  The patient was also started on Integrilin infusion for a stenosis seen in the M2 segment.  The patient was then admitted to the ICU for ongoing care.    I discussed the plan of care with patient, bedside RN, charge RN, pharmacy, and Dr. Clay .    Review of Systems  Review of Systems   Constitutional:  Negative for chills, fever and malaise/fatigue.   HENT:  Negative for congestion and sore throat.    Eyes:  Negative for blurred vision and double vision.   Respiratory:  Negative for cough and shortness of breath.    Cardiovascular:  Negative for chest pain, palpitations and leg swelling.   Gastrointestinal:  Negative for blood in stool, nausea and vomiting.   Genitourinary:  Negative for flank pain and hematuria.   Musculoskeletal:  Negative for back pain and neck pain.   Skin:  Negative for rash.   Neurological:  Positive  "for dizziness, speech change and focal weakness. Negative for headaches.   Endo/Heme/Allergies:  Does not bruise/bleed easily.   Psychiatric/Behavioral:  Negative for depression. The patient is not nervous/anxious.        Past Medical History   has a past medical history of Anesthesia, Anxiety, ASTHMA, Cancer (HCC), Carpal tunnel syndrome, Heart murmur, Infectious disease (2007), Pain (11-26-12), Panic attack, Psychiatric problem, and Seasonal allergies.    Surgical History   has a past surgical history that includes rectal biopsy (2007); wide excision (8/4/2010); cervical disk and fusion anterior (6/27/2011); wide excision (9/7/2011); wide excision (2/29/2012); wide excision (12/19/2012); exam under anesthesia (5/21/2014); rectal exploration (5/21/2014); rectal exploration (1/28/2015); rectal exploration (9/2/2016); anal fistulectomy (5/24/2017); hemorrhoidectomy (8/4/2010); vulvectomy partial (October 2007); vulvectomy radical (Bilateral, 9/2/2016); other neurological surg; and rectal exploration (12/19/2018).     Family History  family history includes Cancer in her maternal aunt; Diabetes in her mother; Stroke in her mother.   Family history reviewed with patient. There is family history that is pertinent to the chief complaint.     Social History   reports that she quit smoking about 16 years ago. Her smoking use included cigarettes. She started smoking about 41 years ago. She has a 25 pack-year smoking history. She has never used smokeless tobacco. She reports that she does not drink alcohol and does not use drugs.    Allergies  Allergies   Allergen Reactions    Latex Shortness of Breath and Itching    Other Food Hives and Rash     Splenda    Tape      \"makes skin turn red\"  Paper tape ok    Hydromorphone Vomiting and Nausea    Other Environmental      Dial soap --  Skin itching       Medications  Prior to Admission Medications   Prescriptions Last Dose Informant Patient Reported? Taking?   Cariprazine HCl " (VRAYLAR) 4.5 MG Cap  Patient Yes No   Sig: Take 4.5 mg by mouth every evening.   apixaban (ELIQUIS) 5mg Tab   No No   Sig: Take 2 Tablets by mouth 2 times a day for 7 days, THEN 1 Tablet 2 times a day for 30 days. Indications: DVT/PE   atorvastatin (LIPITOR) 40 MG Tab   No No   Sig: Take 1 Tablet by mouth every evening for 30 days.   dapagliflozin propanediol (FARXIGA) 10 MG Tab   No No   Sig: Take 1 Tablet by mouth every day for 30 days.   gabapentin (NEURONTIN) 300 MG Cap  Patient Yes No   Sig: Take 300 mg by mouth 3 times a day.   losartan (COZAAR) 25 MG Tab   No No   Sig: Take one-half (0.5) Tablet by mouth every evening for 30 days.   metoprolol SR (TOPROL XL) 25 MG TABLET SR 24 HR   No No   Sig: Take 1 Tablet by mouth every day for 30 days.   venlafaxine XR (EFFEXOR XR) 75 MG CAPSULE SR 24 HR  Patient Yes No   Sig: Take 75 mg by mouth every day.      Facility-Administered Medications: None       Physical Exam  Temp:  [36.8 °C (98.2 °F)] 36.8 °C (98.2 °F)  Pulse:  [112-122] 115  Resp:  [18-25] 23  BP: (102-118)/(78-96) 118/96  SpO2:  [85 %-96 %] 90 %  Blood Pressure: (!) 118/96   Temperature: 36.8 °C (98.2 °F)   Pulse: (!) 115   Respiration: (!) 23   Pulse Oximetry: 90 %       Physical Exam  Vitals and nursing note reviewed.   Constitutional:       General: She is not in acute distress.     Appearance: She is obese. She is ill-appearing. She is not toxic-appearing.   HENT:      Head: Normocephalic and atraumatic.      Right Ear: External ear normal.      Left Ear: External ear normal.      Nose: Nose normal. No rhinorrhea.      Mouth/Throat:      Mouth: Mucous membranes are moist.      Pharynx: Oropharynx is clear. No oropharyngeal exudate.   Eyes:      General: No scleral icterus.     Conjunctiva/sclera: Conjunctivae normal.      Pupils: Pupils are equal, round, and reactive to light.   Cardiovascular:      Rate and Rhythm: Regular rhythm. Tachycardia present.      Pulses: Normal pulses.      Heart sounds: No  murmur heard.  Pulmonary:      Breath sounds: Normal breath sounds. No wheezing.   Chest:      Chest wall: No tenderness.   Abdominal:      Palpations: Abdomen is soft.      Tenderness: There is no abdominal tenderness. There is no guarding or rebound.   Musculoskeletal:         General: Normal range of motion.      Cervical back: Normal range of motion and neck supple.      Right lower leg: No edema.      Left lower leg: No edema.   Lymphadenopathy:      Cervical: No cervical adenopathy.   Skin:     General: Skin is warm and dry.      Capillary Refill: Capillary refill takes less than 2 seconds.      Findings: No rash.   Neurological:      Mental Status: She is alert and oriented to person, place, and time.      Cranial Nerves: No cranial nerve deficit.      Sensory: No sensory deficit.      Motor: No weakness.   Psychiatric:         Mood and Affect: Mood normal.         Behavior: Behavior normal.         Thought Content: Thought content normal.         Laboratory:  Recent Labs     05/29/24 0018 05/30/24  0155 05/30/24  1832   WBC 9.1 6.8 10.7   RBC 4.43 4.60 5.26   HEMOGLOBIN 14.0 14.5 16.5*   HEMATOCRIT 41.2 42.9 51.7*   MCV 93.0 93.3 98.3*   MCH 31.6 31.5 31.4   MCHC 34.0 33.8 31.9*   RDW 46.5 48.0 52.8*   PLATELETCT 238 230 314   MPV 9.3 9.4 9.8     Recent Labs     05/28/24 2101 05/29/24  0018 05/30/24  0155   SODIUM 132* 132* 141   POTASSIUM 3.7 3.9 3.6   CHLORIDE 96 96 102   CO2 22 23 23   GLUCOSE 115* 112* 136*   BUN 17 16 21   CREATININE 1.08 1.12 1.20   CALCIUM 8.4* 8.7 9.0     Recent Labs     05/28/24 2101 05/29/24  0018 05/30/24  0155   ALTSGPT 107* 109* 92*   ASTSGOT 62* 65* 44   ALKPHOSPHAT 99 109* 107*   TBILIRUBIN 1.2 1.3 0.8   GLUCOSE 115* 112* 136*     Recent Labs     05/28/24  1029 05/30/24  1832   APTT 28.2 29.8   INR 1.22* 1.15*     Recent Labs     05/28/24 2101   NTPROBNP 2285*     Recent Labs     05/28/24  0115   TRIGLYCERIDE 86   HDL 23*   *     Recent Labs     05/28/24  0115  "  TROPONINT 34*       Imaging:  DX-CHEST-PORTABLE (1 VIEW)   Final Result      1.  Mildly enlarged cardiac silhouette with changes of vascular congestion and mild edema.      CT-CTA NECK WITH & W/O-POST PROCESSING   Final Result      CT angiogram of the neck within normal limits.      CT-CTA HEAD WITH & W/O-POST PROCESS   Final Result      1.  There is a right M1 branch occlusion versus severe stenosis.      2.  Findings were discussed with ARSH HDZ on 5/30/2024 at 7:17 PM.      CT-CEREBRAL PERFUSION ANALYSIS   Final Result      1. Cerebral blood flow less than 30% possibly representing completed infarct = 0 mL.   2. T Max more than 6 seconds possibly representing combination of completed infarct and ischemia = 76 mL.   3. Mismatched volume possibly representing ischemic brain/penumbra= 76 mL      4.  Please note that this cerebral perfusion study and report is Quantitative and targets supratentorial (cerebral) perfusion for evaluation of large vessel territory acute ischemia/infarction. For example, lacunar infarcts, and brainstem/posterior fossa    ischemia/infarction are not evaluated on this study.  Data acquisition is subject to artifacts which can yield non-anatomically plausible perfusion maps which may be due to motion, bolus timing, signal to noise ratio, or other technical factors.    Perfusion map abnormalities which show non-anatomic distributions are likely artifact.   This study is not \"stand-alone\" and should only be utilized for diagnosis, management/treatment in correlation with CT, CTA, and/or MRI and clinical factors.         CT-HEAD W/O   Final Result      1.  Head CT without contrast within normal limits. No evidence of acute cerebral infarction, hemorrhage or mass lesion.         IR-THROMBECTOMY ARTERY SECONDARY    (Results Pending)   MR-BRAIN-W/O    (Results Pending)   CT-HEAD W/O    (Results Pending)         Assessment/Plan:  Justification for Admission Status  I anticipate this " patient will require at least two midnights for appropriate medical management, necessitating inpatient admission because of an acute right MCA ischemic stroke due to a right M1 occlusion s/p thrombectomy.    Patient will need a ICU (Adult and Pediatrics) bed on MEDICAL service .  The need is secondary to acute right MCA ischemic stroke due to a right M1 occlusion s/p thrombectomy with TICI 2C flow requiring hourly neurological checks and strict SBP control.    * Acute ischemic right MCA stroke (HCC)- (present on admission)  Assessment & Plan  Due to right M1 occlusion likely from the LV thrombus   S/p thrombectomy with TICI 2C flow results  Neurological checks every 1 hour  SBP goals > 100 and < 140  Labetalol/hydralazine as needed, nicardipine infusion   ECHO was just performed this week  MRI brain  High intensity statin  PT/OT/SLP  Integrilin infusion for M2 segment stenosis and convert to ASA on 5/31  Neurology and IR following    Acute systolic heart failure (HCC)- (present on admission)  Assessment & Plan  Noted on ECHO from 5/28/2024  Cont Farxiga 10mg PO daily  Cont Metoprolol 25mg XL PO daily  Cont losartan 12.5mg PO daily  Maintain euvolemia, may need Lasix as needed    Left ventricular thrombus- (present on admission)  Assessment & Plan  Cont loading dose of Eliquis    Pulmonic stenosis, congenital- (present on admission)  Assessment & Plan  Hx of    Hyperlipidemia- (present on admission)  Assessment & Plan  High intensity statin    Mixed anxiety and depressive disorder- (present on admission)  Assessment & Plan  Cont home Venlafaxine 75mg PO daily  Cont home Cariprazine 4.5mg PO every evening        VTE prophylaxis: SCDs/TEDs and therapeutic anticoagulation with Eliquis    The patient is critically ill at this time requiring hourly neurochecks as well as strict blood pressure control with systolic blood pressure goals greater than 100 and less than 140 with active titration of nicardipine drip.  I have  assessed and reassessed the patient's neurological status, respiratory status, hemodynamics, and cardiovascular status.  Patient remains at high risk of clinical deterioration, worsening vital organ dysfunction, and death without the above critical care interventions.    Critical care time equals 108 minutes.

## 2024-05-31 NOTE — CONSULTS
Hospital Medicine Consultation    Date of Service  5/31/2024    Referring Physician  Leonardo Green M.D.    Consulting Physician  Suresh Ferrari D.O.    Reason for Consultation  Transfer of care out of ICU s/p CVA    History of Presenting Illness  Analia Wu is a 53 y.o. female with obesity, perianal dysplasia(s/p surgery), mood disorder, dyslipidemia, depression.  She presented 5/30/2024 after a discharge earlier that day for SOB and Nausea/vomiting. She was diagnosed with a left ventricular thrombus and treated with heparin drip then transitioned to apixaban.. An Echo suggested an EF:29%. She was discharged 5/30, but returned with an acute onset of slurred speech and left sided weakness. She was now admitted with an acute stroke. She was taken to IR for a extraction of a right M1 occlusion.    5/31: Awake and oriented with slurred speech and left facial droop. States her left sided strength has improved.  Denies headache.      Review of Systems  Review of Systems   Constitutional:  Negative for fever and malaise/fatigue.   HENT:  Negative for congestion.    Respiratory:  Negative for shortness of breath.    Cardiovascular:  Negative for leg swelling.   Gastrointestinal:  Negative for abdominal pain and nausea.   Musculoskeletal:  Negative for neck pain.   Neurological:  Positive for speech change. Negative for sensory change, focal weakness and headaches.   Psychiatric/Behavioral:  The patient is not nervous/anxious.        Past Medical History   has a past medical history of Anesthesia, Anxiety, ASTHMA, Cancer (HCC), Carpal tunnel syndrome, Heart murmur, Infectious disease (2007), Pain (11-26-12), Panic attack, Psychiatric problem, and Seasonal allergies.    She has no past medical history of Breast cancer (HCC) or COPD.    Surgical History   has a past surgical history that includes rectal biopsy (2007); wide excision (8/4/2010); cervical disk and fusion anterior (6/27/2011); wide excision (9/7/2011); wide  excision (2/29/2012); wide excision (12/19/2012); exam under anesthesia (5/21/2014); rectal exploration (5/21/2014); rectal exploration (1/28/2015); rectal exploration (9/2/2016); anal fistulectomy (5/24/2017); hemorrhoidectomy (8/4/2010); vulvectomy partial (October 2007); vulvectomy radical (Bilateral, 9/2/2016); other neurological surg; and rectal exploration (12/19/2018).    Family History  family history includes Cancer in her maternal aunt; Diabetes in her mother; Stroke in her mother.    Social History   reports that she quit smoking about 16 years ago. Her smoking use included cigarettes. She started smoking about 41 years ago. She has a 25 pack-year smoking history. She has never used smokeless tobacco. She reports that she does not drink alcohol and does not use drugs.    Medications  Prior to Admission Medications   Prescriptions Last Dose Informant Patient Reported? Taking?   Cariprazine HCl (VRAYLAR) 4.5 MG Cap  Patient Yes No   Sig: Take 4.5 mg by mouth every evening.   apixaban (ELIQUIS) 5mg Tab   No No   Sig: Take 2 Tablets by mouth 2 times a day for 7 days, THEN 1 Tablet 2 times a day for 30 days. Indications: DVT/PE   atorvastatin (LIPITOR) 40 MG Tab   No No   Sig: Take 1 Tablet by mouth every evening for 30 days.   dapagliflozin propanediol (FARXIGA) 10 MG Tab   No No   Sig: Take 1 Tablet by mouth every day for 30 days.   gabapentin (NEURONTIN) 300 MG Cap  Patient Yes No   Sig: Take 300 mg by mouth 3 times a day.   losartan (COZAAR) 25 MG Tab   No No   Sig: Take one-half (0.5) Tablet by mouth every evening for 30 days.   metoprolol SR (TOPROL XL) 25 MG TABLET SR 24 HR   No No   Sig: Take 1 Tablet by mouth every day for 30 days.   venlafaxine XR (EFFEXOR XR) 75 MG CAPSULE SR 24 HR  Patient Yes No   Sig: Take 75 mg by mouth every day.      Facility-Administered Medications: None       Allergies  Allergies   Allergen Reactions    Latex Shortness of Breath and Itching    Other Food Hives and Rash      "Splenda    Tape      \"makes skin turn red\"  Paper tape ok    Hydromorphone Vomiting and Nausea    Other Environmental      Dial soap --  Skin itching       Physical Exam  Temp:  [36.3 °C (97.3 °F)-37 °C (98.6 °F)] 36.3 °C (97.3 °F)  Pulse:  [] 108  Resp:  [14-28] 26  BP: ()/(52-77) 96/69  SpO2:  [87 %-99 %] 95 %    Physical Exam  Vitals reviewed.   Constitutional:       Appearance: Normal appearance. She is obese. She is not diaphoretic.   HENT:      Head: Normocephalic and atraumatic.      Nose: Nose normal.      Mouth/Throat:      Mouth: Mucous membranes are moist.      Pharynx: No oropharyngeal exudate.   Eyes:      General: No scleral icterus.        Right eye: No discharge.         Left eye: No discharge.      Extraocular Movements: Extraocular movements intact.      Conjunctiva/sclera: Conjunctivae normal.   Cardiovascular:      Rate and Rhythm: Normal rate and regular rhythm.      Pulses:           Radial pulses are 2+ on the right side and 2+ on the left side.        Dorsalis pedis pulses are 2+ on the right side and 2+ on the left side.      Heart sounds: No murmur heard.  Pulmonary:      Effort: Pulmonary effort is normal. No respiratory distress.      Breath sounds: Normal breath sounds. No wheezing or rales.   Abdominal:      General: Bowel sounds are normal. There is no distension.      Palpations: Abdomen is soft.   Musculoskeletal:         General: No swelling or tenderness.      Cervical back: Neck supple. No tenderness. No muscular tenderness.      Right lower leg: No edema.      Left lower leg: No edema.   Skin:     Coloration: Skin is not jaundiced or pale.   Neurological:      Mental Status: She is alert and oriented to person, place, and time.      Comments: Left facial droop with slurred speech   Psychiatric:         Mood and Affect: Mood normal.         Behavior: Behavior normal.         Fluids  Date 05/31/24 0700 - 06/01/24 0659   Shift 4341-2533 9856-8254 2953-1340 24 Hour Total "   INTAKE   IV Piggyback 73.5   73.5   Shift Total 73.5   73.5   OUTPUT   Shift Total       Weight (kg) 94.7 94.7 94.7 94.7       Laboratory  Recent Labs     05/30/24  0155 05/30/24 1832 05/31/24  0420   WBC 6.8 10.7 9.1   RBC 4.60 5.26 4.74   HEMOGLOBIN 14.5 16.5* 14.7   HEMATOCRIT 42.9 51.7* 45.8   MCV 93.3 98.3* 96.6   MCH 31.5 31.4 31.0   MCHC 33.8 31.9* 32.1*   RDW 48.0 52.8* 52.6*   PLATELETCT 230 314 256   MPV 9.4 9.8 9.5     Recent Labs     05/30/24 0155 05/30/24 1832 05/31/24  0420   SODIUM 141 142 139   POTASSIUM 3.6 5.3 4.2   CHLORIDE 102 98 100   CO2 23 18* 26   GLUCOSE 136* 75 107*   BUN 21 21 18   CREATININE 1.20 1.22 1.06   CALCIUM 9.0 10.6* 9.5     Recent Labs     05/30/24 1832   APTT 29.8   INR 1.15*          Recent Labs     05/31/24 0420   TRIGLYCERIDE 84   HDL 38*   LDL 74        Imaging  CT-HEAD W/O   Final Result      New small amount of right frontal subarachnoid hemorrhage.         These findings were discussed with ARTURO SANTOS on 5/31/2024 4:09 PM.            IR-THROMBECTOMY ARTERY SECONDARY   Final Result      1.  Distal right M1 occlusion.   2.  Mechanical thrombectomy.   3.  TICI 2c Flow with focal nonflow limiting proximal M2 stenosis. Integrilin bolus and infusion was started.      DX-CHEST-PORTABLE (1 VIEW)   Final Result      1.  Mildly enlarged cardiac silhouette with changes of vascular congestion and mild edema.      CT-CTA NECK WITH & W/O-POST PROCESSING   Final Result      CT angiogram of the neck within normal limits.      CT-CTA HEAD WITH & W/O-POST PROCESS   Final Result      1.  There is a right M1 branch occlusion versus severe stenosis.      2.  Findings were discussed with ARSH HDZ on 5/30/2024 at 7:17 PM.      CT-CEREBRAL PERFUSION ANALYSIS   Final Result      1. Cerebral blood flow less than 30% possibly representing completed infarct = 0 mL.   2. T Max more than 6 seconds possibly representing combination of completed infarct and ischemia = 76 mL.  "  3. Mismatched volume possibly representing ischemic brain/penumbra= 76 mL      4.  Please note that this cerebral perfusion study and report is Quantitative and targets supratentorial (cerebral) perfusion for evaluation of large vessel territory acute ischemia/infarction. For example, lacunar infarcts, and brainstem/posterior fossa    ischemia/infarction are not evaluated on this study.  Data acquisition is subject to artifacts which can yield non-anatomically plausible perfusion maps which may be due to motion, bolus timing, signal to noise ratio, or other technical factors.    Perfusion map abnormalities which show non-anatomic distributions are likely artifact.   This study is not \"stand-alone\" and should only be utilized for diagnosis, management/treatment in correlation with CT, CTA, and/or MRI and clinical factors.         CT-HEAD W/O   Final Result      1.  Head CT without contrast within normal limits. No evidence of acute cerebral infarction, hemorrhage or mass lesion.         MR-BRAIN-W/O    (Results Pending)       Assessment/Plan  * Acute ischemic right MCA stroke (HCC)- (present on admission)  Assessment & Plan  Suspect due to prior ventricular thrombus and that it had subsequently traveled causing stroke.  Apixaban 5mg BID, atorvastatin.  Monitor neurochecks and vitals.  Losartan 12.5mg   Passed swallow evaluation  MRI brain pending    Acute systolic heart failure (HCC)- (present on admission)  Assessment & Plan  HFrEF 28%  Losartan and metoprolol    Left ventricular thrombus- (present on admission)  Assessment & Plan  Last admit was on heparin drip then on apixaban.    Noted on 5/28/24 echo  CONCLUSIONS  Dilated LV with severely reduced systolic function, estimated LVEF 25%.    Global hypokinesis with regional variation  Grade II LV diastolic dysfunction  LV apical thrombus is present: 0.9 x 1.5 cm, semi-mobile.  Normal RV size with mildly reduced systolic function  Reduced estimated resting " stroke-volume 29ml and cardiac output per   Doppler assessment  Mildly dilated left atrium  Moderate MR  Moderate TR  Trivial pericardial fusion  Estimated RVSP 45 mmHg  Normal IVC size      Hyperlipidemia- (present on admission)  Assessment & Plan  Atorvastatin 80mg qHS    Mixed anxiety and depressive disorder- (present on admission)  Assessment & Plan  Effexor XR 75mg q day

## 2024-05-31 NOTE — ASSESSMENT & PLAN NOTE
Due to right M1 occlusion likely from the LV thrombus   S/p thrombectomy with TICI 2C flow results  Neurological checks every 1 hour  SBP goals > 100 and < 140  Labetalol/hydralazine as needed, nicardipine infusion   ECHO was just performed this week  MRI brain  High intensity statin  PT/OT/SLP  Integrilin infusion for M2 segment stenosis and convert to ASA on 5/31  Neurology and IR following

## 2024-05-31 NOTE — PROGRESS NOTES
53-year-old female with RIGHT cerebral stroke symptoms. CT angiogram demonstrated distal M1 occlusion. Mechanical Thrombectomy  was performed. Post mechanical thymectomy angiogram demonstrates patent middle cerebral artery and their branches indicating the TICI 2c flow. However there is focal stenosis in the distal M2 segment. Therefore Integrilin bolus dose was given. The patient was started on Integrilin maintenance dose.    Since the patient is on high-dose Eliquis , the Integrilin can be converted to the aspirin in the morning. This is communicated with the neurologist and the intensivist.

## 2024-05-31 NOTE — THERAPY
Speech Language Pathology   Clinical Swallow Evaluation     Patient Name: Analia Wu  AGE:  53 y.o., SEX:  female  Medical Record #: [id number]  Date of Service: 5/31/2024      History of Present Illness  Pt is a 53 y.o. 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 Pt presented back later to [facility] [place] on 5/30/24 with complaints of sudden onset of speech changes w/ L sided extremity weakness.   A code stroke was called and a CTA of the head showed a right M1 occlusion.  The patient was emergently taken to the IR suite where she was found to have a thrombus that was extricated with a TICI 2c resultant flow.  The patient was also started on Integrilin infusion for a stenosis seen in the M2 segment.    CMHx: Acute ischemic R MCA, Acute systolic heart failure, Left ventricular thrombus, Pulmonic stenosis, Hyperlipidemia, Mixed anxiety and depressive disorder     No hx of ST at [facility] [place]     5/30 Dx Chest:   1.  Mildly enlarged cardiac silhouette with changes of vascular congestion and mild edema.      General Information:  Vitals  O2 (LPM): 2  O2 Delivery Device: Silicone Nasal Cannula  Level of Consciousness: Alert, Awake  Orientation: Oriented x 4  Follows Directives: Yes      Prior Living Situation & Level of Function:  Communication: WFL  Swallowing: WFL       Oral Mechanism Evaluation:  Dentition: Natural dentition   Facial Symmetry: Equal  Facial Sensation: Equal     Labial Observations: WFL   Lingual Observations: Midline  Motor Speech: WFL          Laryngeal Function:  Secretion Management: Adequate  Voice Quality: WFL  Cough: Perceptually WNL       Subjective  Patient received awake, sitting upright in bed. Denied any history of dysphagia. Agreeable to PO with SLP.         Assessment  Current Method of Nutrition: NPO until cleared by speech pathology  Positioning: De Los Santos's (60-90 degrees)  Bolus Administration: Patient  O2 (LPM): 2 O2 Delivery Device: Silicone Nasal Cannula  Factor(s) Affecting Performance: None  Tracheostomy : No          Swallowing Trials:  Swallowing Trials  Ice: WFL  Thin Liquid (TN0): WFL  Pureed (PU4): WFL  Regular (RG7): WFL      Comments: Patient able to self-feed without difficulty. Demo'd adequate oral bolus acceptance, containment, and manipulation. No notable oral bolus residue upon oral inspection. Adequate bite with functional mastication of solids. The 3 ounce water challenge is a swallow screen which if passed has a predictive rate of 96% sensitivity for identifying individuals safe to swallow (Jennifer et al, 2008). Patient completed without incident. No cough or throat clear appreciated with any consistency. Vocal quality remained stable throughout oral intake. Single swallow completed per bolus. No signs of esophageal dysfunction. Provided education regarding general aspiration precautions as well as signs of aspiration.         Clinical Impressions  Patient presents without overt s/sx of airway invasion with oral intake. Recommend oral diet of regular solids and thin liquids. Please hold PO and contact SLP with any difficulties. Given acute R CVA, SLP to follow to ensure diet tolerance and monitor for changes.       Recommendations  Diet Consistency: Regular solids/thin liquids  Instrumentation: None indicated at this time  Medication: As tolerated  Supervision: Assist with meal tray set up  Positioning: Fully upright and midline during oral intake, Meals sitting upright in a chair, as tolerated  Risk Management : Small bites/sips, Slow rate of intake  Oral Care: BID       SLP Treatment Plan  Treatment Plan: Dysphagia Treatment  SLP Frequency: 3x Per Week  Estimated Duration: Until Therapy Goals Met      Anticipated Discharge Needs  Discharge  "Recommendations: Anticipate that the patient will have no further speech therapy needs after discharge from the hospital   Therapy Recommendations Upon DC: Patient / Family / Caregiver Education        Patient / Family Goals  Patient / Family Goal #1: \"Water\"  Short Term Goals  Short Term Goal # 1: Pt will consume a diet of regular solids and thin liquids without overt s/sx of aspiration or decline in respiratory status      Kelsey Lange, SLP   "

## 2024-05-31 NOTE — PROGRESS NOTES
Neurology Progress Note  Neurohospitalist Service, [facility]    Referring Physician: Kimberly Leary M.D.      Interval History: Patient seen and examined this AM. Facial weakness resolved and patient responding to questions more appropriately. She denies acute complaints. No reported acute overnight events.     Review of systems: In addition to what is detailed in the HPI and/or updated in the interval history, all other systems reviewed and are negative.    Past Medical History, Past Surgical History and Social History reviewed and unchanged from prior    Medications:    Current Facility-Administered Medications:     aspirin (Asa) chewable tab 81 mg, 81 mg, Oral, DAILY, Leonardo Green M.D., 81 mg at 05/31/24 1002    niCARdipine (Cardene) 25 mg in  mL Standard Infusion, 0-15 mg/hr, Intravenous, Continuous, Ino Clay M.D.    acetaminophen (Tylenol) tablet 650 mg, 650 mg, Oral, Q6HRS PRN, Kimberly Leary M.D.    atorvastatin (Lipitor) tablet 80 mg, 80 mg, Oral, Q EVENING, Kimberly Leary M.D.    labetalol (Normodyne/Trandate) injection 10-20 mg, 10-20 mg, Intravenous, Q10 MIN PRN, Kimberly Leary M.D.    hydrALAZINE (Apresoline) injection 10 mg, 10 mg, Intravenous, Q2HRS PRN, Kimberly Leary M.D.    gabapentin (Neurontin) capsule 300 mg, 300 mg, Oral, TID, Kimberly Leary M.D., 300 mg at 05/31/24 1002    losartan (Cozaar) tablet 12.5 mg, 12.5 mg, Oral, Q EVENING, Kimberly Leary M.D.    dapagliflozin propanediol (Farxiga) tablet 10 mg, 10 mg, Oral, DAILY, Kimberly Leary M.D., 10 mg at 05/31/24 0529    metoprolol SR (Toprol XL) tablet 25 mg, 25 mg, Oral, DAILY, Kimberly Leary M.D.    venlafaxine XR (Effexor XR) capsule 75 mg, 75 mg, Oral, DAILY, Kimberly Leary M.D., 75 mg at 05/31/24 0529    apixaban (Eliquis) tablet 10 mg, 10 mg, Oral, BID, 10 mg at 05/31/24 1002 **FOLLOWED BY** [START ON 6/7/2024] apixaban (Eliquis) tablet 5 mg, 5 mg, Oral, BID, Kimberly MEIER  MADI Leary    cariprazine (Vraylar) capsule 4.5 mg, 4.5 mg, Oral, Nightly, Kimberly Leary M.D.    Physical Examination:   BP 95/65   Pulse (!) 110   Temp 37 °C (98.6 °F) (Temporal)   Resp 20   Wt 94.7 kg (208 lb 12.4 oz)   LMP 12/17/2018 (Approximate)   SpO2 96%   Breastfeeding No   BMI 32.70 kg/m²     NEUROLOGICAL EXAM:     MENTAL STATUS: Awake, alert and oriented to person, place, time and situation  LANG/SPEECH: Naming and repetition intact, fluent speech, follows simple commands.    CRANIAL NERVES:  II: Pupils equal and reactive, no VF deficits  III, IV, VI: EOM intact, no gaze preference or deviation, no nystagmus.  V: normal sensation in V1, V2, and V3 segments bilaterally  VII: no asymmetry, no nasolabial fold flattening  VIII: normal hearing to speech  IX, X: normal palatal elevation, no uvular deviation  XI: 5/5 head turn and 5/5 shoulder shrug bilaterally  XII: midline tongue protrusion    MOTOR: Antigravity movement in bilateral upper and lower extremities. Full and symmetric power in proximal and distal muscle groups in bilateral upper and lower extremities    REFLEXES: bilateral flexor plantar response, no clonus  SENSORY: Normal to fine touch in all extremities, no extinction to double sided stimulation (visual & tactile)  COORD: Normal finger to nose, no tremor, no dysmetria  GAIT: Deferrred    Objective Data:    Labs:  Lab Results   Component Value Date/Time    PROTHROMBTM 14.9 (H) 05/30/2024 06:32 PM    INR 1.15 (H) 05/30/2024 06:32 PM      Lab Results   Component Value Date/Time    WBC 9.1 05/31/2024 04:20 AM    RBC 4.74 05/31/2024 04:20 AM    HEMOGLOBIN 14.7 05/31/2024 04:20 AM    HEMATOCRIT 45.8 05/31/2024 04:20 AM    MCV 96.6 05/31/2024 04:20 AM    MCH 31.0 05/31/2024 04:20 AM    MCHC 32.1 (L) 05/31/2024 04:20 AM    MPV 9.5 05/31/2024 04:20 AM    NEUTSPOLYS 71.50 05/31/2024 04:20 AM    LYMPHOCYTES 15.20 (L) 05/31/2024 04:20 AM    MONOCYTES 9.80 05/31/2024 04:20 AM    EOSINOPHILS  3.10 05/31/2024 04:20 AM    BASOPHILS 0.20 05/31/2024 04:20 AM    HYPOCHROMIA 1+ 05/05/2014 11:47 AM      Lab Results   Component Value Date/Time    SODIUM 139 05/31/2024 04:20 AM    POTASSIUM 4.2 05/31/2024 04:20 AM    CHLORIDE 100 05/31/2024 04:20 AM    CO2 26 05/31/2024 04:20 AM    GLUCOSE 107 (H) 05/31/2024 04:20 AM    BUN 18 05/31/2024 04:20 AM    CREATININE 1.06 05/31/2024 04:20 AM    CREATININE 0.8 10/05/2007 04:50 AM      Lab Results   Component Value Date/Time    CHOLSTRLTOT 129 05/31/2024 04:20 AM    LDL 74 05/31/2024 04:20 AM    HDL 38 (A) 05/31/2024 04:20 AM    TRIGLYCERIDE 84 05/31/2024 04:20 AM       Lab Results   Component Value Date/Time    ALKPHOSPHAT 107 (H) 05/31/2024 04:20 AM    ASTSGOT 36 05/31/2024 04:20 AM    ALTSGPT 83 (H) 05/31/2024 04:20 AM    TBILIRUBIN 0.8 05/31/2024 04:20 AM        Imaging/Testing:    I interpreted and/or reviewed the patient's neuroimaging    IR-THROMBECTOMY ARTERY SECONDARY   Final Result      1.  Distal right M1 occlusion.   2.  Mechanical thrombectomy.   3.  TICI 2c Flow with focal nonflow limiting proximal M2 stenosis. Integrilin bolus and infusion was started.      DX-CHEST-PORTABLE (1 VIEW)   Final Result      1.  Mildly enlarged cardiac silhouette with changes of vascular congestion and mild edema.      CT-CTA NECK WITH & W/O-POST PROCESSING   Final Result      CT angiogram of the neck within normal limits.      CT-CTA HEAD WITH & W/O-POST PROCESS   Final Result      1.  There is a right M1 branch occlusion versus severe stenosis.      2.  Findings were discussed with ARSH HDZ on 5/30/2024 at 7:17 PM.      CT-CEREBRAL PERFUSION ANALYSIS   Final Result      1. Cerebral blood flow less than 30% possibly representing completed infarct = 0 mL.   2. T Max more than 6 seconds possibly representing combination of completed infarct and ischemia = 76 mL.   3. Mismatched volume possibly representing ischemic brain/penumbra= 76 mL      4.  Please note that this  "cerebral perfusion study and report is Quantitative and targets supratentorial (cerebral) perfusion for evaluation of large vessel territory acute ischemia/infarction. For example, lacunar infarcts, and brainstem/posterior fossa    ischemia/infarction are not evaluated on this study.  Data acquisition is subject to artifacts which can yield non-anatomically plausible perfusion maps which may be due to motion, bolus timing, signal to noise ratio, or other technical factors.    Perfusion map abnormalities which show non-anatomic distributions are likely artifact.   This study is not \"stand-alone\" and should only be utilized for diagnosis, management/treatment in correlation with CT, CTA, and/or MRI and clinical factors.         CT-HEAD W/O   Final Result      1.  Head CT without contrast within normal limits. No evidence of acute cerebral infarction, hemorrhage or mass lesion.         MR-BRAIN-W/O    (Results Pending)   CT-HEAD W/O    (Results Pending)       Assessment and Plan:   53 y.o. HF on Eliquis, HLD, HTN who presents to [facility] after the acute onset of speech difficulties, Lt-facial droop and right upper extremity weakness. Acute head imaging demonstrates Rt-M1 occlusion with perfusion mismatch of 76 cc of penumbra. Patient recently diagnosed with heart failure and was started on loading dose of Eliquis for Lt-ventricular thrombus. Off label TNK was discussed with family however they elected not to pursue IV-thrombolysis secondary to the likely higher and unknown risk of bleeding. Patient was taken for MT and achieved TICI 2c recanalization. One distal lesion in Rt-M2 either spasm or distalized embolism and patient started on Integrilin gtt to be converted to aspirin monotherapy tomorrow. Etiology of Rt-M1 embolism almost certainly Lt-ventricular thrombus. Recommend cardiology consultation regarding any necessary management changes in this setting. Patient has been placed on additional aspirin therapy in the " setting of distal M2 narrowing post thrombectomy.      Problem list:  -- Rt-M1 occlusion     Recommendations:  -- Neurochecks/NIHSS per post-thrombectomy protocol  -- Expedite MRI brain without contrast, does not need to wait 24 hours  -- Repeat head CT only if there is clinical deterioration  -- S/p TICI 2c: maintain systolic -140; IV cardene preferred  -- Long term BP goal <130/80  -- A1c: 5.8, LDL: 74  -- Continue home atorvastatin dose of 40 mg qHS; patient is already at goal of LDL <70  -- Transition to aspirin 81 mg in the setting of distal Rt-M2 narrowing post-thrombectomy  -- Continue home dose Eliquis 10 mg in the setting of Lt-ventricular thrombus; risk of hemorrhage outweighed by recurrent stroke risk in the setting that she had a embolus with anti-coagulation  -- Cardiology consultation for recommendations on anti-coagulation in the setting of breakthrough stroke on loading dose of Eliquis in the setting of new heart failure and Lt-ventricular thrombus  -- TTE w/o bubble  -- Stroke bridge clinic follow up on discharge  -- SCDs for DVT ppx  -- PT/OT/SLP ok even within first 24 hours    I have performed a physical exam and reviewed and updated ROS and Plan today (5/31/2024).     Edi Westbrook III, MD  ABPN Board Certified in Neurology  Vascular Neurology, [facility]

## 2024-05-31 NOTE — ED PROVIDER NOTES
ED Provider Note    CHIEF COMPLAINT  Speech difficulties    EXTERNAL RECORDS REVIEWED  Inpatient Notes patient was admitted for intractable nausea and vomiting, shortness of breath with overexertion and was found to have heart failure with EF 25%.  She had a left heart cath with normal coronary artery findings.  She was found to have a left ventricular thrombus and was placed on Eliquis    HPI/ROS  LIMITATION TO HISTORY   Select: Behavior  OUTSIDE HISTORIAN(S):  Daughter    Analia Wu is a 53 y.o. female with history of heart failure, LV thrombus on Eliquis who presents due to concern for stroke.  The patient was last seen well around 545.  She was looking for her medications.  Her daughter found her at 6 PM this evening and she had some dysarthria.  She also had some word finding difficulty.  It appeared that she was having some left-sided deficits and she was unable to ambulate.  She was also acting confused.  The patient states that she was feeling frustrated as she cannot find her medications.  She denies any chest pain, difficulty breathing, abdominal pain, nausea, vomiting, headache.  She has never had a stroke before.    PAST MEDICAL HISTORY   has a past medical history of Anesthesia, Anxiety, ASTHMA, Cancer (HCC), Carpal tunnel syndrome, Heart murmur, Infectious disease (2007), Pain (11-26-12), Panic attack, Psychiatric problem, and Seasonal allergies.    SURGICAL HISTORY   has a past surgical history that includes rectal biopsy (2007); wide excision (8/4/2010); cervical disk and fusion anterior (6/27/2011); wide excision (9/7/2011); wide excision (2/29/2012); wide excision (12/19/2012); exam under anesthesia (5/21/2014); rectal exploration (5/21/2014); rectal exploration (1/28/2015); rectal exploration (9/2/2016); anal fistulectomy (5/24/2017); hemorrhoidectomy (8/4/2010); vulvectomy partial (October 2007); vulvectomy radical (Bilateral, 9/2/2016); other neurological surg; and rectal exploration  "(2018).    FAMILY HISTORY  Family History   Problem Relation Age of Onset    Diabetes Mother     Stroke Mother     Cancer Maternal Aunt     Lung Disease Neg Hx     Heart Disease Neg Hx        SOCIAL HISTORY  Social History     Tobacco Use    Smoking status: Former     Current packs/day: 0.00     Average packs/day: 1 pack/day for 25.0 years (25.0 ttl pk-yrs)     Types: Cigarettes     Start date: 10/4/1982     Quit date: 10/4/2007     Years since quittin.6    Smokeless tobacco: Never    Tobacco comments:        Vaping Use    Vaping status: Never Used   Substance and Sexual Activity    Alcohol use: No    Drug use: No    Sexual activity: Not Currently       CURRENT MEDICATIONS  Home Medications    **Home medications have not yet been reviewed for this encounter**       Audit from Redirected Encounters    **Home medications have not yet been reviewed for this encounter**         ALLERGIES  Allergies   Allergen Reactions    Latex Shortness of Breath and Itching    Other Food Hives and Rash     Splenda    Tape      \"makes skin turn red\"  Paper tape ok    Hydromorphone Vomiting and Nausea    Other Environmental      Dial soap --  Skin itching       PHYSICAL EXAM  VITAL SIGNS: BP 99/65   Pulse 60   Temp 36.8 °C (98.2 °F) (Temporal)   Resp (!) 21   LMP 2018 (Approximate)   SpO2 95%   Breastfeeding No      Constitutional: Well developed, Well nourished, moderate distress, awake in wheelchair outside of charge nurse desk  HEENT: Normocephalic, Atraumatic,  external ears normal, pharynx pink,  Mucous  Membranes moist, No rhinorrhea or mucosal edema  Eyes: PERRL, EOMI, Conjunctiva normal, No discharge.   Neck: Normal range of motion, No tenderness, Supple, No stridor.   Cardiovascular: Regular Rate and Rhythm, No murmurs,  rubs, or gallops.   Thorax & Lungs: Lungs clear to auscultation bilaterally, No respiratory distress, No wheezes, rhales or rhonchi, No chest wall tenderness.   Abdomen: Bowel sounds " normal, Soft, non tender, non distended,  No pulsatile masses., no rebound guarding or peritoneal signs.   Skin: Warm, Dry, No erythema, No rash,   Back:  No CVA tenderness,  No spinal tenderness, bony crepitance step offs or instability.   Extremities: Equal, intact distal pulses, No cyanosis, clubbing or edema,  No tenderness.   Musculoskeletal: Good range of motion in all major joints. No tenderness to palpation or major deformities noted.   Neurologic: Alert and oriented, left facial drrop, eyes shut tight bilaterally, forehead wrinkles bilaterally, sensation intact to light touch bilateral face, tongue midline, head turn and shoulder shrug with full strength. Hearing intact grossly bilaterally. 5/5 strength left shoulder, 4/5 strength right shoulder, elbow  4 out of 5 strength right elbow, 5 out of 5 strength left elbow, 5/5 strength hip, knee, ankle b/l   SILT biceps, forearms, hands, SILT thighs, shins mid foot   NIH 4  Psychiatric: Affect normal, Judgment normal, Mood normal.    EKG/LABS  Results for orders placed or performed during the hospital encounter of 05/30/24   CBC WITH DIFFERENTIAL   Result Value Ref Range    WBC 10.7 4.8 - 10.8 K/uL    RBC 5.26 4.20 - 5.40 M/uL    Hemoglobin 16.5 (H) 12.0 - 16.0 g/dL    Hematocrit 51.7 (H) 37.0 - 47.0 %    MCV 98.3 (H) 81.4 - 97.8 fL    MCH 31.4 27.0 - 33.0 pg    MCHC 31.9 (L) 32.2 - 35.5 g/dL    RDW 52.8 (H) 35.9 - 50.0 fL    Platelet Count 314 164 - 446 K/uL    MPV 9.8 9.0 - 12.9 fL    Neutrophils-Polys 64.10 44.00 - 72.00 %    Lymphocytes 19.40 (L) 22.00 - 41.00 %    Monocytes 12.60 0.00 - 13.40 %    Eosinophils 3.00 0.00 - 6.90 %    Basophils 0.50 0.00 - 1.80 %    Immature Granulocytes 0.40 0.00 - 0.90 %    Nucleated RBC 0.00 0.00 - 0.20 /100 WBC    Neutrophils (Absolute) 6.88 1.82 - 7.42 K/uL    Lymphs (Absolute) 2.08 1.00 - 4.80 K/uL    Monos (Absolute) 1.35 (H) 0.00 - 0.85 K/uL    Eos (Absolute) 0.32 0.00 - 0.51 K/uL    Baso (Absolute) 0.05 0.00 - 0.12  K/uL    Immature Granulocytes (abs) 0.04 0.00 - 0.11 K/uL    NRBC (Absolute) 0.00 K/uL   COMP METABOLIC PANEL   Result Value Ref Range    Sodium 142 135 - 145 mmol/L    Potassium 5.3 3.6 - 5.5 mmol/L    Chloride 98 96 - 112 mmol/L    Co2 18 (L) 20 - 33 mmol/L    Anion Gap 26.0 (H) 7.0 - 16.0    Glucose 75 65 - 99 mg/dL    Bun 21 8 - 22 mg/dL    Creatinine 1.22 0.50 - 1.40 mg/dL    Calcium 10.6 (H) 8.5 - 10.5 mg/dL    Correct Calcium 10.1 8.5 - 10.5 mg/dL    AST(SGOT) 44 12 - 45 U/L    ALT(SGPT) 108 (H) 2 - 50 U/L    Alkaline Phosphatase 128 (H) 30 - 99 U/L    Total Bilirubin 0.7 0.1 - 1.5 mg/dL    Albumin 4.6 3.2 - 4.9 g/dL    Total Protein 7.4 6.0 - 8.2 g/dL    Globulin 2.8 1.9 - 3.5 g/dL    A-G Ratio 1.6 g/dL   PROTHROMBIN TIME   Result Value Ref Range    PT 14.9 (H) 12.0 - 14.6 sec    INR 1.15 (H) 0.87 - 1.13   APTT   Result Value Ref Range    APTT 29.8 24.7 - 36.0 sec   COD (ADULT)   Result Value Ref Range    ABO Grouping Only O     Rh Grouping Only POS     Antibody Screen-Cod NEG    MAGNESIUM   Result Value Ref Range    Magnesium 2.6 (H) 1.5 - 2.5 mg/dL   TROPONIN   Result Value Ref Range    Troponin T 29 (H) 6 - 19 ng/L   ESTIMATED GFR   Result Value Ref Range    GFR (CKD-EPI) 53 (A) >60 mL/min/1.73 m 2   POCT glucose device results   Result Value Ref Range    POC Glucose, Blood 106 (H) 65 - 99 mg/dL       I have independently interpreted this EKG    RADIOLOGY/PROCEDURES   I have independently interpreted the diagnostic imaging associated with this visit and am waiting the final reading from the radiologist.   My preliminary interpretation is as follows: no ICH    Radiologist interpretation:  DX-CHEST-PORTABLE (1 VIEW)   Final Result      1.  Mildly enlarged cardiac silhouette with changes of vascular congestion and mild edema.      CT-CTA NECK WITH & W/O-POST PROCESSING   Final Result      CT angiogram of the neck within normal limits.      CT-CTA HEAD WITH & W/O-POST PROCESS   Final Result      1.  There is  "a right M1 branch occlusion versus severe stenosis.      2.  Findings were discussed with ARSH HDZ on 5/30/2024 at 7:17 PM.      CT-CEREBRAL PERFUSION ANALYSIS   Final Result      1. Cerebral blood flow less than 30% possibly representing completed infarct = 0 mL.   2. T Max more than 6 seconds possibly representing combination of completed infarct and ischemia = 76 mL.   3. Mismatched volume possibly representing ischemic brain/penumbra= 76 mL      4.  Please note that this cerebral perfusion study and report is Quantitative and targets supratentorial (cerebral) perfusion for evaluation of large vessel territory acute ischemia/infarction. For example, lacunar infarcts, and brainstem/posterior fossa    ischemia/infarction are not evaluated on this study.  Data acquisition is subject to artifacts which can yield non-anatomically plausible perfusion maps which may be due to motion, bolus timing, signal to noise ratio, or other technical factors.    Perfusion map abnormalities which show non-anatomic distributions are likely artifact.   This study is not \"stand-alone\" and should only be utilized for diagnosis, management/treatment in correlation with CT, CTA, and/or MRI and clinical factors.         CT-HEAD W/O   Final Result      1.  Head CT without contrast within normal limits. No evidence of acute cerebral infarction, hemorrhage or mass lesion.         IR-THROMBECTOMY ARTERY SECONDARY    (Results Pending)       COURSE & MEDICAL DECISION MAKING    ASSESSMENT, COURSE AND PLAN  Care Narrative:   This is a 53-year-old female who presents due to concern for stroke.  The patient was in her normal state of health at 5:45 PM when her daughter noticed some word finding difficulties and difficulties with her speech.  She then seemed to have some weakness on the left side per her daughter and was unable to walk.  On arrival, she is mildly tachycardic to the 120s but otherwise her vital signs are stable.  She is " awake and alert and she does have some subtle findings on her neurologic exam including left-sided facial droop and mild weakness to the right upper and lower extremities.  I called for stroke alert at the charge nurse test.  Stroke neurologist, Dr. Westbrook, immediately presented to bedside.  We decided to proceed with stroke activation.  Patient went immediately to CT.  On the CTA of head, there is concern for potential right M1 branch occlusion and I discussed this with the radiologist, Dr. Villatoro.  Patient is not a candidate for TNK as she is on high-dose Eliquis due to history of LV thrombus.  Labs are unremarkable without significant anemia, electrolyte disturbance, CINDY.  I did discuss with intensivist, Dr. Leary, who agreed to admit the patient to the hospital.  Patient and her daughter have been updated on plan of care.  Patient to IR with neuro IR at this time for thrombectomy.            ADDITIONAL PROBLEMS MANAGED  None    DISPOSITION AND DISCUSSIONS  I have discussed management of the patient with the following physicians and SUE's:    Stroke neurologist - Dr. Westbrook  Radiologist - Dr. Villatoro  Intensivist - Dr. Leary    Discussion of management with other Q or appropriate source(s): None     Escalation of care considered, and ultimately not performed:None    Barriers to care at this time, including but not limited to:  None .     Decision tools and prescription drugs considered including, but not limited to:  None .    DISPOSITION:  Patient will be hospitalized by Dr. Leary, intensivist, in guarded condition.      FINAL DIAGNOSIS  1. Acute ischemic right MCA stroke (HCC)           Electronically signed by: Perla Varela M.D., 5/30/2024 6:44 PM

## 2024-05-31 NOTE — PROGRESS NOTES
Radiology Progress Note   Author: CARLIE Kimbrough Date & Time created: 5/31/2024  10:43 AM   Date of admission  5/30/2024  Note to reader: this note follows the APSO format rather than the historical SOAP format. Assessment and plan located at the top of the note for ease of use.    Chief Complaint  53 y.o. female admitted 5/30/2024 with acute onset of speech changes and left sided weakness.      HPI  Patient is a 53 y.o. female with pmh of mood disorder, perianal dysplasia status post surgery, asthma, hyperlipidemia and recent admission to [facility] on 5/27/2024 to 5/30/2024 for congestive heart failure due to congenital pulmonic stenosis and also found to have a left ventricular thrombus then sent home on loading dose Eliquis who presented back on 5/30/2024 with complaints of sudden onset of speech changes with left-sided extremity weakness.  A code stroke was called, and a CTA of the head showed a right M1 occlusion. KARLEY was consulted. The patient was emergently taken to the IR suite. Dr. Clay performed mechanical thrombectomy of Right M1 segment, TICI 2c on 5/30/29. The patient was also started on Integrilin infusion for a stenosis seen in the M2 segment.    Interval History:   5/31/24 - No acute events overnight. Patient hypotensive. Right groin access site soft, non-tender, without ecchymosis; dressing CDI. MRI pending. Labs I reviewed: WBC 9.1, H/H 14.7/45.8. Discussed patient care with Intensivist, RN, and I reviewed IDT notes.    Assessment/Plan     Principal Problem:    Acute ischemic right MCA stroke (HCC)  Active Problems:    Mixed anxiety and depressive disorder    Hyperlipidemia    Pulmonic stenosis, congenital    Left ventricular thrombus    Acute systolic heart failure (HCC)      Plan IR  - S/P Right M1 thrombectomy with TICI 2c  - Transition Integrilin to ASA 81 mg daily  - Post access site instructions: no lifting greater than 5 lbs and no baths/swimming/soaking in tub for 5 days.  Shower OK. OK to change dressings/band aid as needed.   - Neuro checks per ICU protocol  - Secondary stroke prevention per Neurology recommendations  - Pending CT/MRI   - Pending PT/OT/ Swallow eval  - KARLEY signing off  -  -  -Thank you for allowing Interventional Radiology team to participate in the patients care, if any additional care or requests are needed in the future please do not hesitate to call or place IR order           Review of Systems  Physical Exam   Review of Systems   Constitutional:  Negative for chills, fever and malaise/fatigue.   Respiratory:  Negative for cough and shortness of breath.    Cardiovascular:  Negative for chest pain.   Gastrointestinal:  Negative for abdominal pain, nausea and vomiting.   Neurological:  Negative for tingling, sensory change, speech change, focal weakness, weakness and headaches.      Vitals:    05/31/24 1000   BP: (!) 87/62   Pulse: (!) 118   Resp: 20   Temp:    SpO2: 91%        Physical Exam  Vitals and nursing note reviewed.   Constitutional:       General: She is not in acute distress.  Cardiovascular:      Rate and Rhythm: Tachycardia present.   Pulmonary:      Effort: Pulmonary effort is normal. No respiratory distress.   Abdominal:      Palpations: Abdomen is soft.      Tenderness: There is no abdominal tenderness.   Skin:     General: Skin is warm and dry.      Comments: Right groin access site soft, non-tender, without ecchymosis; dressing cdi.     Neurological:      General: No focal deficit present.      Mental Status: She is alert and oriented to person, place, and time.      Comments: Aox4  Speech slurred  PERRLA, no gaze deficits  Left facial droop  Shoulder shrug intact  Sustained antigravity with no downward drift in all extremities  Sensation intact    Psychiatric:         Mood and Affect: Mood normal.         Behavior: Behavior normal.             Labs    Recent Labs     05/30/24  0155 05/30/24  1832 05/31/24  0420   WBC 6.8 10.7 9.1   RBC 4.60  5.26 4.74   HEMOGLOBIN 14.5 16.5* 14.7   HEMATOCRIT 42.9 51.7* 45.8   MCV 93.3 98.3* 96.6   MCH 31.5 31.4 31.0   MCHC 33.8 31.9* 32.1*   RDW 48.0 52.8* 52.6*   PLATELETCT 230 314 256   MPV 9.4 9.8 9.5     Recent Labs     05/30/24  0155 05/30/24  1832 05/31/24  0420   SODIUM 141 142 139   POTASSIUM 3.6 5.3 4.2   CHLORIDE 102 98 100   CO2 23 18* 26   GLUCOSE 136* 75 107*   BUN 21 21 18   CREATININE 1.20 1.22 1.06   CALCIUM 9.0 10.6* 9.5     Recent Labs     05/30/24 0155 05/30/24 1832 05/31/24  0420   ALBUMIN 3.7 4.6 4.1   TBILIRUBIN 0.8 0.7 0.8   ALKPHOSPHAT 107* 128* 107*   TOTPROTEIN 6.1 7.4 6.6   ALTSGPT 92* 108* 83*   ASTSGOT 44 44 36   CREATININE 1.20 1.22 1.06     IR-THROMBECTOMY ARTERY SECONDARY   Final Result      1.  Distal right M1 occlusion.   2.  Mechanical thrombectomy.   3.  TICI 2c Flow with focal nonflow limiting proximal M2 stenosis. Integrilin bolus and infusion was started.      DX-CHEST-PORTABLE (1 VIEW)   Final Result      1.  Mildly enlarged cardiac silhouette with changes of vascular congestion and mild edema.      CT-CTA NECK WITH & W/O-POST PROCESSING   Final Result      CT angiogram of the neck within normal limits.      CT-CTA HEAD WITH & W/O-POST PROCESS   Final Result      1.  There is a right M1 branch occlusion versus severe stenosis.      2.  Findings were discussed with ARSH HDZ on 5/30/2024 at 7:17 PM.      CT-CEREBRAL PERFUSION ANALYSIS   Final Result      1. Cerebral blood flow less than 30% possibly representing completed infarct = 0 mL.   2. T Max more than 6 seconds possibly representing combination of completed infarct and ischemia = 76 mL.   3. Mismatched volume possibly representing ischemic brain/penumbra= 76 mL      4.  Please note that this cerebral perfusion study and report is Quantitative and targets supratentorial (cerebral) perfusion for evaluation of large vessel territory acute ischemia/infarction. For example, lacunar infarcts, and brainstem/posterior  "fossa    ischemia/infarction are not evaluated on this study.  Data acquisition is subject to artifacts which can yield non-anatomically plausible perfusion maps which may be due to motion, bolus timing, signal to noise ratio, or other technical factors.    Perfusion map abnormalities which show non-anatomic distributions are likely artifact.   This study is not \"stand-alone\" and should only be utilized for diagnosis, management/treatment in correlation with CT, CTA, and/or MRI and clinical factors.         CT-HEAD W/O   Final Result      1.  Head CT without contrast within normal limits. No evidence of acute cerebral infarction, hemorrhage or mass lesion.         MR-BRAIN-W/O    (Results Pending)   MR-BRAIN-W/O    (Results Pending)   CT-HEAD W/O    (Results Pending)     INR   Date Value Ref Range Status   05/30/2024 1.15 (H) 0.87 - 1.13 Final     Comment:     INR - Non-therapeutic Reference Range: 0.87-1.13  INR - Therapeutic Reference Range: 2.0-4.0       No results found for: \"POCINR\"     Intake/Output Summary (Last 24 hours) at 5/31/2024 0755  Last data filed at 5/31/2024 0600  Gross per 24 hour   Intake 141.12 ml   Output 700 ml   Net -558.88 ml      I have personally reviewed the above labs and imaging      I have performed a physical exam and reviewed and updated ROS and Plan today (5/31/2024).     51 minutes in directly providing and coordinating care and extensive data review.  No time overlap and excludes procedures.   "

## 2024-05-31 NOTE — DISCHARGE PLANNING
[facility] Transitional Care Coordination    Referral from:  Kimberly Leary MD  Insurance Provider on Facesheet:GERARDO/ffs  Potential Rehab Diagnosis: stroke    Chart review indicates patient may have on going medical management and may have therapy needs to possibly meet inpatient rehab facility criteria with the goal of returning to community.    D/C support: daughter     Physiatry consultation pended per protocol.   Would appreciate therapy evaluations once medically appropriate.     TCC following          Thank you for the referral.

## 2024-05-31 NOTE — THERAPY
Physical Therapy Contact Note    Patient Name: Analia Wu  Age:  53 y.o., Sex:  female  Medical Record #: [id number]  Today's Date: 5/31/2024    PT consult received, active bedrest order; please adjust for PT evaluation to occur;     Maya CELESTE, PT,  [phone]

## 2024-05-31 NOTE — CARE PLAN
The patient is Watcher - Medium risk of patient condition declining or worsening    Shift Goals  Clinical Goals: BP Management  Patient Goals: Comfort  Family Goals: AIME    Progress made toward(s) clinical / shift goals:    Problem: Optimal Care of the Stroke Patient  Goal: Optimal acute care for the stroke patient  Outcome: Progressing     Problem: Neuro Status  Goal: Neuro status will remain stable or improve  Outcome: Progressing     Problem: Pain - Standard  Goal: Alleviation of pain or a reduction in pain to the patient’s comfort goal  Outcome: Progressing       Patient is not progressing towards the following goals:

## 2024-05-31 NOTE — ASSESSMENT & PLAN NOTE
Noted on ECHO from 5/28/2024  Cont Farxiga 10mg PO daily  Cont Metoprolol 25mg XL PO daily  Cont losartan 12.5mg PO daily  Maintain euvolemia, may need Lasix as needed

## 2024-05-31 NOTE — PROGRESS NOTES
4 Eyes Skin Assessment Completed by PÉREZ Rivas and PÉREZ Scott.    Head WDL  Ears WDL  Nose WDL  Mouth WDL  Neck WDL  Breast/Chest WDL  Shoulder Blades WDL  Spine WDL  (R) Arm/Elbow/Hand Redness and Blanching  (L) Arm/Elbow/Hand Redness and Blanching  Abdomen WDL  Groin Incision  Scrotum/Coccyx/Buttocks Redness and Blanching  (R) Leg WDL  (L) Leg WDL  (R) Heel/Foot/Toe Redness and Blanching  (L) Heel/Foot/Toe Redness and Blanching          Devices In Places ECG, Blood Pressure Cuff, Pulse Ox, and Nasal Cannula      Interventions In Place NC W/Ear Foams    Possible Skin Injury No    Pictures Uploaded Into Epic N/A  Wound Consult Placed N/A  RN Wound Prevention Protocol Ordered No

## 2024-05-31 NOTE — PROGRESS NOTES
Cerebral angiogram with mechanical thrombectomy for M1 occlusion occlusion by Dr. Clay.  Emergent consent. Pre-procedure pedal pulses present by doppler and marked. Right groin access site. Pt placed on monitor, prepped and draped in a sterile fashion. Vital signs were taken every 5 minutes and remained within parameters (see doc flow sheets). Penumbra system was used to retrieve clot. Hemostasis achieved using Angioseal deployed at 2025. Report given to Dewey ORTEZ. Pt was transported to ICU with RN and monitor to RICU 110. Stroke worksheet review during warm handoff with Dewey ORTEZ. ICU responsible for serial checks starting at 2100. See Stroke Procedure flowsheet for VS, neuro, access, extremity serial assessments.    LKW:1745  NIH:10  Time in IR:1945  Access:2005  1st angio:2009  1st pass:2014  Closure (end time):2026    Goal SBP per MD: less than 140    TICI score 2C  @ 2024    Post procedure pedal pulses present by doppler    [facility] Angioseal   REF: [id number]  LOT:[id number]  EXP:03/18/2025

## 2024-06-01 ENCOUNTER — APPOINTMENT (OUTPATIENT)
Dept: RADIOLOGY | Facility: MEDICAL CENTER | Age: 54
DRG: 252 | End: 2024-06-01
Attending: INTERNAL MEDICINE
Payer: MEDICAID

## 2024-06-01 LAB
ALBUMIN SERPL BCP-MCNC: 3.8 G/DL (ref 3.2–4.9)
ALBUMIN/GLOB SERPL: 1.6 G/DL
ALP SERPL-CCNC: 99 U/L (ref 30–99)
ALT SERPL-CCNC: 69 U/L (ref 2–50)
ANION GAP SERPL CALC-SCNC: 10 MMOL/L (ref 7–16)
AST SERPL-CCNC: 33 U/L (ref 12–45)
BACTERIA BLD CULT: NORMAL
BACTERIA BLD CULT: NORMAL
BASOPHILS # BLD AUTO: 0.3 % (ref 0–1.8)
BASOPHILS # BLD: 0.03 K/UL (ref 0–0.12)
BILIRUB SERPL-MCNC: 0.9 MG/DL (ref 0.1–1.5)
BUN SERPL-MCNC: 16 MG/DL (ref 8–22)
CALCIUM ALBUM COR SERPL-MCNC: 9.6 MG/DL (ref 8.5–10.5)
CALCIUM SERPL-MCNC: 9.4 MG/DL (ref 8.5–10.5)
CHLORIDE SERPL-SCNC: 103 MMOL/L (ref 96–112)
CO2 SERPL-SCNC: 26 MMOL/L (ref 20–33)
CREAT SERPL-MCNC: 1.06 MG/DL (ref 0.5–1.4)
EOSINOPHIL # BLD AUTO: 0.28 K/UL (ref 0–0.51)
EOSINOPHIL NFR BLD: 2.6 % (ref 0–6.9)
ERYTHROCYTE [DISTWIDTH] IN BLOOD BY AUTOMATED COUNT: 51.8 FL (ref 35.9–50)
GFR SERPLBLD CREATININE-BSD FMLA CKD-EPI: 62 ML/MIN/1.73 M 2
GLOBULIN SER CALC-MCNC: 2.4 G/DL (ref 1.9–3.5)
GLUCOSE SERPL-MCNC: 110 MG/DL (ref 65–99)
HCT VFR BLD AUTO: 41.1 % (ref 37–47)
HGB BLD-MCNC: 13.6 G/DL (ref 12–16)
IMM GRANULOCYTES # BLD AUTO: 0.04 K/UL (ref 0–0.11)
IMM GRANULOCYTES NFR BLD AUTO: 0.4 % (ref 0–0.9)
LYMPHOCYTES # BLD AUTO: 1.29 K/UL (ref 1–4.8)
LYMPHOCYTES NFR BLD: 12.2 % (ref 22–41)
MCH RBC QN AUTO: 31.7 PG (ref 27–33)
MCHC RBC AUTO-ENTMCNC: 33.1 G/DL (ref 32.2–35.5)
MCV RBC AUTO: 95.8 FL (ref 81.4–97.8)
MONOCYTES # BLD AUTO: 1.01 K/UL (ref 0–0.85)
MONOCYTES NFR BLD AUTO: 9.5 % (ref 0–13.4)
NEUTROPHILS # BLD AUTO: 7.96 K/UL (ref 1.82–7.42)
NEUTROPHILS NFR BLD: 75 % (ref 44–72)
NRBC # BLD AUTO: 0 K/UL
NRBC BLD-RTO: 0 /100 WBC (ref 0–0.2)
PLATELET # BLD AUTO: 231 K/UL (ref 164–446)
PMV BLD AUTO: 9.8 FL (ref 9–12.9)
POTASSIUM SERPL-SCNC: 4.6 MMOL/L (ref 3.6–5.5)
PROT SERPL-MCNC: 6.2 G/DL (ref 6–8.2)
RBC # BLD AUTO: 4.29 M/UL (ref 4.2–5.4)
SIGNIFICANT IND 70042: NORMAL
SIGNIFICANT IND 70042: NORMAL
SITE SITE: NORMAL
SITE SITE: NORMAL
SODIUM SERPL-SCNC: 139 MMOL/L (ref 135–145)
SOURCE SOURCE: NORMAL
SOURCE SOURCE: NORMAL
WBC # BLD AUTO: 10.6 K/UL (ref 4.8–10.8)

## 2024-06-01 PROCEDURE — 770022 HCHG ROOM/CARE - ICU (200)

## 2024-06-01 PROCEDURE — A9270 NON-COVERED ITEM OR SERVICE: HCPCS | Performed by: INTERNAL MEDICINE

## 2024-06-01 PROCEDURE — 700102 HCHG RX REV CODE 250 W/ 637 OVERRIDE(OP): Performed by: PSYCHIATRY & NEUROLOGY

## 2024-06-01 PROCEDURE — 85025 COMPLETE CBC W/AUTO DIFF WBC: CPT

## 2024-06-01 PROCEDURE — 99232 SBSQ HOSP IP/OBS MODERATE 35: CPT | Performed by: PSYCHIATRY & NEUROLOGY

## 2024-06-01 PROCEDURE — A9270 NON-COVERED ITEM OR SERVICE: HCPCS | Performed by: PSYCHIATRY & NEUROLOGY

## 2024-06-01 PROCEDURE — 80053 COMPREHEN METABOLIC PANEL: CPT

## 2024-06-01 PROCEDURE — 99232 SBSQ HOSP IP/OBS MODERATE 35: CPT | Performed by: HOSPITALIST

## 2024-06-01 PROCEDURE — 92523 SPEECH SOUND LANG COMPREHEN: CPT

## 2024-06-01 PROCEDURE — 70551 MRI BRAIN STEM W/O DYE: CPT

## 2024-06-01 PROCEDURE — 700102 HCHG RX REV CODE 250 W/ 637 OVERRIDE(OP): Performed by: INTERNAL MEDICINE

## 2024-06-01 RX ADMIN — VENLAFAXINE HYDROCHLORIDE 75 MG: 75 CAPSULE, EXTENDED RELEASE ORAL at 05:27

## 2024-06-01 RX ADMIN — APIXABAN 10 MG: 5 TABLET, FILM COATED ORAL at 08:17

## 2024-06-01 RX ADMIN — GABAPENTIN 300 MG: 300 CAPSULE ORAL at 14:12

## 2024-06-01 RX ADMIN — ASPIRIN 81 MG: 81 TABLET, CHEWABLE ORAL at 05:27

## 2024-06-01 RX ADMIN — GABAPENTIN 300 MG: 300 CAPSULE ORAL at 08:17

## 2024-06-01 RX ADMIN — GABAPENTIN 300 MG: 300 CAPSULE ORAL at 21:00

## 2024-06-01 RX ADMIN — APIXABAN 5 MG: 5 TABLET, FILM COATED ORAL at 17:47

## 2024-06-01 RX ADMIN — DAPAGLIFLOZIN 10 MG: 10 TABLET, FILM COATED ORAL at 05:27

## 2024-06-01 RX ADMIN — CARIPRAZINE 4.5 MG: 3 CAPSULE, GELATIN COATED ORAL at 21:00

## 2024-06-01 RX ADMIN — ACETAMINOPHEN 650 MG: 325 TABLET, FILM COATED ORAL at 22:58

## 2024-06-01 RX ADMIN — ATORVASTATIN CALCIUM 80 MG: 40 TABLET, FILM COATED ORAL at 17:47

## 2024-06-01 ASSESSMENT — ENCOUNTER SYMPTOMS
SPEECH CHANGE: 0
SHORTNESS OF BREATH: 0
FOCAL WEAKNESS: 0
NAUSEA: 0
HEADACHES: 0
SENSORY CHANGE: 1
NECK PAIN: 0
DIZZINESS: 0
NERVOUS/ANXIOUS: 0
SORE THROAT: 0

## 2024-06-01 ASSESSMENT — PAIN DESCRIPTION - PAIN TYPE
TYPE: ACUTE PAIN

## 2024-06-01 NOTE — PROGRESS NOTES
Hospital Medicine Daily Progress Note    Date of Service  6/1/2024    Chief Complaint  SOB and N/V    Hospital Course  Analia Wu is a 53 y.o. female with obesity, perianal dysplasia(s/p surgery), mood disorder, dyslipidemia, depression.  She presented 5/30/2024 after a discharge earlier that day for SOB and Nausea/vomiting. She was diagnosed with a left ventricular thrombus and treated with heparin drip then transitioned to apixaban.. An Echo suggested an EF:25%. She was discharged 5/30, but returned with an acute onset of slurred speech and left sided weakness. She was now admitted with an acute stroke. She was taken to IR for a extraction of a right M1 occlusion.     Interval Problem Update  6/1: CT head shows concern of right frontal subarachnoid hemorrhage. She is alert denies any change neurologically and denies any headache.  Speech remains with slight slur and has left facial droop.    I have discussed this patient's plan of care and discharge plan at IDT rounds today with Case Management, Nursing, Nursing leadership, and other members of the IDT team.    Consultants/Specialty  neurology    Code Status  Full Code    Disposition  The patient is not medically cleared for discharge to home or a post-acute facility.      I have placed the appropriate orders for post-discharge needs.    Review of Systems  Review of Systems   Constitutional:  Negative for malaise/fatigue.   HENT:  Negative for sore throat.    Respiratory:  Negative for shortness of breath.    Cardiovascular:  Negative for chest pain and leg swelling.   Gastrointestinal:  Negative for nausea.   Musculoskeletal:  Negative for neck pain.   Neurological:  Positive for sensory change. Negative for dizziness, speech change, focal weakness and headaches.   Psychiatric/Behavioral:  The patient is not nervous/anxious.         Physical Exam  Temp:  [35.9 °C (96.6 °F)-37 °C (98.6 °F)] 36.2 °C (97.2 °F)  Pulse:  [] 98  Resp:  [19-26] 20  BP:  ()/(51-76) 102/71  SpO2:  [89 %-98 %] 94 %    Physical Exam  Vitals reviewed.   Constitutional:       Appearance: Normal appearance. She is obese. She is not diaphoretic.   HENT:      Head: Normocephalic and atraumatic.      Nose: Nose normal.      Mouth/Throat:      Mouth: Mucous membranes are moist.      Pharynx: No oropharyngeal exudate.   Eyes:      General: No scleral icterus.        Right eye: No discharge.         Left eye: No discharge.      Extraocular Movements: Extraocular movements intact.      Conjunctiva/sclera: Conjunctivae normal.   Cardiovascular:      Rate and Rhythm: Normal rate and regular rhythm.      Pulses:           Radial pulses are 2+ on the right side and 2+ on the left side.        Dorsalis pedis pulses are 2+ on the right side and 2+ on the left side.      Heart sounds: No murmur heard.  Pulmonary:      Effort: Pulmonary effort is normal. No respiratory distress.      Breath sounds: Normal breath sounds. No wheezing or rales.   Abdominal:      General: Bowel sounds are normal. There is no distension.      Palpations: Abdomen is soft.   Musculoskeletal:         General: No swelling or tenderness.      Cervical back: Neck supple. No tenderness. No muscular tenderness.      Right lower leg: No edema.      Left lower leg: No edema.   Skin:     Coloration: Skin is not jaundiced or pale.   Neurological:      Mental Status: She is alert and oriented to person, place, and time.      Comments: Left facial droop with slurred speech   Psychiatric:         Mood and Affect: Mood normal.         Behavior: Behavior normal.         Fluids    Intake/Output Summary (Last 24 hours) at 6/1/2024 2155  Last data filed at 6/1/2024 2000  Gross per 24 hour   Intake 280 ml   Output 1250 ml   Net -970 ml       Laboratory  Recent Labs     05/30/24  1832 05/31/24  0420 06/01/24  0420   WBC 10.7 9.1 10.6   RBC 5.26 4.74 4.29   HEMOGLOBIN 16.5* 14.7 13.6   HEMATOCRIT 51.7* 45.8 41.1   MCV 98.3* 96.6 95.8   MCH  31.4 31.0 31.7   MCHC 31.9* 32.1* 33.1   RDW 52.8* 52.6* 51.8*   PLATELETCT 314 256 231   MPV 9.8 9.5 9.8     Recent Labs     05/30/24  1832 05/31/24  0420 06/01/24  0420   SODIUM 142 139 139   POTASSIUM 5.3 4.2 4.6   CHLORIDE 98 100 103   CO2 18* 26 26   GLUCOSE 75 107* 110*   BUN 21 18 16   CREATININE 1.22 1.06 1.06   CALCIUM 10.6* 9.5 9.4     Recent Labs     05/30/24  1832   APTT 29.8   INR 1.15*         Recent Labs     05/31/24  0420   TRIGLYCERIDE 84   HDL 38*   LDL 74       Imaging  CT-HEAD W/O   Final Result      New small amount of right frontal subarachnoid hemorrhage.         These findings were discussed with ARTURO SANTOS on 5/31/2024 4:09 PM.            IR-THROMBECTOMY ARTERY SECONDARY   Final Result      1.  Distal right M1 occlusion.   2.  Mechanical thrombectomy.   3.  TICI 2c Flow with focal nonflow limiting proximal M2 stenosis. Integrilin bolus and infusion was started.      DX-CHEST-PORTABLE (1 VIEW)   Final Result      1.  Mildly enlarged cardiac silhouette with changes of vascular congestion and mild edema.      CT-CTA NECK WITH & W/O-POST PROCESSING   Final Result      CT angiogram of the neck within normal limits.      CT-CTA HEAD WITH & W/O-POST PROCESS   Final Result      1.  There is a right M1 branch occlusion versus severe stenosis.      2.  Findings were discussed with ARSH HDZ on 5/30/2024 at 7:17 PM.      CT-CEREBRAL PERFUSION ANALYSIS   Final Result      1. Cerebral blood flow less than 30% possibly representing completed infarct = 0 mL.   2. T Max more than 6 seconds possibly representing combination of completed infarct and ischemia = 76 mL.   3. Mismatched volume possibly representing ischemic brain/penumbra= 76 mL      4.  Please note that this cerebral perfusion study and report is Quantitative and targets supratentorial (cerebral) perfusion for evaluation of large vessel territory acute ischemia/infarction. For example, lacunar infarcts, and brainstem/posterior  "fossa    ischemia/infarction are not evaluated on this study.  Data acquisition is subject to artifacts which can yield non-anatomically plausible perfusion maps which may be due to motion, bolus timing, signal to noise ratio, or other technical factors.    Perfusion map abnormalities which show non-anatomic distributions are likely artifact.   This study is not \"stand-alone\" and should only be utilized for diagnosis, management/treatment in correlation with CT, CTA, and/or MRI and clinical factors.         CT-HEAD W/O   Final Result      1.  Head CT without contrast within normal limits. No evidence of acute cerebral infarction, hemorrhage or mass lesion.         MR-BRAIN-W/O    (Results Pending)        Assessment/Plan  * Acute ischemic right MCA stroke (HCC)- (present on admission)  Assessment & Plan  Suspect due to prior ventricular thrombus and that it had subsequently traveled causing stroke.  Apixaban 5mg BID, atorvastatin.  Monitor neurochecks and vitals.  Losartan 12.5mg   Passed swallow evaluation  MRI brain pending    Acute systolic heart failure (HCC)- (present on admission)  Assessment & Plan  HFrEF 28%  Losartan and metoprolol    Left ventricular thrombus- (present on admission)  Assessment & Plan  Last admit was on heparin drip then on apixaban.    Noted on 5/28/24 echo  CONCLUSIONS  Dilated LV with severely reduced systolic function, estimated LVEF 25%.    Global hypokinesis with regional variation  Grade II LV diastolic dysfunction  LV apical thrombus is present: 0.9 x 1.5 cm, semi-mobile.  Normal RV size with mildly reduced systolic function  Reduced estimated resting stroke-volume 29ml and cardiac output per   Doppler assessment  Mildly dilated left atrium  Moderate MR  Moderate TR  Trivial pericardial fusion  Estimated RVSP 45 mmHg  Normal IVC size      Hyperlipidemia- (present on admission)  Assessment & Plan  Atorvastatin 80mg qHS    Mixed anxiety and depressive disorder- (present on " admission)  Assessment & Plan  Effexor XR 75mg q day         VTE prophylaxis: VTE Selection    I have performed a physical exam and reviewed and updated ROS and Plan today (6/1/2024). In review of yesterday's note (5/31/2024), there are no changes except as documented above.

## 2024-06-01 NOTE — PROGRESS NOTES
Neurology Progress Note  Neurohospitalist Service, [facility]    Referring Physician: Suresh Ferrari D.O.      HPI: Refer to initial documented Neurology H&P, as detailed in the patient's chart.    Interval History:   No new complaints.  No acute events overnight.  States she feels good.  Fully alert and oriented.    Review of systems: In addition to what is detailed in the HPI and/or updated in the interval history, all other systems reviewed and are negative.    Past Medical History:    has a past medical history of Anesthesia, Anxiety, ASTHMA, Cancer (HCC), Carpal tunnel syndrome, Heart murmur, Infectious disease (2007), Pain (11-26-12), Panic attack, Psychiatric problem, and Seasonal allergies.    She has no past medical history of Breast cancer (HCC) or COPD.    FHx:  family history includes Cancer in her maternal aunt; Diabetes in her mother; Stroke in her mother.    SHx:   reports that she quit smoking about 16 years ago. Her smoking use included cigarettes. She started smoking about 41 years ago. She has a 25 pack-year smoking history. She has never used smokeless tobacco. She reports that she does not drink alcohol and does not use drugs.    Medications:    Current Facility-Administered Medications:     apixaban (Eliquis) tablet 5 mg, 5 mg, Oral, BID, Tj Owens M.D.    aspirin (Asa) chewable tab 81 mg, 81 mg, Oral, DAILY, Leonardo Green M.D., 81 mg at 06/01/24 0527    acetaminophen (Tylenol) tablet 650 mg, 650 mg, Oral, Q6HRS PRN, Kimberly Leary M.D.    atorvastatin (Lipitor) tablet 80 mg, 80 mg, Oral, Q EVENING, Kimberly Leary M.D., 80 mg at 05/31/24 1718    labetalol (Normodyne/Trandate) injection 10-20 mg, 10-20 mg, Intravenous, Q10 MIN PRN, Kimberly Leary M.D.    hydrALAZINE (Apresoline) injection 10 mg, 10 mg, Intravenous, Q2HRS PRN, Kimberly Leary M.D.    gabapentin (Neurontin) capsule 300 mg, 300 mg, Oral, TID, Kimberly Leary M.D., 300 mg at 06/01/24  0817    losartan (Cozaar) tablet 12.5 mg, 12.5 mg, Oral, Q EVENING, Kimberly Leary M.D., 12.5 mg at 05/31/24 1719    dapagliflozin propanediol (Farxiga) tablet 10 mg, 10 mg, Oral, DAILY, Kimberly Leary M.D., 10 mg at 06/01/24 0527    metoprolol SR (Toprol XL) tablet 25 mg, 25 mg, Oral, DAILY, Kimberly Leary M.D.    venlafaxine XR (Effexor XR) capsule 75 mg, 75 mg, Oral, DAILY, Kimberly Leary M.D., 75 mg at 06/01/24 0527    cariprazine (Vraylar) capsule 4.5 mg, 4.5 mg, Oral, Nightly, Kimberly Leary M.D., 4.5 mg at 05/31/24 2055    Physical Examination:    Vitals:    06/01/24 0300 06/01/24 0400 06/01/24 0500 06/01/24 0600   BP: (!) 81/56 (!) 86/58 91/56 91/58   Pulse: 100 (!) 102 99 100   Resp: (!) 22 19 20 19   Temp:  36.3 °C (97.3 °F)  36.5 °C (97.7 °F)   TempSrc:  Temporal  Temporal   SpO2: 98% 95% 96% 91%   Weight:       Height:           General:   Patient is awake and in no acute distress  Neck: Full range of motion  Eyes: Midline, Pupils reactive to light.  CV: RRR  Lungs: No respiratory distress  Extremities: No cyanosis, warm, no significant edema.    NEUROLOGICAL EXAM:   Mental status: Awake, alert and fully oriented, follows commands  Speech and language: speech is not dysarthric. The patient is able to name and repeat.  Cranial nerve exam: Pupils are equal, round and reactive to light bilaterally. Visual fields are full. Extraocular muscles are intact.   Face is symmetric, facial sensation is intact.   Motor exam: Sustain antigravity in all 4 extremities with no downward drift. Tone is normal. No abnormal movements were seen on exam.  Sensory exam: No sensory deficits identified   Coordination: no gross ataxia noted on exam  Plantar reflexes: Equivocal  Gait: deferred     Objective Data:    Labs:  Lab Results   Component Value Date/Time    PROTHROMBTM 14.9 (H) 05/30/2024 06:32 PM    INR 1.15 (H) 05/30/2024 06:32 PM      Lab Results   Component Value Date/Time    WBC 10.6 06/01/2024 04:20 AM     RBC 4.29 06/01/2024 04:20 AM    HEMOGLOBIN 13.6 06/01/2024 04:20 AM    HEMATOCRIT 41.1 06/01/2024 04:20 AM    MCV 95.8 06/01/2024 04:20 AM    MCH 31.7 06/01/2024 04:20 AM    MCHC 33.1 06/01/2024 04:20 AM    MPV 9.8 06/01/2024 04:20 AM    NEUTSPOLYS 75.00 (H) 06/01/2024 04:20 AM    LYMPHOCYTES 12.20 (L) 06/01/2024 04:20 AM    MONOCYTES 9.50 06/01/2024 04:20 AM    EOSINOPHILS 2.60 06/01/2024 04:20 AM    BASOPHILS 0.30 06/01/2024 04:20 AM    HYPOCHROMIA 1+ 05/05/2014 11:47 AM      Lab Results   Component Value Date/Time    SODIUM 139 06/01/2024 04:20 AM    POTASSIUM 4.6 06/01/2024 04:20 AM    CHLORIDE 103 06/01/2024 04:20 AM    CO2 26 06/01/2024 04:20 AM    GLUCOSE 110 (H) 06/01/2024 04:20 AM    BUN 16 06/01/2024 04:20 AM    CREATININE 1.06 06/01/2024 04:20 AM    CREATININE 0.8 10/05/2007 04:50 AM      Lab Results   Component Value Date/Time    CHOLSTRLTOT 129 05/31/2024 04:20 AM    LDL 74 05/31/2024 04:20 AM    HDL 38 (A) 05/31/2024 04:20 AM    TRIGLYCERIDE 84 05/31/2024 04:20 AM       Lab Results   Component Value Date/Time    ALKPHOSPHAT 99 06/01/2024 04:20 AM    ASTSGOT 33 06/01/2024 04:20 AM    ALTSGPT 69 (H) 06/01/2024 04:20 AM    TBILIRUBIN 0.9 06/01/2024 04:20 AM        Imaging/Testing:    I interpreted and/or reviewed the patient's neuroimaging    CT-HEAD W/O   Final Result      New small amount of right frontal subarachnoid hemorrhage.         These findings were discussed with ARTURO SANTOS on 5/31/2024 4:09 PM.            IR-THROMBECTOMY ARTERY SECONDARY   Final Result      1.  Distal right M1 occlusion.   2.  Mechanical thrombectomy.   3.  TICI 2c Flow with focal nonflow limiting proximal M2 stenosis. Integrilin bolus and infusion was started.      DX-CHEST-PORTABLE (1 VIEW)   Final Result      1.  Mildly enlarged cardiac silhouette with changes of vascular congestion and mild edema.      CT-CTA NECK WITH & W/O-POST PROCESSING   Final Result      CT angiogram of the neck within normal limits.     "  CT-CTA HEAD WITH & W/O-POST PROCESS   Final Result      1.  There is a right M1 branch occlusion versus severe stenosis.      2.  Findings were discussed with ARSH HDZ on 5/30/2024 at 7:17 PM.      CT-CEREBRAL PERFUSION ANALYSIS   Final Result      1. Cerebral blood flow less than 30% possibly representing completed infarct = 0 mL.   2. T Max more than 6 seconds possibly representing combination of completed infarct and ischemia = 76 mL.   3. Mismatched volume possibly representing ischemic brain/penumbra= 76 mL      4.  Please note that this cerebral perfusion study and report is Quantitative and targets supratentorial (cerebral) perfusion for evaluation of large vessel territory acute ischemia/infarction. For example, lacunar infarcts, and brainstem/posterior fossa    ischemia/infarction are not evaluated on this study.  Data acquisition is subject to artifacts which can yield non-anatomically plausible perfusion maps which may be due to motion, bolus timing, signal to noise ratio, or other technical factors.    Perfusion map abnormalities which show non-anatomic distributions are likely artifact.   This study is not \"stand-alone\" and should only be utilized for diagnosis, management/treatment in correlation with CT, CTA, and/or MRI and clinical factors.         CT-HEAD W/O   Final Result      1.  Head CT without contrast within normal limits. No evidence of acute cerebral infarction, hemorrhage or mass lesion.         MR-BRAIN-W/O    (Results Pending)       Assessment and Plan:  Analia Wu is a 53 y.o. female past medical history significant for hypertension, hyperlipidemia, heart failure Eliquis with evidence of left ventricular thrombus who presented with left-sided weakness involving face arm and leg.  She was found to have right M1 occlusion with favorable ischemic penumbra.  Patient underwent successful thrombectomy with TICI 2c recanalization.  She is currently on loading dose of " Eliquis 10 mg twice a day and aspirin 81 mg daily.  Follow-up brain CT revealed new small amount of right frontal subarachnoid hemorrhage.  Neurologically she has improved.    Plan:  -Continue with every 2 hours neurocheck in [place] ICU for next 24-hour.  - Given presence of a small subarachnoid hemorrhage, will reduce Eliquis dose to 5 mg twice a day.  - If any clinical deterioration, please obtain stat head CT and notify neurology.  - Maintain systolic blood pressure 100-140.  - LDL is 74, hemoglobin A1c 5.8, continue with home dose of atorvastatin 40 mg daily.  - PT, OT and SLP  -Follow-up with [facility] after discharge    The evaluation of the patient, and recommended management, was discussed with Dr. Leonardo Green MD      Please note that this dictation was created using voice recognition software. I have made every reasonable attempt to correct obvious errors, but I expect that there are errors of grammar and possibly content that I did not discover before finalizing the note.      Tj Owens MD  [facility]

## 2024-06-01 NOTE — ASSESSMENT & PLAN NOTE
Suspect due to prior ventricular thrombus and that it had subsequently traveled causing stroke.  Apixaban 5mg BID, atorvastatin.  Monitor neurochecks and vitals.  Losartan 12.5mg   Passed swallow evaluation  MRI brain pending

## 2024-06-01 NOTE — THERAPY
Speech Language Pathology   Cognitive Evaluation     Patient Name: Analia Wu  AGE:  53 y.o., SEX:  female  Medical Record #: [id number]  Date of Service: 6/1/2024      History of Present Illness  Pt is a 53 y.o. 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 Pt presented back later to [facility] on 5/30/24 with complaints of sudden onset of speech changes w/ L sided extremity weakness.   A code stroke was called and a CTA of the head showed a right M1 occlusion.  The patient was emergently taken to the IR suite where she was found to have a thrombus that was extricated with a TICI 2c resultant flow.  The patient was also started on Integrilin infusion for a stenosis seen in the M2 segment.    CMHx: Acute ischemic R MCA, Acute systolic heart failure, Left ventricular thrombus, Pulmonic stenosis, Hyperlipidemia, Mixed anxiety and depressive disorder     No hx of ST at [facility]     5/30 Dx Chest:   1.  Mildly enlarged cardiac silhouette with changes of vascular congestion and mild edema.      General Information  Vitals  O2 (LPM): 2  O2 Delivery Device: Silicone Nasal Cannula  Level of Consciousness: Alert, Awake  Orientation: Oriented x 4  Follows Directives: Yes      Prior Living Situation & Level of Function  Housing / Facility: 1 Story Apartment / Condo  Lives with - Patient's Self Care Capacity: Adult Children   Communication: WFL  Swallowing: WFL       Oral Mechanism Evaluation  Dentition: Natural dentition  Facial Symmetry: Equal  Facial Sensation: Equal  Labial Observations: WFL  Lingual Observations: Midline  Motor Speech: WFL      Subjective  Patient received awake, sitting upright in bed. Denied concerns with cognition; however, reported not wanting to be on her phone as much which  "she feels is \"weird.\" Pt agreeable to SLP assessment.           Assessment  The patient was seen this date for a cognitive evaluation. Portions of the COGNISTAT (The Neurobehavioral Cognitive Status Examination), as well as non-standardized assessments were utilized. Results are as follows:    Cognistat  Orientation: Average  Attention: Mild  Comprehension: Average  Repetition: Average  Naming: Average  Memory: Average  Calculations: Mild  Similarities: Average  Judgement: Average      Clock Drawing  Prompt \"Draw a clock, place all of the numbers inside the [place],  and manoj the hands at 10 after 11.\" Clock drawn scored as 13/13, indicating no impairment per CLQT protocol.       Medication Management  Attempted task; pt expressed \"I can't concentrate I need to use the restroom.\" Thus task abandonded. Pt, however, endorsed taking daily medications and provided a functional routine and memory-enhancing strategy.    Comments:  Patient communicating at level of conversation with fluent speech. Attended to all tasks without redirection. Timely response and processing. Appropriate pragmatics. Pt denied any concerns with returning to iADLS upon discharge; however, explained that she lives with her daughter who can assist if needed.       Clinical Impressions  Patient presents with mild deficits in attention and calculations; per objective measure. Formal assessments revealed relative strengths in language, problem solving, memory, and executive functioning. Pt endorsed performance on assessment is consistent with baseline. Acute speech therapy targeting cognition is not warranted. Please re-consult with changes.      NOTE: It is not within the scope of practice of Speech-Language Pathologists to determine patient capacity. Please defer to the physician or psych to complete this assessment.       Recommendations  Supervision Needs Upons Discharge: Intermittent assistance with IADLs (see below)  IADLs: Medication management, " Financial management, Appointment management, Household chores, Cooking         SLP Treatment Plan  Treatment Plan: Dysphagia Treatment  SLP Frequency: 3x Per Week  Estimated Duration: 1 Visit      Anticipated Discharge Needs  Discharge Recommendations: Anticipate that the patient will have no further speech therapy needs after discharge from the hospital  Therapy Recommendations Upon DC: Not Indicated        Kelsey Lange SLP

## 2024-06-01 NOTE — CARE PLAN
The patient is Stable - Low risk of patient condition declining or worsening    Shift Goals  Clinical Goals: BP Management, stable neuro exams, CT/MRI  Patient Goals: Comfort  Family Goals: AIME    Progress made toward(s) clinical / shift goals:      Problem: Knowledge Deficit - Stroke Education  Goal: Patient's knowledge of stroke and risk factors will improve  Outcome: Progressing     Problem: Optimal Care of the Stroke Patient  Goal: Optimal acute care for the stroke patient  Outcome: Progressing     Problem: Discharge Planning - Stroke  Goal: Ensure Stroke Core Measures are met prior to discharge  Outcome: Progressing, Post Thrombectomy imaging done. Q1 neuro checks continue.     Problem: Neuro Status  Goal: Neuro status will remain stable or improve  Outcome: Progressing, neuro status remains stable. Q1 neuro checks continue until 2100 today.

## 2024-06-01 NOTE — ASSESSMENT & PLAN NOTE
Last admit was on heparin drip then on apixaban.    Noted on 5/28/24 echo  CONCLUSIONS  Dilated LV with severely reduced systolic function, estimated LVEF 25%.    Global hypokinesis with regional variation  Grade II LV diastolic dysfunction  LV apical thrombus is present: 0.9 x 1.5 cm, semi-mobile.  Normal RV size with mildly reduced systolic function  Reduced estimated resting stroke-volume 29ml and cardiac output per   Doppler assessment  Mildly dilated left atrium  Moderate MR  Moderate TR  Trivial pericardial fusion  Estimated RVSP 45 mmHg  Normal IVC size

## 2024-06-01 NOTE — CARE PLAN
The patient is Stable - Low risk of patient condition declining or worsening    Shift Goals  Clinical Goals: Serial Neuro Assessments, STable Neuros, BP Management, Comfort  Patient Goals: Rest  Family Goals: AIME    Progress made toward(s) clinical / shift goals:    Problem: Optimal Care of the Stroke Patient  Goal: Optimal emergency care for the stroke patient  Outcome: Progressing     Problem: Knowledge Deficit - Stroke Education  Goal: Patient's knowledge of stroke and risk factors will improve  Outcome: Progressing     Problem: Neuro Status  Goal: Neuro status will remain stable or improve  Outcome: Progressing     Problem: Fall Risk  Goal: Patient will remain free from falls  Outcome: Progressing     Problem: Pain - Standard  Goal: Alleviation of pain or a reduction in pain to the patient’s comfort goal  Outcome: Progressing       Patient is not progressing towards the following goals:

## 2024-06-01 NOTE — CARE PLAN
The patient is Watcher - Medium risk of patient condition declining or worsening    Shift Goals  Clinical Goals: Serial Neuro Assessments, STable Neuros, BP Management, Comfort  Patient Goals: Rest  Family Goals: AIME    Progress made toward(s) clinical / shift goals:    Problem: Knowledge Deficit - Stroke Education  Goal: Patient's knowledge of stroke and risk factors will improve  Outcome: Progressing     Problem: Psychosocial - Patient Condition  Goal: Patient's ability to verbalize feelings about condition will improve  Outcome: Progressing     Problem: Neuro Status  Goal: Neuro status will remain stable or improve  Outcome: Progressing     Problem: Hemodynamic Monitoring  Goal: Patient's hemodynamics, fluid balance and neurologic status will be stable or improve  Outcome: Progressing     Problem: Urinary Elimination  Goal: Establish and maintain regular urinary output  Outcome: Progressing     Problem: Skin Integrity  Goal: Skin integrity is maintained or improved  Outcome: Progressing     Problem: Fall Risk  Goal: Patient will remain free from falls  Outcome: Progressing     Problem: Pain - Standard  Goal: Alleviation of pain or a reduction in pain to the patient’s comfort goal  Outcome: Progressing       Patient is not progressing towards the following goals: N/A

## 2024-06-02 ENCOUNTER — HOSPITAL ENCOUNTER (INPATIENT)
Facility: REHABILITATION | Age: 54
End: 2024-06-02
Attending: PHYSICAL MEDICINE & REHABILITATION | Admitting: PHYSICAL MEDICINE & REHABILITATION
Payer: MEDICAID

## 2024-06-02 PROBLEM — I60.9 SUBARACHNOID BLEED (HCC): Status: ACTIVE | Noted: 2024-06-02

## 2024-06-02 LAB
ALBUMIN SERPL BCP-MCNC: 3.7 G/DL (ref 3.2–4.9)
ALBUMIN/GLOB SERPL: 1.4 G/DL
ALP SERPL-CCNC: 97 U/L (ref 30–99)
ALT SERPL-CCNC: 59 U/L (ref 2–50)
ANION GAP SERPL CALC-SCNC: 12 MMOL/L (ref 7–16)
AST SERPL-CCNC: 27 U/L (ref 12–45)
BASOPHILS # BLD AUTO: 0.3 % (ref 0–1.8)
BASOPHILS # BLD: 0.03 K/UL (ref 0–0.12)
BILIRUB SERPL-MCNC: 0.6 MG/DL (ref 0.1–1.5)
BUN SERPL-MCNC: 21 MG/DL (ref 8–22)
CALCIUM ALBUM COR SERPL-MCNC: 9.8 MG/DL (ref 8.5–10.5)
CALCIUM SERPL-MCNC: 9.6 MG/DL (ref 8.5–10.5)
CHLORIDE SERPL-SCNC: 105 MMOL/L (ref 96–112)
CO2 SERPL-SCNC: 24 MMOL/L (ref 20–33)
CREAT SERPL-MCNC: 1.07 MG/DL (ref 0.5–1.4)
EOSINOPHIL # BLD AUTO: 0.32 K/UL (ref 0–0.51)
EOSINOPHIL NFR BLD: 3.2 % (ref 0–6.9)
ERYTHROCYTE [DISTWIDTH] IN BLOOD BY AUTOMATED COUNT: 51.5 FL (ref 35.9–50)
GFR SERPLBLD CREATININE-BSD FMLA CKD-EPI: 61 ML/MIN/1.73 M 2
GLOBULIN SER CALC-MCNC: 2.6 G/DL (ref 1.9–3.5)
GLUCOSE SERPL-MCNC: 114 MG/DL (ref 65–99)
HCT VFR BLD AUTO: 40.7 % (ref 37–47)
HGB BLD-MCNC: 13.2 G/DL (ref 12–16)
IMM GRANULOCYTES # BLD AUTO: 0.03 K/UL (ref 0–0.11)
IMM GRANULOCYTES NFR BLD AUTO: 0.3 % (ref 0–0.9)
LYMPHOCYTES # BLD AUTO: 1.57 K/UL (ref 1–4.8)
LYMPHOCYTES NFR BLD: 15.9 % (ref 22–41)
MCH RBC QN AUTO: 31.6 PG (ref 27–33)
MCHC RBC AUTO-ENTMCNC: 32.4 G/DL (ref 32.2–35.5)
MCV RBC AUTO: 97.4 FL (ref 81.4–97.8)
MONOCYTES # BLD AUTO: 0.92 K/UL (ref 0–0.85)
MONOCYTES NFR BLD AUTO: 9.3 % (ref 0–13.4)
NEUTROPHILS # BLD AUTO: 6.98 K/UL (ref 1.82–7.42)
NEUTROPHILS NFR BLD: 71 % (ref 44–72)
NRBC # BLD AUTO: 0 K/UL
NRBC BLD-RTO: 0 /100 WBC (ref 0–0.2)
PLATELET # BLD AUTO: 213 K/UL (ref 164–446)
PMV BLD AUTO: 9.8 FL (ref 9–12.9)
POTASSIUM SERPL-SCNC: 4.4 MMOL/L (ref 3.6–5.5)
PROT SERPL-MCNC: 6.3 G/DL (ref 6–8.2)
RBC # BLD AUTO: 4.18 M/UL (ref 4.2–5.4)
SODIUM SERPL-SCNC: 141 MMOL/L (ref 135–145)
WBC # BLD AUTO: 9.9 K/UL (ref 4.8–10.8)

## 2024-06-02 PROCEDURE — 80053 COMPREHEN METABOLIC PANEL: CPT

## 2024-06-02 PROCEDURE — A9270 NON-COVERED ITEM OR SERVICE: HCPCS | Performed by: INTERNAL MEDICINE

## 2024-06-02 PROCEDURE — 99232 SBSQ HOSP IP/OBS MODERATE 35: CPT | Performed by: PSYCHIATRY & NEUROLOGY

## 2024-06-02 PROCEDURE — 85025 COMPLETE CBC W/AUTO DIFF WBC: CPT

## 2024-06-02 PROCEDURE — 770020 HCHG ROOM/CARE - TELE (206)

## 2024-06-02 PROCEDURE — A9270 NON-COVERED ITEM OR SERVICE: HCPCS | Performed by: PSYCHIATRY & NEUROLOGY

## 2024-06-02 PROCEDURE — 700102 HCHG RX REV CODE 250 W/ 637 OVERRIDE(OP): Performed by: PSYCHIATRY & NEUROLOGY

## 2024-06-02 PROCEDURE — 700102 HCHG RX REV CODE 250 W/ 637 OVERRIDE(OP): Performed by: INTERNAL MEDICINE

## 2024-06-02 PROCEDURE — 99232 SBSQ HOSP IP/OBS MODERATE 35: CPT | Performed by: HOSPITALIST

## 2024-06-02 RX ADMIN — DAPAGLIFLOZIN 10 MG: 10 TABLET, FILM COATED ORAL at 05:31

## 2024-06-02 RX ADMIN — APIXABAN 5 MG: 5 TABLET, FILM COATED ORAL at 17:03

## 2024-06-02 RX ADMIN — VENLAFAXINE HYDROCHLORIDE 75 MG: 75 CAPSULE, EXTENDED RELEASE ORAL at 05:31

## 2024-06-02 RX ADMIN — APIXABAN 5 MG: 5 TABLET, FILM COATED ORAL at 05:31

## 2024-06-02 RX ADMIN — ASPIRIN 81 MG: 81 TABLET, CHEWABLE ORAL at 05:31

## 2024-06-02 RX ADMIN — GABAPENTIN 300 MG: 300 CAPSULE ORAL at 15:36

## 2024-06-02 RX ADMIN — ATORVASTATIN CALCIUM 80 MG: 40 TABLET, FILM COATED ORAL at 17:02

## 2024-06-02 RX ADMIN — CARIPRAZINE 4.5 MG: 3 CAPSULE, GELATIN COATED ORAL at 20:20

## 2024-06-02 RX ADMIN — GABAPENTIN 300 MG: 300 CAPSULE ORAL at 09:24

## 2024-06-02 RX ADMIN — GABAPENTIN 300 MG: 300 CAPSULE ORAL at 20:20

## 2024-06-02 ASSESSMENT — ENCOUNTER SYMPTOMS
PALPITATIONS: 0
NECK PAIN: 0
ABDOMINAL PAIN: 0
SORE THROAT: 0
SPEECH CHANGE: 0
HEADACHES: 0
DIZZINESS: 0
NERVOUS/ANXIOUS: 0
NAUSEA: 0

## 2024-06-02 ASSESSMENT — PAIN DESCRIPTION - PAIN TYPE
TYPE: ACUTE PAIN

## 2024-06-02 NOTE — CARE PLAN
The patient is Stable - Low risk of patient condition declining or worsening    Shift Goals  Clinical Goals: MRI, Stable Neuro, Rest  Patient Goals: Rest, Comfortability  Family Goals: AIME    Progress made toward(s) clinical / shift goals:    Problem: Discharge Planning - Stroke  Goal: Patient’s continuum of care needs will be met  Outcome: Progressing     Problem: Neuro Status  Goal: Neuro status will remain stable or improve  Outcome: Progressing     Problem: Hemodynamic Monitoring  Goal: Patient's hemodynamics, fluid balance and neurologic status will be stable or improve  Outcome: Progressing     Problem: Pain - Standard  Goal: Alleviation of pain or a reduction in pain to the patient’s comfort goal  Outcome: Progressing       Patient is not progressing towards the following goals:

## 2024-06-02 NOTE — PROGRESS NOTES
Critical care update.    Ms. Wu remains neurologically intact.  However, her follow-up head CT revealed a small amount of right frontal subarachnoid hemorrhage.  It was therefore felt best that she remain in the neuro ICU for frequent neurologic evaluations   Q2  hours.    The hospitalist service has assumed care.  Critical care remains readily available for emergent intervention if necessary.    Leonardo Green MD  Critical care medicine

## 2024-06-02 NOTE — PROGRESS NOTES
Neurology Progress Note  Neurohospitalist Service, [facility]    Referring Physician: Suresh Ferrrai D.O.      HPI: Refer to initial documented Neurology H&P, as detailed in the patient's chart.    Interval History:   No new complaints.  No acute events overnight.  Doing well.  No headaches.  MRI completed last night with evidence of scattered infarct in distribution of right MCA.  Minimal subarachnoid hemorrhage.    Review of systems: In addition to what is detailed in the HPI and/or updated in the interval history, all other systems reviewed and are negative.    Past Medical History:    has a past medical history of Anesthesia, Anxiety, ASTHMA, Cancer (HCC), Carpal tunnel syndrome, Heart murmur, Infectious disease (2007), Pain (11-26-12), Panic attack, Psychiatric problem, and Seasonal allergies.    She has no past medical history of Breast cancer (HCC) or COPD.    FHx:  family history includes Cancer in her maternal aunt; Diabetes in her mother; Stroke in her mother.    SHx:   reports that she quit smoking about 16 years ago. Her smoking use included cigarettes. She started smoking about 41 years ago. She has a 25 pack-year smoking history. She has never used smokeless tobacco. She reports that she does not drink alcohol and does not use drugs.    Medications:    Current Facility-Administered Medications:     apixaban (Eliquis) tablet 5 mg, 5 mg, Oral, BID, Tj Owens M.D., 5 mg at 06/02/24 0531    aspirin (Asa) chewable tab 81 mg, 81 mg, Oral, DAILY, Leonardo Green M.D., 81 mg at 06/02/24 0531    acetaminophen (Tylenol) tablet 650 mg, 650 mg, Oral, Q6HRS PRN, Kimberly Leary M.D., 650 mg at 06/01/24 2258    atorvastatin (Lipitor) tablet 80 mg, 80 mg, Oral, Q EVENING, Kimberly Leary M.D., 80 mg at 06/01/24 1747    labetalol (Normodyne/Trandate) injection 10-20 mg, 10-20 mg, Intravenous, Q10 MIN PRN, iKmberly Leary M.D.    hydrALAZINE (Apresoline) injection 10 mg, 10 mg,  Intravenous, Q2HRS PRN, Kimberly Leary M.D.    gabapentin (Neurontin) capsule 300 mg, 300 mg, Oral, TID, Kimberly Leary M.D., 300 mg at 06/02/24 0924    losartan (Cozaar) tablet 12.5 mg, 12.5 mg, Oral, Q EVENING, Kimberly Leary M.D., 12.5 mg at 05/31/24 1719    dapagliflozin propanediol (Farxiga) tablet 10 mg, 10 mg, Oral, DAILY, Kimberly Leary M.D., 10 mg at 06/02/24 0531    metoprolol SR (Toprol XL) tablet 25 mg, 25 mg, Oral, DAILY, Kimberly Leary M.D.    venlafaxine XR (Effexor XR) capsule 75 mg, 75 mg, Oral, DAILY, Kimberly Leary M.D., 75 mg at 06/02/24 0531    cariprazine (Vraylar) capsule 4.5 mg, 4.5 mg, Oral, Nightly, Kimberly Leary M.D., 4.5 mg at 06/01/24 2100    Physical Examination:    Vitals:    06/02/24 0400 06/02/24 0500 06/02/24 0600 06/02/24 0800   BP: 94/64 93/63 98/66    Pulse: 95 95 95    Resp: 19 19 15    Temp: 36.3 °C (97.3 °F)  36.4 °C (97.5 °F) 36.4 °C (97.6 °F)   TempSrc: Temporal   Temporal   SpO2: 96% 98% 93%    Weight:       Height:           General:   Patient is awake and in no acute distress  Neck: Full range of motion  Eyes: Midline, Pupils reactive to light.  CV: RRR  Lungs: No respiratory distress  Extremities: No cyanosis, warm, no significant edema.    NEUROLOGICAL EXAM:   Mental status: Awake, alert and fully oriented, follows commands  Speech and language: speech is not dysarthric. The patient is able to name and repeat.  Cranial nerve exam: Pupils are equal, round and reactive to light bilaterally. Visual fields are full. Extraocular muscles are intact.   Face is symmetric, facial sensation is intact.   Motor exam: Sustain antigravity in all 4 extremities with no downward drift. Tone is normal. No abnormal movements were seen on exam.  Sensory exam: No sensory deficits identified   Coordination: no gross ataxia noted on exam  Plantar reflexes: Equivocal  Gait: deferred     Objective Data:    Labs:  Lab Results   Component Value Date/Time    PROTHROMBTM 14.9  (H) 05/30/2024 06:32 PM    INR 1.15 (H) 05/30/2024 06:32 PM      Lab Results   Component Value Date/Time    WBC 9.9 06/02/2024 03:50 AM    RBC 4.18 (L) 06/02/2024 03:50 AM    HEMOGLOBIN 13.2 06/02/2024 03:50 AM    HEMATOCRIT 40.7 06/02/2024 03:50 AM    MCV 97.4 06/02/2024 03:50 AM    MCH 31.6 06/02/2024 03:50 AM    MCHC 32.4 06/02/2024 03:50 AM    MPV 9.8 06/02/2024 03:50 AM    NEUTSPOLYS 71.00 06/02/2024 03:50 AM    LYMPHOCYTES 15.90 (L) 06/02/2024 03:50 AM    MONOCYTES 9.30 06/02/2024 03:50 AM    EOSINOPHILS 3.20 06/02/2024 03:50 AM    BASOPHILS 0.30 06/02/2024 03:50 AM    HYPOCHROMIA 1+ 05/05/2014 11:47 AM      Lab Results   Component Value Date/Time    SODIUM 141 06/02/2024 03:50 AM    POTASSIUM 4.4 06/02/2024 03:50 AM    CHLORIDE 105 06/02/2024 03:50 AM    CO2 24 06/02/2024 03:50 AM    GLUCOSE 114 (H) 06/02/2024 03:50 AM    BUN 21 06/02/2024 03:50 AM    CREATININE 1.07 06/02/2024 03:50 AM    CREATININE 0.8 10/05/2007 04:50 AM      Lab Results   Component Value Date/Time    CHOLSTRLTOT 129 05/31/2024 04:20 AM    LDL 74 05/31/2024 04:20 AM    HDL 38 (A) 05/31/2024 04:20 AM    TRIGLYCERIDE 84 05/31/2024 04:20 AM       Lab Results   Component Value Date/Time    ALKPHOSPHAT 97 06/02/2024 03:50 AM    ASTSGOT 27 06/02/2024 03:50 AM    ALTSGPT 59 (H) 06/02/2024 03:50 AM    TBILIRUBIN 0.6 06/02/2024 03:50 AM        Imaging/Testing:    I interpreted and/or reviewed the patient's neuroimaging    MR-BRAIN-W/O   Final Result      1.  Scattered areas of acute ischemia in the RIGHT MCA territory as detailed above. Overall this is substantially less in volume than the tissue identified at risk on the CT perfusion exam.   2.  RIGHT frontal and parietal subarachnoid blood redemonstrated      CT-HEAD W/O   Final Result      New small amount of right frontal subarachnoid hemorrhage.         These findings were discussed with ARTURO SANTOS on 5/31/2024 4:09 PM.            IR-THROMBECTOMY ARTERY SECONDARY   Final Result      1.   "Distal right M1 occlusion.   2.  Mechanical thrombectomy.   3.  TICI 2c Flow with focal nonflow limiting proximal M2 stenosis. Integrilin bolus and infusion was started.      DX-CHEST-PORTABLE (1 VIEW)   Final Result      1.  Mildly enlarged cardiac silhouette with changes of vascular congestion and mild edema.      CT-CTA NECK WITH & W/O-POST PROCESSING   Final Result      CT angiogram of the neck within normal limits.      CT-CTA HEAD WITH & W/O-POST PROCESS   Final Result      1.  There is a right M1 branch occlusion versus severe stenosis.      2.  Findings were discussed with ARSH HDZ on 5/30/2024 at 7:17 PM.      CT-CEREBRAL PERFUSION ANALYSIS   Final Result      1. Cerebral blood flow less than 30% possibly representing completed infarct = 0 mL.   2. T Max more than 6 seconds possibly representing combination of completed infarct and ischemia = 76 mL.   3. Mismatched volume possibly representing ischemic brain/penumbra= 76 mL      4.  Please note that this cerebral perfusion study and report is Quantitative and targets supratentorial (cerebral) perfusion for evaluation of large vessel territory acute ischemia/infarction. For example, lacunar infarcts, and brainstem/posterior fossa    ischemia/infarction are not evaluated on this study.  Data acquisition is subject to artifacts which can yield non-anatomically plausible perfusion maps which may be due to motion, bolus timing, signal to noise ratio, or other technical factors.    Perfusion map abnormalities which show non-anatomic distributions are likely artifact.   This study is not \"stand-alone\" and should only be utilized for diagnosis, management/treatment in correlation with CT, CTA, and/or MRI and clinical factors.         CT-HEAD W/O   Final Result      1.  Head CT without contrast within normal limits. No evidence of acute cerebral infarction, hemorrhage or mass lesion.             Assessment and Plan:  Analia Wu is a 53 y.o. " female past medical history significant for hypertension, hyperlipidemia, heart failure Eliquis with evidence of left ventricular thrombus who presented with left-sided weakness involving face arm and leg.  She was found to have right M1 occlusion with favorable ischemic penumbra.  Patient underwent successful thrombectomy with TICI 2c recanalization.  She is currently on loading dose of Eliquis 10 mg twice a day and aspirin 81 mg daily.  Follow-up brain CT revealed new small amount of right frontal subarachnoid hemorrhage.  Neurologically she has improved.    Plan:  - Okay to transfer out of [facility] ICU to [facility]  - Given presence of a small subarachnoid hemorrhage, will reduce Eliquis dose to 5 mg twice a day.  - If any clinical deterioration, please obtain stat head CT and notify neurology.  - Maintain systolic blood pressure 100-140.  - LDL is 74, hemoglobin A1c 5.8, continue with home dose of atorvastatin 40 mg daily.  - PT, OT and SLP  -Follow-up with [facility] after discharge  - Neurology will sign off, please call us if there is any question.    The evaluation of the patient, and recommended management, was discussed with Dr. Leonardo Green MD      Please note that this dictation was created using voice recognition software. I have made every reasonable attempt to correct obvious errors, but I expect that there are errors of grammar and possibly content that I did not discover before finalizing the note.      Tj Owens MD  Acute Care Neurology Services

## 2024-06-02 NOTE — PROGRESS NOTES
Hospital Medicine Daily Progress Note    Date of Service  6/2/2024    Chief Complaint  SOB and N/V    Hospital Course  Analia Wu is a 53 y.o. female with obesity, perianal dysplasia(s/p surgery), mood disorder, dyslipidemia, depression.  She presented 5/30/2024 after a discharge earlier that day for SOB and Nausea/vomiting. She was diagnosed with a left ventricular thrombus and treated with heparin drip then transitioned to apixaban.. An Echo suggested an EF:25%. She was discharged 5/30, but returned with an acute onset of slurred speech and left sided weakness. She was now admitted with an acute stroke. She was taken to IR for a extraction of a right M1 occlusion.     Interval Problem Update  6/2: She remains with slight slur and has left facial droop. No other neurologic deficit.  No headache.  Needs PT/OT and I have cancelled her bed rest.    I have discussed this patient's plan of care and discharge plan at IDT rounds today with Case Management, Nursing, Nursing leadership, and other members of the IDT team.    Consultants/Specialty  neurology    Code Status  Full Code    Disposition  The patient is not medically cleared for discharge to home or a post-acute facility.      I have placed the appropriate orders for post-discharge needs.    Review of Systems  Review of Systems   Constitutional:  Negative for malaise/fatigue.   HENT:  Negative for sore throat.    Cardiovascular:  Negative for palpitations and leg swelling.   Gastrointestinal:  Negative for abdominal pain and nausea.   Musculoskeletal:  Negative for neck pain.   Neurological:  Negative for dizziness, speech change and headaches.   Psychiatric/Behavioral:  The patient is not nervous/anxious.         Physical Exam  Temp:  [36.1 °C (97 °F)-36.7 °C (98 °F)] 36.6 °C (97.9 °F)  Pulse:  [] 112  Resp:  [15-34] 18  BP: ()/(58-75) 102/73  SpO2:  [91 %-98 %] 97 %    Physical Exam  Vitals reviewed.   Constitutional:       Appearance: Normal  appearance. She is obese. She is not diaphoretic.   HENT:      Head: Normocephalic and atraumatic.      Nose: Nose normal.      Mouth/Throat:      Mouth: Mucous membranes are moist.      Pharynx: No oropharyngeal exudate.   Eyes:      General: No scleral icterus.        Right eye: No discharge.         Left eye: No discharge.      Extraocular Movements: Extraocular movements intact.      Conjunctiva/sclera: Conjunctivae normal.   Cardiovascular:      Rate and Rhythm: Normal rate and regular rhythm.      Pulses:           Radial pulses are 2+ on the right side and 2+ on the left side.        Dorsalis pedis pulses are 2+ on the right side and 2+ on the left side.      Heart sounds: No murmur heard.  Pulmonary:      Effort: Pulmonary effort is normal. No respiratory distress.      Breath sounds: Normal breath sounds. No wheezing or rales.   Abdominal:      General: Bowel sounds are normal. There is no distension.      Palpations: Abdomen is soft.   Musculoskeletal:         General: No swelling or tenderness.      Cervical back: Neck supple. No tenderness. No muscular tenderness.      Right lower leg: No edema.      Left lower leg: No edema.   Skin:     Coloration: Skin is not jaundiced or pale.   Neurological:      Mental Status: She is alert and oriented to person, place, and time.      Comments: Left facial droop with slurred speech   Psychiatric:         Mood and Affect: Mood normal.         Behavior: Behavior normal.         Fluids    Intake/Output Summary (Last 24 hours) at 6/2/2024 2022  Last data filed at 6/2/2024 1800  Gross per 24 hour   Intake 600 ml   Output 2090 ml   Net -1490 ml       Laboratory  Recent Labs     05/31/24  0420 06/01/24  0420 06/02/24  0350   WBC 9.1 10.6 9.9   RBC 4.74 4.29 4.18*   HEMOGLOBIN 14.7 13.6 13.2   HEMATOCRIT 45.8 41.1 40.7   MCV 96.6 95.8 97.4   MCH 31.0 31.7 31.6   MCHC 32.1* 33.1 32.4   RDW 52.6* 51.8* 51.5*   PLATELETCT 256 231 213   MPV 9.5 9.8 9.8     Recent Labs      05/31/24  0420 06/01/24  0420 06/02/24  0350   SODIUM 139 139 141   POTASSIUM 4.2 4.6 4.4   CHLORIDE 100 103 105   CO2 26 26 24   GLUCOSE 107* 110* 114*   BUN 18 16 21   CREATININE 1.06 1.06 1.07   CALCIUM 9.5 9.4 9.6               Recent Labs     05/31/24  0420   TRIGLYCERIDE 84   HDL 38*   LDL 74       Imaging  MR-BRAIN-W/O   Final Result      1.  Scattered areas of acute ischemia in the RIGHT MCA territory as detailed above. Overall this is substantially less in volume than the tissue identified at risk on the CT perfusion exam.   2.  RIGHT frontal and parietal subarachnoid blood redemonstrated      CT-HEAD W/O   Final Result      New small amount of right frontal subarachnoid hemorrhage.         These findings were discussed with ARTURO SANTOS on 5/31/2024 4:09 PM.            IR-THROMBECTOMY ARTERY SECONDARY   Final Result      1.  Distal right M1 occlusion.   2.  Mechanical thrombectomy.   3.  TICI 2c Flow with focal nonflow limiting proximal M2 stenosis. Integrilin bolus and infusion was started.      DX-CHEST-PORTABLE (1 VIEW)   Final Result      1.  Mildly enlarged cardiac silhouette with changes of vascular congestion and mild edema.      CT-CTA NECK WITH & W/O-POST PROCESSING   Final Result      CT angiogram of the neck within normal limits.      CT-CTA HEAD WITH & W/O-POST PROCESS   Final Result      1.  There is a right M1 branch occlusion versus severe stenosis.      2.  Findings were discussed with ARSH HDZ on 5/30/2024 at 7:17 PM.      CT-CEREBRAL PERFUSION ANALYSIS   Final Result      1. Cerebral blood flow less than 30% possibly representing completed infarct = 0 mL.   2. T Max more than 6 seconds possibly representing combination of completed infarct and ischemia = 76 mL.   3. Mismatched volume possibly representing ischemic brain/penumbra= 76 mL      4.  Please note that this cerebral perfusion study and report is Quantitative and targets supratentorial (cerebral) perfusion for  "evaluation of large vessel territory acute ischemia/infarction. For example, lacunar infarcts, and brainstem/posterior fossa    ischemia/infarction are not evaluated on this study.  Data acquisition is subject to artifacts which can yield non-anatomically plausible perfusion maps which may be due to motion, bolus timing, signal to noise ratio, or other technical factors.    Perfusion map abnormalities which show non-anatomic distributions are likely artifact.   This study is not \"stand-alone\" and should only be utilized for diagnosis, management/treatment in correlation with CT, CTA, and/or MRI and clinical factors.         CT-HEAD W/O   Final Result      1.  Head CT without contrast within normal limits. No evidence of acute cerebral infarction, hemorrhage or mass lesion.              Assessment/Plan  * Acute ischemic right MCA stroke (HCC)- (present on admission)  Assessment & Plan  Suspect due to prior ventricular thrombus and that it had subsequently traveled causing stroke.  Apixaban 5mg BID, atorvastatin.  Monitor neurochecks and vitals.  Losartan 12.5mg   Passed swallow evaluation  6/2 MRI brain:  1.  Scattered areas of acute ischemia in the RIGHT MCA territory as detailed above. Overall this is substantially less in volume than the tissue identified at risk on the CT perfusion exam.  2.  RIGHT frontal and parietal subarachnoid blood redemonstrated    Subarachnoid bleed (HCC)  Assessment & Plan  Noted on CT head.  No worsening of neurologic findings.  6/2 transfer out of ICU.   Await PT/OT and consideration of discharge based on function.    Acute systolic heart failure (HCC)- (present on admission)  Assessment & Plan  HFrEF 28%  Losartan and metoprolol    Left ventricular thrombus- (present on admission)  Assessment & Plan  Last admit was on heparin drip then on apixaban.    Noted on 5/28/24 echo  CONCLUSIONS  Dilated LV with severely reduced systolic function, estimated LVEF 25%.    Global hypokinesis with " regional variation  Grade II LV diastolic dysfunction  LV apical thrombus is present: 0.9 x 1.5 cm, semi-mobile.  Normal RV size with mildly reduced systolic function  Reduced estimated resting stroke-volume 29ml and cardiac output per   Doppler assessment  Mildly dilated left atrium  Moderate MR  Moderate TR  Trivial pericardial fusion  Estimated RVSP 45 mmHg  Normal IVC size      Hyperlipidemia- (present on admission)  Assessment & Plan  Atorvastatin 80mg qHS    Mixed anxiety and depressive disorder- (present on admission)  Assessment & Plan  Effexor XR 75mg q day         VTE prophylaxis:    therapeutic anticoagulation with eliquis 5 mg BID      I have performed a physical exam and reviewed and updated ROS and Plan today (6/2/2024). In review of yesterday's note (6/1/2024), there are no changes except as documented above.

## 2024-06-03 ENCOUNTER — PHARMACY VISIT (OUTPATIENT)
Dept: PHARMACY | Facility: MEDICAL CENTER | Age: 54
End: 2024-06-03
Payer: COMMERCIAL

## 2024-06-03 VITALS
WEIGHT: 207.23 LBS | TEMPERATURE: 97.7 F | RESPIRATION RATE: 18 BRPM | OXYGEN SATURATION: 96 % | DIASTOLIC BLOOD PRESSURE: 75 MMHG | SYSTOLIC BLOOD PRESSURE: 107 MMHG | HEART RATE: 78 BPM | HEIGHT: 67 IN | BODY MASS INDEX: 32.53 KG/M2

## 2024-06-03 LAB
ALBUMIN SERPL BCP-MCNC: 3.7 G/DL (ref 3.2–4.9)
ALBUMIN/GLOB SERPL: 1.5 G/DL
ALP SERPL-CCNC: 100 U/L (ref 30–99)
ALT SERPL-CCNC: 68 U/L (ref 2–50)
ANION GAP SERPL CALC-SCNC: 11 MMOL/L (ref 7–16)
AST SERPL-CCNC: 36 U/L (ref 12–45)
BASOPHILS # BLD AUTO: 0.3 % (ref 0–1.8)
BASOPHILS # BLD: 0.03 K/UL (ref 0–0.12)
BILIRUB SERPL-MCNC: 0.5 MG/DL (ref 0.1–1.5)
BUN SERPL-MCNC: 24 MG/DL (ref 8–22)
CALCIUM ALBUM COR SERPL-MCNC: 9.9 MG/DL (ref 8.5–10.5)
CALCIUM SERPL-MCNC: 9.7 MG/DL (ref 8.5–10.5)
CHLORIDE SERPL-SCNC: 107 MMOL/L (ref 96–112)
CO2 SERPL-SCNC: 24 MMOL/L (ref 20–33)
CREAT SERPL-MCNC: 1.15 MG/DL (ref 0.5–1.4)
EOSINOPHIL # BLD AUTO: 0.34 K/UL (ref 0–0.51)
EOSINOPHIL NFR BLD: 3.7 % (ref 0–6.9)
ERYTHROCYTE [DISTWIDTH] IN BLOOD BY AUTOMATED COUNT: 52.5 FL (ref 35.9–50)
GFR SERPLBLD CREATININE-BSD FMLA CKD-EPI: 56 ML/MIN/1.73 M 2
GLOBULIN SER CALC-MCNC: 2.5 G/DL (ref 1.9–3.5)
GLUCOSE SERPL-MCNC: 115 MG/DL (ref 65–99)
HCT VFR BLD AUTO: 43 % (ref 37–47)
HGB BLD-MCNC: 13.3 G/DL (ref 12–16)
IMM GRANULOCYTES # BLD AUTO: 0.03 K/UL (ref 0–0.11)
IMM GRANULOCYTES NFR BLD AUTO: 0.3 % (ref 0–0.9)
LYMPHOCYTES # BLD AUTO: 1.44 K/UL (ref 1–4.8)
LYMPHOCYTES NFR BLD: 15.7 % (ref 22–41)
MCH RBC QN AUTO: 31 PG (ref 27–33)
MCHC RBC AUTO-ENTMCNC: 30.9 G/DL (ref 32.2–35.5)
MCV RBC AUTO: 100.2 FL (ref 81.4–97.8)
MONOCYTES # BLD AUTO: 0.8 K/UL (ref 0–0.85)
MONOCYTES NFR BLD AUTO: 8.7 % (ref 0–13.4)
NEUTROPHILS # BLD AUTO: 6.53 K/UL (ref 1.82–7.42)
NEUTROPHILS NFR BLD: 71.3 % (ref 44–72)
NRBC # BLD AUTO: 0 K/UL
NRBC BLD-RTO: 0 /100 WBC (ref 0–0.2)
PLATELET # BLD AUTO: 221 K/UL (ref 164–446)
PMV BLD AUTO: 9.9 FL (ref 9–12.9)
POTASSIUM SERPL-SCNC: 4.4 MMOL/L (ref 3.6–5.5)
PROT SERPL-MCNC: 6.2 G/DL (ref 6–8.2)
RBC # BLD AUTO: 4.29 M/UL (ref 4.2–5.4)
SODIUM SERPL-SCNC: 142 MMOL/L (ref 135–145)
WBC # BLD AUTO: 9.2 K/UL (ref 4.8–10.8)

## 2024-06-03 PROCEDURE — 97535 SELF CARE MNGMENT TRAINING: CPT

## 2024-06-03 PROCEDURE — 700102 HCHG RX REV CODE 250 W/ 637 OVERRIDE(OP): Performed by: INTERNAL MEDICINE

## 2024-06-03 PROCEDURE — 97165 OT EVAL LOW COMPLEX 30 MIN: CPT

## 2024-06-03 PROCEDURE — 97161 PT EVAL LOW COMPLEX 20 MIN: CPT

## 2024-06-03 PROCEDURE — A9270 NON-COVERED ITEM OR SERVICE: HCPCS | Performed by: INTERNAL MEDICINE

## 2024-06-03 PROCEDURE — 85025 COMPLETE CBC W/AUTO DIFF WBC: CPT

## 2024-06-03 PROCEDURE — A9270 NON-COVERED ITEM OR SERVICE: HCPCS | Performed by: PSYCHIATRY & NEUROLOGY

## 2024-06-03 PROCEDURE — 80053 COMPREHEN METABOLIC PANEL: CPT

## 2024-06-03 PROCEDURE — 700102 HCHG RX REV CODE 250 W/ 637 OVERRIDE(OP): Performed by: PSYCHIATRY & NEUROLOGY

## 2024-06-03 PROCEDURE — 99239 HOSP IP/OBS DSCHRG MGMT >30: CPT | Performed by: STUDENT IN AN ORGANIZED HEALTH CARE EDUCATION/TRAINING PROGRAM

## 2024-06-03 PROCEDURE — RXMED WILLOW AMBULATORY MEDICATION CHARGE: Performed by: STUDENT IN AN ORGANIZED HEALTH CARE EDUCATION/TRAINING PROGRAM

## 2024-06-03 RX ADMIN — ASPIRIN 81 MG: 81 TABLET, CHEWABLE ORAL at 04:50

## 2024-06-03 RX ADMIN — DAPAGLIFLOZIN 10 MG: 10 TABLET, FILM COATED ORAL at 04:50

## 2024-06-03 RX ADMIN — GABAPENTIN 300 MG: 300 CAPSULE ORAL at 14:40

## 2024-06-03 RX ADMIN — GABAPENTIN 300 MG: 300 CAPSULE ORAL at 09:17

## 2024-06-03 RX ADMIN — METOPROLOL SUCCINATE 25 MG: 25 TABLET, EXTENDED RELEASE ORAL at 04:50

## 2024-06-03 RX ADMIN — APIXABAN 5 MG: 5 TABLET, FILM COATED ORAL at 04:50

## 2024-06-03 RX ADMIN — VENLAFAXINE HYDROCHLORIDE 75 MG: 75 CAPSULE, EXTENDED RELEASE ORAL at 04:50

## 2024-06-03 ASSESSMENT — ACTIVITIES OF DAILY LIVING (ADL): TOILETING: INDEPENDENT

## 2024-06-03 ASSESSMENT — COGNITIVE AND FUNCTIONAL STATUS - GENERAL
MOBILITY SCORE: 20
CLIMB 3 TO 5 STEPS WITH RAILING: A LITTLE
TOILETING: A LITTLE
STANDING UP FROM CHAIR USING ARMS: A LITTLE
SUGGESTED CMS G CODE MODIFIER DAILY ACTIVITY: CJ
MOVING TO AND FROM BED TO CHAIR: A LITTLE
WALKING IN HOSPITAL ROOM: A LITTLE
SUGGESTED CMS G CODE MODIFIER MOBILITY: CJ
DRESSING REGULAR LOWER BODY CLOTHING: A LITTLE
DAILY ACTIVITIY SCORE: 21
HELP NEEDED FOR BATHING: A LITTLE

## 2024-06-03 ASSESSMENT — GAIT ASSESSMENTS
GAIT LEVEL OF ASSIST: SUPERVISED
DISTANCE (FEET): 400
DEVIATION: NO DEVIATION

## 2024-06-03 ASSESSMENT — FIBROSIS 4 INDEX: FIB4 SCORE: 1.07

## 2024-06-03 ASSESSMENT — PAIN DESCRIPTION - PAIN TYPE: TYPE: ACUTE PAIN

## 2024-06-03 NOTE — PROGRESS NOTES
Report given to Karen ORTEZ, all questions answered. Pt sinus tach, 109bpm, /75, 93% on 2L NC, all belongings and medications included and sent with CCT on tele box to new room.

## 2024-06-03 NOTE — ASSESSMENT & PLAN NOTE
Noted on CT head.  No worsening of neurologic findings.  6/2 transfer out of ICU.   Await PT/OT and consideration of discharge based on function.

## 2024-06-03 NOTE — CARE PLAN
The patient is Stable - Low risk of patient condition declining or worsening    Shift Goals  Clinical Goals: Stable neuro status  Patient Goals: Rest  Family Goals: AIME    Progress made toward(s) clinical / shift goals:      Problem: Neuro Status  Goal: Neuro status will remain stable or improve  Outcome: Progressing  Note: Q4h neuro checks.      Problem: Fall Risk  Goal: Patient will remain free from falls  Outcome: Progressing     Problem: Knowledge Deficit - Standard  Goal: Patient and family/care givers will demonstrate understanding of plan of care, disease process/condition, diagnostic tests and medications  Outcome: Progressing       Patient is not progressing towards the following goals:

## 2024-06-03 NOTE — DISCHARGE PLANNING
Would appreciate TX evals once appropriate.    1356-Analia is not requiring 2 of 3 disciplines.  TCC will no longer follow.  Please reach out to myself with any questions.

## 2024-06-03 NOTE — PROGRESS NOTES
4 Eyes Skin Assessment Completed by PÉREZ Jones and PÉREZ Quintero.    Head WDL  Ears WDL  Nose WDL  Mouth WDL  Neck WDL  Breast/Chest Small white spot left chest patient stated it is a dry spot   Shoulder Blades WDL  Spine WDL  (R) Arm/Elbow/Hand WDL  (L) Arm/Elbow/Hand Bruise left upper arm  Abdomen WDL  Groin Right groin site bruising  Scrotum/Coccyx/Buttocks Redness and Blanching  (R) Leg WDL  (L) Leg Bruising  (R) Heel/Foot/Toe dry/cracked heels  (L) Heel/Foot/Toe dry/cracked heels           Devices In Places Tele Box and SCD's      Interventions In Place Pillows    Possible Skin Injury No    Pictures Uploaded Into Epic N/A  Wound Consult Placed N/A  RN Wound Prevention Protocol Ordered No

## 2024-06-03 NOTE — THERAPY
Occupational Therapy   Initial Evaluation     Patient Name: Analia Wu  Age:  53 y.o., Sex:  female  Medical Record #: [id number]  Today's Date: 6/3/2024     Precautions  Precautions: (P) Fall Risk  Comments: (P) -140    Assessment  Patient is a 53 y.o. female with a diagnosis of acute R MCA CVA and underwent thrombectomy. Pt was also found to have minimal SAH in the right frontal and parietal lobes. Additional factors influencing patient status / progress: PMHx includes obesity, perianal dysplasia(s/p surgery), mood disorder, dyslipidemia, depression. Pt had previously discharged the same day she presented this admission where she was treated for a left ventricular thrombus.EF 25%.     During OT eval, pt demo'd ADLs and related functional mobility with overall SPV and no AD. Pt does not demo any significant strength, coordination or balance deficits. Pt was receptive to education on energy conservation, DME recommendations and BEFAST acronym with handouts provided. Pt has no further acute OT needs at this time. Patient will not be actively followed for occupational therapy services at this time, however may be seen if requested by physician for 1 more visit within 30 days to address any discharge or equipment needs.       Plan    Occupational Therapy Initial Treatment Plan   Duration: (P) Discharge Needs Only    DC Equipment Recommendations: (P) Tub / Shower Seat, Hand Held Shower  Discharge Recommendations: (P) Anticipate that the patient will have no further occupational therapy needs after discharge from the hospital     Subjective    Pt agreeable to OT eval      Objective     06/03/24 0853   Initial Contact Note    Initial Contact Note Order Received and Verified, Evaluation Only - Patient Does Not Require Further Acute Occupational Therapy at this Time.  However, May Benefit from Post Acute Therapy for Higher Level Functional Deficits.   Prior Living Situation   Prior Services Home-Independent    Housing / Facility 1 Story Apartment / Condo   Steps Into Home 0   Steps In Home 0   Bathroom Set up Bathtub / Shower Combination   Equipment Owned None   Lives with - Patient's Self Care Capacity Adult Children   Comments Pt lives with her daughter who works during the day   Prior Level of ADL Function   Self Feeding Independent   Grooming / Hygiene Independent   Bathing Independent   Dressing Independent   Toileting Independent   Prior Level of IADL Function   Medication Management Requires Assist  (reports her daughter manages)   Laundry Independent   Kitchen Mobility Independent   Finances Requires Assist   Home Management Independent   Shopping Independent   Prior Level Of Mobility Independent Without Device in Community   Driving / Transportation Relatives / Others Provide Transportation;Utilizes Public Transportation   Occupation (Pre-Hospital Vocational) Not Employed   Precautions   Precautions Fall Risk   Comments -140   Pain   Intervention Declines   Cognition    Cognition / Consciousness WDL   Level of Consciousness Alert   Comments Pleasant and cooperative, receptive to education   Active ROM Upper Body   Active ROM Upper Body  WDL   Strength Upper Body   Upper Body Strength  WDL   Comments No significant unilateral strength deficits   Sensation Upper Body   Upper Extremity Sensation  Not Tested   Upper Body Muscle Tone   Upper Body Muscle Tone  WDL   Neurological Concerns   Neurological Concerns No   Coordination Upper Body   Coordination WDL   Comments Serial opposition intact bilaterally   Balance Assessment   Sitting Balance (Static) Good   Sitting Balance (Dynamic) Good   Standing Balance (Static) Fair +   Standing Balance (Dynamic) Fair   Weight Shift Sitting Good   Weight Shift Standing Good   Comments no AD   Bed Mobility    Supine to Sit Supervised   Scooting Supervised   Comments HOB flat   ADL Assessment   Grooming Supervision;Standing   Upper Body Dressing Supervision  (gown change)    Lower Body Dressing Supervision  (don underwear/pants)   Toileting   (declined need)   How much help from another person does the patient currently need...   Putting on and taking off regular lower body clothing? 3   Bathing (including washing, rinsing, and drying)? 3   Toileting, which includes using a toilet, bedpan, or urinal? 3   Putting on and taking off regular upper body clothing? 4   Taking care of personal grooming such as brushing teeth? 4   Eating meals? 4   6 Clicks Daily Activity Score 21   mRS Prior to admission   Prior to admission mRS 0   Modified Runnels (mRS)   Modified Runnels Score 1   Functional Mobility   Sit to Stand Supervised   Bed, Chair, Wheelchair Transfer Supervised   Toilet Transfers Supervised   Transfer Method Stand Step   Mobility in room with SPV, no AD   Visual Perception   Comments wears glasses   Activity Tolerance   Sitting in Chair post   Sitting Edge of Bed 5 min   Standing 5 min   Education Group   Education Provided Energy Conservation;Adaptive Equipment   Role of Occupational Therapist Patient Response Patient;Acceptance;Explanation;Verbal Demonstration   Energy Conservation Patient Response Patient;Acceptance;Explanation;Handout;Verbal Demonstration  (education on energy conservation and methods to apply to daily routine at home; handout provided)   Adaptive Equipment Patient Response Patient;Acceptance;Explanation;Handout;Verbal Demonstration  (recommendation for a standard shower chair and [facility])   Occupational Therapy Initial Treatment Plan    Duration Discharge Needs Only   Problem List   Problem List Decreased Functional Mobility;Decreased Activity Tolerance   Anticipated Discharge Equipment and Recommendations   DC Equipment Recommendations Tub / Shower Seat;Hand Held Shower   Discharge Recommendations Anticipate that the patient will have no further occupational therapy needs after discharge from the hospital   Interdisciplinary Plan of Care Collaboration   IDT  Collaboration with  Nursing   Patient Position at End of Therapy Seated;Chair Alarm On;Call Light within Reach;Tray Table within Reach;Phone within Reach   Collaboration Comments RN aware   Session Information   Date / Session Number  6/3 d/c needs

## 2024-06-03 NOTE — CARE PLAN
The patient is Stable - Low risk of patient condition declining or worsening    Shift Goals  Clinical Goals: Monitor neuro status  Patient Goals: Rest  Family Goals: AIME    Progress made toward(s) clinical / shift goals: Assumed care of patient at 0715. Patient is alert and oriented x4. NIH complete at bedside. Mobilized patient to the bathroom and up to the chair. Groin site is clean/dry/soft. Updated patient on plan of care. Q4 neuro checks in place. Bed alarm is on and call light is within reach.    Problem: Optimal Care of the Stroke Patient  Goal: Optimal acute care for the stroke patient  Outcome: Progressing  Acute Care Order Review:   Reviewed orders for compliance with Stroke Protocol   Consulted with provider regarding Stroke Protocol orders     Problem: Neuro Status  Goal: Neuro status will remain stable or improve  Outcome: Progressing  Note: Q4 hour neuro checks in place. Patient is alert and oriented x4. Slight left sided facial droop present. NIH completed. Neuro status remained stable.      Problem: Mobility - Stroke  Goal: Patient's capacity to carry out activities will improve  Outcome: Progressing  Mobility:   Encouraged mobilization per interdisciplinary team recommendations   Monitored for signs of activity intolerance   Provided rest periods between activities   Collaborated with PT/OT       Patient is not progressing towards the following goals: N/A

## 2024-06-03 NOTE — THERAPY
Physical Therapy   Initial Evaluation     Patient Name: Analia Wu  Age:  53 y.o., Sex:  female  Medical Record #: [id number]  Today's Date: 6/3/2024     Precautions  Precautions: Fall Risk  Comments: -140    Assessment  Patient is 53 y.o. female admitted with R M1 occlusion s/p mechanical thrombectomy.  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  Pt was seen for PT evaluation, demonstrated all functional mobility with SPV without AD.  BLE strength, coordination, and sensation WFL, pt denied residual symptoms.  Briefly educated pt on strategies to increase physical activity to aid in preventing future neurologic events, pt receptive.  Pt appears to be at/near her functional baseline, no further acute PT needs.    Plan    Physical Therapy Initial Treatment Plan   Duration: Evaluation only    DC Equipment Recommendations: None  Discharge Recommendations: Anticipate that the patient will have no further physical therapy needs after discharge from the hospital     Objective     06/03/24 0915   Precautions   Precautions Fall Risk   Comments -140   Pain 0 - 10 Group   Therapist Pain Assessment Post Activity Pain Same as Prior to Activity;Nurse Notified;0   Prior Living Situation   Prior Services Home-Independent   Housing / Facility 1 Story Apartment / Condo   Steps Into Home 0   Equipment Owned None   Lives with - Patient's Self Care Capacity Adult Children (daughter)   Prior Level of Functional Mobility   Bed Mobility Independent   Transfer Status Independent   Ambulation Independent   Ambulation Distance community distances   Assistive Devices Used None   Stairs Independent   Cognition    Cognition / Consciousness WDL   Level of Consciousness Alert   Comments Pleasant & receptive to education   Active ROM Lower Body    Active ROM Lower Body  WDL   Strength Lower Body   Lower Body Strength  WDL   Sensation Lower  Body   Lower Extremity Sensation   WDL   Balance Assessment   Sitting Balance (Static) Good   Sitting Balance (Dynamic) Good   Standing Balance (Static) Fair +   Standing Balance (Dynamic) Fair   Weight Shift Sitting Good   Weight Shift Standing Good   Bed Mobility    Comments NT up in chair pre & post session   Gait Analysis   Gait Level Of Assist Supervised   Assistive Device None   Distance (Feet) 400   # of Times Distance was Traveled 1   Deviation No deviation   Weight Bearing Status No restrictions   Functional Mobility   Sit to Stand Supervised   Bed, Chair, Wheelchair Transfer Supervised   Transfer Method Stand Step   Mobility ambulation in hallway   Activity Tolerance   Comments functional   Education Group   Education Provided Role of Physical Therapist   Role of Physical Therapist Patient Response Patient;Acceptance;Explanation;Verbal Demonstration   Physical Therapy Initial Treatment Plan    Duration Evaluation only

## 2024-06-03 NOTE — PROGRESS NOTES
" H&P      MOTHER     Mother's Name:  Jalyn Hood   MRN:  [id number]    Age:  30 y.o.  Estimated Date of Delivery: 18       and Para:           Maternal antibiotics: No    Attending MD: Kevon Sheffield/Ras Name: [facility]     Patient Active Problem List    Diagnosis Date Noted   • Encounter for supervision of other normal pregnancy, third trimester 2017     Priority: Medium       PRENATAL LABS FROM LAST 10 MONTHS  Blood Bank:  Lab Results   Component Value Date    ABOGROUP O 2017    RH POS 2017    ABSCRN NEG 2017     Hepatitis B Surface Antigen:  Lab Results   Component Value Date    HEPBSAG Negative 2017     Gonorrhoeae:  Lab Results   Component Value Date    NGONPCR Negative 2017     Chlamydia:  Lab Results   Component Value Date    CTRACPCR Negative 2017     Urogenital Beta Strep Group B:  No results found for: UROGSTREPB   Strep GPB, DNA Probe:  Lab Results   Component Value Date    STEPBPCR Negative 2018     Rapid Plasma Reagin / Syphilis:  Lab Results   Component Value Date    SYPHQUAL Non Reactive 2018     HIV 1/0/2:  No results found for: NLU908, [id number]   Rubella IgG Antibody:  Lab Results   Component Value Date    RUBELLAIGG 33.40 2017     Hep C:  No results found for: HEPCAB     Diabetes: No     ADDITIONAL MATERNAL HISTORY  Maternal HIV NR, prenatal ultrasound WNL         's Name:   Ashlie Hood      MRN:  [id number] Sex:  female     Age:  22 hours old         Delivery Method:  Vaginal, Spontaneous Delivery    Birth Weight:  3.78 kg (8 lb 5.3 oz)  85 %ile (Z= 1.04) based on WHO (Girls, 0-2 years) weight-for-age data using vitals from 2018. Delivery Time:  1156    Delivery Date:  18   Current Weight:  3.729 kg (8 lb 3.5 oz) Birth Length:  48.3 cm (1' 7\")  32 %ile (Z= -0.45) based on WHO (Girls, 0-2 years) length-for-age data using vitals from 2018.   Baby Weight Change:  " Reached out to stroke coordinator regarding stroke bridge appointment. Instructed patient she will get a phone call to set up a stroke bridge appointment after discharge.    "-1% Head Circumference:  33.7 cm (13.27\")  44 %ile (Z= -0.15) based on WHO (Girls, 0-2 years) head circumference-for-age data using vitals from 2018.     DELIVERY  Gestational Age: 39w6d  Birth  Infant Care Staff: Labor & Delivery RN;Respiratory Care Therapist  Delivery of Infant-Date: 18  Delivery of Infant-Time: 115  Sex: Female  Delivery Type: Vaginal  Presentation Position: Vertex;Occiput Anterior       Umbilical Cord  # of Cord Vessels: Three  Umbilical Cord: Clamped    APGAR  Apgar 1 Minute Total Score: 8  Apgar 5 Minute Total Score: 9       Medications Administered in Last 48 Hours from 2018 0941 to 2018 0941     Date/Time Order Dose Route Action Comments    2018 1158 erythromycin ophthalmic ointment   Both Eyes Given     2018 1158 phytonadione (AQUA-MEPHYTON) injection 1 mg 1 mg Intramuscular Given     2018 1500 hepatitis B vaccine recombinant injection 0.5 mL 0.5 mL Intramuscular Given           Patient Vitals for the past 48 hrs:   Temp Temp Source Pulse Resp O2 Delivery Weight Height   18 1201 - - - - - 3.78 kg (8 lb 5.3 oz) 0.483 m (1' 7.02\")   18 1230 37.3 °C (99.1 °F) - 160 50 - - -   18 1300 37.1 °C (98.7 °F) Axillary 165 50 - - -   18 1330 37.1 °C (98.8 °F) Axillary 170 55 - - -   18 1400 36.7 °C (98 °F) Axillary 165 55 - - -   18 1500 36.4 °C (97.6 °F) - 148 40 - - -   18 1600 36.4 °C (97.6 °F) - 138 38 - - -   18 2000 36.8 °C (98.3 °F) - 144 52 None (Room Air) 3.729 kg (8 lb 3.5 oz) -   18 0200 37.6 °C (99.7 °F) - 168 52 None (Room Air) - -   18 0201 38 °C (100.4 °F) - - - - - -   18 0330 37.4 °C (99.3 °F) - 152 56 None (Room Air) - -   18 0800 36.9 °C (98.4 °F) - 126 60 None (Room Air) - -         [place] Feeding I/O for the past 48 hrs:   Right Side Breast Feeding Minutes Left Side Breast Feeding Minutes Formula Type Reason for Formula Bottle Feeding Amount (ml) JULIENNE ONLY   18 " 0330 - - Enfamil Parent(s) Request, Educated 10   18 0215 - - Enfamil Parent(s) Request, Educated 10   05/22/18 0145 25 - - - -   18 0100 15 - - - -   18 2310 15 - - - -   18 2200 10 10 - - -   18 2045 30 - - - -   18 1910 20 - - - -   18 1500 - 10 - - -         No data found.       PHYSICAL EXAM  Skin: warm, color normal for ethnicity  Head: Anterior fontanel open and flat  Eyes: Red reflex present OU  Neck: clavicles intact to palpation  ENT: Ear canals patent, palate intact  Chest/Lungs: good aeration, clear bilaterally, normal work of breathing  Cardiovascular: Regular rate and rhythm, no murmur, femoral pulses 2+ bilaterally, normal capillary refill  Abdomen: soft, positive bowel sounds, nontender, nondistended, no masses, no hepatosplenomegaly  Trunk/Spine: no dimples, wojciech, or masses. Spine symmetric  Extremities: warm and well perfused. Ortolani/Luciano negative, moving all extremities well  Genitalia: Normal female    Anus: appears patent  Neuro: symmetric juanita, positive grasp, normal suck, normal tone    Recent Results (from the past 48 hour(s))   ARTERIAL AND VENOUS CORD GAS    Collection Time: 18 12:10 PM   Result Value Ref Range    Cord Bg Ph 7.25     Cord Bg Pco2 56.1 mmHg    Cord Bg Po2 13.1 mmHg    Cord Bg O2 Saturation 19.3 %    Cord Bg Hco3 24 mmol/L    Cord Bg Base Excess -4 mmol/L    CV Ph 7.34     CV Pco2 37.4 mmHg    CV Po2 30.9     CV O2 Saturation 71.4 %    CV Hco3 20 mmol/L    CV Base Excess -5 mmol/L   ABO GROUPING ON     Collection Time: 18  3:07 PM   Result Value Ref Range    ABO Grouping On [place] O    CBC WITH DIFFERENTIAL    Collection Time: 18  2:27 AM   Result Value Ref Range    WBC 28.8 (H) 8.0 - 14.3 K/uL    RBC 4.82 3.40 - 5.40 M/uL    Hemoglobin 18.3 12.7 - 18.3 g/dL    Hematocrit 51.5 37.4 - 55.9 %    .8 (H) 89.7 - 105.4 fL    MCH 38.0 (H) 32.6 - 37.8 pg    MCHC 35.5 (H) 33.9 - 35.4 g/dL    RDW  61.1 51.4 - 65.7 fL    Platelet Count 191 (L) 234 - 346 K/uL    MPV 9.9 (H) 7.9 - 8.5 fL    Nucleated RBC 0.20 0.00 - 8.30 /100 WBC    NRBC (Absolute) 0.05 K/uL    Neutrophils-Polys 60.20 (H) 23.10 - 58.40 %    Lymphocytes 28.30 (L) 28.40 - 54.60 %    Monocytes 4.40 (L) 5.00 - 11.00 %    Eosinophils 2.70 0.00 - 4.00 %    Basophils 0.90 0.00 - 1.00 %    Neutrophils (Absolute) 18.35 (H) 1.73 - 6.75 K/uL    Lymphs (Absolute) 8.15 2.00 - 11.50 K/uL    Monos (Absolute) 1.27 0.57 - 1.72 K/uL    Eos (Absolute) 0.78 (H) 0.00 - 0.64 K/uL    Baso (Absolute) 0.26 (H) 0.00 - 0.07 K/uL    Anisocytosis 1+     Macrocytosis 1+    DIFFERENTIAL MANUAL    Collection Time: 05/22/18  2:27 AM   Result Value Ref Range    Bands-Stabs 3.50 0.00 - 10.00 %    Manual Diff Status PERFORMED    PERIPHERAL SMEAR REVIEW    Collection Time: 05/22/18  2:27 AM   Result Value Ref Range    Peripheral Smear Review see below    PLATELET ESTIMATE    Collection Time: 05/22/18  2:27 AM   Result Value Ref Range    Plt Estimation Normal    MORPHOLOGY    Collection Time: 05/22/18  2:27 AM   Result Value Ref Range    RBC Morphology Present     Polychromia 1+     Poikilocytosis 1+     Ovalocytes 1+        OTHER:  Feeding well    ASSESSMENT & PLAN  DOL 1 term AGA female. Vag deliv,. fever--CBC reassuring, q 4 hour vitals, monitor closely. mec  At [facility], mouth suctioned, no intubation.

## 2024-06-03 NOTE — DISCHARGE INSTRUCTIONS
Ischemic Stroke  Discharge Instructions    You experienced an Ischemic Stroke.  Ischemic stroke is the most common type of stroke and happens when an artery in the brain becomes blocked by a plaque fragment or blood clot. Typically, these blockages travel from the heart or larger arteries that supply the brain.  The brain needs a constant supply of blood, which carries oxygen and nutrients it needs to function.  A stroke occurs when one of these arteries to the brain is either blocked or bursts. As a result, part of the brain does not get the blood it needs, so it starts to die.     Treatment Received:  Mechanical Thrombectomy    You had a Mechanical Thrombectomy.  A Mechanical Thrombectomy is a procedure to remove blood clots from the brain after an ischemic stroke.  The procedure involves making a small incision in the groin, and threading thin tubes (catheters) through your blood vessels to the clot. A tiny device at the catheter's tip grabs the clot and removes it, restoring blood flow to the brain.    Home Care Instructions:    Catheter insertion site care  If you have a bandage, make sure you:  Wash your hands with soap and water for at least 20 seconds before and after you change your bandage. If you cannot use soap and water, use hand .  Change your bandage as told by your doctor.  Check the site every day for signs of infection. Check for:  More redness, swelling, or pain.  Fluid or blood.  Warmth.  Pus or a bad smell.  Activity  Do not lift anything that is heavier than 5 lb (2.3 kg)  Rest  For the first 2-3 days after your procedure, or as long as told:  Try not to climb stairs.  Do not squat.  Return to your normal activities when your provider says that it is safe.  Get up to take short walks every 1 to 2 hours. Ask for help if you feel weak or unsteady.  Do exercises as told by your doctor.    General instructions  Take over-the-counter and prescription medicines as directed by your  "provider.  Do not smoke or use any products that contain nicotine or tobacco.   Keep all follow-up visits.    Contact your provider if:  You have more redness, swelling, or pain around the site where the catheter was put in.  You have a fever.    Stroke Risk Factors    You are at increased risk of having another stroke event.  See your Patient Stroke Guide to help reduce your stroke risk. These are your specific risk factors:  Cardiovascular disease  High blood pressure  High Cholesterol and lipids     Get help right away if you have any signs of a stroke.  \"BE FAST\" is an easy way to remember the main warning signs of a stroke:  B - Balance. Dizziness, sudden trouble walking, or loss of balance.  E - Eyes. Trouble seeing or a change in how you see.  F - Face. Sudden weakness or loss of feeling in the face. The face or eyelid may droop on one side.  A - Arms. Weakness or loss of feeling in an arm. This happens all of a sudden and most often on one side of the body.  S - Speech. Sudden trouble speaking, slurred speech, or trouble understanding what people say.  T - Time. Time to call emergency services. Write down what time symptoms started.    "

## 2024-06-06 ENCOUNTER — OFFICE VISIT (OUTPATIENT)
Dept: CARDIOLOGY | Facility: MEDICAL CENTER | Age: 54
End: 2024-06-06
Attending: NURSE PRACTITIONER
Payer: MEDICAID

## 2024-06-06 VITALS
BODY MASS INDEX: 32.33 KG/M2 | HEART RATE: 118 BPM | DIASTOLIC BLOOD PRESSURE: 72 MMHG | RESPIRATION RATE: 18 BRPM | HEIGHT: 67 IN | OXYGEN SATURATION: 95 % | WEIGHT: 206 LBS | SYSTOLIC BLOOD PRESSURE: 108 MMHG

## 2024-06-06 DIAGNOSIS — I51.3 LEFT VENTRICULAR THROMBUS: ICD-10-CM

## 2024-06-06 DIAGNOSIS — Z79.899 HIGH RISK MEDICATION USE: ICD-10-CM

## 2024-06-06 DIAGNOSIS — I42.8 NON-ISCHEMIC CARDIOMYOPATHY (HCC): ICD-10-CM

## 2024-06-06 DIAGNOSIS — I50.20 ACC/AHA STAGE C SYSTOLIC HEART FAILURE (HCC): ICD-10-CM

## 2024-06-06 DIAGNOSIS — I50.9 HEART FAILURE, NYHA CLASS 2 (HCC): ICD-10-CM

## 2024-06-06 PROCEDURE — 99214 OFFICE O/P EST MOD 30 MIN: CPT | Performed by: NURSE PRACTITIONER

## 2024-06-06 PROCEDURE — 99213 OFFICE O/P EST LOW 20 MIN: CPT | Performed by: NURSE PRACTITIONER

## 2024-06-06 PROCEDURE — 3074F SYST BP LT 130 MM HG: CPT | Performed by: NURSE PRACTITIONER

## 2024-06-06 PROCEDURE — 3078F DIAST BP <80 MM HG: CPT | Performed by: NURSE PRACTITIONER

## 2024-06-06 RX ORDER — LOSARTAN POTASSIUM 25 MG/1
12.5 TABLET ORAL EVERY EVENING
Qty: 15 TABLET | Refills: 11 | Status: SHIPPED | OUTPATIENT
Start: 2024-06-06

## 2024-06-06 RX ORDER — METOPROLOL SUCCINATE 25 MG/1
25 TABLET, EXTENDED RELEASE ORAL DAILY
Qty: 30 TABLET | Refills: 11 | Status: SHIPPED | OUTPATIENT
Start: 2024-06-06

## 2024-06-06 RX ORDER — ATORVASTATIN CALCIUM 40 MG/1
40 TABLET, FILM COATED ORAL EVERY EVENING
Qty: 30 TABLET | Refills: 11 | Status: SHIPPED | OUTPATIENT
Start: 2024-06-06

## 2024-06-06 RX ORDER — DAPAGLIFLOZIN 10 MG/1
10 TABLET, FILM COATED ORAL DAILY
Qty: 30 TABLET | Refills: 11 | Status: SHIPPED | OUTPATIENT
Start: 2024-06-06

## 2024-06-06 ASSESSMENT — MINNESOTA LIVING WITH HEART FAILURE QUESTIONNAIRE (MLHF)
EATING LESS FOODS YOU LIKE: 3
COSTING YOU MONEY FOR MEDICAL CARE: 0
DIFFICULTY SOCIALIZING WITH FAMILY OR FRIENDS: 3
GIVING YOU SIDE EFFECTS FROM TREATMENTS: 3
DIFFICULTY WITH SEXUAL ACTIVITIES: 0
FEELING LIKE A BURDEN TO FAMILY AND FRIENDS: 0
MAKING YOU STAY IN A HOSPITAL: 0
DIFFICULTY WITH RECREATIONAL PASTIMES, SPORTS, HOBBIES: 3
DIFFICULTY GOING AWAY FROM HOME: 3
TIRED, FATIGUED OR LOW ON ENERGY: 5
LOSS OF SELF CONTROL IN YOUR LIFE: 3
MAKING YOU WORRY: 5
DIFFICULTY SLEEPING WELL AT NIGHT: 0
SWELLING IN ANKLES OR LEGS: 0
WALKING ABOUT OR CLIMBING STAIRS DIFFICULT: 3
HAVING TO SIT OR LIE DOWN DURING THE DAY: 5
WORKING AROUND THE HOUSE OR YARD DIFFICULT: 2
DIFFICULTY WORKING TO EARN A LIVING: 0
DIFFICULTY TO CONCENTRATE OR REMEMBERING THINGS: 0
MAKING YOU SHORT OF BREATH: 5
MAKING YOU FEEL DEPRESSED: 5
TOTAL_SCORE: 48

## 2024-06-06 ASSESSMENT — ENCOUNTER SYMPTOMS
PND: 0
ABDOMINAL PAIN: 0
PALPITATIONS: 0
MYALGIAS: 0
COUGH: 0
DIZZINESS: 0
FEVER: 0
CLAUDICATION: 0
ORTHOPNEA: 0
SHORTNESS OF BREATH: 0

## 2024-06-06 ASSESSMENT — FIBROSIS 4 INDEX: FIB4 SCORE: 1.07

## 2024-06-06 NOTE — PROGRESS NOTES
"Chief Complaint   Patient presents with    New Patient     N/P Dx:Acute systolic heart failure (HCC)       Subjective     Analia Wu is a 53 y.o. female who presents today for follow up on her heart failure.     Pt was previously seen in 2012 with Dr. Abraham for congenital pulmonic stenosis.  She was lost to follow-up for unclear reasons.    Patient did have a recent hospitalization from 5/27/2024 through 5/30/2024.  She presented with nausea and vomiting and shortness of breath.  She was found to have elevated troponin in NT proBNP.  She required oxygen.  She had an echo performed which showed an EF of 25% and LV apical thrombus, moderate MR, TR and RVSP of 45 mmHg.  Cardiology was consulted.  Patient had a left heart cath with no CAD.  She was treated with IV diuretics and her respiratory status improved.  She was started on GDMT and started on Eliquis for her LV thrombus.  She was encouraged to follow-up with outpatient cardiology.    She then returned to the hospital on 5/30/2024 through 6/4/2024 for left-sided weakness involving her face, arm and leg.  She was found to have a right M1 occlusion with favorable ischemic penumbra.  She underwent successful thrombectomy with TICI 2C recanalization.    Patient feels well, denies chest pain, shortness of breath, palpitations, dizziness/lightheadedness, orthopnea, PND or Edema.     She has not been weighing herself at home.    She reports compliance with her medications, except she has been holding metoprolol as she was told not to take it if her BP was less than 100, systolic.    Additionally, patient has the following medical problems:    -PTSD: Vraylar, effexor    -Former smoker     -Perianal dysplasia with surgery      Past Medical History:   Diagnosis Date    Anesthesia     ponv,  \"shallow breathing\"    Anxiety     ASTHMA     does not have any inhalers    Cancer (HCC)     vulva    Carpal tunnel syndrome     Heart murmur     pt reports she has never had " any problems    Infectious disease 2007    Mrsa infection    Pain 11-26-12    neck, left knee, 6-7/10    Panic attack     Psychiatric problem     depression, PTSD, anxiety    Seasonal allergies      Past Surgical History:   Procedure Laterality Date    RECTAL EXPLORATION  12/19/2018    Procedure: RECTAL EXPLORATION - FOR HIGH RESOLUTION ANOSCOPY;  Surgeon: Billy Villegas M.D.;  Location: SURGERY [facility];  Service: General    ANAL FISTULECTOMY  5/24/2017    Procedure: ANAL FISTULECTOMY FOR: WIDE EXCISION ANAL DYSPLASIA;  Surgeon: Billy Villegas M.D.;  Location: SURGERY [facility];  Service:     RECTAL EXPLORATION  9/2/2016    Procedure: RECTAL EXPLORATION eua, AND BIOPSY;  Surgeon: Billy Villegas M.D.;  Location: SURGERY [facility];  Service:     VULVECTOMY RADICAL Bilateral 9/2/2016    Procedure: VULVECTOMY RADICAL;  Surgeon: Reese Carroll M.D.;  Location: SURGERY [facility];  Service:     RECTAL EXPLORATION  1/28/2015    Performed by Billy Villegas M.D. at SURGERY [facility]    EXAM UNDER ANESTHESIA  5/21/2014    Performed by Billy Villegas M.D. at SURGERY [facility]    RECTAL EXPLORATION  5/21/2014    Performed by Billy Villegas M.D. at SUR[facility]ERY [facility]    WIDE EXCISION  12/19/2012    Performed by Billy Villegas M.D. at SUR[facility]ERY [facility]    WIDE EXCISION  2/29/2012    Performed by BILLY VILLEGAS at SURGERY [facility]    WIDE EXCISION  9/7/2011    Performed by BILLY VILLEGAS at SURGERY [facility]    CERVICAL DISK AND FUSION ANTERIOR  6/27/2011    Performed by YEIMI LEVY at SURGERY [facility]    WIDE EXCISION  8/4/2010    Performed by BILLY VILLEGAS at SUR[facility]ERY [facility]    HEMORRHOIDECTOMY  8/4/2010    Performed by BILLY VILLEGAS at SURGERY [facility]    VULVECTOMY PARTIAL  October 2007    RECTAL BIOPSY  2007    OTHER NEUROLOGICAL SURG      neck fusion     Family History   Problem Relation Age of Onset    Diabetes Mother     Stroke Mother     Cancer  "Maternal Aunt     Lung Disease Neg Hx     Heart Disease Neg Hx      Social History     Socioeconomic History    Marital status: Single     Spouse name: Not on file    Number of children: Not on file    Years of education: Not on file    Highest education level: Not on file   Occupational History    Not on file   Tobacco Use    Smoking status: Former     Current packs/day: 0.00     Average packs/day: 1 pack/day for 25.0 years (25.0 ttl pk-yrs)     Types: Cigarettes     Start date: 10/4/1982     Quit date: 10/4/2007     Years since quittin.6    Smokeless tobacco: Never    Tobacco comments:        Vaping Use    Vaping status: Never Used   Substance and Sexual Activity    Alcohol use: No    Drug use: No    Sexual activity: Not Currently   Other Topics Concern    Not on file   Social History Narrative    Not on file     Social Determinants of Health     Financial Resource Strain: Not on file   Food Insecurity: No Food Insecurity (2024)    Hunger Vital Sign     Worried About Running Out of Food in the Last Year: Never true     Ran Out of Food in the Last Year: Never true   Transportation Needs: No Transportation Needs (2024)    PRAPARE - Transportation     Lack of Transportation (Medical): No     Lack of Transportation (Non-Medical): No   Physical Activity: Not on file   Stress: Not on file   Social Connections: Not on file   Intimate Partner Violence: Not At Risk (2024)    Humiliation, Afraid, Rape, and Kick questionnaire     Fear of Current or Ex-Partner: No     Emotionally Abused: No     Physically Abused: No     Sexually Abused: No   Housing Stability: Low Risk  (2024)    Housing Stability Vital Sign     Unable to Pay for Housing in the Last Year: No     Number of Places Lived in the Last Year: 1     Unstable Housing in the Last Year: No     Allergies   Allergen Reactions    Latex Shortness of Breath and Itching    Other Food Hives and Rash     Splenda    Tape      \"makes skin turn " "red\"  Paper tape ok    Hydromorphone Vomiting and Nausea    Other Environmental      Dial soap --  Skin itching     Outpatient Encounter Medications as of 6/6/2024   Medication Sig Dispense Refill    atorvastatin (LIPITOR) 40 MG Tab Take 1 Tablet by mouth every evening. 30 Tablet 11    dapagliflozin propanediol (FARXIGA) 10 MG Tab Take 1 Tablet by mouth every day. 30 Tablet 11    losartan (COZAAR) 25 MG Tab Take one-half (0.5) Tablet by mouth every evening for 30 days. 15 Tablet 11    metoprolol SR (TOPROL XL) 25 MG TABLET SR 24 HR Take 1 Tablet by mouth every day. 30 Tablet 11    apixaban (ELIQUIS) 5mg Tab Take 1 Tablet by mouth 2 times a day for 90 days. Indications: DVT/PE 60 Tablet 2    Cariprazine HCl (VRAYLAR) 4.5 MG Cap Take 4.5 mg by mouth every evening.      venlafaxine XR (EFFEXOR XR) 75 MG CAPSULE SR 24 HR Take 75 mg by mouth every day.      gabapentin (NEURONTIN) 300 MG Cap Take 300 mg by mouth 3 times a day.      [DISCONTINUED] dapagliflozin propanediol (FARXIGA) 10 MG Tab Take 1 Tablet by mouth every day for 30 days. 30 Tablet 0    [DISCONTINUED] atorvastatin (LIPITOR) 40 MG Tab Take 1 Tablet by mouth every evening for 30 days. 30 Tablet 0    [DISCONTINUED] losartan (COZAAR) 25 MG Tab Take one-half (0.5) Tablet by mouth every evening for 30 days. 15 Tablet 0    [DISCONTINUED] metoprolol SR (TOPROL XL) 25 MG TABLET SR 24 HR Take 1 Tablet by mouth every day for 30 days. 30 Tablet 0     No facility-administered encounter medications on file as of 6/6/2024.     Review of Systems   Constitutional:  Negative for fever and malaise/fatigue.   Respiratory:  Negative for cough and shortness of breath.    Cardiovascular:  Negative for chest pain, palpitations, orthopnea, claudication, leg swelling and PND.   Gastrointestinal:  Negative for abdominal pain.   Musculoskeletal:  Negative for myalgias.   Neurological:  Negative for dizziness.   All other systems reviewed and are negative.             Objective     BP " "108/72 (BP Location: Left arm, Patient Position: Sitting, BP Cuff Size: Adult)   Pulse (!) 118   Resp 18   Ht 1.702 m (5' 7\")   Wt 93.4 kg (206 lb)   LMP 12/17/2018 (Approximate)   SpO2 95%   BMI 32.26 kg/m²     Physical Exam  Vitals reviewed.   Constitutional:       Appearance: She is well-developed.   HENT:      Head: Normocephalic and atraumatic.   Eyes:      Pupils: Pupils are equal, round, and reactive to light.   Neck:      Vascular: No JVD.   Cardiovascular:      Rate and Rhythm: Normal rate and regular rhythm.      Heart sounds: Normal heart sounds.   Pulmonary:      Effort: Pulmonary effort is normal. No respiratory distress.      Breath sounds: Normal breath sounds. No wheezing or rales.   Abdominal:      General: Bowel sounds are normal.      Palpations: Abdomen is soft.   Musculoskeletal:         General: Normal range of motion.      Cervical back: Normal range of motion and neck supple.      Right lower leg: No edema.      Left lower leg: No edema.   Skin:     General: Skin is warm and dry.   Neurological:      General: No focal deficit present.      Mental Status: She is alert and oriented to person, place, and time.   Psychiatric:         Behavior: Behavior normal.            Lab Results   Component Value Date/Time    CHOLSTRLTOT 129 05/31/2024 04:20 AM    LDL 74 05/31/2024 04:20 AM    HDL 38 (A) 05/31/2024 04:20 AM    TRIGLYCERIDE 84 05/31/2024 04:20 AM       Lab Results   Component Value Date/Time    SODIUM 142 06/03/2024 01:12 AM    POTASSIUM 4.4 06/03/2024 01:12 AM    CHLORIDE 107 06/03/2024 01:12 AM    CO2 24 06/03/2024 01:12 AM    GLUCOSE 115 (H) 06/03/2024 01:12 AM    BUN 24 (H) 06/03/2024 01:12 AM    CREATININE 1.15 06/03/2024 01:12 AM    CREATININE 0.8 10/05/2007 04:50 AM     Lab Results   Component Value Date/Time    ALKPHOSPHAT 100 (H) 06/03/2024 01:12 AM    ASTSGOT 36 06/03/2024 01:12 AM    ALTSGPT 68 (H) 06/03/2024 01:12 AM    TBILIRUBIN 0.5 06/03/2024 01:12 AM       Echocardiogram " 10/30/2012  CONCLUSIONS   Normal left ventricular size, thickness, systolic function, and   diastolic function.   Thickened mitral valve leaflets.   Mild mitral regurgitation.   Right ventricular systolic pressure is estimated to be 22-27 mmHg.   Thickened pulmonic valve leaflets.   Possible mild stenosis with peak 17 mmHg, mean 10 mmHg, trace pulmonic insufficiency.    Transthoracic Echo Report 5/28/2024  Dilated LV with severely reduced systolic function, estimated LVEF 25%.    Global hypokinesis with regional variation  Grade II LV diastolic dysfunction  LV apical thrombus is present: 0.9 x 1.5 cm, semi-mobile.  Normal RV size with mildly reduced systolic function  Reduced estimated resting stroke-volume 29ml and cardiac output per   Doppler assessment  Mildly dilated left atrium  Moderate MR  Moderate TR  Trivial pericardial fusion  Estimated RVSP 45 mmHg  Normal IVC size  Dr. Walsh notified via Voalte on 5/28/24 at 10:09 AM    Coronary angiogram 5/29/2024  HEMODYNAMICS:  Aortic pressure: 93/58 mmHg  LVEDP: 18 mmHg  No significant aortic gradient on pullback     CORONARY ANGIOGRAPHY:  The left main coronary artery : Normal-appearing large caliber vessel that bifurcates to LAD and left circumflex  The left anterior descending coronary artery : Large in caliber transapical vessel that gives rise to several small-medium caliber diagonal branches.  Normal-appearing  The left circumflex coronary artery : Normal-appearing large caliber vessel that gives rise to a large high OM1 and large OM 2.  The right coronary artery : Large-caliber dominant vessel, normal-appearing     IMPRESSION:  1.  Nonischemic cardiomyopathy, normal-appearing epicardial coronary arteries, dominant RCA  2.  Mildly elevated resting LVEDP 18 mmHg (at the LVOT level) without significant transaortic gradient on pullback     RECOMMENDATIONS:  Ongoing goal-directed therapy for nonischemic cardiomyopathy and acute HFrEF  Primary ASCVD prevention  TR band  protocol     NOTIFICATION:  The patient's daughter was attempted to be called with updates, left her voicemail message.     Referring Cardiologist - Dr. Amato - was notified.    Date 6MWT MLWHF   6/6/2024 Not done d/t tachycardia 48                     Assessment & Plan     1. Left ventricular thrombus        2. ACC/AHA stage C systolic heart failure (HCC)  Referral to Pharmacotherapy Service    REFERRAL TO CARDIOLOGY - HEART FAILURE NURSING EDUCATION    atorvastatin (LIPITOR) 40 MG Tab    dapagliflozin propanediol (FARXIGA) 10 MG Tab    losartan (COZAAR) 25 MG Tab    metoprolol SR (TOPROL XL) 25 MG TABLET SR 24 HR      3. Heart failure, NYHA class 2 (HCC)        4. Non-ischemic cardiomyopathy (HCC)        5. High risk medication use            Medical Decision Making: Today's Assessment/Status/Plan:        HFrEF, Stage C, Class 1-2, LVEF 25%: Based on physical examination findings, patient is euvolemic. No JVD, lungs are clear to auscultation, no pitting edema in bilateral lower extremities, no ascites.   -Heart failure due to nonischemic cardiomyopathy  -Discussed Heart failure trajectory and prognosis with patient. Will continue to optimize medical therapy as tolerated. Advanced HF treatment, need for remote monitoring consideration at every visit.   -ACE-I/ARB/ARNI: Continue losartan 12.5 mg daily  -Evidence Based Beta-blocker: Discussed importance of beta-blocker, recommend patient to restart metoprolol SR 25 mg daily regularly.  She can continue to monitor her BP, but should not hold her medication.  Patient to log her BPs and contact our office if BP is low so we can make other recommendations  -Aldosterone Antagonist: Consider if BP allows in the future  -Diuretic: Consider if needed  -SGLT2 inhibitor: Continue Farxiga 10 mg daily  -Other: Consider as needed (Hydralazine/Isosorbide, Vericiguat, Corlanor)  -Labs: No labs ordered at this time, Will continue to closely monitor for side effects of patient's high  risk medication(s) including renal function, liver function NTproBNP/cardiac markers, and electrolytes as needed  -discussed importance of exercise and regular activity  -Discussed/reviewed maintaining a low Sodium and hydration recommendations  -Repeat Echo in 3-6 months, if LVEF not >35%, then will discuss/consider ICD for primary Prevention.   -moderate MR seen on echo: Will will follow and refer to structural heart clinic for Mitraclip evaluation as needed once patient has been optimized and echo has been repeated  -Reinforced s/sx of worsening heart failure with patient and weight monitoring. Pt verbalizes understanding. Pt to call office or RTC if present.    -PUMP line number [phone] (PUMP) reviewed with patient  -Heart Failure Education:  Discussed the Definition of heart failure (linking disease, symptoms, and treatment) and causes of heart failure  Recognition of escalating symptoms and concrete plan for response to particular symptoms  Discussed the indication for ACE-I/ARB/ARNI, Beta-Blocker, Aldosterone antagonist, SGLT2 inhibitor  Risk modification for heart failure progression  Specific diet recommendations: Discussed 2000 mg sodium diet, adequate hydration.  Patient encouraged that she can slowly increase activity levels  Importance of treatment adherence  Behavioral strategies to promote treatment adherence  Patient referred for formal heart failure education  -Advanced care planning: Advance directive and POLST to be discussed at future visit  -Pharmacotherapy Referral: Patient referred over to the pharmacotherapy program for further optimization of medical therapy   -Compliance Barriers: None  -Genetic testing Consideration: None  -Consideration for LVAD and/or Heart transplant as necessary      LV thrombus:  -Continue Eliquis 5 mg twice a day    Stroke, s/p thrombectomy:  -Will be following up with neurology in July  -Continue atorvastatin 40 mg daily    FU in clinic in 1 week for heart failure  education, 3 to 4 weeks with pharmacotherapy and 6 weeks back in the heart failure clinic. Sooner if needed.    Patient verbalizes understanding and agrees with the plan of care.     PLEASE NOTE: This Note was created using voice recognition Software. I have made every reasonable attempt to correct obvious errors, but I expect that there are errors of grammar and possibly content that I did not discover before finalizing the note

## 2024-06-10 ENCOUNTER — APPOINTMENT (OUTPATIENT)
Dept: RADIOLOGY | Facility: MEDICAL CENTER | Age: 54
End: 2024-06-10
Attending: EMERGENCY MEDICINE
Payer: MEDICAID

## 2024-06-10 ENCOUNTER — HOSPITAL ENCOUNTER (EMERGENCY)
Facility: MEDICAL CENTER | Age: 54
End: 2024-06-10
Attending: EMERGENCY MEDICINE
Payer: MEDICAID

## 2024-06-10 VITALS
HEIGHT: 67 IN | RESPIRATION RATE: 21 BRPM | HEART RATE: 88 BPM | BODY MASS INDEX: 31.59 KG/M2 | DIASTOLIC BLOOD PRESSURE: 74 MMHG | WEIGHT: 201.28 LBS | TEMPERATURE: 97.3 F | OXYGEN SATURATION: 94 % | SYSTOLIC BLOOD PRESSURE: 109 MMHG

## 2024-06-10 DIAGNOSIS — N83.202 CYSTS OF BOTH OVARIES: ICD-10-CM

## 2024-06-10 DIAGNOSIS — K76.89 HEPATIC CYST: ICD-10-CM

## 2024-06-10 DIAGNOSIS — N83.201 CYSTS OF BOTH OVARIES: ICD-10-CM

## 2024-06-10 DIAGNOSIS — N39.0 URINARY TRACT INFECTION WITHOUT HEMATURIA, SITE UNSPECIFIED: ICD-10-CM

## 2024-06-10 DIAGNOSIS — N28.1 RENAL CYST: ICD-10-CM

## 2024-06-10 DIAGNOSIS — R10.13 EPIGASTRIC PAIN: ICD-10-CM

## 2024-06-10 DIAGNOSIS — R11.2 NAUSEA AND VOMITING, UNSPECIFIED VOMITING TYPE: ICD-10-CM

## 2024-06-10 LAB
ALBUMIN SERPL BCP-MCNC: 4.3 G/DL (ref 3.2–4.9)
ALBUMIN/GLOB SERPL: 1.5 G/DL
ALP SERPL-CCNC: 117 U/L (ref 30–99)
ALT SERPL-CCNC: 61 U/L (ref 2–50)
AMORPH CRY #/AREA URNS HPF: PRESENT /HPF
ANION GAP SERPL CALC-SCNC: 14 MMOL/L (ref 7–16)
APPEARANCE UR: ABNORMAL
AST SERPL-CCNC: 30 U/L (ref 12–45)
BACTERIA #/AREA URNS HPF: ABNORMAL /HPF
BASOPHILS # BLD AUTO: 0.3 % (ref 0–1.8)
BASOPHILS # BLD: 0.03 K/UL (ref 0–0.12)
BILIRUB SERPL-MCNC: 1.1 MG/DL (ref 0.1–1.5)
BILIRUB UR QL STRIP.AUTO: ABNORMAL
BUN SERPL-MCNC: 18 MG/DL (ref 8–22)
CALCIUM ALBUM COR SERPL-MCNC: 9.8 MG/DL (ref 8.5–10.5)
CALCIUM SERPL-MCNC: 10 MG/DL (ref 8.5–10.5)
CHLORIDE SERPL-SCNC: 109 MMOL/L (ref 96–112)
CO2 SERPL-SCNC: 22 MMOL/L (ref 20–33)
COLOR UR: ABNORMAL
CREAT SERPL-MCNC: 1.12 MG/DL (ref 0.5–1.4)
EKG IMPRESSION: NORMAL
EOSINOPHIL # BLD AUTO: 0.1 K/UL (ref 0–0.51)
EOSINOPHIL NFR BLD: 1 % (ref 0–6.9)
EPI CELLS #/AREA URNS HPF: ABNORMAL /HPF
ERYTHROCYTE [DISTWIDTH] IN BLOOD BY AUTOMATED COUNT: 50.8 FL (ref 35.9–50)
GFR SERPLBLD CREATININE-BSD FMLA CKD-EPI: 58 ML/MIN/1.73 M 2
GLOBULIN SER CALC-MCNC: 2.9 G/DL (ref 1.9–3.5)
GLUCOSE SERPL-MCNC: 112 MG/DL (ref 65–99)
GLUCOSE UR STRIP.AUTO-MCNC: 500 MG/DL
HCG SERPL QL: NEGATIVE
HCT VFR BLD AUTO: 45.1 % (ref 37–47)
HGB BLD-MCNC: 13.9 G/DL (ref 12–16)
HYALINE CASTS #/AREA URNS LPF: ABNORMAL /LPF
IMM GRANULOCYTES # BLD AUTO: 0.02 K/UL (ref 0–0.11)
IMM GRANULOCYTES NFR BLD AUTO: 0.2 % (ref 0–0.9)
KETONES UR STRIP.AUTO-MCNC: 15 MG/DL
LEUKOCYTE ESTERASE UR QL STRIP.AUTO: ABNORMAL
LIPASE SERPL-CCNC: 41 U/L (ref 11–82)
LYMPHOCYTES # BLD AUTO: 1.47 K/UL (ref 1–4.8)
LYMPHOCYTES NFR BLD: 14.7 % (ref 22–41)
MCH RBC QN AUTO: 30.3 PG (ref 27–33)
MCHC RBC AUTO-ENTMCNC: 30.8 G/DL (ref 32.2–35.5)
MCV RBC AUTO: 98.5 FL (ref 81.4–97.8)
MICRO URNS: ABNORMAL
MONOCYTES # BLD AUTO: 0.89 K/UL (ref 0–0.85)
MONOCYTES NFR BLD AUTO: 8.9 % (ref 0–13.4)
NEUTROPHILS # BLD AUTO: 7.49 K/UL (ref 1.82–7.42)
NEUTROPHILS NFR BLD: 74.9 % (ref 44–72)
NITRITE UR QL STRIP.AUTO: NEGATIVE
NRBC # BLD AUTO: 0 K/UL
NRBC BLD-RTO: 0 /100 WBC (ref 0–0.2)
NT-PROBNP SERPL IA-MCNC: 4938 PG/ML (ref 0–125)
PH UR STRIP.AUTO: 5 [PH] (ref 5–8)
PLATELET # BLD AUTO: 328 K/UL (ref 164–446)
PMV BLD AUTO: 9.9 FL (ref 9–12.9)
POTASSIUM SERPL-SCNC: 4.8 MMOL/L (ref 3.6–5.5)
PROT SERPL-MCNC: 7.2 G/DL (ref 6–8.2)
PROT UR QL STRIP: 30 MG/DL
RBC # BLD AUTO: 4.58 M/UL (ref 4.2–5.4)
RBC # URNS HPF: ABNORMAL /HPF
RBC UR QL AUTO: ABNORMAL
SODIUM SERPL-SCNC: 145 MMOL/L (ref 135–145)
SP GR UR STRIP.AUTO: 1.03
TROPONIN T SERPL-MCNC: 21 NG/L (ref 6–19)
UROBILINOGEN UR STRIP.AUTO-MCNC: 1 MG/DL
WBC # BLD AUTO: 10 K/UL (ref 4.8–10.8)
WBC #/AREA URNS HPF: ABNORMAL /HPF

## 2024-06-10 PROCEDURE — 71045 X-RAY EXAM CHEST 1 VIEW: CPT

## 2024-06-10 PROCEDURE — 80053 COMPREHEN METABOLIC PANEL: CPT

## 2024-06-10 PROCEDURE — 84703 CHORIONIC GONADOTROPIN ASSAY: CPT

## 2024-06-10 PROCEDURE — 36415 COLL VENOUS BLD VENIPUNCTURE: CPT

## 2024-06-10 PROCEDURE — 83690 ASSAY OF LIPASE: CPT

## 2024-06-10 PROCEDURE — 83880 ASSAY OF NATRIURETIC PEPTIDE: CPT

## 2024-06-10 PROCEDURE — 85025 COMPLETE CBC W/AUTO DIFF WBC: CPT

## 2024-06-10 PROCEDURE — 81001 URINALYSIS AUTO W/SCOPE: CPT

## 2024-06-10 PROCEDURE — 700117 HCHG RX CONTRAST REV CODE 255: Mod: UD | Performed by: EMERGENCY MEDICINE

## 2024-06-10 PROCEDURE — 74177 CT ABD & PELVIS W/CONTRAST: CPT

## 2024-06-10 PROCEDURE — 99285 EMERGENCY DEPT VISIT HI MDM: CPT

## 2024-06-10 PROCEDURE — 96374 THER/PROPH/DIAG INJ IV PUSH: CPT | Mod: XU

## 2024-06-10 PROCEDURE — 84484 ASSAY OF TROPONIN QUANT: CPT

## 2024-06-10 PROCEDURE — 96375 TX/PRO/DX INJ NEW DRUG ADDON: CPT

## 2024-06-10 PROCEDURE — 93005 ELECTROCARDIOGRAM TRACING: CPT | Performed by: EMERGENCY MEDICINE

## 2024-06-10 PROCEDURE — 700111 HCHG RX REV CODE 636 W/ 250 OVERRIDE (IP): Mod: JZ,UD | Performed by: EMERGENCY MEDICINE

## 2024-06-10 PROCEDURE — 700105 HCHG RX REV CODE 258: Mod: UD | Performed by: EMERGENCY MEDICINE

## 2024-06-10 RX ORDER — ONDANSETRON 2 MG/ML
4 INJECTION INTRAMUSCULAR; INTRAVENOUS ONCE
Status: COMPLETED | OUTPATIENT
Start: 2024-06-10 | End: 2024-06-10

## 2024-06-10 RX ORDER — NITROFURANTOIN 25; 75 MG/1; MG/1
100 CAPSULE ORAL 2 TIMES DAILY
Qty: 10 CAPSULE | Refills: 0 | Status: ACTIVE | OUTPATIENT
Start: 2024-06-10 | End: 2024-06-15

## 2024-06-10 RX ORDER — SODIUM CHLORIDE 9 MG/ML
500 INJECTION, SOLUTION INTRAVENOUS ONCE
Status: COMPLETED | OUTPATIENT
Start: 2024-06-10 | End: 2024-06-10

## 2024-06-10 RX ORDER — CEFTRIAXONE 2 G/1
2000 INJECTION, POWDER, FOR SOLUTION INTRAMUSCULAR; INTRAVENOUS ONCE
Status: COMPLETED | OUTPATIENT
Start: 2024-06-10 | End: 2024-06-10

## 2024-06-10 RX ADMIN — CEFTRIAXONE SODIUM 2000 MG: 2 INJECTION, POWDER, FOR SOLUTION INTRAMUSCULAR; INTRAVENOUS at 20:04

## 2024-06-10 RX ADMIN — SODIUM CHLORIDE 500 ML: 9 INJECTION, SOLUTION INTRAVENOUS at 17:20

## 2024-06-10 RX ADMIN — ONDANSETRON 4 MG: 2 INJECTION INTRAMUSCULAR; INTRAVENOUS at 17:19

## 2024-06-10 RX ADMIN — IOHEXOL 100 ML: 350 INJECTION, SOLUTION INTRAVENOUS at 18:19

## 2024-06-10 ASSESSMENT — FIBROSIS 4 INDEX: FIB4 SCORE: 1.07

## 2024-06-10 NOTE — ED TRIAGE NOTES
".  Chief Complaint   Patient presents with    N/V     Abd pain with n/v x 4 days, pt. States she recent left ventricular thrombus last week       ./61   Pulse (!) 110   Temp 36.3 °C (97.3 °F) (Temporal)   Resp 14   Ht 1.702 m (5' 7\")   Wt 91.3 kg (201 lb 4.5 oz)   LMP 12/17/2018 (Approximate)   SpO2 96%   BMI 31.52 kg/m²     .Pt is alert and oriented, speaking in full sentences, follows commands and responds appropriately to questions Resp are even and unlabored.  Skin pink warm and dry     Pt placed in lobby after labs completed. Pt educated on triage process. Pt encouraged to alert staff for any changes.    "

## 2024-06-10 NOTE — ED PROVIDER NOTES
ED Provider Note    CHIEF COMPLAINT  Chief Complaint   Patient presents with    N/V     Abd pain with n/v x 4 days, pt. States she recent left ventricular thrombus last week       EXTERNAL RECORDS REVIEWED  Outpatient Notes Cardiology office visit 6/6/24    HPI/ROS  LIMITATION TO HISTORY   Select: : None  OUTSIDE HISTORIAN(S):  None    Analia Wu is a 53 y.o. female who presents to the emergency department for evaluation of nausea and vomiting.  The patient states that she started having vomiting for the last 4 days.  She states that today she vomited twice but has been unable to keep anything down.  He denies any bloody or bilious emesis.  She does admit to some epigastric pain as well.  The patient was recently hospitalized and diagnosed with a left ventricular thrombus.  She was also rehospitalized after suffering a stroke and has residual right-sided weakness.  She is currently on Eliquis and has been taking her medications as prescribed.  She denies chest pain or shortness of breath.  She states that she had a little bit of blood in her urine last week but this has since resolved.  She denies any dysuria.  She denies any diarrhea.    PAST MEDICAL HISTORY   has a past medical history of Anesthesia, Anxiety, ASTHMA, Cancer (HCC), Carpal tunnel syndrome, Heart murmur, Infectious disease (2007), Pain (11-26-12), Panic attack, Psychiatric problem, and Seasonal allergies.    SURGICAL HISTORY   has a past surgical history that includes rectal biopsy (2007); wide excision (8/4/2010); cervical disk and fusion anterior (6/27/2011); wide excision (9/7/2011); wide excision (2/29/2012); wide excision (12/19/2012); exam under anesthesia (5/21/2014); rectal exploration (5/21/2014); rectal exploration (1/28/2015); rectal exploration (9/2/2016); anal fistulectomy (5/24/2017); hemorrhoidectomy (8/4/2010); vulvectomy partial (October 2007); vulvectomy radical (Bilateral, 9/2/2016); other neurological surg; and rectal  "exploration (2018).    FAMILY HISTORY  Family History   Problem Relation Age of Onset    Diabetes Mother     Stroke Mother     Cancer Maternal Aunt     Lung Disease Neg Hx     Heart Disease Neg Hx        SOCIAL HISTORY  Social History     Tobacco Use    Smoking status: Former     Current packs/day: 0.00     Average packs/day: 1 pack/day for 25.0 years (25.0 ttl pk-yrs)     Types: Cigarettes     Start date: 10/4/1982     Quit date: 10/4/2007     Years since quittin.6    Smokeless tobacco: Never    Tobacco comments:        Vaping Use    Vaping status: Never Used   Substance and Sexual Activity    Alcohol use: No    Drug use: No    Sexual activity: Not Currently       CURRENT MEDICATIONS  Home Medications       Reviewed by Serene Santiago R.N. (Registered Nurse) on 06/10/24 at 1601  Med List Status: Partial     Medication Last Dose Status   apixaban (ELIQUIS) 5mg Tab  Active   atorvastatin (LIPITOR) 40 MG Tab  Active   Cariprazine HCl (VRAYLAR) 4.5 MG Cap  Active   dapagliflozin propanediol (FARXIGA) 10 MG Tab  Active   gabapentin (NEURONTIN) 300 MG Cap  Active   losartan (COZAAR) 25 MG Tab  Active   metoprolol SR (TOPROL XL) 25 MG TABLET SR 24 HR  Active   venlafaxine XR (EFFEXOR XR) 75 MG CAPSULE SR 24 HR  Active                  Audit from Redirected Encounters    **Home medications have not yet been reviewed for this encounter**         ALLERGIES  Allergies   Allergen Reactions    Latex Shortness of Breath and Itching    Other Food Hives and Rash     Splenda    Tape      \"makes skin turn red\"  Paper tape ok    Hydromorphone Vomiting and Nausea    Other Environmental      Dial soap --  Skin itching       PHYSICAL EXAM  VITAL SIGNS: /74   Pulse 88   Temp 36.3 °C (97.3 °F) (Temporal)   Resp (!) 21   Ht 1.702 m (5' 7\")   Wt 91.3 kg (201 lb 4.5 oz)   LMP 2018 (Approximate)   SpO2 94%   BMI 31.52 kg/m²   Constitutional: Alert and in no apparent distress.  HENT: Normocephalic " atraumatic. Bilateral external ears normal. Nose normal. Mucous membranes are dry.  Eyes: Pupils are equal and reactive. Conjunctiva normal. Non-icteric sclera.   Neck: Normal range of motion without tenderness. Supple.  Cardiovascular: Tachycardic rate and regular rhythm. No murmurs, gallops or rubs.  Thorax & Lungs: No retractions, nasal flaring, or tachypnea. Breath sounds are clear to auscultation bilaterally. No wheezing, rhonchi or rales.  Abdomen: Soft and nondistended.  There is mild tenderness to palpation in the epigastrium.  Skin: Warm and dry. No rashes are noted.  Back: No bony tenderness, No CVA tenderness.   Extremities: 2+ peripheral pulses. Cap refill is less than 2 seconds. No edema, cyanosis, or clubbing.  Musculoskeletal: Good range of motion in all major joints. No tenderness to palpation or major deformities noted.   Neurologic: Alert and oriented x 3. The patient moves all 4 extremities without obvious deficits.      EKG/LABS  Results for orders placed or performed during the hospital encounter of 06/10/24   CBC WITH DIFFERENTIAL   Result Value Ref Range    WBC 10.0 4.8 - 10.8 K/uL    RBC 4.58 4.20 - 5.40 M/uL    Hemoglobin 13.9 12.0 - 16.0 g/dL    Hematocrit 45.1 37.0 - 47.0 %    MCV 98.5 (H) 81.4 - 97.8 fL    MCH 30.3 27.0 - 33.0 pg    MCHC 30.8 (L) 32.2 - 35.5 g/dL    RDW 50.8 (H) 35.9 - 50.0 fL    Platelet Count 328 164 - 446 K/uL    MPV 9.9 9.0 - 12.9 fL    Neutrophils-Polys 74.90 (H) 44.00 - 72.00 %    Lymphocytes 14.70 (L) 22.00 - 41.00 %    Monocytes 8.90 0.00 - 13.40 %    Eosinophils 1.00 0.00 - 6.90 %    Basophils 0.30 0.00 - 1.80 %    Immature Granulocytes 0.20 0.00 - 0.90 %    Nucleated RBC 0.00 0.00 - 0.20 /100 WBC    Neutrophils (Absolute) 7.49 (H) 1.82 - 7.42 K/uL    Lymphs (Absolute) 1.47 1.00 - 4.80 K/uL    Monos (Absolute) 0.89 (H) 0.00 - 0.85 K/uL    Eos (Absolute) 0.10 0.00 - 0.51 K/uL    Baso (Absolute) 0.03 0.00 - 0.12 K/uL    Immature Granulocytes (abs) 0.02 0.00 - 0.11  K/uL    NRBC (Absolute) 0.00 K/uL   COMP METABOLIC PANEL   Result Value Ref Range    Sodium 145 135 - 145 mmol/L    Potassium 4.8 3.6 - 5.5 mmol/L    Chloride 109 96 - 112 mmol/L    Co2 22 20 - 33 mmol/L    Anion Gap 14.0 7.0 - 16.0    Glucose 112 (H) 65 - 99 mg/dL    Bun 18 8 - 22 mg/dL    Creatinine 1.12 0.50 - 1.40 mg/dL    Calcium 10.0 8.5 - 10.5 mg/dL    Correct Calcium 9.8 8.5 - 10.5 mg/dL    AST(SGOT) 30 12 - 45 U/L    ALT(SGPT) 61 (H) 2 - 50 U/L    Alkaline Phosphatase 117 (H) 30 - 99 U/L    Total Bilirubin 1.1 0.1 - 1.5 mg/dL    Albumin 4.3 3.2 - 4.9 g/dL    Total Protein 7.2 6.0 - 8.2 g/dL    Globulin 2.9 1.9 - 3.5 g/dL    A-G Ratio 1.5 g/dL   LIPASE   Result Value Ref Range    Lipase 41 11 - 82 U/L   HCG QUAL SERUM   Result Value Ref Range    Beta-Hcg Qualitative Serum Negative Negative   URINALYSIS,CULTURE IF INDICATED    Specimen: Urine   Result Value Ref Range    Color DK Yellow     Character Cloudy (A)     Specific Gravity 1.034 <1.035    Ph 5.0 5.0 - 8.0    Glucose 500 (A) Negative mg/dL    Ketones 15 (A) Negative mg/dL    Protein 30 (A) Negative mg/dL    Bilirubin Moderate (A) Negative    Urobilinogen, Urine 1.0 Negative    Nitrite Negative Negative    Leukocyte Esterase Small (A) Negative    Occult Blood Moderate (A) Negative    Micro Urine Req Microscopic    ESTIMATED GFR   Result Value Ref Range    GFR (CKD-EPI) 58 (A) >60 mL/min/1.73 m 2   proBrain Natriuretic Peptide, NT   Result Value Ref Range    NT-proBNP 4938 (H) 0 - 125 pg/mL   TROPONIN   Result Value Ref Range    Troponin T 21 (H) 6 - 19 ng/L   URINE MICROSCOPIC (W/UA)   Result Value Ref Range    WBC 20-50 (A) /hpf    RBC 2-5 (A) /hpf    Bacteria Moderate (A) None /hpf    Epithelial Cells Moderate (A) /hpf    Amorphous Crystal Present /hpf    Hyaline Cast 3-5 (A) /lpf   EKG (NOW)   Result Value Ref Range    Report       [facility]    Test Date:  2024-06-10  Pt Name:    LAI MALONE                 Department: ER  MRN:        [id number]                      Room:       [facility]  Gender:     Female                       Technician: [id number]  :        [id number]                   Requested By:LEXIE MALONE  Order #:    [id number]                    Reading MD: Lexie Malone    Measurements  Intervals                                Axis  Rate:       99                           P:          64  DE:         168                          QRS:        -48  QRSD:       147                          T:          83  QT:         405  QTc:        520    Interpretive Statements  Sinus rhythm  LAE, consider biatrial enlargement  Left bundle branch block  Compared to ECG 2024 06:12:39  No significant changes  Electronically Signed On 06- 17:24:13 PDT by Lexie Malone       I have independently interpreted this EKG    RADIOLOGY/PROCEDURES   I have independently interpreted the diagnostic imaging associated with this visit and am waiting the final reading from the radiologist.   My preliminary interpretation is as follows: No obstructive bowel gas pattern noted.    Radiologist interpretation:  CT-ABDOMEN-PELVIS WITH   Final Result      1.  Probable 12 mm and 7 mm right lobe hepatic cysts.   2.  Fatty liver.   3.  Left renal cortical cyst measuring about 8 cm.   4.  Bilateral ovarian cysts 36 mm on the right and 41 mm on the left. Likely physiologic and of doubtful clinical significance.   5.  Trace free fluid in the cul-de-sac favoring the right. This is nonspecific but likely physiologic.      DX-CHEST-PORTABLE (1 VIEW)   Final Result      1.  Cardiac silhouette enlargement.   2.  No acute abnormality.          COURSE & MEDICAL DECISION MAKING    ASSESSMENT, COURSE AND PLAN  Care Narrative: This is a 53-year-old female presenting to the emergency department for evaluation of nausea and vomiting.  On initial evaluation, the patient was noted be tachycardic.  The remainder of her vital signs are reassuring.  Physical exam was notable  for tenderness to palpation in the epigastrium.  She had no evidence of peritonitis.  Her mucous membranes were quite dry.  She had denied any shortness of breath or extremity swelling so a gentle fluid bolus was given secondary to her dry mucous membranes and persistent vomiting.  An IV was established and labs were sent.    White count was normal at 10.  A neutrophil predominance was noted.  Metabolic panel was without significant joint derangement.  LFTs were slightly elevated but this appears to be at her baseline upon review of her previous labs.  Lipase was normal at 41 and I am less concerned for acute pancreatitis.  Urinalysis was notable for 20-50 WBCs and moderate bacteria.  It was sent for culture and the patient was given a dose of ceftriaxone here in the ED for a possible urinary tract infection.    Her EKG was unchanged from her previous EKGs.  Troponin was ordered and 21.  This is improved from her previous troponins.  BNP was 4000 but she had no pulmonary edema on chest x-ray, and she was not hypoxic and did not appear clinically fluid overloaded.  This was also not far from her baseline upon review of previous BMPs.  I am less concern for cardiac etiology of her symptoms today especially as she adamantly denies any chest pain or shortness of breath.    Chest x-ray revealed an enlarged cardiac silhouette but was otherwise unchanged.  No evidence of focal opacity concern for bacterial pneumonia was noted.    CT of the abdomen and pelvis was ordered given the tenderness palpation epigastrium.  This did not reveal any acute processes.  Incidental findings including hepatic cyst, renal cyst, and bilateral ovarian cysts were noted.  However, I do not think these are contributing to her symptoms.  I updated the patient on these incidental findings and she will follow-up with her primary care physician.    The patient improved while in the ED and was able to tolerate water by mouth.  I do think she is  stable for discharge.  I discussed supportive measures with her and encouraged her to follow-up with her primary care physician in 1 to 3 days.  She will return to the ED with any worsening signs or symptoms.    The patient presents with abdominal pain without signs of peritonitis or other life-threatening or serious etiology. The patient appears stable for discharge and has been instructed to return immediately if the symptoms worsen in any way, or in 8-12hr if not improved for re-evaluation. The patient has been instructed to return if the symptoms worsen or change in any way.    Hydration: Based on the patient's presentation of Acute Vomiting and Dehydration the patient was given IV fluids. IV Hydration was used because oral hydration was not adequate alone. Upon recheck following hydration, the patient was improved.    ADDITIONAL PROBLEMS MANAGED  Nausea and vomiting, abdominal pain, urinary tract infection, renal cyst, hepatic cyst, bilateral ovarian cysts    DISPOSITION AND DISCUSSIONS  I have discussed management of the patient with the following physicians and SUE's: None    Discussion of management with other QHP or appropriate source(s): None     Escalation of care considered, and ultimately not performed:acute inpatient care management, however at this time, the patient is most appropriate for outpatient management    Barriers to care at this time, including but not limited to:  None .     Decision tools and prescription drugs considered including, but not limited to: Antibiotics nitrofurantoin .    FINAL IMPRESSION  1. Nausea and vomiting, unspecified vomiting type    2. Urinary tract infection without hematuria, site unspecified    3. Renal cyst    4. Hepatic cyst    5. Cysts of both ovaries    6. Epigastric pain      PRESCRIPTIONS  New Prescriptions    NITROFURANTOIN (MACROBID) 100 MG CAP    Take 1 Capsule by mouth 2 times a day for 5 days.     FOLLOW UP  Dorothy Vidal, A.P.N.  [address]  [facility] [address]  [phone]    Call in 1 day  To schedule a follow up appointment    [facility], Emergency Dept  [address] [facility] [address]  [phone]  Go to   As needed    -DISCHARGE-    Electronically signed by: Lexie Lemus D.O., 6/10/2024 4:50 PM

## 2024-06-11 NOTE — ED NOTES
"Pt discharged home. IV discontinued and gauze placed, pt in possession of belongings. Pt provided discharge education and information pertaining to medications and follow up appointments. Pt received copy of discharge instructions and verbalized understanding. /74   Pulse 88   Temp 36.3 °C (97.3 °F) (Temporal)   Resp (!) 21   Ht 1.702 m (5' 7\")   Wt 91.3 kg (201 lb 4.5 oz)   LMP 12/17/2018 (Approximate)   SpO2 94%   BMI 31.52 kg/m²     "

## 2024-06-15 ENCOUNTER — APPOINTMENT (OUTPATIENT)
Dept: RADIOLOGY | Facility: MEDICAL CENTER | Age: 54
End: 2024-06-15
Attending: EMERGENCY MEDICINE
Payer: MEDICAID

## 2024-06-15 ENCOUNTER — HOSPITAL ENCOUNTER (EMERGENCY)
Facility: MEDICAL CENTER | Age: 54
End: 2024-06-15
Attending: EMERGENCY MEDICINE
Payer: MEDICAID

## 2024-06-15 VITALS
RESPIRATION RATE: 18 BRPM | OXYGEN SATURATION: 92 % | HEART RATE: 97 BPM | SYSTOLIC BLOOD PRESSURE: 112 MMHG | DIASTOLIC BLOOD PRESSURE: 78 MMHG | HEIGHT: 67 IN | TEMPERATURE: 97.6 F | WEIGHT: 199.08 LBS | BODY MASS INDEX: 31.25 KG/M2

## 2024-06-15 DIAGNOSIS — R11.0 NAUSEA: ICD-10-CM

## 2024-06-15 LAB
ALBUMIN SERPL BCP-MCNC: 3.8 G/DL (ref 3.2–4.9)
ALBUMIN/GLOB SERPL: 1.4 G/DL
ALP SERPL-CCNC: 110 U/L (ref 30–99)
ALT SERPL-CCNC: 48 U/L (ref 2–50)
ANION GAP SERPL CALC-SCNC: 17 MMOL/L (ref 7–16)
APPEARANCE UR: CLEAR
AST SERPL-CCNC: 38 U/L (ref 12–45)
BACTERIA #/AREA URNS HPF: NEGATIVE /HPF
BASOPHILS # BLD AUTO: 0.3 % (ref 0–1.8)
BASOPHILS # BLD: 0.03 K/UL (ref 0–0.12)
BILIRUB SERPL-MCNC: 1.3 MG/DL (ref 0.1–1.5)
BILIRUB UR QL STRIP.AUTO: ABNORMAL
BUN SERPL-MCNC: 16 MG/DL (ref 8–22)
CALCIUM ALBUM COR SERPL-MCNC: 9.7 MG/DL (ref 8.5–10.5)
CALCIUM SERPL-MCNC: 9.5 MG/DL (ref 8.5–10.5)
CHLORIDE SERPL-SCNC: 106 MMOL/L (ref 96–112)
CO2 SERPL-SCNC: 17 MMOL/L (ref 20–33)
COLOR UR: ABNORMAL
CREAT SERPL-MCNC: 0.91 MG/DL (ref 0.5–1.4)
EKG IMPRESSION: NORMAL
EOSINOPHIL # BLD AUTO: 0.13 K/UL (ref 0–0.51)
EOSINOPHIL NFR BLD: 1.4 % (ref 0–6.9)
EPI CELLS #/AREA URNS HPF: ABNORMAL /HPF
ERYTHROCYTE [DISTWIDTH] IN BLOOD BY AUTOMATED COUNT: 51.8 FL (ref 35.9–50)
GFR SERPLBLD CREATININE-BSD FMLA CKD-EPI: 75 ML/MIN/1.73 M 2
GLOBULIN SER CALC-MCNC: 2.8 G/DL (ref 1.9–3.5)
GLUCOSE SERPL-MCNC: 110 MG/DL (ref 65–99)
GLUCOSE UR STRIP.AUTO-MCNC: >=1000 MG/DL
HCT VFR BLD AUTO: 43.5 % (ref 37–47)
HGB BLD-MCNC: 14.2 G/DL (ref 12–16)
HYALINE CASTS #/AREA URNS LPF: ABNORMAL /LPF
IMM GRANULOCYTES # BLD AUTO: 0.05 K/UL (ref 0–0.11)
IMM GRANULOCYTES NFR BLD AUTO: 0.6 % (ref 0–0.9)
KETONES UR STRIP.AUTO-MCNC: 40 MG/DL
LEUKOCYTE ESTERASE UR QL STRIP.AUTO: ABNORMAL
LIPASE SERPL-CCNC: 42 U/L (ref 11–82)
LYMPHOCYTES # BLD AUTO: 0.99 K/UL (ref 1–4.8)
LYMPHOCYTES NFR BLD: 10.9 % (ref 22–41)
MCH RBC QN AUTO: 31.4 PG (ref 27–33)
MCHC RBC AUTO-ENTMCNC: 32.6 G/DL (ref 32.2–35.5)
MCV RBC AUTO: 96.2 FL (ref 81.4–97.8)
MICRO URNS: ABNORMAL
MONOCYTES # BLD AUTO: 0.74 K/UL (ref 0–0.85)
MONOCYTES NFR BLD AUTO: 8.2 % (ref 0–13.4)
NEUTROPHILS # BLD AUTO: 7.13 K/UL (ref 1.82–7.42)
NEUTROPHILS NFR BLD: 78.6 % (ref 44–72)
NITRITE UR QL STRIP.AUTO: NEGATIVE
NRBC # BLD AUTO: 0 K/UL
NRBC BLD-RTO: 0 /100 WBC (ref 0–0.2)
NT-PROBNP SERPL IA-MCNC: 4501 PG/ML (ref 0–125)
PH UR STRIP.AUTO: 5 [PH] (ref 5–8)
PLATELET # BLD AUTO: 296 K/UL (ref 164–446)
PMV BLD AUTO: 9.6 FL (ref 9–12.9)
POTASSIUM SERPL-SCNC: 4.3 MMOL/L (ref 3.6–5.5)
PROT SERPL-MCNC: 6.6 G/DL (ref 6–8.2)
PROT UR QL STRIP: 100 MG/DL
RBC # BLD AUTO: 4.52 M/UL (ref 4.2–5.4)
RBC # URNS HPF: ABNORMAL /HPF
RBC UR QL AUTO: NEGATIVE
SODIUM SERPL-SCNC: 140 MMOL/L (ref 135–145)
SP GR UR STRIP.AUTO: 1.03
TROPONIN T SERPL-MCNC: 20 NG/L (ref 6–19)
UROBILINOGEN UR STRIP.AUTO-MCNC: 2 MG/DL
WBC # BLD AUTO: 9.1 K/UL (ref 4.8–10.8)
WBC #/AREA URNS HPF: ABNORMAL /HPF

## 2024-06-15 PROCEDURE — 83880 ASSAY OF NATRIURETIC PEPTIDE: CPT

## 2024-06-15 PROCEDURE — 96375 TX/PRO/DX INJ NEW DRUG ADDON: CPT

## 2024-06-15 PROCEDURE — 96376 TX/PRO/DX INJ SAME DRUG ADON: CPT

## 2024-06-15 PROCEDURE — 93005 ELECTROCARDIOGRAM TRACING: CPT | Performed by: EMERGENCY MEDICINE

## 2024-06-15 PROCEDURE — 700111 HCHG RX REV CODE 636 W/ 250 OVERRIDE (IP): Mod: UD | Performed by: EMERGENCY MEDICINE

## 2024-06-15 PROCEDURE — 99284 EMERGENCY DEPT VISIT MOD MDM: CPT

## 2024-06-15 PROCEDURE — 80053 COMPREHEN METABOLIC PANEL: CPT

## 2024-06-15 PROCEDURE — 81001 URINALYSIS AUTO W/SCOPE: CPT

## 2024-06-15 PROCEDURE — 96374 THER/PROPH/DIAG INJ IV PUSH: CPT

## 2024-06-15 PROCEDURE — A9270 NON-COVERED ITEM OR SERVICE: HCPCS | Performed by: EMERGENCY MEDICINE

## 2024-06-15 PROCEDURE — 84484 ASSAY OF TROPONIN QUANT: CPT

## 2024-06-15 PROCEDURE — 36415 COLL VENOUS BLD VENIPUNCTURE: CPT

## 2024-06-15 PROCEDURE — 700105 HCHG RX REV CODE 258: Mod: UD | Performed by: EMERGENCY MEDICINE

## 2024-06-15 PROCEDURE — 71045 X-RAY EXAM CHEST 1 VIEW: CPT

## 2024-06-15 PROCEDURE — 83690 ASSAY OF LIPASE: CPT

## 2024-06-15 PROCEDURE — 700102 HCHG RX REV CODE 250 W/ 637 OVERRIDE(OP): Performed by: EMERGENCY MEDICINE

## 2024-06-15 PROCEDURE — 85025 COMPLETE CBC W/AUTO DIFF WBC: CPT

## 2024-06-15 RX ORDER — METOCLOPRAMIDE HYDROCHLORIDE 5 MG/ML
5 INJECTION INTRAMUSCULAR; INTRAVENOUS ONCE
Status: COMPLETED | OUTPATIENT
Start: 2024-06-15 | End: 2024-06-15

## 2024-06-15 RX ORDER — SODIUM CHLORIDE, SODIUM LACTATE, POTASSIUM CHLORIDE, CALCIUM CHLORIDE 600; 310; 30; 20 MG/100ML; MG/100ML; MG/100ML; MG/100ML
1000 INJECTION, SOLUTION INTRAVENOUS ONCE
Status: DISCONTINUED | OUTPATIENT
Start: 2024-06-15 | End: 2024-06-15

## 2024-06-15 RX ORDER — SODIUM CHLORIDE 9 MG/ML
1000 INJECTION, SOLUTION INTRAVENOUS ONCE
Status: COMPLETED | OUTPATIENT
Start: 2024-06-15 | End: 2024-06-15

## 2024-06-15 RX ORDER — ONDANSETRON 2 MG/ML
4 INJECTION INTRAMUSCULAR; INTRAVENOUS ONCE
Status: COMPLETED | OUTPATIENT
Start: 2024-06-15 | End: 2024-06-15

## 2024-06-15 RX ORDER — FAMOTIDINE 20 MG/1
20 TABLET, FILM COATED ORAL 2 TIMES DAILY
Qty: 60 TABLET | Refills: 0 | Status: SHIPPED | OUTPATIENT
Start: 2024-06-15

## 2024-06-15 RX ORDER — DIPHENHYDRAMINE HYDROCHLORIDE 50 MG/ML
12.5 INJECTION INTRAMUSCULAR; INTRAVENOUS ONCE
Status: COMPLETED | OUTPATIENT
Start: 2024-06-15 | End: 2024-06-15

## 2024-06-15 RX ADMIN — LIDOCAINE HYDROCHLORIDE 15 ML: 20 SOLUTION ORAL; TOPICAL at 17:17

## 2024-06-15 RX ADMIN — SODIUM CHLORIDE 1000 ML: 9 INJECTION, SOLUTION INTRAVENOUS at 14:20

## 2024-06-15 RX ADMIN — ONDANSETRON 4 MG: 2 INJECTION INTRAMUSCULAR; INTRAVENOUS at 14:20

## 2024-06-15 RX ADMIN — ONDANSETRON 4 MG: 2 INJECTION INTRAMUSCULAR; INTRAVENOUS at 16:05

## 2024-06-15 RX ADMIN — METOPROLOL TARTRATE 25 MG: 25 TABLET, FILM COATED ORAL at 17:29

## 2024-06-15 RX ADMIN — METOCLOPRAMIDE 5 MG: 5 INJECTION, SOLUTION INTRAMUSCULAR; INTRAVENOUS at 16:57

## 2024-06-15 RX ADMIN — DIPHENHYDRAMINE HYDROCHLORIDE 12.5 MG: 50 INJECTION, SOLUTION INTRAMUSCULAR; INTRAVENOUS at 16:58

## 2024-06-15 ASSESSMENT — FIBROSIS 4 INDEX: FIB4 SCORE: 0.63

## 2024-06-15 NOTE — ED TRIAGE NOTES
Chief Complaint   Patient presents with    N/V     X1 week. Denies blood in emesis. States she was seen on Monday for the same complaint and symptoms are worsening.       Patient states she was treated for a UTI, reports she has been compliant with abx but today threw them up.     Patient ambulatory to triage for above complaint. Patient A&Ox4, GCS 15, patient speaking in full sentences. Equal and unlabored respirations. Patient educated on triage process and encouraged to notify staff if condition worsens. Appropriate protocols ordered. Patient returned to the lobby in stable condition.

## 2024-06-15 NOTE — ED PROVIDER NOTES
ED Provider Note    Primary care provider: ROXANN Bella    CHIEF COMPLAINT  Chief Complaint   Patient presents with    N/V     X1 week. Denies blood in emesis. States she was seen on Monday for the same complaint and symptoms are worsening.        HPI  Analia Wu is a 53 y.o. female who presents to the Emergency Department nausea and vomiting.  The patient started having nausea vomiting when she was first here few weeks ago, she apparently has not been vomiting when she was an inpatient.  Since she goes home she started vomiting again.  She denies any abdominal pain or diarrhea.  She notes a very slight headache and very slight chest pain which she equates to the repeated vomiting.  Has been prescribed Zofran but does not appear to be working.      External Record Review: Patient was hospitalized for 72 hours here for nausea and vomiting.  Patient had an elevated troponin, proBNP of 4169, underwent CTA that was negative for pulmonary embolus, was on 2 L.  Hypoxia.  2D echo showed EF is 25% and there was a LV apical thrombus.  Patient underwent left heart cath that showed normal coronary arteries.  Discharged on Eliquis for the ventricular thrombus.  Shortly after discharge she presented back on 5/30/2024 with left-sided deficits, underwent thrombectomy.    Underwent a HIDA scan that was negative for acute cholecystitis.  Patient came back to the ER 6/10 for nausea and vomiting, workup showed some persistently elevated LFTs, possible UTI.  Underwent CT of the abdomen pelvis that was unremarkable.    REVIEW OF SYSTEMS  See HPI.     PAST MEDICAL HISTORY   has a past medical history of Anesthesia, Anxiety, ASTHMA, Cancer (HCC), Carpal tunnel syndrome, Heart murmur, Infectious disease (2007), Pain (11-26-12), Panic attack, Psychiatric problem, and Seasonal allergies.    SURGICAL HISTORY   has a past surgical history that includes rectal biopsy (2007); wide excision (8/4/2010); cervical disk and  "fusion anterior (2011); wide excision (2011); wide excision (2012); wide excision (2012); exam under anesthesia (2014); rectal exploration (2014); rectal exploration (2015); rectal exploration (2016); anal fistulectomy (2017); hemorrhoidectomy (2010); vulvectomy partial (2007); vulvectomy radical (Bilateral, 2016); other neurological surg; and rectal exploration (2018).    SOCIAL HISTORY  Social History     Tobacco Use    Smoking status: Former     Current packs/day: 0.00     Average packs/day: 1 pack/day for 25.0 years (25.0 ttl pk-yrs)     Types: Cigarettes     Start date: 10/4/1982     Quit date: 10/4/2007     Years since quittin.7    Smokeless tobacco: Never    Tobacco comments:        Vaping Use    Vaping status: Never Used   Substance Use Topics    Alcohol use: No    Drug use: No      Social History     Substance and Sexual Activity   Drug Use No       FAMILY HISTORY  Family History   Problem Relation Age of Onset    Diabetes Mother     Stroke Mother     Cancer Maternal Aunt     Lung Disease Neg Hx     Heart Disease Neg Hx        CURRENT MEDICATIONS  Reviewed.  See Encounter Summary.     ALLERGIES  Allergies   Allergen Reactions    Latex Shortness of Breath and Itching    Other Food Hives and Rash     Splenda    Tape      \"makes skin turn red\"  Paper tape ok    Hydromorphone Vomiting and Nausea    Other Environmental      Dial soap --  Skin itching       PHYSICAL EXAM  VITAL SIGNS: /84   Pulse (!) 106   Temp 36.4 °C (97.6 °F) (Temporal)   Resp 19   Ht 1.702 m (5' 7\")   Wt 90.3 kg (199 lb 1.2 oz)   LMP 2018 (Approximate)   SpO2 93%   BMI 31.18 kg/m²   Constitutional: Awake, alert in no apparent distress.  HENT: Normocephalic, Bilateral external ears normal. Nose normal.   Eyes: Conjunctiva normal, non-icteric, EOMI.    Thorax & Lungs: Easy unlabored respirations, Clear to ascultation bilaterally.  Cardiovascular: " Tachycardic, No murmurs, rubs or gallops. Bilateral pulses symmetrical.   Abdomen:  Soft, nontender, nondistended, normal active bowel sounds.  Negative Coffey's.  :    Skin: Visualized skin is  Dry, No erythema, No rash.   Musculoskeletal:   No cyanosis, clubbing or edema. No leg asymmetry.   Neurologic: Alert, Grossly non-focal.  Good strength all 4 extremities without drift.  Psychiatric: Normal affect, Normal mood  Lymphatic:      EKG   12 lead Interpreted by me  Rhythm: Sinus tach  Rate: 104  Axis: normal  Ectopy: none  Conduction: LBBB unchanged  ST Segments: no acute change  T Waves: no acute change  Clinical Impression: Left bundle branch block, unchanged, no Sgarbossa criteria, sinus tach, otherwise unremarkable EKG.      RADIOLOGY  I have independently interpreted the diagnostic imaging associated with this visit and am waiting the final reading from the radiologist.   My preliminary interpretation is as follows: No acute infiltrates    Radiologist interpretation:   DX-CHEST-PORTABLE (1 VIEW)   Final Result      No acute cardiopulmonary abnormality identified.          COURSE & MEDICAL DECISION MAKING  Pertinent Labs & Imaging studies reviewed. (See chart for details)    COURSE & MEDICAL DECISION MAKING  Pertinent Labs & Imaging studies reviewed. (See chart for details)    Differential diagnoses include but are not limited to: Dehydration, ACS, pancreatitis, electrolyte abnormalities    1:10 PM - Nursing notes reviewed, patient seen and examined at bedside.    6:12 PM: Patient reassessed, still has some mild nausea, has not vomited yet since she has been here.    Escalation of care considered, and ultimately not performed: acute inpatient care management, however at this time, the patient is most appropriate for outpatient management.      Decision tools and prescription drugs considered including, but not limited to: Heart score 3    Decision Making:  This is a pleasant 53 y.o. year old female who  presents with nausea.  The patient has had nausea predominantly without any significant vomiting.  She did appear slightly dehydrated, I gave her 1 L of fluids with some mild improvement in her heart rate.  I did not give any additional fluids as her creatinine is actually better than it has been recently and she has an EF of 25%.  I believe her tachycardia is related to her history of pulmonary hypertension, she did not take her home beta-blocker today.  Looking back in the medical record she has been persistently tachycardic on prior presentations.  She also has undergone a left heart cath recently that did not show any coronary artery disease, therefore I do not suspect ACS at this time.  Troponin is improved compared to previous, proBNP elevated but consistent with previous, oxygenation is normal, do not feel that she requires hospitalization for decompensated heart failure.    In summary this is a well-appearing 53-year-old female who has multiple medical problems, has been recently admitted for CHF, ventricular thrombus, CVA that presents with nausea without any other associated features.    Workup today is unrevealing, despite her multiple comorbidities, I do not see anything that is acutely decompensated.  She is neurologically intact, no evidence of ACS, no severe electrolyte derangements, troponin, creatinine better than usual.  I think it is reasonable to discharge on an H2 blocker in case this is related to GERD/reflux.       The patient was discharged home (see d/c instructions) was told to return immediately for any signs or symptoms listed, or any worsening at all.  The patient verbally agreed to the discharge precautions and follow-up plan which is documented in EPIC.    Discharge Medications:  New Prescriptions    FAMOTIDINE (PEPCID) 20 MG TAB    Take 1 Tablet by mouth 2 times a day.       FINAL IMPRESSION  1. Nausea

## 2024-06-16 NOTE — ED NOTES
AVS discussed with patient. Return precautions and f/u information discussed. Pt ambulatory with steady gait to lobby

## 2024-06-16 NOTE — DISCHARGE INSTRUCTIONS
The exact cause of your nausea is not entirely clear.  You have had a CT and ultrasound recently that did not show any cause of the nausea.  Labs today do not show any sign of dehydration or cardiac problems.  Sometimes nausea can be caused by acid reflux, this cannot be easily identified on imaging or labs.  I will start you on a antacid, recommend follow-up with primary for further evaluation of this.  Referral to GI may be appropriate.

## 2024-06-17 NOTE — OP THERAPY DAILY TREATMENT
"  Outpatient Occupational Therapy  DAILY TREATMENT     [facility] Occupational Therapy [address]  [address]  [place] [address]  Phone:  [phone]  Fax:  [phone]    Date: 09/03/2020    Patient: Analia Wu  YOB: 1970  MRN: [id number]     Time Calculation  Start time: 1130  Stop time: 1200 Time Calculation (min): 30 minutes         Chief Complaint: Arm Problem (left lateral epicondylitis)    Visit #: 4    SUBJECTIVE: \"My arm is a little sore\"    OBJECTIVE:  Current objective measures:   Left Cozen's Test: positive for lateral epicondylitis           Therapeutic Exercises (CPT 81809):     1. LUE    Therapeutic Exercise Summary:  Ultrasound to left common extensor origin and extrinsic extensors: Applicator size 5cm2, Frequency 3.3 MHz, Amplitude 2.5 W/cm2 x 4 minutes.  TFM to common extensor origin, STM to extrinsic extensors with and without Guasha tool combined with active composite flexion/gross extension x 20 reps, active wrist flexion with 3 sec holds in flexed position, return to neutral x 15 reps.  Self-wrist stretches into flexion/extension x 30 seconds, adding slight ulnar/radial deviation respectively for an additional 30 seconds.  Pronation/supination with shoulder at 90 degrees of abduction x 15 reps with 3 second holds. Shoulder at 90 degrees of ff and elbow extended for active g.e wrist flexion for 15 second holds, return to neutral x 5 reps, 2 sets.  Ice massage to common extensor origin and extensors x 2 minutes.      Time-based treatments/modalities:  Therapeutic exercise minutes (CPT 87969): 30 minutes        ASSESSMENT:   Response to treatment:  Pt making progress with her left extrinsic extensor soft tissue mobility, flexibility, and strength for her daily routine in response to ultrasound, stretches, and active graded exercises described above with decreased reports of pain.  HEP updated with good understanding.    PLAN/RECOMMENDATIONS:   Plan for " treatment: therapy treatment to continue next visit.  Planned interventions for next visit: continue with current treatment and therapeutic exercise (CPT 96682)        Detail Level: Zone no fever

## 2024-06-18 ENCOUNTER — APPOINTMENT (OUTPATIENT)
Dept: CARDIOLOGY | Facility: MEDICAL CENTER | Age: 54
End: 2024-06-18
Attending: NURSE PRACTITIONER
Payer: MEDICAID

## 2024-06-18 ENCOUNTER — PHARMACY VISIT (OUTPATIENT)
Dept: PHARMACY | Facility: MEDICAL CENTER | Age: 54
End: 2024-06-18
Payer: COMMERCIAL

## 2024-06-18 ENCOUNTER — HOSPITAL ENCOUNTER (EMERGENCY)
Facility: MEDICAL CENTER | Age: 54
End: 2024-06-18
Attending: EMERGENCY MEDICINE
Payer: MEDICAID

## 2024-06-18 VITALS
WEIGHT: 199.3 LBS | OXYGEN SATURATION: 95 % | RESPIRATION RATE: 16 BRPM | HEART RATE: 65 BPM | DIASTOLIC BLOOD PRESSURE: 71 MMHG | SYSTOLIC BLOOD PRESSURE: 102 MMHG | HEIGHT: 67 IN | TEMPERATURE: 97 F | BODY MASS INDEX: 31.28 KG/M2

## 2024-06-18 DIAGNOSIS — N30.01 ACUTE CYSTITIS WITH HEMATURIA: ICD-10-CM

## 2024-06-18 DIAGNOSIS — R11.2 NAUSEA AND VOMITING, UNSPECIFIED VOMITING TYPE: ICD-10-CM

## 2024-06-18 LAB
ALBUMIN SERPL BCP-MCNC: 4.2 G/DL (ref 3.2–4.9)
ALBUMIN/GLOB SERPL: 1.8 G/DL
ALP SERPL-CCNC: 111 U/L (ref 30–99)
ALT SERPL-CCNC: 65 U/L (ref 2–50)
AMORPH CRY #/AREA URNS HPF: PRESENT /HPF
ANION GAP SERPL CALC-SCNC: 12 MMOL/L (ref 7–16)
APPEARANCE UR: ABNORMAL
AST SERPL-CCNC: 42 U/L (ref 12–45)
BACTERIA #/AREA URNS HPF: ABNORMAL /HPF
BASOPHILS # BLD AUTO: 0.6 % (ref 0–1.8)
BASOPHILS # BLD: 0.05 K/UL (ref 0–0.12)
BILIRUB SERPL-MCNC: 1 MG/DL (ref 0.1–1.5)
BILIRUB UR QL STRIP.AUTO: ABNORMAL
BUN SERPL-MCNC: 19 MG/DL (ref 8–22)
CALCIUM ALBUM COR SERPL-MCNC: 9.5 MG/DL (ref 8.5–10.5)
CALCIUM SERPL-MCNC: 9.7 MG/DL (ref 8.5–10.5)
CHLORIDE SERPL-SCNC: 107 MMOL/L (ref 96–112)
CO2 SERPL-SCNC: 22 MMOL/L (ref 20–33)
COLOR UR: YELLOW
CREAT SERPL-MCNC: 1.02 MG/DL (ref 0.5–1.4)
EOSINOPHIL # BLD AUTO: 0.14 K/UL (ref 0–0.51)
EOSINOPHIL NFR BLD: 1.6 % (ref 0–6.9)
EPI CELLS #/AREA URNS HPF: ABNORMAL /HPF
ERYTHROCYTE [DISTWIDTH] IN BLOOD BY AUTOMATED COUNT: 53.9 FL (ref 35.9–50)
GFR SERPLBLD CREATININE-BSD FMLA CKD-EPI: 65 ML/MIN/1.73 M 2
GLOBULIN SER CALC-MCNC: 2.4 G/DL (ref 1.9–3.5)
GLUCOSE SERPL-MCNC: 118 MG/DL (ref 65–99)
GLUCOSE UR STRIP.AUTO-MCNC: 500 MG/DL
HCG SERPL QL: NEGATIVE
HCT VFR BLD AUTO: 45.7 % (ref 37–47)
HGB BLD-MCNC: 14.5 G/DL (ref 12–16)
HYALINE CASTS #/AREA URNS LPF: ABNORMAL /LPF
IMM GRANULOCYTES # BLD AUTO: 0.03 K/UL (ref 0–0.11)
IMM GRANULOCYTES NFR BLD AUTO: 0.3 % (ref 0–0.9)
KETONES UR STRIP.AUTO-MCNC: ABNORMAL MG/DL
LEUKOCYTE ESTERASE UR QL STRIP.AUTO: NEGATIVE
LIPASE SERPL-CCNC: 71 U/L (ref 11–82)
LYMPHOCYTES # BLD AUTO: 1.34 K/UL (ref 1–4.8)
LYMPHOCYTES NFR BLD: 15.2 % (ref 22–41)
MCH RBC QN AUTO: 31 PG (ref 27–33)
MCHC RBC AUTO-ENTMCNC: 31.7 G/DL (ref 32.2–35.5)
MCV RBC AUTO: 97.9 FL (ref 81.4–97.8)
MICRO URNS: ABNORMAL
MONOCYTES # BLD AUTO: 0.92 K/UL (ref 0–0.85)
MONOCYTES NFR BLD AUTO: 10.4 % (ref 0–13.4)
NEUTROPHILS # BLD AUTO: 6.33 K/UL (ref 1.82–7.42)
NEUTROPHILS NFR BLD: 71.9 % (ref 44–72)
NITRITE UR QL STRIP.AUTO: POSITIVE
NRBC # BLD AUTO: 0 K/UL
NRBC BLD-RTO: 0 /100 WBC (ref 0–0.2)
PH UR STRIP.AUTO: 5.5 [PH] (ref 5–8)
PLATELET # BLD AUTO: 326 K/UL (ref 164–446)
PMV BLD AUTO: 9.7 FL (ref 9–12.9)
POTASSIUM SERPL-SCNC: 5.2 MMOL/L (ref 3.6–5.5)
PROT SERPL-MCNC: 6.6 G/DL (ref 6–8.2)
PROT UR QL STRIP: 100 MG/DL
RBC # BLD AUTO: 4.67 M/UL (ref 4.2–5.4)
RBC # URNS HPF: ABNORMAL /HPF
RBC UR QL AUTO: NEGATIVE
SODIUM SERPL-SCNC: 141 MMOL/L (ref 135–145)
SP GR UR STRIP.AUTO: >=1.03
UROBILINOGEN UR STRIP.AUTO-MCNC: 1 MG/DL
WBC # BLD AUTO: 8.8 K/UL (ref 4.8–10.8)
WBC #/AREA URNS HPF: ABNORMAL /HPF

## 2024-06-18 PROCEDURE — 96372 THER/PROPH/DIAG INJ SC/IM: CPT

## 2024-06-18 PROCEDURE — RXMED WILLOW AMBULATORY MEDICATION CHARGE: Performed by: EMERGENCY MEDICINE

## 2024-06-18 PROCEDURE — 80053 COMPREHEN METABOLIC PANEL: CPT

## 2024-06-18 PROCEDURE — 84703 CHORIONIC GONADOTROPIN ASSAY: CPT

## 2024-06-18 PROCEDURE — 85025 COMPLETE CBC W/AUTO DIFF WBC: CPT

## 2024-06-18 PROCEDURE — 36415 COLL VENOUS BLD VENIPUNCTURE: CPT

## 2024-06-18 PROCEDURE — 99284 EMERGENCY DEPT VISIT MOD MDM: CPT

## 2024-06-18 PROCEDURE — 700111 HCHG RX REV CODE 636 W/ 250 OVERRIDE (IP): Mod: JZ,UD | Performed by: EMERGENCY MEDICINE

## 2024-06-18 PROCEDURE — 81001 URINALYSIS AUTO W/SCOPE: CPT

## 2024-06-18 PROCEDURE — 83690 ASSAY OF LIPASE: CPT

## 2024-06-18 PROCEDURE — A9270 NON-COVERED ITEM OR SERVICE: HCPCS | Mod: UD | Performed by: EMERGENCY MEDICINE

## 2024-06-18 PROCEDURE — 700102 HCHG RX REV CODE 250 W/ 637 OVERRIDE(OP): Mod: UD | Performed by: EMERGENCY MEDICINE

## 2024-06-18 RX ORDER — CEPHALEXIN 500 MG/1
500 CAPSULE ORAL 4 TIMES DAILY
Qty: 28 CAPSULE | Refills: 0 | Status: ACTIVE | OUTPATIENT
Start: 2024-06-18 | End: 2024-06-24

## 2024-06-18 RX ORDER — METOCLOPRAMIDE HYDROCHLORIDE 5 MG/ML
10 INJECTION INTRAMUSCULAR; INTRAVENOUS ONCE
Status: COMPLETED | OUTPATIENT
Start: 2024-06-18 | End: 2024-06-18

## 2024-06-18 RX ORDER — METOCLOPRAMIDE 10 MG/1
10 TABLET ORAL 4 TIMES DAILY PRN
Qty: 15 TABLET | Refills: 0 | Status: SHIPPED | OUTPATIENT
Start: 2024-06-18

## 2024-06-18 RX ORDER — CEPHALEXIN 500 MG/1
500 CAPSULE ORAL ONCE
Status: COMPLETED | OUTPATIENT
Start: 2024-06-18 | End: 2024-06-18

## 2024-06-18 RX ADMIN — METOCLOPRAMIDE 10 MG: 5 INJECTION, SOLUTION INTRAMUSCULAR; INTRAVENOUS at 14:59

## 2024-06-18 RX ADMIN — CEPHALEXIN 500 MG: 500 CAPSULE ORAL at 16:20

## 2024-06-18 ASSESSMENT — FIBROSIS 4 INDEX: FIB4 SCORE: 1

## 2024-06-18 NOTE — ED TRIAGE NOTES
"Chief Complaint   Patient presents with    N/V     Seen on 6/15 for same and prescribed zofran but is still having nausea. Denies fevers/chills or abdominal pain       Patient to triage ambulatory with a steady gait, AAOx4, Appropriate precautions in place.     Explained wait time and triage process. Placed back in lobby. Told to notify ED tech or RN of any changes, verbalized understanding.    /81   Pulse (!) 102   Temp 36 °C (96.8 °F) (Temporal)   Resp 20   Ht 1.702 m (5' 7\")   Wt 90.4 kg (199 lb 4.7 oz)   LMP 12/17/2018 (Approximate)   SpO2 99%   BMI 31.21 kg/m²     "

## 2024-06-18 NOTE — ED PROVIDER NOTES
"ER Provider Note    Scribed for Dr. Suresh Rich M.D. by Perez Harvey. 6/18/2024  2:43 PM    Primary Care Provider: ROXANN Bella    CHIEF COMPLAINT  Chief Complaint   Patient presents with    N/V     Seen on 6/15 for same and prescribed zofran but is still having nausea. Denies fevers/chills or abdominal pain       EXTERNAL RECORDS REVIEWED  Inpatient Notes Admitted for acute ischemic stroke and Outpatient Notes Seen on the 15th of this month and was discharged with Zofran.       HPI/ROS  LIMITATION TO HISTORY   Select: : None    OUTSIDE HISTORIAN(S):  None.     Analia Wu is a 53 y.o. female who presents to the ED for evaluation of nausea and vomiting onset 2 weeks ago. Patient was recently seen for similar symptoms in this emergency department and was prescribed Zofran at that visit. She has been taking Zofran as prescribed, but her nausea has persisted. She has had episodes of vomiting, and reports that her diet has lacked protein. She denies any fevers, flank pain, back pain, chills, or abdominal pain. Patient has been evaluated for these symptoms by her primary care physician, and has received a referral to GI for follow up. She has not attempted any other medications to treat her nausea. Patient has a history of a stroke, which she was admitted for this year.    PAST MEDICAL HISTORY  Past Medical History:   Diagnosis Date    Anesthesia     ponv,  \"shallow breathing\"    Anxiety     ASTHMA     does not have any inhalers    Cancer (HCC)     vulva    Carpal tunnel syndrome     Heart murmur     pt reports she has never had any problems    Infectious disease 2007    Mrsa infection    Pain 11-26-12    neck, left knee, 6-7/10    Panic attack     Psychiatric problem     depression, PTSD, anxiety    Seasonal allergies        SURGICAL HISTORY  Past Surgical History:   Procedure Laterality Date    RECTAL EXPLORATION  12/19/2018    Procedure: RECTAL EXPLORATION - FOR HIGH RESOLUTION ANOSCOPY;  " Surgeon: Billy Villegas M.D.;  Location: SURGERY [facility];  Service: General    ANAL FISTULECTOMY  5/24/2017    Procedure: ANAL FISTULECTOMY FOR: WIDE EXCISION ANAL DYSPLASIA;  Surgeon: Billy Villegas M.D.;  Location: SURGERY [facility];  Service:     RECTAL EXPLORATION  9/2/2016    Procedure: RECTAL EXPLORATION eua, AND BIOPSY;  Surgeon: Billy Villegas M.D.;  Location: SURGERY [facility];  Service:     VULVECTOMY RADICAL Bilateral 9/2/2016    Procedure: VULVECTOMY RADICAL;  Surgeon: Reese Carroll M.D.;  Location: SURGERY [facility];  Service:     RECTAL EXPLORATION  1/28/2015    Performed by Billy Villegas M.D. at SURGERY [facility]    EXAM UNDER ANESTHESIA  5/21/2014    Performed by Billy Villegas M.D. at SURGERY [facility]    RECTAL EXPLORATION  5/21/2014    Performed by Billy Villegas M.D. at SURGERY [facility]    WIDE EXCISION  12/19/2012    Performed by Billy Villegas M.D. at SURGERY [facility]    WIDE EXCISION  2/29/2012    Performed by BILLY VILLEGAS at SURGERY [facility]    WIDE EXCISION  9/7/2011    Performed by BILLY VILLEGAS at SURGERY [facility]    CERVICAL DISK AND FUSION ANTERIOR  6/27/2011    Performed by YEIMI LEVY at SURGERY [facility]    WIDE EXCISION  8/4/2010    Performed by BILLY VILLEGAS at SURGERY [facility]    HEMORRHOIDECTOMY  8/4/2010    Performed by BILLY VILLEGAS at SURGERY [facility]    VULVECTOMY PARTIAL  October 2007    RECTAL BIOPSY  2007    OTHER NEUROLOGICAL SURG      neck fusion       FAMILY HISTORY  Family History   Problem Relation Age of Onset    Diabetes Mother     Stroke Mother     Cancer Maternal Aunt     Lung Disease Neg Hx     Heart Disease Neg Hx        SOCIAL HISTORY   reports that she quit smoking about 16 years ago. Her smoking use included cigarettes. She started smoking about 41 years ago. She has a 25 pack-year smoking history. She has never used smokeless tobacco. She reports that she does not drink  "alcohol and does not use drugs.    CURRENT MEDICATIONS  Discharge Medication List as of 6/18/2024  4:22 PM        CONTINUE these medications which have NOT CHANGED    Details   famotidine (PEPCID) 20 MG Tab Take 1 Tablet by mouth 2 times a day., Disp-60 Tablet, R-0, Normal      atorvastatin (LIPITOR) 40 MG Tab Take 1 Tablet by mouth every evening., Disp-30 Tablet, R-11, Normal      dapagliflozin propanediol (FARXIGA) 10 MG Tab Take 1 Tablet by mouth every day., Disp-30 Tablet, R-11, Normal      losartan (COZAAR) 25 MG Tab Take one-half (0.5) Tablet by mouth every evening for 30 days., Disp-15 Tablet, R-11, Normal      metoprolol SR (TOPROL XL) 25 MG TABLET SR 24 HR Take 1 Tablet by mouth every day., Disp-30 Tablet, R-11, Normal      apixaban (ELIQUIS) 5mg Tab Take 1 Tablet by mouth 2 times a day for 90 days. Indications: DVT/PE, Disp-60 Tablet, R-2, Normal      Cariprazine HCl (VRAYLAR) 4.5 MG Cap Take 4.5 mg by mouth every evening., Historical Med      venlafaxine XR (EFFEXOR XR) 75 MG CAPSULE SR 24 HR Take 75 mg by mouth every day., Historical Med      gabapentin (NEURONTIN) 300 MG Cap Take 300 mg by mouth 3 times a day., Historical Med             ALLERGIES  Latex, Other food, Tape, Hydromorphone, and Other environmental    PHYSICAL EXAM  /81   Pulse (!) 102   Temp 36 °C (96.8 °F) (Temporal)   Resp 20   Ht 1.702 m (5' 7\")   Wt 90.4 kg (199 lb 4.7 oz)   LMP 12/17/2018 (Approximate)   SpO2 99%   BMI 31.21 kg/m²   Constitutional: Alert in no apparent distress.  HENT: No signs of trauma, Bilateral external ears normal, Nose normal.   Eyes: Pupils are equal and reactive, Conjunctiva normal, Non-icteric.   Neck: Normal range of motion, No tenderness, Supple,   Cardiovascular: Regular rate and rhythm, no murmurs.   Thorax & Lungs: Normal breath sounds, No respiratory distress, No wheezing, No chest tenderness.   Abdomen: Bowel sounds normal, Soft, No tenderness,   Skin: Warm, Dry, No erythema, No rash. "   Back: No bony tenderness, No CVA tenderness.   Extremities: No edema, No tenderness, No cyanosis, no tenderness  Neurologic: Alert , Normal motor function, Normal sensory function, No focal deficits noted.  Psychiatric: Affect normal     DIAGNOSTIC STUDIES & PROCEDURES    Labs:   Labs Reviewed   CBC WITH DIFFERENTIAL - Abnormal; Notable for the following components:       Result Value    MCV 97.9 (*)     MCHC 31.7 (*)     RDW 53.9 (*)     Lymphocytes 15.20 (*)     Monos (Absolute) 0.92 (*)     All other components within normal limits   COMP METABOLIC PANEL - Abnormal; Notable for the following components:    Glucose 118 (*)     ALT(SGPT) 65 (*)     Alkaline Phosphatase 111 (*)     All other components within normal limits   URINALYSIS,CULTURE IF INDICATED - Abnormal; Notable for the following components:    Character Cloudy (*)     Glucose 500 (*)     Ketones Trace (*)     Protein 100 (*)     Bilirubin Moderate (*)     Nitrite Positive (*)     All other components within normal limits   URINE MICROSCOPIC (W/UA) - Abnormal; Notable for the following components:    Bacteria Few (*)     All other components within normal limits   LIPASE   HCG QUAL SERUM   ESTIMATED GFR      All labs reviewed by me.    COURSE & MEDICAL DECISION MAKING    ED Observation Status? No; Patient does not meet criteria for ED Observation.     INITIAL ASSESSMENT AND PLAN  Care Narrative:       2:43 PM - Patient seen and evaluated at bedside. Patient presents today with persisting nausea. Discussed plan of care, and informed her that her lab work that has returned at this time looks promising. Patient agrees to plan of care. Patient will be treated with Reglan 10 mg for her symptoms. Ordered UA culture if indicated, HCG qual serum, Lipase, CMP, and CBC with differential to evaluate.    4:34 PM - I reevaluated the patient at bedside. The patient informs me they feel improved following antibiotic and Reglan administration. I discussed the  patient's diagnostic study results which show acute cystitis with hematuria. I discussed plan for discharge and follow up as outlined below. The patient is stable for discharge at this time and will return for any new or worsening symptoms. Patient verbalizes understanding and support with my plan for discharge.          ADDITIONAL PROBLEM LIST AND DISPOSITION  Patient tolerating oral intake.  Ultimately the patient did have a urinary tract infection.  She has been very nauseated.  The patient has a mild ALT elevation.  There is no leukocytosis.  There is no significant abdominal tenderness.  She is not pregnant.  Do not believe that imaging is necessary at this time.  Increased her Friday of nausea medication with Reglan.  Antibiotics be necessary for the UTI.  We will discharge home with strict return precautions and follow-up.               DISPOSITION AND DISCUSSIONS  I have discussed management of the patient with the following physicians and SUE's: None.     Discussion of management with other QHP or appropriate source(s): None     Escalation of care considered, and ultimately not performed: acute inpatient care management, however at this time, the patient is most appropriate for outpatient management.    Barriers to care at this time, including but not limited to:  None are known at this time .     Decision tools and prescription drugs considered including, but not limited to: Antibiotics Keflex and Reglan .     The patient will return for new or worsening symptoms and is stable at the time of discharge.    DISPOSITION:  Patient will be discharged home in stable condition.    FOLLOW UP:  Mavis Sheldon P.A.-C.  [address]  [place] [address] [place] [id number]  [phone]    In 2 days        OUTPATIENT MEDICATIONS:  Discharge Medication List as of 6/18/2024  4:22 PM        START taking these medications    Details   cephALEXin (KEFLEX) 500 MG Cap Take 1 Capsule by mouth 4 times a day for 7 days., Disp-28  Capsule, R-0, Normal      metoclopramide (REGLAN) 10 MG Tab Take 1 Tablet by mouth 4 times a day as needed (nausea)., Disp-15 Tablet, R-0, Normal              FINAL IMPRESSION   1. Nausea and vomiting, unspecified vomiting type    2. Acute cystitis with hematuria         Perez CHIN (Scribe), am scribing for, and in the presence of, Suresh Rich M.D..    Electronically signed by: Perez Harvey (Scribe), 6/18/2024    ISuresh M.D. personally performed the services described in this documentation, as scribed by Perez Harvey in my presence, and it is both accurate and complete.    The note accurately reflects work and decisions made by me.  Suresh Rich M.D.  6/18/2024  5:02 PM

## 2024-06-20 NOTE — DISCHARGE SUMMARY
Discharge Summary    CHIEF COMPLAINT ON ADMISSION  No chief complaint on file.      Reason for Admission  other     Admission Date  5/30/2024    CODE STATUS  Prior    HPI & HOSPITAL COURSE  Analia Wu is a 53 y.o. female with obesity, perianal dysplasia(s/p surgery), mood disorder, dyslipidemia, depression.  She presented 5/30/2024 after a discharge earlier that day for SOB and Nausea/vomiting. She was diagnosed with a left ventricular thrombus and treated with heparin drip then transitioned to apixaban.. An Echo suggested an EF:25%. She was discharged 5/30, but returned with an acute onset of slurred speech and left sided weakness. She was now admitted with an acute stroke. She was taken to IR for a extraction of a right M1 occlusion.      6/2: She remains with slight slur and has left facial droop. No other neurologic deficit.  No headache.  Needs PT/OT.  6/3 cleared for DC, stable vitals and labs, stable neuro exam. Will follow with stroke bridge clinic on discharge      Given presence of a small subarachnoid hemorrhage, will reduce Eliquis dose to 5 mg twice a day.   Therefore, she is discharged in good and stable condition to home with close outpatient follow-up.    The patient met 2-midnight criteria for an inpatient stay at the time of discharge.    Discharge Date  6/3/2024    FOLLOW UP ITEMS POST DISCHARGE  Stroke bridge clinic and PCP follow up for continued care    DISCHARGE DIAGNOSES  Principal Problem:    Acute ischemic right MCA stroke (HCC) (POA: Yes)  Active Problems:    Mixed anxiety and depressive disorder (POA: Yes)    Hyperlipidemia (POA: Yes)    Pulmonic stenosis, congenital (POA: Yes)      Overview: IMO load March 2020    Left ventricular thrombus (POA: Yes)    Acute systolic heart failure (HCC) (POA: Yes)    Subarachnoid bleed (HCC) (POA: Unknown)  Resolved Problems:    * No resolved hospital problems. *      FOLLOW UP  Future Appointments   Date Time Provider Department Center   6/24/2024   "1:45 PM PHARMACIST-CAM B [facility] None   7/2/2024  2:30 PM EDUCATION - CARDIO [facility] None   7/23/2024  1:45 PM CARLIE Shi [facility] None   7/24/2024 10:00 AM Feng Shipley D.O. [facility] None     ROXANN Bella  [address] [facility] [address]  [phone]            MEDICATIONS ON DISCHARGE     Medication List        CHANGE how you take these medications        Instructions   Eliquis 5 MG Tabs  What changed: See the new instructions.  Generic drug: apixaban   Take 1 Tablet by mouth 2 times a day for 90 days. Indications: DVT/PE  Dose: 5 mg            CONTINUE taking these medications        Instructions   gabapentin 300 MG Caps  Commonly known as: Neurontin   Take 300 mg by mouth 3 times a day.  Dose: 300 mg     venlafaxine XR 75 MG Cp24  Commonly known as: Effexor XR   Take 75 mg by mouth every day.  Dose: 75 mg     Vraylar 4.5 MG Caps  Generic drug: Cariprazine HCl   Take 4.5 mg by mouth every evening.  Dose: 4.5 mg              Allergies  Allergies   Allergen Reactions    Latex Shortness of Breath and Itching    Other Food Hives and Rash     Splenda    Tape      \"makes skin turn red\"  Paper tape ok    Hydromorphone Vomiting and Nausea    Other Environmental      Dial soap --  Skin itching       DIET  No orders of the defined types were placed in this encounter.      ACTIVITY  As tolerated.  Weight bearing as tolerated    CONSULTATIONS  Neurology, ICU    PROCEDURES  NA    LABORATORY  Lab Results   Component Value Date    SODIUM 141 06/18/2024    POTASSIUM 5.2 06/18/2024    CHLORIDE 107 06/18/2024    CO2 22 06/18/2024    GLUCOSE 118 (H) 06/18/2024    BUN 19 06/18/2024    CREATININE 1.02 06/18/2024    CREATININE 0.8 10/05/2007        Lab Results   Component Value Date    WBC 8.8 06/18/2024    HEMOGLOBIN 14.5 06/18/2024    HEMATOCRIT 45.7 06/18/2024    PLATELETCT 326 06/18/2024        Total time of the discharge process exceeds 35 minutes.  "

## 2024-06-24 ENCOUNTER — NON-PROVIDER VISIT (OUTPATIENT)
Dept: CARDIOLOGY | Facility: MEDICAL CENTER | Age: 54
End: 2024-06-24
Attending: NURSE PRACTITIONER
Payer: MEDICAID

## 2024-06-24 VITALS
BODY MASS INDEX: 31.42 KG/M2 | DIASTOLIC BLOOD PRESSURE: 78 MMHG | HEART RATE: 99 BPM | WEIGHT: 200.6 LBS | SYSTOLIC BLOOD PRESSURE: 104 MMHG

## 2024-06-24 PROCEDURE — 99213 OFFICE O/P EST LOW 20 MIN: CPT

## 2024-06-24 ASSESSMENT — FIBROSIS 4 INDEX: FIB4 SCORE: 0.86

## 2024-06-24 NOTE — PROGRESS NOTES
CHF Pharmacotherapy Visit    Informed written consent was given on: 06/24/24    Analia Wu is here for CHF    HPI  Pertinent Interval History since last visit:   ED visits d/t persistent N/V on 06/10, 06/15, and 06/18  Reports N/V has been an ongoing issue since before she developed heart issues  Reports that she is just starting to be able to keep food down  States metoclopramide makes her most nauseous so she is not taking it  Has a referral to GI but states she is on the waitlist. In the meantime, patient has f/u with PCP tomorrow.  Pt denies CP, SOB, LE edema, lightheadedness/dizziness    Most recent EF:  25% 05/28/24    Potential Barriers to Care:  Adherence: denies missed doses but unsure if she is keeping it down due to vomiting  Side effects: none  Affordability: no issues, covered by Medicaid    Current CHF Medications - including dose:   Entresto or ACE/ARB: losartan 12.5 mg daily  Beta blocker: metoprolol ER 25 mg daily   Diuretic: none  Aldosterone antagonist: none  SGLT2i: Farxiga 10 mg daily    Home BP and HR:  BP: typically 100s/80s, highest ~118/80  HR: 105-110    Diabetes Data Review:  Lab Results   Component Value Date/Time    HBA1C 5.8 (H) 05/31/2024 04:20 AM     Current lipid medications:  Atorvastatin 40 mg daily    Lipids Data Review:   Lab Results   Component Value Date/Time    CHOLSTRLTOT 129 05/31/2024 04:20 AM    LDL 74 05/31/2024 04:20 AM    HDL 38 (A) 05/31/2024 04:20 AM    TRIGLYCERIDE 84 05/31/2024 04:20 AM       Lifestyle:  Change in weight: Stable  Exercise habits: no regular exercise program at this time d/t N/V. Experiencing fatigue.  Diet: low sodium    DATA REVIEW  Vitals:    06/24/24 1357   BP: 104/78   Pulse: 99       Lab Results   Component Value Date/Time    SODIUM 141 06/18/2024 01:01 PM    POTASSIUM 5.2 06/18/2024 01:01 PM    CHLORIDE 107 06/18/2024 01:01 PM    CO2 22 06/18/2024 01:01 PM    GLUCOSE 118 (H) 06/18/2024 01:01 PM    BUN 19 06/18/2024 01:01 PM     "CREATININE 1.02 06/18/2024 01:01 PM    CREATININE 0.8 10/05/2007 04:50 AM     Lab Results   Component Value Date/Time    ALKPHOSPHAT 111 (H) 06/18/2024 01:01 PM    ASTSGOT 42 06/18/2024 01:01 PM    ALTSGPT 65 (H) 06/18/2024 01:01 PM    TBILIRUBIN 1.0 06/18/2024 01:01 PM    INR 1.15 (H) 05/30/2024 06:32 PM    ALBUMIN 4.2 06/18/2024 01:01 PM      No components found for: \"MICROALBUMINCREATRATIOURINE\"    Renal function:  Calculated creatinine clearance: 90 ml/min   eGFR: 65 mL/min/1.73 m2    Other:  Immunization History   Administered Date(s) Administered    Influenza (IM) Preservative Free - HISTORICAL DATA 10/10/2013, 10/10/2014    Influenza Vaccine Quad Inj (Pf) 10/19/2015, 11/01/2017, 11/08/2018    PFIZER PURPLE CAP SARS-COV-2 VACCINATION (12+) 05/21/2021, 06/15/2021    Pneumococcal Conjugate Vaccine (PCV20) 05/30/2024    Tdap Vaccine 08/22/2006, 08/01/2012     Recent Imaging Studies:    None since last visit    ASSESSMENT AND PLAN  CHF  Educated pt on basic pathophysiology and symptom management, as well as the importance of GDMT.   Pt denies CP, SOB, LE edema, lightheadedness/dizziness  Reported home vitals are comparable to clinic vitals today  Could optimize GDMT further (especially increasing metoprolol at this time), however pt expresses concern about not being able to keep oral intake down. Per shared decision making, will continue current regimen and re-evaluate at f/u.    CHF medications (changes are bolded)  Entresto or ACE/ARB: losartan 12.5 mg daily  Beta blocker: metoprolol ER 25 mg daily   Diuretic: none  Aldosterone antagonist: none  SGLT2i: Farxiga 10 mg daily    2. Hyperlipidemia  Patient type: Secondary Prevention  Goal: LDL-C:   <70 mg/dL  LDL slightly above goal at 74 mg/dl. Could consider additional pharmacotherapy in the future or focus on lifestyle modifications for now    Lipid medications (changes are bolded)  Atorvastatin 40 mg daily    3. Lifestyle   Recommendations From Today's Visit: "   Increase exercise as tolerated  Continue low sodium diet    Blood Work Ordered At Today's visit:   None    Follow-Up:   4 weeks    Gia Steve, Mike    CC:  DANIELITO Matamoros Ruth, A.P.R.N.    xMedications reconciled  xFlow sheets updated            [facility] Consent      Name    Analia Wu    MRN number: [id number]    the following are guidelines for participation in the [facility].     I, ____Analia Wu_____, understand and voluntarily agree to participate in the [facility] and to have services provided to me by pharmacists working in collaboration with my provider.    I understand the pharmacist within the [facility] may initiate, modify or discontinue my medications, order appropriate testing and appointments, perform exams, monitor treatment, and make clinical evaluations and decisions pursuant to a collaborative practice agreement with my provider.  I understand the pharmacist within the [facility] is not a physician, osteopathic physician, advanced practice registered nurse or physician assistant and may not diagnose.  I will take all my medications as instructed and not change the way I take it without first talking to my provider or a pharmacist within the [facility].  I understand that if I am late to my appointment I may not be able to be seen by a pharmacist at that time and will have to reschedule my appointment.  During appointment with pharmacist I understand that pharmacist has the right not to answer questions or perform services outside the pharmacist’s scope of practice.  By signing below, I provide informed consent for the pharmacist to provide these services and for my participation in the [facility].      Analia Wu           [id number]           06/24/24  Patient Name                   MRN number  Date     ____Obtained verbal consent from Analia Wu____  Patient Signature

## 2024-07-02 ENCOUNTER — APPOINTMENT (OUTPATIENT)
Dept: CARDIOLOGY | Facility: MEDICAL CENTER | Age: 54
End: 2024-07-02
Attending: NURSE PRACTITIONER
Payer: MEDICAID

## 2024-07-11 ENCOUNTER — APPOINTMENT (OUTPATIENT)
Dept: CARDIOLOGY | Facility: MEDICAL CENTER | Age: 54
End: 2024-07-11
Attending: NURSE PRACTITIONER
Payer: MEDICAID

## 2024-07-16 ENCOUNTER — APPOINTMENT (OUTPATIENT)
Dept: CARDIOLOGY | Facility: MEDICAL CENTER | Age: 54
End: 2024-07-16
Attending: NURSE PRACTITIONER
Payer: MEDICAID

## 2024-07-23 ENCOUNTER — APPOINTMENT (OUTPATIENT)
Dept: CARDIOLOGY | Facility: MEDICAL CENTER | Age: 54
End: 2024-07-23
Attending: NURSE PRACTITIONER
Payer: MEDICAID

## 2024-07-24 ENCOUNTER — OFFICE VISIT (OUTPATIENT)
Dept: NEUROLOGY | Facility: MEDICAL CENTER | Age: 54
End: 2024-07-24
Attending: PSYCHIATRY & NEUROLOGY
Payer: MEDICAID

## 2024-07-24 ENCOUNTER — OFFICE VISIT (OUTPATIENT)
Dept: CARDIOLOGY | Facility: MEDICAL CENTER | Age: 54
End: 2024-07-24
Attending: NURSE PRACTITIONER
Payer: MEDICAID

## 2024-07-24 VITALS
BODY MASS INDEX: 32.49 KG/M2 | HEIGHT: 67 IN | OXYGEN SATURATION: 98 % | WEIGHT: 207.01 LBS | HEART RATE: 95 BPM | SYSTOLIC BLOOD PRESSURE: 112 MMHG | TEMPERATURE: 97.3 F | DIASTOLIC BLOOD PRESSURE: 58 MMHG

## 2024-07-24 VITALS
OXYGEN SATURATION: 93 % | RESPIRATION RATE: 18 BRPM | HEIGHT: 67 IN | WEIGHT: 208.2 LBS | BODY MASS INDEX: 32.68 KG/M2 | DIASTOLIC BLOOD PRESSURE: 65 MMHG | HEART RATE: 115 BPM | SYSTOLIC BLOOD PRESSURE: 99 MMHG

## 2024-07-24 DIAGNOSIS — Z86.73 H/O ISCHEMIC RIGHT MCA STROKE: ICD-10-CM

## 2024-07-24 DIAGNOSIS — I51.3 LEFT VENTRICULAR THROMBUS: ICD-10-CM

## 2024-07-24 DIAGNOSIS — I50.9 HEART FAILURE, NYHA CLASS 2 (HCC): ICD-10-CM

## 2024-07-24 DIAGNOSIS — I50.20 ACC/AHA STAGE C SYSTOLIC HEART FAILURE (HCC): ICD-10-CM

## 2024-07-24 DIAGNOSIS — Z79.899 HIGH RISK MEDICATION USE: ICD-10-CM

## 2024-07-24 PROBLEM — I63.511 ACUTE ISCHEMIC RIGHT MCA STROKE (HCC): Status: RESOLVED | Noted: 2024-05-30 | Resolved: 2024-07-24

## 2024-07-24 PROCEDURE — 99214 OFFICE O/P EST MOD 30 MIN: CPT | Performed by: NURSE PRACTITIONER

## 2024-07-24 PROCEDURE — 3078F DIAST BP <80 MM HG: CPT | Performed by: NURSE PRACTITIONER

## 2024-07-24 PROCEDURE — 99212 OFFICE O/P EST SF 10 MIN: CPT | Performed by: NURSE PRACTITIONER

## 2024-07-24 PROCEDURE — 99214 OFFICE O/P EST MOD 30 MIN: CPT | Performed by: PSYCHIATRY & NEUROLOGY

## 2024-07-24 PROCEDURE — 99212 OFFICE O/P EST SF 10 MIN: CPT | Performed by: PSYCHIATRY & NEUROLOGY

## 2024-07-24 PROCEDURE — 3078F DIAST BP <80 MM HG: CPT | Performed by: PSYCHIATRY & NEUROLOGY

## 2024-07-24 PROCEDURE — 3074F SYST BP LT 130 MM HG: CPT | Performed by: PSYCHIATRY & NEUROLOGY

## 2024-07-24 PROCEDURE — 3074F SYST BP LT 130 MM HG: CPT | Performed by: NURSE PRACTITIONER

## 2024-07-24 RX ORDER — FUROSEMIDE 40 MG/1
40 TABLET ORAL DAILY
Qty: 30 TABLET | Refills: 11 | Status: ON HOLD | OUTPATIENT
Start: 2024-07-24 | End: 2024-07-31

## 2024-07-24 RX ORDER — VENLAFAXINE HYDROCHLORIDE 75 MG/1
75 CAPSULE, EXTENDED RELEASE ORAL DAILY
COMMUNITY
Start: 2024-06-25 | End: 2024-07-24

## 2024-07-24 ASSESSMENT — ENCOUNTER SYMPTOMS
FEVER: 0
CLAUDICATION: 0
ABDOMINAL PAIN: 0
PALPITATIONS: 0
COUGH: 0
DIZZINESS: 0
ORTHOPNEA: 0
PND: 0
MYALGIAS: 0
SHORTNESS OF BREATH: 1

## 2024-07-24 ASSESSMENT — FIBROSIS 4 INDEX
FIB4 SCORE: 0.86
FIB4 SCORE: 0.86

## 2024-07-27 ENCOUNTER — HOSPITAL ENCOUNTER (INPATIENT)
Facility: MEDICAL CENTER | Age: 54
LOS: 4 days | End: 2024-07-31
Attending: EMERGENCY MEDICINE | Admitting: INTERNAL MEDICINE
Payer: MEDICAID

## 2024-07-27 ENCOUNTER — APPOINTMENT (OUTPATIENT)
Dept: RADIOLOGY | Facility: MEDICAL CENTER | Age: 54
DRG: 292 | End: 2024-07-27
Attending: EMERGENCY MEDICINE
Payer: MEDICAID

## 2024-07-27 DIAGNOSIS — I50.21 ACUTE SYSTOLIC HEART FAILURE (HCC): ICD-10-CM

## 2024-07-27 DIAGNOSIS — M79.89 LEG SWELLING: ICD-10-CM

## 2024-07-27 DIAGNOSIS — I50.20 ACC/AHA STAGE C SYSTOLIC HEART FAILURE (HCC): ICD-10-CM

## 2024-07-27 DIAGNOSIS — Z86.73 H/O ISCHEMIC RIGHT MCA STROKE: ICD-10-CM

## 2024-07-27 DIAGNOSIS — I50.9 ACUTE CONGESTIVE HEART FAILURE, UNSPECIFIED HEART FAILURE TYPE (HCC): ICD-10-CM

## 2024-07-27 DIAGNOSIS — R60.0 PERIPHERAL EDEMA: ICD-10-CM

## 2024-07-27 DIAGNOSIS — I50.23 ACUTE ON CHRONIC HFREF (HEART FAILURE WITH REDUCED EJECTION FRACTION) (HCC): ICD-10-CM

## 2024-07-27 PROBLEM — R17 ELEVATED BILIRUBIN: Status: ACTIVE | Noted: 2024-07-27

## 2024-07-27 LAB
ALBUMIN SERPL BCP-MCNC: 3.7 G/DL (ref 3.2–4.9)
ALBUMIN/GLOB SERPL: 1.2 G/DL
ALP SERPL-CCNC: 141 U/L (ref 30–99)
ALT SERPL-CCNC: 91 U/L (ref 2–50)
ANION GAP SERPL CALC-SCNC: 17 MMOL/L (ref 7–16)
AST SERPL-CCNC: 51 U/L (ref 12–45)
BASOPHILS # BLD AUTO: 0.5 % (ref 0–1.8)
BASOPHILS # BLD: 0.05 K/UL (ref 0–0.12)
BILIRUB CONJ SERPL-MCNC: 1.8 MG/DL (ref 0.1–0.5)
BILIRUB INDIRECT SERPL-MCNC: 1.6 MG/DL (ref 0–1)
BILIRUB SERPL-MCNC: 3.4 MG/DL (ref 0.1–1.5)
BUN SERPL-MCNC: 20 MG/DL (ref 8–22)
CALCIUM ALBUM COR SERPL-MCNC: 9.7 MG/DL (ref 8.5–10.5)
CALCIUM SERPL-MCNC: 9.5 MG/DL (ref 8.5–10.5)
CHLORIDE SERPL-SCNC: 96 MMOL/L (ref 96–112)
CO2 SERPL-SCNC: 22 MMOL/L (ref 20–33)
CREAT SERPL-MCNC: 1 MG/DL (ref 0.5–1.4)
EKG IMPRESSION: NORMAL
EOSINOPHIL # BLD AUTO: 0.37 K/UL (ref 0–0.51)
EOSINOPHIL NFR BLD: 4 % (ref 0–6.9)
ERYTHROCYTE [DISTWIDTH] IN BLOOD BY AUTOMATED COUNT: 51.6 FL (ref 35.9–50)
GFR SERPLBLD CREATININE-BSD FMLA CKD-EPI: 67 ML/MIN/1.73 M 2
GLOBULIN SER CALC-MCNC: 3.1 G/DL (ref 1.9–3.5)
GLUCOSE SERPL-MCNC: 121 MG/DL (ref 65–99)
HCT VFR BLD AUTO: 40.7 % (ref 37–47)
HGB BLD-MCNC: 13 G/DL (ref 12–16)
IMM GRANULOCYTES # BLD AUTO: 0.04 K/UL (ref 0–0.11)
IMM GRANULOCYTES NFR BLD AUTO: 0.4 % (ref 0–0.9)
LYMPHOCYTES # BLD AUTO: 1.13 K/UL (ref 1–4.8)
LYMPHOCYTES NFR BLD: 12.3 % (ref 22–41)
MCH RBC QN AUTO: 28.5 PG (ref 27–33)
MCHC RBC AUTO-ENTMCNC: 31.9 G/DL (ref 32.2–35.5)
MCV RBC AUTO: 89.3 FL (ref 81.4–97.8)
MONOCYTES # BLD AUTO: 0.92 K/UL (ref 0–0.85)
MONOCYTES NFR BLD AUTO: 10.1 % (ref 0–13.4)
NEUTROPHILS # BLD AUTO: 6.64 K/UL (ref 1.82–7.42)
NEUTROPHILS NFR BLD: 72.7 % (ref 44–72)
NRBC # BLD AUTO: 0 K/UL
NRBC BLD-RTO: 0 /100 WBC (ref 0–0.2)
NT-PROBNP SERPL IA-MCNC: 6804 PG/ML (ref 0–125)
PLATELET # BLD AUTO: 305 K/UL (ref 164–446)
PMV BLD AUTO: 9.7 FL (ref 9–12.9)
POTASSIUM SERPL-SCNC: 3.1 MMOL/L (ref 3.6–5.5)
PROT SERPL-MCNC: 6.8 G/DL (ref 6–8.2)
RBC # BLD AUTO: 4.56 M/UL (ref 4.2–5.4)
SODIUM SERPL-SCNC: 135 MMOL/L (ref 135–145)
TROPONIN T SERPL-MCNC: 48 NG/L (ref 6–19)
WBC # BLD AUTO: 9.2 K/UL (ref 4.8–10.8)

## 2024-07-27 PROCEDURE — 93005 ELECTROCARDIOGRAM TRACING: CPT | Performed by: INTERNAL MEDICINE

## 2024-07-27 PROCEDURE — 99223 1ST HOSP IP/OBS HIGH 75: CPT | Performed by: INTERNAL MEDICINE

## 2024-07-27 PROCEDURE — A9270 NON-COVERED ITEM OR SERVICE: HCPCS | Mod: JZ | Performed by: INTERNAL MEDICINE

## 2024-07-27 PROCEDURE — 84484 ASSAY OF TROPONIN QUANT: CPT

## 2024-07-27 PROCEDURE — 71045 X-RAY EXAM CHEST 1 VIEW: CPT

## 2024-07-27 PROCEDURE — 85025 COMPLETE CBC W/AUTO DIFF WBC: CPT

## 2024-07-27 PROCEDURE — 99285 EMERGENCY DEPT VISIT HI MDM: CPT

## 2024-07-27 PROCEDURE — 80053 COMPREHEN METABOLIC PANEL: CPT

## 2024-07-27 PROCEDURE — 83880 ASSAY OF NATRIURETIC PEPTIDE: CPT

## 2024-07-27 PROCEDURE — 700111 HCHG RX REV CODE 636 W/ 250 OVERRIDE (IP): Mod: JZ,UD | Performed by: EMERGENCY MEDICINE

## 2024-07-27 PROCEDURE — A9270 NON-COVERED ITEM OR SERVICE: HCPCS

## 2024-07-27 PROCEDURE — 700102 HCHG RX REV CODE 250 W/ 637 OVERRIDE(OP)

## 2024-07-27 PROCEDURE — 770020 HCHG ROOM/CARE - TELE (206)

## 2024-07-27 PROCEDURE — 96374 THER/PROPH/DIAG INJ IV PUSH: CPT

## 2024-07-27 PROCEDURE — 700111 HCHG RX REV CODE 636 W/ 250 OVERRIDE (IP): Mod: JZ | Performed by: INTERNAL MEDICINE

## 2024-07-27 PROCEDURE — 700102 HCHG RX REV CODE 250 W/ 637 OVERRIDE(OP): Mod: JZ | Performed by: INTERNAL MEDICINE

## 2024-07-27 PROCEDURE — 36415 COLL VENOUS BLD VENIPUNCTURE: CPT

## 2024-07-27 PROCEDURE — 82248 BILIRUBIN DIRECT: CPT

## 2024-07-27 PROCEDURE — 96376 TX/PRO/DX INJ SAME DRUG ADON: CPT

## 2024-07-27 RX ORDER — METOPROLOL SUCCINATE 25 MG/1
25 TABLET, EXTENDED RELEASE ORAL DAILY
Status: DISCONTINUED | OUTPATIENT
Start: 2024-07-28 | End: 2024-07-31 | Stop reason: HOSPADM

## 2024-07-27 RX ORDER — ONDANSETRON 4 MG/1
4 TABLET, ORALLY DISINTEGRATING ORAL EVERY 4 HOURS PRN
Status: DISCONTINUED | OUTPATIENT
Start: 2024-07-27 | End: 2024-07-31 | Stop reason: HOSPADM

## 2024-07-27 RX ORDER — VENLAFAXINE HYDROCHLORIDE 37.5 MG/1
75 CAPSULE, EXTENDED RELEASE ORAL DAILY
Status: DISCONTINUED | OUTPATIENT
Start: 2024-07-28 | End: 2024-07-31 | Stop reason: HOSPADM

## 2024-07-27 RX ORDER — FUROSEMIDE 10 MG/ML
60 INJECTION INTRAMUSCULAR; INTRAVENOUS ONCE
Status: COMPLETED | OUTPATIENT
Start: 2024-07-27 | End: 2024-07-27

## 2024-07-27 RX ORDER — ATORVASTATIN CALCIUM 40 MG/1
40 TABLET, FILM COATED ORAL EVERY EVENING
Status: DISCONTINUED | OUTPATIENT
Start: 2024-07-27 | End: 2024-07-31 | Stop reason: HOSPADM

## 2024-07-27 RX ORDER — DAPAGLIFLOZIN 10 MG/1
10 TABLET, FILM COATED ORAL DAILY
Status: DISCONTINUED | OUTPATIENT
Start: 2024-07-28 | End: 2024-07-31 | Stop reason: HOSPADM

## 2024-07-27 RX ORDER — LANOLIN ALCOHOL/MO/W.PET/CERES
400 CREAM (GRAM) TOPICAL 2 TIMES DAILY
Status: DISCONTINUED | OUTPATIENT
Start: 2024-07-27 | End: 2024-07-31 | Stop reason: HOSPADM

## 2024-07-27 RX ORDER — PROMETHAZINE HYDROCHLORIDE 25 MG/1
12.5-25 TABLET ORAL EVERY 4 HOURS PRN
Status: DISCONTINUED | OUTPATIENT
Start: 2024-07-27 | End: 2024-07-31 | Stop reason: HOSPADM

## 2024-07-27 RX ORDER — ACETAMINOPHEN 325 MG/1
650 TABLET ORAL EVERY 4 HOURS PRN
Status: DISCONTINUED | OUTPATIENT
Start: 2024-07-27 | End: 2024-07-31 | Stop reason: HOSPADM

## 2024-07-27 RX ORDER — ONDANSETRON 2 MG/ML
4 INJECTION INTRAMUSCULAR; INTRAVENOUS EVERY 4 HOURS PRN
Status: DISCONTINUED | OUTPATIENT
Start: 2024-07-27 | End: 2024-07-31 | Stop reason: HOSPADM

## 2024-07-27 RX ORDER — CEPHALEXIN 500 MG/1
500 CAPSULE ORAL 4 TIMES DAILY
Status: ON HOLD | COMMUNITY
Start: 2024-06-18 | End: 2024-07-31

## 2024-07-27 RX ORDER — VENLAFAXINE HYDROCHLORIDE 75 MG/1
75 CAPSULE, EXTENDED RELEASE ORAL DAILY
COMMUNITY

## 2024-07-27 RX ORDER — PROMETHAZINE HYDROCHLORIDE 25 MG/1
12.5-25 SUPPOSITORY RECTAL EVERY 4 HOURS PRN
Status: DISCONTINUED | OUTPATIENT
Start: 2024-07-27 | End: 2024-07-31 | Stop reason: HOSPADM

## 2024-07-27 RX ORDER — PROCHLORPERAZINE EDISYLATE 5 MG/ML
5-10 INJECTION INTRAMUSCULAR; INTRAVENOUS EVERY 4 HOURS PRN
Status: DISCONTINUED | OUTPATIENT
Start: 2024-07-27 | End: 2024-07-31 | Stop reason: HOSPADM

## 2024-07-27 RX ORDER — FUROSEMIDE 10 MG/ML
40 INJECTION INTRAMUSCULAR; INTRAVENOUS EVERY 8 HOURS
Status: DISCONTINUED | OUTPATIENT
Start: 2024-07-27 | End: 2024-07-31 | Stop reason: HOSPADM

## 2024-07-27 RX ORDER — POTASSIUM CHLORIDE 1500 MG/1
20 TABLET, EXTENDED RELEASE ORAL 2 TIMES DAILY
Status: DISCONTINUED | OUTPATIENT
Start: 2024-07-27 | End: 2024-07-31 | Stop reason: HOSPADM

## 2024-07-27 RX ADMIN — FUROSEMIDE 60 MG: 10 INJECTION, SOLUTION INTRAVENOUS at 14:22

## 2024-07-27 RX ADMIN — ATORVASTATIN CALCIUM 40 MG: 40 TABLET, FILM COATED ORAL at 17:55

## 2024-07-27 RX ADMIN — POTASSIUM CHLORIDE 20 MEQ: 1500 TABLET, EXTENDED RELEASE ORAL at 17:55

## 2024-07-27 RX ADMIN — APIXABAN 5 MG: 5 TABLET, FILM COATED ORAL at 17:55

## 2024-07-27 RX ADMIN — FUROSEMIDE 40 MG: 10 INJECTION, SOLUTION INTRAVENOUS at 22:11

## 2024-07-27 RX ADMIN — Medication 400 MG: at 17:55

## 2024-07-27 RX ADMIN — ACETAMINOPHEN 650 MG: 325 TABLET ORAL at 22:59

## 2024-07-27 RX ADMIN — FUROSEMIDE 40 MG: 10 INJECTION, SOLUTION INTRAVENOUS at 16:37

## 2024-07-27 ASSESSMENT — SOCIAL DETERMINANTS OF HEALTH (SDOH)
WITHIN THE LAST YEAR, HAVE YOU BEEN HUMILIATED OR EMOTIONALLY ABUSED IN OTHER WAYS BY YOUR PARTNER OR EX-PARTNER?: NO
WITHIN THE PAST 12 MONTHS, THE FOOD YOU BOUGHT JUST DIDN'T LAST AND YOU DIDN'T HAVE MONEY TO GET MORE: NEVER TRUE
WITHIN THE LAST YEAR, HAVE TO BEEN RAPED OR FORCED TO HAVE ANY KIND OF SEXUAL ACTIVITY BY YOUR PARTNER OR EX-PARTNER?: NO
WITHIN THE PAST 12 MONTHS, YOU WORRIED THAT YOUR FOOD WOULD RUN OUT BEFORE YOU GOT THE MONEY TO BUY MORE: NEVER TRUE
WITHIN THE LAST YEAR, HAVE YOU BEEN AFRAID OF YOUR PARTNER OR EX-PARTNER?: NO
IN THE PAST 12 MONTHS, HAS THE ELECTRIC, GAS, OIL, OR WATER COMPANY THREATENED TO SHUT OFF SERVICE IN YOUR HOME?: NO
WITHIN THE LAST YEAR, HAVE YOU BEEN KICKED, HIT, SLAPPED, OR OTHERWISE PHYSICALLY HURT BY YOUR PARTNER OR EX-PARTNER?: NO

## 2024-07-27 ASSESSMENT — LIFESTYLE VARIABLES
CONSUMPTION TOTAL: NEGATIVE
ALCOHOL_USE: NO
EVER FELT BAD OR GUILTY ABOUT YOUR DRINKING: NO
TOTAL SCORE: 0
HAVE PEOPLE ANNOYED YOU BY CRITICIZING YOUR DRINKING: NO
TOTAL SCORE: 0
ON A TYPICAL DAY WHEN YOU DRINK ALCOHOL HOW MANY DRINKS DO YOU HAVE: 0
EVER HAD A DRINK FIRST THING IN THE MORNING TO STEADY YOUR NERVES TO GET RID OF A HANGOVER: NO
HOW MANY TIMES IN THE PAST YEAR HAVE YOU HAD 5 OR MORE DRINKS IN A DAY: 0
TOTAL SCORE: 0
AVERAGE NUMBER OF DAYS PER WEEK YOU HAVE A DRINK CONTAINING ALCOHOL: 0
HAVE YOU EVER FELT YOU SHOULD CUT DOWN ON YOUR DRINKING: NO

## 2024-07-27 ASSESSMENT — COGNITIVE AND FUNCTIONAL STATUS - GENERAL
DAILY ACTIVITIY SCORE: 24
MOBILITY SCORE: 24
SUGGESTED CMS G CODE MODIFIER MOBILITY: CH
SUGGESTED CMS G CODE MODIFIER DAILY ACTIVITY: CH

## 2024-07-27 ASSESSMENT — ENCOUNTER SYMPTOMS
ABDOMINAL PAIN: 0
PALPITATIONS: 0
VOMITING: 1
ORTHOPNEA: 1
CHILLS: 0
PND: 1
SHORTNESS OF BREATH: 1
DIARRHEA: 0
COUGH: 0
NAUSEA: 1
FEVER: 0

## 2024-07-27 ASSESSMENT — FIBROSIS 4 INDEX
FIB4 SCORE: 0.86
FIB4 SCORE: 0.95
FIB4 SCORE: 0.95

## 2024-07-27 ASSESSMENT — PATIENT HEALTH QUESTIONNAIRE - PHQ9
SUM OF ALL RESPONSES TO PHQ9 QUESTIONS 1 AND 2: 0
2. FEELING DOWN, DEPRESSED, IRRITABLE, OR HOPELESS: NOT AT ALL
1. LITTLE INTEREST OR PLEASURE IN DOING THINGS: NOT AT ALL

## 2024-07-27 ASSESSMENT — PAIN DESCRIPTION - PAIN TYPE
TYPE: ACUTE PAIN
TYPE: ACUTE PAIN

## 2024-07-27 NOTE — ED NOTES
Assumed care of pt, received bedside report from PÉREZ Heard    Cardiac and spO2 monitor on, call light within reach.

## 2024-07-27 NOTE — H&P
Hospital Medicine History & Physical Note    Date of Service  7/27/2024    Primary Care Physician  Mavis Sheldon P.A.-C.    Consultants  none    Specialist Names: none    Code Status  Full Code    Chief Complaint  Chief Complaint   Patient presents with    Leg Swelling       History of Presenting Illness  Analia Wu is a 53 y.o. female who presented 7/27/2024 with HFrEF of unknown etiology with known left ventricular thrombus complicated by embolic stroke who comes into the hospital for above chief complaint.  Patient states that she does not know her dry weight but that she has had increasing bilateral lower extremity swelling that is worsened over the last week.  It is gone to the point where she cannot put her shoes on and has to wear sandals.  She also endorses some PND and orthopnea.  She went to her cardiology urgent care clinic yesterday where she was put on oral Lasix but then came into the hospital as she had increasing shortness of breath.  Patient was given IV Lasix 60 mg x 1 with some good increasing urine output.  She does endorse some preceding nausea and vomiting a few days ago but no other infectious-like symptoms.  She says that the nausea and vomiting is resolved.  I personally spoke with the ER physician detail about this patient's case.    I discussed the plan of care with patient.    Review of Systems  Review of Systems   Constitutional:  Negative for chills and fever.   Respiratory:  Positive for shortness of breath. Negative for cough.    Cardiovascular:  Positive for orthopnea, leg swelling and PND. Negative for chest pain and palpitations.   Gastrointestinal:  Positive for nausea and vomiting. Negative for abdominal pain and diarrhea.       Past Medical History   has a past medical history of Anesthesia, Anxiety, ASTHMA, Cancer (HCC), Carpal tunnel syndrome, Heart murmur, Infectious disease (2007), Pain (11-26-12), Panic attack, Psychiatric problem, and Seasonal  "allergies.    Surgical History   has a past surgical history that includes rectal biopsy (2007); wide excision (8/4/2010); cervical disk and fusion anterior (6/27/2011); wide excision (9/7/2011); wide excision (2/29/2012); wide excision (12/19/2012); exam under anesthesia (5/21/2014); rectal exploration (5/21/2014); rectal exploration (1/28/2015); rectal exploration (9/2/2016); anal fistulectomy (5/24/2017); hemorrhoidectomy (8/4/2010); vulvectomy partial (October 2007); vulvectomy radical (Bilateral, 9/2/2016); other neurological surg; and rectal exploration (12/19/2018).     Family History  family history includes Cancer in her maternal aunt; Diabetes in her mother; Stroke in her mother.   Family history reviewed with patient. There is no family history that is pertinent to the chief complaint.     Social History   reports that she quit smoking about 16 years ago. Her smoking use included cigarettes. She started smoking about 41 years ago. She has a 25 pack-year smoking history. She has never used smokeless tobacco. She reports that she does not drink alcohol and does not use drugs.    Allergies  Allergies   Allergen Reactions    Latex Shortness of Breath and Itching    Other Food Hives and Rash     Splenda    Tape      \"makes skin turn red\"  Paper tape ok    Hydromorphone Vomiting and Nausea    Other Environmental      Dial soap --  Skin itching       Medications  Prior to Admission Medications   Prescriptions Last Dose Informant Patient Reported? Taking?   apixaban (ELIQUIS) 5mg Tab 7/27/2024 at AM Patient No Yes   Sig: Take 1 Tablet by mouth 2 times a day for 90 days. Indications: DVT/PE   atorvastatin (LIPITOR) 40 MG Tab 7/26/2024 at PM Patient, Patient's Home Pharmacy No Yes   Sig: Take 1 Tablet by mouth every evening.   cephALEXin (KEFLEX) 500 MG Cap 6/23/2024 at UNK Patient Yes Yes   Sig: Take 500 mg by mouth 4 times a day.   dapagliflozin propanediol (FARXIGA) 10 MG Tab 7/27/2024 at AM Patient, Patient's " Home Pharmacy No Yes   Sig: Take 1 Tablet by mouth every day.   furosemide (LASIX) 40 MG Tab 7/27/2024 at AM Patient, Patient's Home Pharmacy No Yes   Sig: Take 1 Tablet by mouth every day.   losartan (COZAAR) 25 MG Tab 7/26/2024 at UNK Patient, Patient's Home Pharmacy No Yes   Sig: Take one-half (0.5) Tablet by mouth every evening for 30 days.   Patient taking differently: Take 25 mg by mouth every evening. Does not take if SBP < 95 mmHg   metoprolol SR (TOPROL XL) 25 MG TABLET SR 24 HR 7/27/2024 at AM Patient, Patient's Home Pharmacy No Yes   Sig: Take 1 Tablet by mouth every day.   venlafaxine XR (EFFEXOR XR) 75 MG CAPSULE SR 24 HR >1 WEEK at [facility] Patient Yes Yes   Sig: Take 75 mg by mouth every day.      Facility-Administered Medications: None       Physical Exam  Temp:  [36.3 °C (97.3 °F)-36.3 °C (97.4 °F)] 36.3 °C (97.3 °F)  Pulse:  [] 117  Resp:  [16-26] 20  BP: ()/(57-82) 116/82  SpO2:  [93 %-96 %] 94 %  Blood Pressure: 118/57   Temperature: 36.3 °C (97.4 °F)   Pulse: 97   Respiration: 18   Pulse Oximetry: 93 %       Physical Exam  Vitals and nursing note reviewed.   Constitutional:       General: She is not in acute distress.     Appearance: She is well-developed.      Comments: Lying in bed, appears comfortable, speaking in full sentences   HENT:      Head: Normocephalic and atraumatic.      Mouth/Throat:      Pharynx: No oropharyngeal exudate.   Eyes:      General: No scleral icterus.     Pupils: Pupils are equal, round, and reactive to light.   Neck:      Thyroid: No thyromegaly.   Cardiovascular:      Rate and Rhythm: Normal rate and regular rhythm.      Heart sounds: Normal heart sounds. No murmur heard.  Pulmonary:      Effort: Pulmonary effort is normal. No respiratory distress.      Breath sounds: Rales (L base) present. No wheezing.   Abdominal:      General: Bowel sounds are normal. There is no distension.      Palpations: Abdomen is soft.      Tenderness: There is no abdominal  tenderness. There is no guarding or rebound.   Musculoskeletal:         General: No tenderness. Normal range of motion.      Cervical back: Normal range of motion and neck supple.      Right lower leg: Edema present.      Left lower leg: Edema present.      Comments: 3+ pitting edema B/L LE's from the knees distally, warm peripherally   Skin:     General: Skin is warm and dry.      Findings: No rash.   Neurological:      Mental Status: She is alert and oriented to person, place, and time.      Cranial Nerves: No cranial nerve deficit.         Laboratory:  Recent Labs     07/27/24  1420   WBC 9.2   RBC 4.56   HEMOGLOBIN 13.0   HEMATOCRIT 40.7   MCV 89.3   MCH 28.5   MCHC 31.9*   RDW 51.6*   PLATELETCT 305   MPV 9.7     Recent Labs     07/27/24  1420   SODIUM 135   POTASSIUM 3.1*   CHLORIDE 96   CO2 22   GLUCOSE 121*   BUN 20   CREATININE 1.00   CALCIUM 9.5     Recent Labs     07/27/24  1420   ALTSGPT 91*   ASTSGOT 51*   ALKPHOSPHAT 141*   TBILIRUBIN 3.4*   GLUCOSE 121*         Recent Labs     07/27/24  1420   NTPROBNP 6804*         Recent Labs     07/27/24  1420   TROPONINT 48*       Imaging:  DX-CHEST-PORTABLE (1 VIEW)   Final Result      Mild infiltrate opacity in the left lung base.          EKG pending    I personally reviewed the serum troponin, CMP, BMP, CMP.    Assessment/Plan:  Justification for Admission Status  I anticipate this patient will require at least two midnights for appropriate medical management, necessitating inpatient admission because acute on chronic HFrEF exacerbation    Patient will need a Telemetry bed on MEDICAL service .  The need is secondary to above.    * Acute on chronic HFrEF (heart failure with reduced ejection fraction) (HCC)- (present on admission)  Assessment & Plan  Unknown etiology for hfref, appears VOL, unclear dry weight  IV lasix 40 mg TID  Continue all GDMT except for losartan, given relatively soft bp  Admit to tele, strict I/o's, daily weights  Consider cards  consult  No clear e/o concurrent infectious symptoms at this time    Elevated bilirubin- (present on admission)  Assessment & Plan  Elevated at 3.1, unclear etiology, a bit too high for Gilbert's, appears new  Trend  Check direct bili, no e/o hemolysis    H/O ischemic right MCA stroke- (present on admission)  Assessment & Plan  Embolic in nature  Doac  Statin  Monitor  No new focal neuro deficits appreciate today    Elevated troponin- (present on admission)  Assessment & Plan  Double her baseline, no cp, check EKG  Possibly related to concurrent active chf exacerbation    Hypokalemia- (present on admission)  Assessment & Plan  statin    Left ventricular thrombus- (present on admission)  Assessment & Plan  Known, continue doac    Mood disorder (HCC)- (present on admission)  Assessment & Plan  Consider medications    Elevated LFTs- (present on admission)  Assessment & Plan  Presumably related to passive hepatic congestion from hfref  trend    Benign neoplasm of rectum and anal canal- (present on admission)  Assessment & Plan  monitor        VTE prophylaxis: therapeutic anticoagulation with eliquis 5 mg bid

## 2024-07-27 NOTE — ED TRIAGE NOTES
Vitals:    07/27/24 1244   BP: 98/76   Pulse: (!) 108   Resp: 16   Temp: 36.3 °C (97.4 °F)   SpO2: 95%     Chief Complaint   Patient presents with    Leg Swelling     Pt reports she has a hx of CHF, dx earlier this year. She follows with Dr. Koenig and was placed on a lasix a few days ago for bilateral lower extremity swelling. The swelling has worsened and is making it painful to walk.     Pt is ambulatory to and from triage and is alert and oriented x 4.

## 2024-07-27 NOTE — ED PROVIDER NOTES
"ER Provider Note    Scribed for iSva Tran D.O. by Allen Shen. 7/27/2024  1:48 PM    Primary Care Provider: Mavis Sheldon P.A.-C.    CHIEF COMPLAINT  Chief Complaint   Patient presents with    Leg Swelling     HPI/ROS    Analia Wu is a 53 y.o. female who presents to the Emergency Department for evaluation of bilateral leg swelling onset 5 days ago. The patient reports that there was no swelling before onset of symptoms, and describes the pain radiating to her knee. She notes that the pain is so bad that she has had issues sleeping. Confirms coughing, but denies congestion or shortness of breath other than at night. Patient adds that she has a history of congestive heart failure as of May 27 and had a heart attack previously. She reports taking 40 mg Lasix.    ROS as per HPI.    PAST MEDICAL HISTORY  Past Medical History:   Diagnosis Date    Anesthesia     ponv,  \"shallow breathing\"    Anxiety     ASTHMA     does not have any inhalers    Cancer (HCC)     vulva    Carpal tunnel syndrome     Heart murmur     pt reports she has never had any problems    Infectious disease 2007    Mrsa infection    Pain 11-26-12    neck, left knee, 6-7/10    Panic attack     Psychiatric problem     depression, PTSD, anxiety    Seasonal allergies      SURGICAL HISTORY  Past Surgical History:   Procedure Laterality Date    RECTAL EXPLORATION  12/19/2018    Procedure: RECTAL EXPLORATION - FOR HIGH RESOLUTION ANOSCOPY;  Surgeon: Billy Gamboa M.D.;  Location: SURGERY [facility];  Service: General    ANAL FISTULECTOMY  5/24/2017    Procedure: ANAL FISTULECTOMY FOR: WIDE EXCISION ANAL DYSPLASIA;  Surgeon: Billy Gamboa M.D.;  Location: SURGERY [facility];  Service:     RECTAL EXPLORATION  9/2/2016    Procedure: RECTAL EXPLORATION eua, AND BIOPSY;  Surgeon: Billy Gamboa M.D.;  Location: SURGERY [facility];  Service:     VULVECTOMY RADICAL Bilateral 9/2/2016    Procedure: VULVECTOMY RADICAL;  Surgeon: " Reese Carroll M.D.;  Location: SURGERY [facility];  Service:     RECTAL EXPLORATION  1/28/2015    Performed by Billy Villegas M.D. at SURGERY [facility]    EXAM UNDER ANESTHESIA  5/21/2014    Performed by Billy Villegas M.D. at SURGERY [facility]    RECTAL EXPLORATION  5/21/2014    Performed by Billy Villegas M.D. at SURGERY [facility]    WIDE EXCISION  12/19/2012    Performed by Billy Villegas M.D. at SURGERY [facility]    WIDE EXCISION  2/29/2012    Performed by BILLY VILLEGAS at SURGERY [facility]    WIDE EXCISION  9/7/2011    Performed by BILLY VILLEGAS at SURGERY [facility]    CERVICAL DISK AND FUSION ANTERIOR  6/27/2011    Performed by YEIMI LEVY at SURGERY [facility]    WIDE EXCISION  8/4/2010    Performed by BILLY VILLEGAS at SURGERY [facility]    HEMORRHOIDECTOMY  8/4/2010    Performed by BILLY VILLEGAS at SURGERY [facility]    VULVECTOMY PARTIAL  October 2007    RECTAL BIOPSY  2007    OTHER NEUROLOGICAL SURG      neck fusion     FAMILY HISTORY  Family History   Problem Relation Age of Onset    Diabetes Mother     Stroke Mother     Cancer Maternal Aunt     Lung Disease Neg Hx     Heart Disease Neg Hx      SOCIAL HISTORY   reports that she quit smoking about 16 years ago. Her smoking use included cigarettes. She started smoking about 41 years ago. She has a 25 pack-year smoking history. She has never used smokeless tobacco. She reports that she does not drink alcohol and does not use drugs.    CURRENT MEDICATIONS  Current Discharge Medication List        CONTINUE these medications which have NOT CHANGED    Details   cephALEXin (KEFLEX) 500 MG Cap Take 500 mg by mouth 4 times a day.      venlafaxine XR (EFFEXOR XR) 75 MG CAPSULE SR 24 HR Take 75 mg by mouth every day.      furosemide (LASIX) 40 MG Tab Take 1 Tablet by mouth every day.  Qty: 30 Tablet, Refills: 11    Associated Diagnoses: ACC/AHA stage C systolic heart failure (HCC)      atorvastatin (LIPITOR) 40  "MG Tab Take 1 Tablet by mouth every evening.  Qty: 30 Tablet, Refills: 11    Associated Diagnoses: ACC/AHA stage C systolic heart failure (HCC)      dapagliflozin propanediol (FARXIGA) 10 MG Tab Take 1 Tablet by mouth every day.  Qty: 30 Tablet, Refills: 11    Associated Diagnoses: ACC/AHA stage C systolic heart failure (HCC)      losartan (COZAAR) 25 MG Tab Take one-half (0.5) Tablet by mouth every evening for 30 days.  Qty: 15 Tablet, Refills: 11    Associated Diagnoses: ACC/AHA stage C systolic heart failure (HCC)      metoprolol SR (TOPROL XL) 25 MG TABLET SR 24 HR Take 1 Tablet by mouth every day.  Qty: 30 Tablet, Refills: 11    Associated Diagnoses: ACC/AHA stage C systolic heart failure (HCC)      apixaban (ELIQUIS) 5mg Tab Take 1 Tablet by mouth 2 times a day for 90 days. Indications: DVT/PE  Qty: 60 Tablet, Refills: 2    Associated Diagnoses: Left ventricular thrombus           ALLERGIES  Latex, Other food, Tape, Hydromorphone, and Other environmental    PHYSICAL EXAM  BP 98/76   Pulse (!) 108   Temp 36.3 °C (97.4 °F) (Temporal)   Resp 16   Ht 1.702 m (5' 7\")   Wt 93.8 kg (206 lb 12.7 oz)   LMP 12/17/2018 (Approximate)   SpO2 95%   BMI 32.39 kg/m²     General: No acute distress.  HENT: Normocephalic, Mucus membranes are moist.   Chest: Lungs have even and unlabored respirations, Clear to auscultation.   Cardiovascular: Regular rate and regular rhythm, No peripheral cyanosis.  Abdomen: Non distended.  Extremities: 2-3 + edema to bilateral lower legs. Distal circulation normal.  Neuro: Awake, Conversive, Able to relay recent events.  Psychiatric: Calm and cooperative.     EXTERNAL RECORDS REVIEWED  Review of patient's past medical records show that she was admitted here on 5/27/24 for congestive heart failure of unknown etiology and was discharged on 5/30/24.      INITIAL ASSESSMENT  Patient has a history of CHF and due to her current leg swelling for the past 5 days with no shortness of breath, I " have concerns for kidney or liver problems as well as exacerbation of CHF. Will begin diuretic administration and evaluate with labs and chest x-ray.    ED Observation Status? No; Patient does not meet criteria for ED Observation.     DIAGNOSTIC STUDIES    Labs:   Results for orders placed or performed during the hospital encounter of 07/27/24   CBC WITH DIFFERENTIAL   Result Value Ref Range    WBC 9.2 4.8 - 10.8 K/uL    RBC 4.56 4.20 - 5.40 M/uL    Hemoglobin 13.0 12.0 - 16.0 g/dL    Hematocrit 40.7 37.0 - 47.0 %    MCV 89.3 81.4 - 97.8 fL    MCH 28.5 27.0 - 33.0 pg    MCHC 31.9 (L) 32.2 - 35.5 g/dL    RDW 51.6 (H) 35.9 - 50.0 fL    Platelet Count 305 164 - 446 K/uL    MPV 9.7 9.0 - 12.9 fL    Neutrophils-Polys 72.70 (H) 44.00 - 72.00 %    Lymphocytes 12.30 (L) 22.00 - 41.00 %    Monocytes 10.10 0.00 - 13.40 %    Eosinophils 4.00 0.00 - 6.90 %    Basophils 0.50 0.00 - 1.80 %    Immature Granulocytes 0.40 0.00 - 0.90 %    Nucleated RBC 0.00 0.00 - 0.20 /100 WBC    Neutrophils (Absolute) 6.64 1.82 - 7.42 K/uL    Lymphs (Absolute) 1.13 1.00 - 4.80 K/uL    Monos (Absolute) 0.92 (H) 0.00 - 0.85 K/uL    Eos (Absolute) 0.37 0.00 - 0.51 K/uL    Baso (Absolute) 0.05 0.00 - 0.12 K/uL    Immature Granulocytes (abs) 0.04 0.00 - 0.11 K/uL    NRBC (Absolute) 0.00 K/uL   COMP METABOLIC PANEL   Result Value Ref Range    Sodium 135 135 - 145 mmol/L    Potassium 3.1 (L) 3.6 - 5.5 mmol/L    Chloride 96 96 - 112 mmol/L    Co2 22 20 - 33 mmol/L    Anion Gap 17.0 (H) 7.0 - 16.0    Glucose 121 (H) 65 - 99 mg/dL    Bun 20 8 - 22 mg/dL    Creatinine 1.00 0.50 - 1.40 mg/dL    Calcium 9.5 8.5 - 10.5 mg/dL    Correct Calcium 9.7 8.5 - 10.5 mg/dL    AST(SGOT) 51 (H) 12 - 45 U/L    ALT(SGPT) 91 (H) 2 - 50 U/L    Alkaline Phosphatase 141 (H) 30 - 99 U/L    Total Bilirubin 3.4 (H) 0.1 - 1.5 mg/dL    Albumin 3.7 3.2 - 4.9 g/dL    Total Protein 6.8 6.0 - 8.2 g/dL    Globulin 3.1 1.9 - 3.5 g/dL    A-G Ratio 1.2 g/dL   TROPONIN   Result Value Ref  Range    Troponin T 48 (H) 6 - 19 ng/L   proBrain Natriuretic Peptide, NT   Result Value Ref Range    NT-proBNP 6804 (H) 0 - 125 pg/mL   ESTIMATED GFR   Result Value Ref Range    GFR (CKD-EPI) 67 >60 mL/min/1.73 m 2     Radiology:   The attending emergency physician has independently interpreted the diagnostic imaging associated with this visit and am waiting the final reading from the radiologist.   Preliminary interpretation is as follows: No pulmonary edema  Radiologist interpretation:   DX-CHEST-PORTABLE (1 VIEW)   Final Result      Mild infiltrate opacity in the left lung base.        COURSE & MEDICAL DECISION MAKING     COURSE AND PLAN  1:48 PM - Patient seen and examined at bedside. Discussed plan of care, including treating the patient with medication, along with obtaining lab work and imaging for further evaluation. Patient agrees to the plan of care. The patient will be medicated with Lasix 60 mg injection. Ordered for DX-Chest-Portable, CBC w/ diff., CMP, Troponin, and proBrain Natriuretic Peptide to evaluate her symptoms.     3:09 PM - I reevaluated the patient at bedside. I discussed the patient's diagnostic study results which show evidence of acute CHF. I informed the patient of my plan to admit today given the patient's current presentation and diagnostic study results. Patient verbalizes understanding and support with my plan for admission. I discussed the patient's case and the above findings with Dr. Mackay (Hospitalist) who agreed to hospitalize the patient. Patient's care was transferred at this time.     ED Summary: The patient presents with complaints of bilateral lower extremity swelling.  It is started 5 days ago she has known history of congestive heart failure and does take Lasix daily.  There is no shortness of breath lungs are clear x-ray shows mild pulmonary edema.  Her BNP is significantly elevated from her baseline, troponin is mildly elevated but not concerning for MI.  IV Lasix  was given here.  The patient will be admitted for further evaluation and treatment      DISPOSITION AND DISCUSSIONS  I have discussed management of the patient with the following physicians and SUE's: Dr. Mackay (Hospitalist)    Discussion of management with other [facility] or appropriate source(s): None    Barriers to care at this time, including but not limited to: No known barriers of care     DISPOSITION:  Patient will be hospitalized by Dr. Mackay (Hospitalist) in critical condition.    FINAL DIAGNOSIS  1. Peripheral edema    2. Leg swelling    3. Acute congestive heart failure, unspecified heart failure type (HCC)      Allen CHIN (Scribe), am scribing for, and in the presence of, Siva Tran D.O..    Electronically signed by: Allen Shen (Josefibkassandra), 7/27/2024    ISiva D.O. personally performed the services described in this documentation, as scribed by Allen Shen in my presence, and it is both accurate and complete.     The note accurately reflects work and decisions made by me.  Siva Tarn D.O.  7/27/2024  4:53 PM

## 2024-07-27 NOTE — ED NOTES
Medication reconciliation is complete per patient reporting and home pharmacy - [facility] [phone] .  - Allergies reviewed.  - Patient prescribed cephalexin on 6/18 for 7 days, but did not finish course d/t GI upset.  - Patient taking apixaban and reports last dose as 7/27 AM, but was prescribed a 30 day supply dispensed on 6/3.    Alejandra Parra, Pharmacy Intern

## 2024-07-28 LAB
ALBUMIN SERPL BCP-MCNC: 3.5 G/DL (ref 3.2–4.9)
ALBUMIN/GLOB SERPL: 1.2 G/DL
ALP SERPL-CCNC: 138 U/L (ref 30–99)
ALT SERPL-CCNC: 77 U/L (ref 2–50)
ANION GAP SERPL CALC-SCNC: 16 MMOL/L (ref 7–16)
AST SERPL-CCNC: 43 U/L (ref 12–45)
BASOPHILS # BLD AUTO: 0.7 % (ref 0–1.8)
BASOPHILS # BLD: 0.06 K/UL (ref 0–0.12)
BILIRUB SERPL-MCNC: 3.1 MG/DL (ref 0.1–1.5)
BUN SERPL-MCNC: 19 MG/DL (ref 8–22)
CALCIUM ALBUM COR SERPL-MCNC: 10.1 MG/DL (ref 8.5–10.5)
CALCIUM SERPL-MCNC: 9.7 MG/DL (ref 8.5–10.5)
CHLORIDE SERPL-SCNC: 94 MMOL/L (ref 96–112)
CO2 SERPL-SCNC: 25 MMOL/L (ref 20–33)
CREAT SERPL-MCNC: 0.8 MG/DL (ref 0.5–1.4)
EKG IMPRESSION: NORMAL
EOSINOPHIL # BLD AUTO: 0.48 K/UL (ref 0–0.51)
EOSINOPHIL NFR BLD: 5.8 % (ref 0–6.9)
ERYTHROCYTE [DISTWIDTH] IN BLOOD BY AUTOMATED COUNT: 51.7 FL (ref 35.9–50)
GFR SERPLBLD CREATININE-BSD FMLA CKD-EPI: 87 ML/MIN/1.73 M 2
GLOBULIN SER CALC-MCNC: 3 G/DL (ref 1.9–3.5)
GLUCOSE SERPL-MCNC: 106 MG/DL (ref 65–99)
HCT VFR BLD AUTO: 38.1 % (ref 37–47)
HGB BLD-MCNC: 12.6 G/DL (ref 12–16)
IMM GRANULOCYTES # BLD AUTO: 0.02 K/UL (ref 0–0.11)
IMM GRANULOCYTES NFR BLD AUTO: 0.2 % (ref 0–0.9)
LYMPHOCYTES # BLD AUTO: 1.42 K/UL (ref 1–4.8)
LYMPHOCYTES NFR BLD: 17.2 % (ref 22–41)
MAGNESIUM SERPL-MCNC: 1.5 MG/DL (ref 1.5–2.5)
MCH RBC QN AUTO: 29.4 PG (ref 27–33)
MCHC RBC AUTO-ENTMCNC: 33.1 G/DL (ref 32.2–35.5)
MCV RBC AUTO: 89 FL (ref 81.4–97.8)
MONOCYTES # BLD AUTO: 0.85 K/UL (ref 0–0.85)
MONOCYTES NFR BLD AUTO: 10.3 % (ref 0–13.4)
NEUTROPHILS # BLD AUTO: 5.42 K/UL (ref 1.82–7.42)
NEUTROPHILS NFR BLD: 65.8 % (ref 44–72)
NRBC # BLD AUTO: 0 K/UL
NRBC BLD-RTO: 0 /100 WBC (ref 0–0.2)
PLATELET # BLD AUTO: 256 K/UL (ref 164–446)
PMV BLD AUTO: 9.4 FL (ref 9–12.9)
POTASSIUM SERPL-SCNC: 2.6 MMOL/L (ref 3.6–5.5)
POTASSIUM SERPL-SCNC: 3.2 MMOL/L (ref 3.6–5.5)
PROT SERPL-MCNC: 6.5 G/DL (ref 6–8.2)
RBC # BLD AUTO: 4.28 M/UL (ref 4.2–5.4)
SODIUM SERPL-SCNC: 135 MMOL/L (ref 135–145)
WBC # BLD AUTO: 8.3 K/UL (ref 4.8–10.8)

## 2024-07-28 PROCEDURE — 700102 HCHG RX REV CODE 250 W/ 637 OVERRIDE(OP): Performed by: INTERNAL MEDICINE

## 2024-07-28 PROCEDURE — 770020 HCHG ROOM/CARE - TELE (206)

## 2024-07-28 PROCEDURE — 83735 ASSAY OF MAGNESIUM: CPT

## 2024-07-28 PROCEDURE — 700111 HCHG RX REV CODE 636 W/ 250 OVERRIDE (IP)

## 2024-07-28 PROCEDURE — 80053 COMPREHEN METABOLIC PANEL: CPT

## 2024-07-28 PROCEDURE — 36415 COLL VENOUS BLD VENIPUNCTURE: CPT

## 2024-07-28 PROCEDURE — 700102 HCHG RX REV CODE 250 W/ 637 OVERRIDE(OP): Mod: JZ

## 2024-07-28 PROCEDURE — 84132 ASSAY OF SERUM POTASSIUM: CPT

## 2024-07-28 PROCEDURE — A9270 NON-COVERED ITEM OR SERVICE: HCPCS | Performed by: INTERNAL MEDICINE

## 2024-07-28 PROCEDURE — A9270 NON-COVERED ITEM OR SERVICE: HCPCS | Mod: JZ

## 2024-07-28 PROCEDURE — 700102 HCHG RX REV CODE 250 W/ 637 OVERRIDE(OP): Mod: JZ | Performed by: INTERNAL MEDICINE

## 2024-07-28 PROCEDURE — 700111 HCHG RX REV CODE 636 W/ 250 OVERRIDE (IP): Mod: JZ | Performed by: INTERNAL MEDICINE

## 2024-07-28 PROCEDURE — 99233 SBSQ HOSP IP/OBS HIGH 50: CPT | Performed by: INTERNAL MEDICINE

## 2024-07-28 PROCEDURE — A9270 NON-COVERED ITEM OR SERVICE: HCPCS | Mod: JZ | Performed by: INTERNAL MEDICINE

## 2024-07-28 PROCEDURE — 93010 ELECTROCARDIOGRAM REPORT: CPT | Performed by: STUDENT IN AN ORGANIZED HEALTH CARE EDUCATION/TRAINING PROGRAM

## 2024-07-28 PROCEDURE — 85025 COMPLETE CBC W/AUTO DIFF WBC: CPT

## 2024-07-28 RX ORDER — SPIRONOLACTONE 25 MG/1
25 TABLET ORAL
Status: DISCONTINUED | OUTPATIENT
Start: 2024-07-28 | End: 2024-07-31 | Stop reason: HOSPADM

## 2024-07-28 RX ORDER — POTASSIUM CHLORIDE 1500 MG/1
40 TABLET, EXTENDED RELEASE ORAL ONCE
Status: COMPLETED | OUTPATIENT
Start: 2024-07-28 | End: 2024-07-28

## 2024-07-28 RX ORDER — POTASSIUM CHLORIDE 7.45 MG/ML
10 INJECTION INTRAVENOUS
Status: COMPLETED | OUTPATIENT
Start: 2024-07-28 | End: 2024-07-28

## 2024-07-28 RX ADMIN — FUROSEMIDE 40 MG: 10 INJECTION, SOLUTION INTRAVENOUS at 06:08

## 2024-07-28 RX ADMIN — METOPROLOL SUCCINATE 25 MG: 25 TABLET, EXTENDED RELEASE ORAL at 06:09

## 2024-07-28 RX ADMIN — Medication 400 MG: at 06:09

## 2024-07-28 RX ADMIN — POTASSIUM CHLORIDE 10 MEQ: 7.46 INJECTION, SOLUTION INTRAVENOUS at 06:08

## 2024-07-28 RX ADMIN — POTASSIUM CHLORIDE 10 MEQ: 7.46 INJECTION, SOLUTION INTRAVENOUS at 03:27

## 2024-07-28 RX ADMIN — POTASSIUM CHLORIDE 40 MEQ: 1500 TABLET, EXTENDED RELEASE ORAL at 03:24

## 2024-07-28 RX ADMIN — APIXABAN 5 MG: 5 TABLET, FILM COATED ORAL at 17:41

## 2024-07-28 RX ADMIN — POTASSIUM CHLORIDE 20 MEQ: 1500 TABLET, EXTENDED RELEASE ORAL at 06:09

## 2024-07-28 RX ADMIN — APIXABAN 5 MG: 5 TABLET, FILM COATED ORAL at 06:09

## 2024-07-28 RX ADMIN — VENLAFAXINE HYDROCHLORIDE 75 MG: 37.5 CAPSULE, EXTENDED RELEASE ORAL at 06:08

## 2024-07-28 RX ADMIN — POTASSIUM CHLORIDE 20 MEQ: 1500 TABLET, EXTENDED RELEASE ORAL at 17:41

## 2024-07-28 RX ADMIN — Medication 400 MG: at 17:41

## 2024-07-28 RX ADMIN — FUROSEMIDE 40 MG: 10 INJECTION, SOLUTION INTRAVENOUS at 21:12

## 2024-07-28 RX ADMIN — POTASSIUM CHLORIDE 10 MEQ: 7.46 INJECTION, SOLUTION INTRAVENOUS at 07:09

## 2024-07-28 RX ADMIN — ATORVASTATIN CALCIUM 40 MG: 40 TABLET, FILM COATED ORAL at 17:41

## 2024-07-28 RX ADMIN — SPIRONOLACTONE 25 MG: 25 TABLET ORAL at 11:53

## 2024-07-28 RX ADMIN — DAPAGLIFLOZIN 10 MG: 10 TABLET, FILM COATED ORAL at 06:09

## 2024-07-28 RX ADMIN — POTASSIUM CHLORIDE 10 MEQ: 7.46 INJECTION, SOLUTION INTRAVENOUS at 04:50

## 2024-07-28 RX ADMIN — FUROSEMIDE 40 MG: 10 INJECTION, SOLUTION INTRAVENOUS at 13:40

## 2024-07-28 ASSESSMENT — ENCOUNTER SYMPTOMS
PHOTOPHOBIA: 0
WEAKNESS: 0
DOUBLE VISION: 0
ABDOMINAL PAIN: 0
MYALGIAS: 0
BLURRED VISION: 0
CHILLS: 0
NECK PAIN: 0
CONSTIPATION: 0
DIZZINESS: 0
COUGH: 0
CLAUDICATION: 0
FEVER: 0
ORTHOPNEA: 0
NAUSEA: 0
SPEECH CHANGE: 0
PALPITATIONS: 0
WEIGHT LOSS: 0
DIARRHEA: 0
HEMOPTYSIS: 0
VOMITING: 0

## 2024-07-28 ASSESSMENT — FIBROSIS 4 INDEX: FIB4 SCORE: 1.13

## 2024-07-28 NOTE — PROGRESS NOTES
Bedside report received from off going RN: Mary, assumed care of patient.     Fall Risk Score: NO RISK  Fall risk interventions in place: Place yellow fall risk ID band on patient, Provide patient/family education based on risk assessment, Educate patient/family to call staff for assistance when getting out of bed, Place fall precaution signage outside patient door, and Place patient in room close to nursing station  Bed type: Regular (Adonis Score less than 17 interventions in place)  Patient on cardiac monitor: Yes  IVF/IV medications: Not Applicable   Oxygen: Room Air  Bedside sitter: Not Applicable   Isolation: Not applicable

## 2024-07-28 NOTE — ASSESSMENT & PLAN NOTE
Echocardiogram 5/28/2024: Left ventricular apical thrombus is present and semimobile  - Continue Eliquis  - Continue to follow with outpatient cardiology

## 2024-07-28 NOTE — CARE PLAN
The patient is Stable - Low risk of patient condition declining or worsening    Shift Goals  Clinical Goals: duirese, monitor I/O, VSS  Patient Goals: updates, rest  Family Goals: not present      Problem: Knowledge Deficit - Standard  Goal: Patient and family/care givers will demonstrate understanding of plan of care, disease process/condition, diagnostic tests and medications  Outcome: Progressing     Problem: Pain - Standard  Goal: Alleviation of pain or a reduction in pain to the patient’s comfort goal  Outcome: Progressing     Problem: Psychosocial  Goal: Patient's ability to verbalize feelings about condition will improve  Outcome: Progressing

## 2024-07-28 NOTE — CARE PLAN
The patient is Stable - Low risk of patient condition declining or worsening    Shift Goals  Clinical Goals: monitor VSS    Progress made toward(s) clinical / shift goals:    Problem: Knowledge Deficit - Standard  Goal: Patient and family/care givers will demonstrate understanding of plan of care, disease process/condition, diagnostic tests and medications  Outcome: Progressing

## 2024-07-28 NOTE — ASSESSMENT & PLAN NOTE
History of  - Continue home Eliquis  - Close monitoring for strokelike symptoms given left ventricular thrombus and history of stroke

## 2024-07-28 NOTE — PROGRESS NOTES
Hospital Medicine Daily Progress Note    Date of Service  7/28/2024    Chief Complaint  Analia Wu is a 53 y.o. female admitted 7/27/2024 with lower extremity edema    Hospital Course  53-year-old female with history of systolic heart failure severe, EF 20% with left ventricular thrombus complicated with embolic stroke who presented 7/27 with worsening lower extremity edema.  Patient states she has been taking her medication and following low-salt diet however not this worsening on her lower extremity edema also increasing weight.  Patient was on room air on admission with tachycardia 104 with normal blood pressure, labs are at baseline with normal kidney function and no leukocytosis, last echo in 5/2024 which showed low ejection fraction 25% with a grade 2 diastolic dysfunction with mildly reduced systolic function and RV.  Patient was admitted for acute on chronic heart failure exacerbation and IV fluid was initiated with improving on her symptoms.    Patient has history of known ischemic cardiomyopathy and being followed by cardiology team, patient is on Eliquis, atorvastatin, Farxiga and metoprolol XL 25 mg daily.  Aldactone was initiated during the hospitalization.    Interval Problem Update  -Evaluated examined the patient at bedside, denied any new symptoms, improving on her lower extremity edema, no crackles and patient is on room air  -Continue IV Lasix and switch to oral tomorrow, weigh daily and I's and O's  -Start with Aldactone and replace potassium    I have discussed this patient's plan of care and discharge plan at IDT rounds today with Case Management, Nursing, Nursing leadership, and other members of the IDT team.    Consultants/Specialty  None    Code Status  Full Code    Disposition  The patient is not medically cleared for discharge to home or a post-acute facility.  Anticipate discharge to: home with close outpatient follow-up    I have placed the appropriate orders for post-discharge  needs.    Review of Systems  Review of Systems   Constitutional:  Positive for malaise/fatigue. Negative for chills, fever and weight loss.   HENT:  Negative for ear pain, hearing loss and tinnitus.    Eyes:  Negative for blurred vision, double vision and photophobia.   Respiratory:  Negative for cough and hemoptysis.    Cardiovascular:  Positive for leg swelling. Negative for chest pain, palpitations, orthopnea and claudication.   Gastrointestinal:  Negative for abdominal pain, constipation, diarrhea, nausea and vomiting.   Genitourinary:  Negative for dysuria, frequency and urgency.   Musculoskeletal:  Negative for myalgias and neck pain.   Skin:  Negative for rash.   Neurological:  Negative for dizziness, speech change and weakness.        Physical Exam  Temp:  [36.2 °C (97.2 °F)-36.7 °C (98.1 °F)] 36.2 °C (97.2 °F)  Pulse:  [] 110  Resp:  [16-26] 18  BP: ()/(57-82) 115/76  SpO2:  [90 %-96 %] 94 %    Physical Exam  Constitutional:       General: She is not in acute distress.     Appearance: She is not ill-appearing.   HENT:      Head: Normocephalic and atraumatic.   Cardiovascular:      Rate and Rhythm: Normal rate.      Heart sounds: No murmur heard.  Pulmonary:      Effort: No respiratory distress.      Breath sounds: No wheezing or rales.   Abdominal:      General: There is no distension.      Palpations: Abdomen is soft.      Tenderness: There is no abdominal tenderness. There is no guarding.   Musculoskeletal:      Right lower leg: Edema present.      Left lower leg: Edema present.   Skin:     Coloration: Skin is not jaundiced.      Findings: No bruising or lesion.   Neurological:      Comments: Dysarthria         Fluids    Intake/Output Summary (Last 24 hours) at 7/28/2024 1043  Last data filed at 7/28/2024 0832  Gross per 24 hour   Intake 620 ml   Output 4550 ml   Net -3930 ml       Laboratory  Recent Labs     07/27/24  1420 07/28/24  0208   WBC 9.2 8.3   RBC 4.56 4.28   HEMOGLOBIN 13.0 12.6    HEMATOCRIT 40.7 38.1   MCV 89.3 89.0   MCH 28.5 29.4   MCHC 31.9* 33.1   RDW 51.6* 51.7*   PLATELETCT 305 256   MPV 9.7 9.4     Recent Labs     07/27/24  1420 07/28/24  0208   SODIUM 135 135   POTASSIUM 3.1* 2.6*   CHLORIDE 96 94*   CO2 22 25   GLUCOSE 121* 106*   BUN 20 19   CREATININE 1.00 0.80   CALCIUM 9.5 9.7                   Imaging  DX-CHEST-PORTABLE (1 VIEW)   Final Result      Mild infiltrate opacity in the left lung base.           Assessment/Plan  * Acute on chronic HFrEF (heart failure with reduced ejection fraction) ([facility])- (present on admission)  Assessment & Plan  Nonischemic cardiomyopathy with EF 25%  Came with lower extremity edema, no significant pulmonary edema patient is on room air  IV lasix 40 mg TID  Continue metoprolol, Farxiga and adding spironolactone  Close monitoring and add ACE inhibitors or ARB's, patient has low blood pressure  Weight daily and I's and O's  Low-salt diet    Elevated bilirubin- (present on admission)  Assessment & Plan  Elevated at 3.1, unclear etiology, a bit too high for Gilbert's, appears new  Trend  Improving    H/O ischemic right MCA stroke- (present on admission)  Assessment & Plan  Embolic in nature  Doac  Statin  Monitor  No new focal neuro deficits appreciate today    Elevated troponin- (present on admission)  Assessment & Plan  Double her baseline, no cp, check EKG  Possibly related to concurrent active chf exacerbation    Hypokalemia- (present on admission)  Assessment & Plan  statin    Left ventricular thrombus- (present on admission)  Assessment & Plan  Patient is on Eliquis    Mood disorder (HCC)- (present on admission)  Assessment & Plan  Consider medications    Elevated LFTs- (present on admission)  Assessment & Plan  Presumably related to passive hepatic congestion from hfref  Improving    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 In review of yesterday's note (7/27/2024), there are no changes except as documented above.      Greater than 51 minutes spent prepping to see patient (e.g. review of tests) obtaining and/or reviewing separately obtained history. Performing a medically appropriate examination and/ evaluation.  Counseling and educating the patient/family/caregiver.  Ordering medications, tests, or procedures.  Referring and communicating with other health care professionals.  Documenting clinical information in EPIC.  Independently interpreting results and communicating results to patient/family/caregiver.  Care coordination

## 2024-07-28 NOTE — PROGRESS NOTES
Monitor Summary  Rhythm: SR/ST  Rate:   Ectopy: PVC  Measurements: .18/.13/.37  ---12 hr Chart Review---

## 2024-07-28 NOTE — HOSPITAL COURSE
Analia Wu is a 53-year-old female with PMHx HFrEF, left ventricular thrombus, history of embolic stroke.  Admitted 7/27 for lower extremity edema.  Found to have acute on chronic heart failure exacerbation.    Echocardiogram 5/28/2024: EF 25% with global hypokinesis.  Left ventricular apical thrombus is present and semimobile.    Patient is followed closely by outpatient cardiology.  She is compliant with her Eliquis, atorvastatin, Farxiga and metoprolol.  Aldactone and lisinopril restarted during this hospitalization.  Her blood pressures remained stable after initiation.    Patient states she was taking Lasix 40 mg as needed for leg swelling.  However, she felt that when she started taking it she did not notice an improvement.  I advised patient to take 40 mg twice daily as needed for leg swelling.    Patient was aggressively diuresed during her hospitalization.  She did require home oxygen upon discharge.  She is discharged with 2 L/min supplemental O2.  This has been delivered to bedside.  Patient will follow-up with cardiology in the next 1 to 2 weeks.  Her medications have been refilled and provided to her prior to discharge.  Home health has been resumed.  She is planning to have her lab work completed in the next 3 to 5 days prior to an appointment with cardiology.

## 2024-07-28 NOTE — ASSESSMENT & PLAN NOTE
Echocardiogram May 2024: EF 25% with global hypokinesis  Persistent lower extremity edema and hypoxia present  - Continue Lasix 40 mg 3 times daily  - Continuous telemetry monitoring   -Close monitoring for electrolyte abnormality and arrhythmia in the setting of forced diuresis  - Continue GDMT with metoprolol, lisinopril, Farxiga and spironolactone  - Close monitoring for hypotension in the setting of GDMT with new additions of lisinopril and spironolactone

## 2024-07-28 NOTE — PROGRESS NOTES
NOC CROSSCOVER        Notified of critical K of 2.6. Ordered 40 meq of PO kdur and 40 meq of IV kcl for replacement.

## 2024-07-29 ENCOUNTER — TELEPHONE (OUTPATIENT)
Dept: CARDIOLOGY | Facility: MEDICAL CENTER | Age: 54
End: 2024-07-29
Payer: MEDICAID

## 2024-07-29 ENCOUNTER — APPOINTMENT (OUTPATIENT)
Dept: RADIOLOGY | Facility: MEDICAL CENTER | Age: 54
End: 2024-07-29
Attending: INTERNAL MEDICINE
Payer: MEDICAID

## 2024-07-29 LAB
ALBUMIN SERPL BCP-MCNC: 3.7 G/DL (ref 3.2–4.9)
ALBUMIN/GLOB SERPL: 1.3 G/DL
ALP SERPL-CCNC: 139 U/L (ref 30–99)
ALT SERPL-CCNC: 72 U/L (ref 2–50)
ANION GAP SERPL CALC-SCNC: 16 MMOL/L (ref 7–16)
AST SERPL-CCNC: 39 U/L (ref 12–45)
BASOPHILS # BLD AUTO: 0.7 % (ref 0–1.8)
BASOPHILS # BLD: 0.06 K/UL (ref 0–0.12)
BILIRUB SERPL-MCNC: 2.7 MG/DL (ref 0.1–1.5)
BUN SERPL-MCNC: 22 MG/DL (ref 8–22)
CALCIUM ALBUM COR SERPL-MCNC: 10.1 MG/DL (ref 8.5–10.5)
CALCIUM SERPL-MCNC: 9.9 MG/DL (ref 8.5–10.5)
CHLORIDE SERPL-SCNC: 95 MMOL/L (ref 96–112)
CO2 SERPL-SCNC: 26 MMOL/L (ref 20–33)
CREAT SERPL-MCNC: 1.05 MG/DL (ref 0.5–1.4)
EOSINOPHIL # BLD AUTO: 0.49 K/UL (ref 0–0.51)
EOSINOPHIL NFR BLD: 6 % (ref 0–6.9)
ERYTHROCYTE [DISTWIDTH] IN BLOOD BY AUTOMATED COUNT: 51 FL (ref 35.9–50)
GFR SERPLBLD CREATININE-BSD FMLA CKD-EPI: 63 ML/MIN/1.73 M 2
GLOBULIN SER CALC-MCNC: 2.8 G/DL (ref 1.9–3.5)
GLUCOSE SERPL-MCNC: 114 MG/DL (ref 65–99)
HAV IGM SERPL QL IA: NORMAL
HBV CORE IGM SER QL: NORMAL
HBV SURFACE AG SER QL: NORMAL
HCT VFR BLD AUTO: 39.3 % (ref 37–47)
HCV AB SER QL: NORMAL
HGB BLD-MCNC: 12.8 G/DL (ref 12–16)
IMM GRANULOCYTES # BLD AUTO: 0.02 K/UL (ref 0–0.11)
IMM GRANULOCYTES NFR BLD AUTO: 0.2 % (ref 0–0.9)
LYMPHOCYTES # BLD AUTO: 1.16 K/UL (ref 1–4.8)
LYMPHOCYTES NFR BLD: 14.3 % (ref 22–41)
MAGNESIUM SERPL-MCNC: 1.6 MG/DL (ref 1.5–2.5)
MCH RBC QN AUTO: 28.8 PG (ref 27–33)
MCHC RBC AUTO-ENTMCNC: 32.6 G/DL (ref 32.2–35.5)
MCV RBC AUTO: 88.3 FL (ref 81.4–97.8)
MONOCYTES # BLD AUTO: 0.82 K/UL (ref 0–0.85)
MONOCYTES NFR BLD AUTO: 10.1 % (ref 0–13.4)
NEUTROPHILS # BLD AUTO: 5.59 K/UL (ref 1.82–7.42)
NEUTROPHILS NFR BLD: 68.7 % (ref 44–72)
NRBC # BLD AUTO: 0 K/UL
NRBC BLD-RTO: 0 /100 WBC (ref 0–0.2)
PLATELET # BLD AUTO: 292 K/UL (ref 164–446)
PMV BLD AUTO: 9.8 FL (ref 9–12.9)
POTASSIUM SERPL-SCNC: 3.1 MMOL/L (ref 3.6–5.5)
PROT SERPL-MCNC: 6.5 G/DL (ref 6–8.2)
RBC # BLD AUTO: 4.45 M/UL (ref 4.2–5.4)
SODIUM SERPL-SCNC: 137 MMOL/L (ref 135–145)
TROPONIN T SERPL-MCNC: 48 NG/L (ref 6–19)
WBC # BLD AUTO: 8.1 K/UL (ref 4.8–10.8)

## 2024-07-29 PROCEDURE — 83735 ASSAY OF MAGNESIUM: CPT

## 2024-07-29 PROCEDURE — 36415 COLL VENOUS BLD VENIPUNCTURE: CPT

## 2024-07-29 PROCEDURE — 76705 ECHO EXAM OF ABDOMEN: CPT

## 2024-07-29 PROCEDURE — A9270 NON-COVERED ITEM OR SERVICE: HCPCS | Mod: JZ | Performed by: INTERNAL MEDICINE

## 2024-07-29 PROCEDURE — 85025 COMPLETE CBC W/AUTO DIFF WBC: CPT

## 2024-07-29 PROCEDURE — 84484 ASSAY OF TROPONIN QUANT: CPT

## 2024-07-29 PROCEDURE — 80074 ACUTE HEPATITIS PANEL: CPT

## 2024-07-29 PROCEDURE — 700102 HCHG RX REV CODE 250 W/ 637 OVERRIDE(OP): Performed by: INTERNAL MEDICINE

## 2024-07-29 PROCEDURE — 700102 HCHG RX REV CODE 250 W/ 637 OVERRIDE(OP): Mod: JZ | Performed by: INTERNAL MEDICINE

## 2024-07-29 PROCEDURE — A9270 NON-COVERED ITEM OR SERVICE: HCPCS | Performed by: INTERNAL MEDICINE

## 2024-07-29 PROCEDURE — 700111 HCHG RX REV CODE 636 W/ 250 OVERRIDE (IP): Mod: JZ | Performed by: INTERNAL MEDICINE

## 2024-07-29 PROCEDURE — 80053 COMPREHEN METABOLIC PANEL: CPT

## 2024-07-29 PROCEDURE — 770020 HCHG ROOM/CARE - TELE (206)

## 2024-07-29 PROCEDURE — 99233 SBSQ HOSP IP/OBS HIGH 50: CPT | Performed by: INTERNAL MEDICINE

## 2024-07-29 RX ORDER — LISINOPRIL 5 MG/1
2.5 TABLET ORAL
Status: DISCONTINUED | OUTPATIENT
Start: 2024-07-29 | End: 2024-07-31 | Stop reason: HOSPADM

## 2024-07-29 RX ADMIN — APIXABAN 5 MG: 5 TABLET, FILM COATED ORAL at 17:35

## 2024-07-29 RX ADMIN — VENLAFAXINE HYDROCHLORIDE 75 MG: 37.5 CAPSULE, EXTENDED RELEASE ORAL at 04:40

## 2024-07-29 RX ADMIN — ATORVASTATIN CALCIUM 40 MG: 40 TABLET, FILM COATED ORAL at 17:35

## 2024-07-29 RX ADMIN — Medication 400 MG: at 04:40

## 2024-07-29 RX ADMIN — APIXABAN 5 MG: 5 TABLET, FILM COATED ORAL at 04:40

## 2024-07-29 RX ADMIN — SPIRONOLACTONE 25 MG: 25 TABLET ORAL at 04:40

## 2024-07-29 RX ADMIN — METOPROLOL SUCCINATE 25 MG: 25 TABLET, EXTENDED RELEASE ORAL at 04:40

## 2024-07-29 RX ADMIN — DAPAGLIFLOZIN 10 MG: 10 TABLET, FILM COATED ORAL at 04:40

## 2024-07-29 RX ADMIN — FUROSEMIDE 40 MG: 10 INJECTION, SOLUTION INTRAVENOUS at 14:59

## 2024-07-29 RX ADMIN — POTASSIUM CHLORIDE 20 MEQ: 1500 TABLET, EXTENDED RELEASE ORAL at 17:35

## 2024-07-29 RX ADMIN — FUROSEMIDE 40 MG: 10 INJECTION, SOLUTION INTRAVENOUS at 22:58

## 2024-07-29 RX ADMIN — Medication 400 MG: at 17:35

## 2024-07-29 RX ADMIN — POTASSIUM CHLORIDE 20 MEQ: 1500 TABLET, EXTENDED RELEASE ORAL at 04:40

## 2024-07-29 RX ADMIN — FUROSEMIDE 40 MG: 10 INJECTION, SOLUTION INTRAVENOUS at 04:40

## 2024-07-29 ASSESSMENT — ENCOUNTER SYMPTOMS
PALPITATIONS: 0
VOMITING: 0
FEVER: 0
COUGH: 0
CLAUDICATION: 0
DIZZINESS: 0
CONSTIPATION: 0
WEAKNESS: 0
WEIGHT LOSS: 0
ORTHOPNEA: 0
PHOTOPHOBIA: 0
BLURRED VISION: 0
NAUSEA: 0
MYALGIAS: 0
CHILLS: 0
SPEECH CHANGE: 0
DIARRHEA: 0
NECK PAIN: 0
ABDOMINAL PAIN: 0
DOUBLE VISION: 0
HEMOPTYSIS: 0

## 2024-07-29 ASSESSMENT — FIBROSIS 4 INDEX: FIB4 SCORE: 0.85

## 2024-07-29 ASSESSMENT — PAIN DESCRIPTION - PAIN TYPE: TYPE: ACUTE PAIN

## 2024-07-29 NOTE — PROGRESS NOTES
Hospital Medicine Daily Progress Note    Date of Service  7/29/2024    Chief Complaint  Analia Wu is a 53 y.o. female admitted 7/27/2024 with lower extremity edema    Hospital Course:    53-year-old female with history of systolic heart failure severe, EF 20% with left ventricular thrombus complicated with embolic stroke who presented 7/27 with worsening lower extremity edema.  Patient states she has been taking her medication and following low-salt diet however not this worsening on her lower extremity edema also increasing weight.  Patient was on room air on admission with tachycardia 104 with normal blood pressure, labs are at baseline with normal kidney function and no leukocytosis, last echo in 5/2024 which showed low ejection fraction 25% with a grade 2 diastolic dysfunction with mildly reduced systolic function and RV.  Patient was admitted for acute on chronic heart failure exacerbation and IV fluid was initiated with improving on her symptoms.    Patient has history of known ischemic cardiomyopathy and being followed by cardiology team, patient is on Eliquis, atorvastatin, Farxiga and metoprolol XL 25 mg daily.  Aldactone was initiated during the hospitalization and patient tolerated the Aldactone, thyroid lisinopril 2.5 mg also was added.        Interval Problem Update  -Evaluated examined the patient at bedside, denied any new symptoms however no significant improving on her lower extremity edema, patient is on 2 L nasal cannula, no significant crackles, patient will get benefit from sleep study, referral was placed, continue IV Lasix at this time, continue spironolactone, add fluid restriction and continue monitoring the patient for another day, replace potassium.  = We will add lisinopril 2.5 mg daily with monitoring her blood pressure closely.          I have discussed this patient's plan of care and discharge plan at IDT rounds today with Case Management, Nursing, Nursing leadership, and  other members of the IDT team.    Consultants/Specialty  None    Code Status  Full Code    Disposition  The patient is not medically cleared for discharge to home or a post-acute facility.  Anticipate discharge to: home with close outpatient follow-up    I have placed the appropriate orders for post-discharge needs.    Review of Systems  Review of Systems   Constitutional:  Negative for chills, fever, malaise/fatigue and weight loss.   HENT:  Negative for ear pain, hearing loss and tinnitus.    Eyes:  Negative for blurred vision, double vision and photophobia.   Respiratory:  Negative for cough and hemoptysis.    Cardiovascular:  Positive for leg swelling. Negative for chest pain, palpitations, orthopnea and claudication.   Gastrointestinal:  Negative for abdominal pain, constipation, diarrhea, nausea and vomiting.   Genitourinary:  Negative for dysuria, frequency and urgency.   Musculoskeletal:  Negative for myalgias and neck pain.   Skin:  Negative for rash.   Neurological:  Negative for dizziness, speech change and weakness.        Physical Exam  Temp:  [36.1 °C (97 °F)-36.6 °C (97.9 °F)] 36.6 °C (97.9 °F)  Pulse:  [] 99  Resp:  [16-19] 18  BP: ()/(60-71) 99/64  SpO2:  [91 %-95 %] 95 %    Physical Exam  Constitutional:       General: She is not in acute distress.     Appearance: She is not ill-appearing.   HENT:      Head: Normocephalic and atraumatic.   Cardiovascular:      Rate and Rhythm: Normal rate.      Heart sounds: No murmur heard.  Pulmonary:      Effort: No respiratory distress.      Breath sounds: No wheezing or rales.   Abdominal:      General: There is no distension.      Palpations: Abdomen is soft.      Tenderness: There is no abdominal tenderness. There is no guarding.   Musculoskeletal:      Right lower leg: Edema present.      Left lower leg: Edema present.   Skin:     Coloration: Skin is not jaundiced.      Findings: No bruising or lesion.   Neurological:      Comments: Dysarthria          Fluids    Intake/Output Summary (Last 24 hours) at 7/29/2024 1432  Last data filed at 7/29/2024 1100  Gross per 24 hour   Intake 370 ml   Output --   Net 370 ml       Laboratory  Recent Labs     07/27/24  1420 07/28/24  0208 07/29/24  0133   WBC 9.2 8.3 8.1   RBC 4.56 4.28 4.45   HEMOGLOBIN 13.0 12.6 12.8   HEMATOCRIT 40.7 38.1 39.3   MCV 89.3 89.0 88.3   MCH 28.5 29.4 28.8   MCHC 31.9* 33.1 32.6   RDW 51.6* 51.7* 51.0*   PLATELETCT 305 256 292   MPV 9.7 9.4 9.8     Recent Labs     07/27/24  1420 07/28/24  0208 07/28/24  1044 07/29/24  0133   SODIUM 135 135  --  137   POTASSIUM 3.1* 2.6* 3.2* 3.1*   CHLORIDE 96 94*  --  95*   CO2 22 25  --  26   GLUCOSE 121* 106*  --  114*   BUN 20 19  --  22   CREATININE 1.00 0.80  --  1.05   CALCIUM 9.5 9.7  --  9.9                   Imaging  US-RUQ   Final Result      1. Echogenic liver, most commonly hepatic steatosis.   2. Cholelithiasis.   3. There is a 1.1 cm mildly complicated cyst in the right hepatic lobe.         DX-CHEST-PORTABLE (1 VIEW)   Final Result      Mild infiltrate opacity in the left lung base.           Assessment/Plan  * Acute on chronic HFrEF (heart failure with reduced ejection fraction) (HCC)- (present on admission)  Assessment & Plan  Nonischemic cardiomyopathy with EF 25%  Came with lower extremity edema, no significant pulmonary edema patient is on room air  IV lasix 40 mg TID  Continue metoprolol, Farxiga.  Patient has low blood pressure and because of that she was not on ACE inhibitors or spironolactone  During hospitalization spironolactone was added on 7/28 and later lisinopril small dose was added on 7/29  Weight daily and I's and O's fluid restriction  Continue IV Lasix monitor daily with adjusting her medication for heart failure.    Elevated bilirubin- (present on admission)  Assessment & Plan  Elevated at 3.1, unclear etiology, a bit too high for Gilbert's, appears new  Trend  Improving    H/O ischemic right MCA stroke- (present on  admission)  Assessment & Plan  Embolic in nature  Doac  Statin  Monitor  No new focal neuro deficits appreciate today    Elevated troponin- (present on admission)  Assessment & Plan  Double her baseline, no cp, check EKG  Possibly related to concurrent active chf exacerbation    Hypokalemia- (present on admission)  Assessment & Plan  statin    Left ventricular thrombus- (present on admission)  Assessment & Plan  Patient is on Eliquis    Mood disorder (HCC)- (present on admission)  Assessment & Plan  Consider medications    Elevated LFTs- (present on admission)  Assessment & Plan  Presumably related to passive hepatic congestion from hfref  Improving    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 In review of yesterday's note (7/28/2024), there are no changes except as documented above.      Greater than 51 minutes spent prepping to see patient (e.g. review of tests) obtaining and/or reviewing separately obtained history. Performing a medically appropriate examination and/ evaluation.  Counseling and educating the patient/family/caregiver.  Ordering medications, tests, or procedures.  Referring and communicating with other health care professionals.  Documenting clinical information in EPIC.  Independently interpreting results and communicating results to patient/family/caregiver.  Care coordination

## 2024-07-29 NOTE — PROGRESS NOTES
Monitor Summary      Rhythm: ST/SR  BBB  Rate: 101 - 115  Ectopy: ST elevation 0.35  Measurements: 0.18 / 0.13 / 0.37    12hr chart check

## 2024-07-29 NOTE — CARE PLAN
The patient is Stable - Low risk of patient condition declining or worsening    Shift Goals  Clinical Goals: diurese  Patient Goals: rest  Family Goals: aki    Progress made toward(s) clinical / shift goals:      Problem: Knowledge Deficit - Standard  Goal: Patient and family/care givers will demonstrate understanding of plan of care, disease process/condition, diagnostic tests and medications  Outcome: Progressing     Problem: Psychosocial  Goal: Patient's ability to verbalize feelings about condition will improve  Outcome: Progressing       Patient is not progressing towards the following goals:

## 2024-07-29 NOTE — PROGRESS NOTES
Monitor Summary    Rhythm: SR/ST  Rate:   Ectopy: (r) PVC  Measurements: .17 / .12 / .35

## 2024-07-30 ENCOUNTER — PATIENT OUTREACH (OUTPATIENT)
Dept: SCHEDULING | Facility: IMAGING CENTER | Age: 54
End: 2024-07-30
Payer: MEDICAID

## 2024-07-30 ENCOUNTER — APPOINTMENT (OUTPATIENT)
Dept: CARDIOLOGY | Facility: MEDICAL CENTER | Age: 54
DRG: 292 | End: 2024-07-30
Attending: NURSE PRACTITIONER
Payer: MEDICAID

## 2024-07-30 ENCOUNTER — APPOINTMENT (OUTPATIENT)
Dept: CARDIOLOGY | Facility: MEDICAL CENTER | Age: 54
End: 2024-07-30
Attending: NURSE PRACTITIONER
Payer: MEDICAID

## 2024-07-30 LAB
ANION GAP SERPL CALC-SCNC: 19 MMOL/L (ref 7–16)
BUN SERPL-MCNC: 24 MG/DL (ref 8–22)
CALCIUM SERPL-MCNC: 9.6 MG/DL (ref 8.5–10.5)
CHLORIDE SERPL-SCNC: 88 MMOL/L (ref 96–112)
CO2 SERPL-SCNC: 29 MMOL/L (ref 20–33)
CREAT SERPL-MCNC: 0.84 MG/DL (ref 0.5–1.4)
GFR SERPLBLD CREATININE-BSD FMLA CKD-EPI: 82 ML/MIN/1.73 M 2
GLUCOSE SERPL-MCNC: 138 MG/DL (ref 65–99)
MAGNESIUM SERPL-MCNC: 1.6 MG/DL (ref 1.5–2.5)
POTASSIUM SERPL-SCNC: 3.7 MMOL/L (ref 3.6–5.5)
SODIUM SERPL-SCNC: 136 MMOL/L (ref 135–145)

## 2024-07-30 PROCEDURE — 700102 HCHG RX REV CODE 250 W/ 637 OVERRIDE(OP): Mod: JZ | Performed by: INTERNAL MEDICINE

## 2024-07-30 PROCEDURE — A9270 NON-COVERED ITEM OR SERVICE: HCPCS | Mod: JZ | Performed by: INTERNAL MEDICINE

## 2024-07-30 PROCEDURE — 700111 HCHG RX REV CODE 636 W/ 250 OVERRIDE (IP): Performed by: STUDENT IN AN ORGANIZED HEALTH CARE EDUCATION/TRAINING PROGRAM

## 2024-07-30 PROCEDURE — 770020 HCHG ROOM/CARE - TELE (206)

## 2024-07-30 PROCEDURE — 700102 HCHG RX REV CODE 250 W/ 637 OVERRIDE(OP): Performed by: INTERNAL MEDICINE

## 2024-07-30 PROCEDURE — 99233 SBSQ HOSP IP/OBS HIGH 50: CPT | Performed by: STUDENT IN AN ORGANIZED HEALTH CARE EDUCATION/TRAINING PROGRAM

## 2024-07-30 PROCEDURE — 700111 HCHG RX REV CODE 636 W/ 250 OVERRIDE (IP): Mod: JZ | Performed by: INTERNAL MEDICINE

## 2024-07-30 PROCEDURE — 36415 COLL VENOUS BLD VENIPUNCTURE: CPT

## 2024-07-30 PROCEDURE — 80048 BASIC METABOLIC PNL TOTAL CA: CPT

## 2024-07-30 PROCEDURE — 83735 ASSAY OF MAGNESIUM: CPT

## 2024-07-30 PROCEDURE — A9270 NON-COVERED ITEM OR SERVICE: HCPCS | Performed by: INTERNAL MEDICINE

## 2024-07-30 RX ORDER — MAGNESIUM SULFATE HEPTAHYDRATE 40 MG/ML
2 INJECTION, SOLUTION INTRAVENOUS ONCE
Status: COMPLETED | OUTPATIENT
Start: 2024-07-30 | End: 2024-07-30

## 2024-07-30 RX ADMIN — Medication 400 MG: at 05:13

## 2024-07-30 RX ADMIN — APIXABAN 5 MG: 5 TABLET, FILM COATED ORAL at 16:47

## 2024-07-30 RX ADMIN — Medication 400 MG: at 16:47

## 2024-07-30 RX ADMIN — ATORVASTATIN CALCIUM 40 MG: 40 TABLET, FILM COATED ORAL at 16:47

## 2024-07-30 RX ADMIN — DAPAGLIFLOZIN 10 MG: 10 TABLET, FILM COATED ORAL at 05:13

## 2024-07-30 RX ADMIN — METOPROLOL SUCCINATE 25 MG: 25 TABLET, EXTENDED RELEASE ORAL at 05:13

## 2024-07-30 RX ADMIN — POTASSIUM CHLORIDE 20 MEQ: 1500 TABLET, EXTENDED RELEASE ORAL at 05:13

## 2024-07-30 RX ADMIN — FUROSEMIDE 40 MG: 10 INJECTION, SOLUTION INTRAVENOUS at 05:13

## 2024-07-30 RX ADMIN — FUROSEMIDE 40 MG: 10 INJECTION, SOLUTION INTRAVENOUS at 14:37

## 2024-07-30 RX ADMIN — MAGNESIUM SULFATE HEPTAHYDRATE 2 G: 2 INJECTION, SOLUTION INTRAVENOUS at 14:44

## 2024-07-30 RX ADMIN — APIXABAN 5 MG: 5 TABLET, FILM COATED ORAL at 05:13

## 2024-07-30 RX ADMIN — SPIRONOLACTONE 25 MG: 25 TABLET ORAL at 05:13

## 2024-07-30 RX ADMIN — FUROSEMIDE 40 MG: 10 INJECTION, SOLUTION INTRAVENOUS at 23:16

## 2024-07-30 RX ADMIN — LISINOPRIL 2.5 MG: 5 TABLET ORAL at 05:13

## 2024-07-30 RX ADMIN — POTASSIUM CHLORIDE 20 MEQ: 1500 TABLET, EXTENDED RELEASE ORAL at 16:47

## 2024-07-30 RX ADMIN — VENLAFAXINE HYDROCHLORIDE 75 MG: 37.5 CAPSULE, EXTENDED RELEASE ORAL at 05:13

## 2024-07-30 ASSESSMENT — ENCOUNTER SYMPTOMS
NAUSEA: 0
SHORTNESS OF BREATH: 1
FEVER: 0
VOMITING: 0
COUGH: 0
ABDOMINAL PAIN: 0

## 2024-07-30 ASSESSMENT — FIBROSIS 4 INDEX: FIB4 SCORE: 0.85

## 2024-07-30 NOTE — FACE TO FACE
"Face to Face Note  -  Durable Medical Equipment    Marion Posey M.D. - NPI: [id number]  I certify that this patient is under my care and that they had a durable medical equipment(DME)face to face encounter by myself that meets the physician DME face-to-face encounter requirements with this patient on:    Date of encounter:   Patient:                    MRN:                       YOB: 2025  Analia Wu  [id number]  1970     The encounter with the patient was in whole, or in part, for the following medical condition, which is the primary reason for durable medical equipment:  CHF    I certify that, based on my findings, the following durable medical equipment is medically necessary:    Oxygen   HOME O2 Saturation Measurements:(Values must be present for Home Oxygen orders)  Room air sat at rest: 88  Room air sat with amb: 86  With liters of O2: 1, O2 sat at rest with O2: 95  With Liters of O2: 2, O2 sat with amb with O2 : 93  Is the patient mobile?: Yes  If patient feels more short of breath, they can go up to 6 liters per minute and contact healthcare provider.    Supporting Symptoms: The patient requires supplemental oxygen, as the following interventions have been tried with limited or no improvement: \"Positive expiratory pressure therapies, \"Ambulation with oximetry, and \"Incentive spirometry.    My Clinical findings support the need for the above equipment due to:  Hypoxia  "

## 2024-07-30 NOTE — PROGRESS NOTES
Bedside report received from off going RN/tech: PÉREZ Rosen assumed care of patient.     Fall Risk Score: MODERATE RISK  Fall risk interventions in place: Provide patient/family education based on risk assessment and Place patient in room close to nursing station  Bed type: Regular (Adonis Score less than 17 interventions in place)  Patient on cardiac monitor: Yes  IVF/IV medications: Not Applicable   Oxygen: How many liters 2L  Bedside sitter: Not Applicable   Isolation: Not applicable    Dapsone Counseling: I discussed with the patient the risks of dapsone including but not limited to hemolytic anemia, agranulocytosis, rashes, methemoglobinemia, kidney failure, peripheral neuropathy, headaches, GI upset, and liver toxicity.  Patients who start dapsone require monitoring including baseline LFTs and weekly CBCs for the first month, then every month thereafter.  The patient verbalized understanding of the proper use and possible adverse effects of dapsone.  All of the patient's questions and concerns were addressed.

## 2024-07-30 NOTE — PROGRESS NOTES
Monitor Summary    Rhythm: SR/ST  Rate:   Ectopy: (r) PVC  Measurements: .16 / .14 / .33

## 2024-07-30 NOTE — CARE PLAN
Problem: Care Map:  Day 3 Optimal Outcome for the Heart Failure Patient  Goal: Day 3:  Optimal Care of the heart failure patient  Description: Target End Date:  end of day 3  Outcome: Progressing     Problem: Knowledge Deficit - Standard  Goal: Patient and family/care givers will demonstrate understanding of plan of care, disease process/condition, diagnostic tests and medications  Description: Target End Date:  1-3 days or as soon as patient condition allows    Document in Patient Education    1.  Patient and family/caregiver oriented to unit, equipment, visitation policy and means for communicating concern  2.  Complete/review Learning Assessment  3.  Assess knowledge level of disease process/condition, treatment plan, diagnostic tests and medications  4.  Explain disease process/condition, treatment plan, diagnostic tests and medications  Outcome: Progressing   The patient is Stable - Low risk of patient condition declining or worsening    Shift Goals  Clinical Goals: diuresis  Patient Goals: rest  Family Goals: aki    Progress made toward(s) clinical / shift goals:      Patient is not progressing towards the following goals:

## 2024-07-30 NOTE — CARE PLAN
Problem: Care Map:  Day 3 Optimal Outcome for the Heart Failure Patient  Goal: Day 3:  Optimal Care of the heart failure patient  Description: Target End Date:  end of day 3  Outcome: Progressing  Intervention: Start Heart Failure education booklet, provide writing utensils and notepad for questions  Note: HF education booklet provided.  Intervention: For patient's with heart failure exacerbation, identify precipitant (diet, med compliance, etc.) and direct education towards lifestyle changes to prevent exacerbations.  Note: Education provided towards lifestyle changes.  Intervention: Instruct patient to write down weights on symptom tracker daily  Note: Pt updated on education.  Intervention: Ensure daily BMP is ordered by provider  Note: Daily BMP ordered.   Intervention: Daily weight documented.  Use stand up scale if patient able. Compare to previous weight  Note: Daily weight obtained  Intervention: Assess and document 2 hour post diuretic output  Note: I/O's documented.  Intervention: Document ambulation tolerance (functional assessment) in ADL flowsheet daily  Note: Ambulation charted in ADL flowsheet.   The patient is Watcher - Medium risk of patient condition declining or worsening    Shift Goals  Clinical Goals: IV lasix, monitor VS  Patient Goals: dc  Family Goals: aki    Progress made toward(s) clinical / shift goals:  Updated on POC    Patient is not progressing towards the following goals:

## 2024-07-30 NOTE — DISCHARGE PLANNING
@1523  Received Choice form at 1454  Agency/Facility Name: [facility]  Referral sent per Choice form @ 152  Per Hemal at [facility] they accept Medicaid FFS. I hard faxed the referral for Oxygen. Scanned DME O2 choice form in media.       @[phone]-  Spoke with Hemal at [facility] was advised they received the hard fax and will process the order. ETA given delivery by 5 pm today. Notified LSW Luly via teams.

## 2024-07-30 NOTE — PROGRESS NOTES
Hospital Medicine Daily Progress Note    Date of Service  7/30/2024    Chief Complaint  Analia Wu is a 53 y.o. female admitted 7/27/2024 with lower extremity edema.     Hospital Course  Analia Wu is a 53-year-old female with PMHx HFrEF, left ventricular thrombus, history of embolic stroke.  Admitted 7/27 for lower extremity edema.  Found to have acute on chronic heart failure exacerbation.    Echocardiogram 5/28/2024: EF 25% with global hypokinesis.  Left ventricular apical thrombus is present and semimobile.    Patient is followed closely by outpatient cardiology.  She is compliant with her Eliquis, atorvastatin, Farxiga and metoprolol.  Aldactone and lisinopril restarted during this hospitalization.  Her blood pressures remained stable after initiation.    Interval Problem Update  7/30: Vitals notable for intermittent tachycardia.  Patient remains on 3 L/min supplemental O2.  Potassium 3.7 from 3.1.  Magnesium 1.6; replaced.  Continue IV Lasix every 8 hours today.  Home O2 evaluation completed and ordered.    I have discussed this patient's plan of care and discharge plan at IDT rounds today with Case Management, Nursing, Nursing leadership, and other members of the IDT team.    Consultants/Specialty  None at this time     Code Status  Full Code    Disposition  The patient is not medically cleared for discharge to home or a post-acute facility.      I have placed the appropriate orders for post-discharge needs.    Review of Systems  Review of Systems   Constitutional:  Positive for malaise/fatigue. Negative for fever.   Respiratory:  Positive for shortness of breath. Negative for cough.    Cardiovascular:  Positive for leg swelling. Negative for chest pain.   Gastrointestinal:  Negative for abdominal pain, nausea and vomiting.        Physical Exam  Temp:  [35.9 °C (96.6 °F)-36.6 °C (97.9 °F)] 36.3 °C (97.3 °F)  Pulse:  [] 103  Resp:  [16-18] 18  BP: ()/(65-78) 121/76  SpO2:  [91 %-96 %]  95 %    Physical Exam  Vitals and nursing note reviewed.   Constitutional:       General: She is not in acute distress.     Appearance: Normal appearance. She is ill-appearing.   Cardiovascular:      Rate and Rhythm: Regular rhythm. Tachycardia present.      Heart sounds: Murmur heard.   Pulmonary:      Effort: Pulmonary effort is normal. No respiratory distress.      Breath sounds: Normal breath sounds.      Comments: Crackles at bilateral lung bases   Musculoskeletal:         General: Swelling present.      Comments: +2 pitting edema to knee bilaterally    Skin:     General: Skin is warm and dry.   Neurological:      Mental Status: She is alert and oriented to person, place, and time.   Psychiatric:         Mood and Affect: Mood normal.         Behavior: Behavior normal.         Fluids    Intake/Output Summary (Last 24 hours) at 7/30/2024 1434  Last data filed at 7/30/2024 1401  Gross per 24 hour   Intake 990 ml   Output 900 ml   Net 90 ml       Laboratory  Recent Labs     07/28/24  0208 07/29/24  0133   WBC 8.3 8.1   RBC 4.28 4.45   HEMOGLOBIN 12.6 12.8   HEMATOCRIT 38.1 39.3   MCV 89.0 88.3   MCH 29.4 28.8   MCHC 33.1 32.6   RDW 51.7* 51.0*   PLATELETCT 256 292   MPV 9.4 9.8     Recent Labs     07/28/24  0208 07/28/24  1044 07/29/24  0133 07/30/24  0904   SODIUM 135  --  137 136   POTASSIUM 2.6* 3.2* 3.1* 3.7   CHLORIDE 94*  --  95* 88*   CO2 25  --  26 29   GLUCOSE 106*  --  114* 138*   BUN 19  --  22 24*   CREATININE 0.80  --  1.05 0.84   CALCIUM 9.7  --  9.9 9.6                   Imaging  US-RUQ   Final Result      1. Echogenic liver, most commonly hepatic steatosis.   2. Cholelithiasis.   3. There is a 1.1 cm mildly complicated cyst in the right hepatic lobe.         DX-CHEST-PORTABLE (1 VIEW)   Final Result      Mild infiltrate opacity in the left lung base.           Assessment/Plan  * Acute on chronic HFrEF (heart failure with reduced ejection fraction) (HCC)- (present on admission)  Assessment &  Plan  Echocardiogram May 2024: EF 25% with global hypokinesis  Persistent lower extremity edema and hypoxia present  - Continue Lasix 40 mg 3 times daily  - Continuous telemetry monitoring   -Close monitoring for electrolyte abnormality and arrhythmia in the setting of forced diuresis  - Continue GDMT with metoprolol, lisinopril, Farxiga and spironolactone  - Close monitoring for hypotension in the setting of GDMT with new additions of lisinopril and spironolactone    Elevated bilirubin- (present on admission)  Assessment & Plan  Bilirubin 2.7  - Repeat CMP in a.m.    H/O ischemic right MCA stroke- (present on admission)  Assessment & Plan  History of  - Continue home Eliquis  - Close monitoring for strokelike symptoms given left ventricular thrombus and history of stroke    Elevated troponin- (present on admission)  Assessment & Plan  Secondary to acute on chronic CHF    Hypokalemia- (present on admission)  Assessment & Plan  Potassium improved to 3.7  -40 p.o. potassium today  - Repeat BMP in a.m.    Left ventricular thrombus- (present on admission)  Assessment & Plan  Echocardiogram 5/28/2024: Left ventricular apical thrombus is present and semimobile  - Continue Eliquis  - Continue to follow with outpatient cardiology    Mood disorder (HCC)- (present on admission)  Assessment & Plan  Continue home venlafaxine    Elevated LFTs- (present on admission)  Assessment & Plan  Secondary to volume overload from acute on chronic CHF  Improving  - Repeat CMP in a.m.    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 In review of yesterday's note (7/29/2024), there are no changes except as documented above.

## 2024-07-30 NOTE — CARE PLAN
The patient is Stable - Low risk of patient condition declining or worsening    Shift Goals  Clinical Goals: blaise mortensen  Patient Goals: go home  Family Goals: aki    Progress made toward(s) clinical / shift goals:      Problem: Care Map:  Day 2 Optimal Outcome for the Heart Failure Patient  Goal: Day 2:  Optimal Care of the heart failure patient  Outcome: Progressing     Problem: Psychosocial  Goal: Patient's ability to verbalize feelings about condition will improve  Outcome: Progressing       Patient is not progressing towards the following goals:

## 2024-07-30 NOTE — DISCHARGE PLANNING
Case Management Discharge Planning    Admission Date: 7/27/2024  GMLOS: 3  ALOS: 3    6-Clicks ADL Score: 24  6-Clicks Mobility Score: 24      Anticipated Discharge Dispo: Discharge Disposition: Discharged to home/self care (01)  Discharge Address: [address] [place] [address] [address]  [place] [address]  Discharge Contact Phone Number: [phone]    DME Needed: Yes    DME Ordered: Yes    Action(s) Taken: DC Assessment Complete (See below), Choice obtained, and DME Face to Face  LMSW met with pt to received choice for DME O2. Choice obtained for [employer]. Faxed over to DPA.     Escalations Completed: None    Medically Clear: No    Next Steps: F/U with DME O2 delivery    Barriers to Discharge: Oxygen Delivery    Is the patient up for discharge tomorrow: Yes    Is transport arranged for discharge disposition: Yes

## 2024-07-30 NOTE — DISCHARGE PLANNING
Care Transition Team Assessment  LMSW met with pt to conduct assessment and verify demographics. Pt is currently AOX4. Pt was admitted on 7/27/2024 for Acute on chronic HFrEF (heart failure with reduced ejection fraction) ([facility]). Pt's PCP is Mavis Sheldon. Pt verified address on file. Pt lives in a first floor apartment with her daughter. Pt stated that she was independent with ADLS and IADLS prior to admission and did not use any DME. Pt's insurance is Medicaid FFS. Pt stated that her daughter will be able to provide transportation home.     Information Source  Orientation Level: Oriented X4  Information Given By: Patient  Who is responsible for making decisions for patient? : Patient    Readmission Evaluation  Is this a readmission?: No    Elopement Risk  Legal Hold: No  Ambulatory or Self Mobile in Wheelchair: Yes  Disoriented: No  Psychiatric Symptoms: None  History of Wandering: No  Elopement this Admit: No  Vocalizing Wanting to Leave: No  Displays Behaviors, Body Language Wanting to Leave: No-Not at Risk for Elopement  Elopement Risk: Not at Risk for Elopement    Interdisciplinary Discharge Planning  Primary Care Physician: MALAIKA BORREGO*  Lives with - Patient's Self Care Capacity: Alone and Able to Care For Self  Patient or legal guardian wants to designate a caregiver: Yes  Caregiver name: Jessika Wu  Caregiver contact info: [phone]  Support Systems: Children  Housing / Facility: 1 Story Apartment / Condo    Discharge Preparedness  What is your plan after discharge?: Home with help  What are your discharge supports?: Child  Prior Functional Level: Ambulatory, Independent with Activities of Daily Living  Difficulity with ADLs: None  Difficulity with IADLs: None    Functional Assesment  Prior Functional Level: Ambulatory, Independent with Activities of Daily Living    Finances  Financial Barriers to Discharge: No    Vision / Hearing Impairment  Vision Impairment : Yes  Right Eye Vision:  Impaired, Wears Glasses  Left Eye Vision: Impaired, Wears Glasses  Hearing Impairment : Yes  Hearing Impairment: Both Ears, Hearing Device(s) Available    Domestic Abuse  Have you ever been the victim of abuse or violence?: No  Possible Abuse/Neglect Reported to:: Not Applicable    Psychological Assessment  History of Substance Abuse: None  History of Psychiatric Problems: Yes  Non-compliant with Treatment: No  Newly Diagnosed Illness: No    Discharge Risks or Barriers  Discharge risks or barriers?: No    Anticipated Discharge Information  Discharge Disposition: Discharged to home/self care (01)  Discharge Address: [address] [place] [address] [address]  [place] [address]  Discharge Contact Phone Number: [phone]

## 2024-07-31 ENCOUNTER — PHARMACY VISIT (OUTPATIENT)
Dept: PHARMACY | Facility: MEDICAL CENTER | Age: 54
End: 2024-07-31
Payer: COMMERCIAL

## 2024-07-31 VITALS
BODY MASS INDEX: 29.13 KG/M2 | RESPIRATION RATE: 16 BRPM | WEIGHT: 185.63 LBS | HEART RATE: 100 BPM | SYSTOLIC BLOOD PRESSURE: 91 MMHG | OXYGEN SATURATION: 96 % | DIASTOLIC BLOOD PRESSURE: 61 MMHG | HEIGHT: 67 IN | TEMPERATURE: 97.2 F

## 2024-07-31 LAB
ALBUMIN SERPL BCP-MCNC: 3.3 G/DL (ref 3.2–4.9)
ALBUMIN/GLOB SERPL: 1.2 G/DL
ALP SERPL-CCNC: 117 U/L (ref 30–99)
ALT SERPL-CCNC: 64 U/L (ref 2–50)
ANION GAP SERPL CALC-SCNC: 14 MMOL/L (ref 7–16)
AST SERPL-CCNC: 42 U/L (ref 12–45)
BILIRUB SERPL-MCNC: 2.9 MG/DL (ref 0.1–1.5)
BUN SERPL-MCNC: 25 MG/DL (ref 8–22)
CALCIUM ALBUM COR SERPL-MCNC: 9.7 MG/DL (ref 8.5–10.5)
CALCIUM SERPL-MCNC: 9.1 MG/DL (ref 8.5–10.5)
CHLORIDE SERPL-SCNC: 90 MMOL/L (ref 96–112)
CO2 SERPL-SCNC: 29 MMOL/L (ref 20–33)
CREAT SERPL-MCNC: 0.86 MG/DL (ref 0.5–1.4)
GFR SERPLBLD CREATININE-BSD FMLA CKD-EPI: 80 ML/MIN/1.73 M 2
GLOBULIN SER CALC-MCNC: 2.8 G/DL (ref 1.9–3.5)
GLUCOSE SERPL-MCNC: 124 MG/DL (ref 65–99)
MAGNESIUM SERPL-MCNC: 1.9 MG/DL (ref 1.5–2.5)
POTASSIUM SERPL-SCNC: 3 MMOL/L (ref 3.6–5.5)
PROT SERPL-MCNC: 6.1 G/DL (ref 6–8.2)
SODIUM SERPL-SCNC: 133 MMOL/L (ref 135–145)

## 2024-07-31 PROCEDURE — 700102 HCHG RX REV CODE 250 W/ 637 OVERRIDE(OP): Mod: JZ | Performed by: INTERNAL MEDICINE

## 2024-07-31 PROCEDURE — 700111 HCHG RX REV CODE 636 W/ 250 OVERRIDE (IP): Mod: JZ | Performed by: INTERNAL MEDICINE

## 2024-07-31 PROCEDURE — RXMED WILLOW AMBULATORY MEDICATION CHARGE: Performed by: STUDENT IN AN ORGANIZED HEALTH CARE EDUCATION/TRAINING PROGRAM

## 2024-07-31 PROCEDURE — A9270 NON-COVERED ITEM OR SERVICE: HCPCS | Mod: JZ

## 2024-07-31 PROCEDURE — 99239 HOSP IP/OBS DSCHRG MGMT >30: CPT | Performed by: STUDENT IN AN ORGANIZED HEALTH CARE EDUCATION/TRAINING PROGRAM

## 2024-07-31 PROCEDURE — 80053 COMPREHEN METABOLIC PANEL: CPT

## 2024-07-31 PROCEDURE — 36415 COLL VENOUS BLD VENIPUNCTURE: CPT

## 2024-07-31 PROCEDURE — 83735 ASSAY OF MAGNESIUM: CPT

## 2024-07-31 PROCEDURE — 700102 HCHG RX REV CODE 250 W/ 637 OVERRIDE(OP): Mod: JZ

## 2024-07-31 PROCEDURE — A9270 NON-COVERED ITEM OR SERVICE: HCPCS | Mod: JZ | Performed by: INTERNAL MEDICINE

## 2024-07-31 RX ORDER — POTASSIUM CHLORIDE 1500 MG/1
40 TABLET, EXTENDED RELEASE ORAL EVERY 4 HOURS
Status: DISPENSED | OUTPATIENT
Start: 2024-07-31 | End: 2024-07-31

## 2024-07-31 RX ORDER — SPIRONOLACTONE 25 MG/1
25 TABLET ORAL DAILY
Qty: 30 TABLET | Refills: 3 | Status: SHIPPED | OUTPATIENT
Start: 2024-08-01

## 2024-07-31 RX ORDER — POTASSIUM CHLORIDE 1500 MG/1
20 TABLET, EXTENDED RELEASE ORAL 2 TIMES DAILY PRN
Qty: 30 TABLET | Refills: 3 | Status: SHIPPED | OUTPATIENT
Start: 2024-07-31

## 2024-07-31 RX ORDER — FUROSEMIDE 40 MG
40 TABLET ORAL 2 TIMES DAILY PRN
Qty: 60 TABLET | Refills: 3 | Status: SHIPPED | OUTPATIENT
Start: 2024-07-31

## 2024-07-31 RX ADMIN — Medication 400 MG: at 05:40

## 2024-07-31 RX ADMIN — METOPROLOL SUCCINATE 25 MG: 25 TABLET, EXTENDED RELEASE ORAL at 05:39

## 2024-07-31 RX ADMIN — DAPAGLIFLOZIN 10 MG: 10 TABLET, FILM COATED ORAL at 05:40

## 2024-07-31 RX ADMIN — APIXABAN 5 MG: 5 TABLET, FILM COATED ORAL at 05:40

## 2024-07-31 RX ADMIN — POTASSIUM CHLORIDE 40 MEQ: 1500 TABLET, EXTENDED RELEASE ORAL at 09:52

## 2024-07-31 RX ADMIN — FUROSEMIDE 40 MG: 10 INJECTION, SOLUTION INTRAVENOUS at 05:42

## 2024-07-31 RX ADMIN — POTASSIUM CHLORIDE 20 MEQ: 1500 TABLET, EXTENDED RELEASE ORAL at 05:39

## 2024-07-31 RX ADMIN — VENLAFAXINE HYDROCHLORIDE 75 MG: 37.5 CAPSULE, EXTENDED RELEASE ORAL at 05:39

## 2024-07-31 ASSESSMENT — FIBROSIS 4 INDEX: FIB4 SCORE: 0.85

## 2024-07-31 ASSESSMENT — PAIN DESCRIPTION - PAIN TYPE: TYPE: ACUTE PAIN

## 2024-07-31 NOTE — PROGRESS NOTES
Patient dc via MTM around 1330 by wheelchair. All O2 DME sent with patient. Patient daughter called when patient got home with some clarification about oxygen and fluid intake restrictions, education provided. Daughter also stated that patient's speech was slurred and she was having trouble formulating sentences. She then asked her mom some questions and she answered appropriately, daughter stated she seemed fine now. Daughter assured that when patient left hospital she was alert and oriented and forming coherent complete sentences. Daughter instructed to call 911 if there are any changes to her mother's status.

## 2024-07-31 NOTE — DOCUMENTATION QUERY
[facility]                                                                       Query Response Note      PATIENT:               LAI MALONE  ACCT #:                  [id number]  MRN:                     [id number]  :                      1970  ADMIT DATE:       2024 1:15 PM  DISCH DATE:          RESPONDING  PROVIDER #:        [id number]           QUERY TEXT:    The patient has troponin level of 48. Please clarify the etiology:     Please add your response to this query in your progress notes if you agree, thank you.    The patient's clinical indicators include:   Hospitalist note: Elevated troponin, possibly related to concurrent active chf exacerbation.  Troponin 48    Risk factor: CHF exacerbation    Treatment: IV Lasix    Thank you,  Jesse Carrion NP, CCDS   Clinical   Connect via Build[employer]ax  Options provided:   -- Myocardial injury (non-ischemic)   -- Other explanation, (please specify)      Query created by: Jesse Carrion on 2024 12:31 PM    RESPONSE TEXT:    Myocardial injury (non-ischemic)          Electronically signed by:  REDD HUIZAR MD 2024 12:55 PM

## 2024-07-31 NOTE — PROGRESS NOTES
Monitor Summary    Rhythm: SR/ST  Rate:   Ectopy: (r) PVC  Measurements: .18 / .12 / .37

## 2024-07-31 NOTE — CARE PLAN
The patient is Stable - Low risk of patient condition declining or worsening    Shift Goals  Clinical Goals: vss, ditiannae  Patient Goals: dc  Family Goals: aki    Progress made toward(s) clinical / shift goals:        Problem: Care Map:  Admission Optimal Outcome for the Heart Failure Patient  Goal: Admission:  Optimal Care of the heart failure patient  Outcome: Met     Problem: Care Map:  Day 1 Optimal Outcome for the Heart Failure Patient  Goal: Day 1:  Optimal Care of the heart failure patient  Outcome: Met     Problem: Care Map:  Day 2 Optimal Outcome for the Heart Failure Patient  Goal: Day 2:  Optimal Care of the heart failure patient  Outcome: Met     Problem: Care Map:  Day 3 Optimal Outcome for the Heart Failure Patient  Goal: Day 3:  Optimal Care of the heart failure patient  Outcome: Met     Problem: Care Map:  Day of Discharge Optimal Outcome for the Heart Failure Patient  Goal: Day of Discharge:  Optimal Care of the heart failure patient  Outcome: Met     Problem: Knowledge Deficit - Standard  Goal: Patient and family/care givers will demonstrate understanding of plan of care, disease process/condition, diagnostic tests and medications  Outcome: Met     Problem: Pain - Standard  Goal: Alleviation of pain or a reduction in pain to the patient’s comfort goal  Outcome: Met     Problem: Psychosocial  Goal: Patient's ability to verbalize feelings about condition will improve  Outcome: Met     Problem: Fall Risk  Goal: Patient will remain free from falls  Outcome: Met       Patient is not progressing towards the following goals:

## 2024-07-31 NOTE — DISCHARGE PLANNING
Case Management Discharge Planning    Admission Date: 7/27/2024  GMLOS: 3  ALOS: 4    6-Clicks ADL Score: 24  6-Clicks Mobility Score: 24      Anticipated Discharge Dispo: Discharge Disposition: Discharged to home/self care (01)  Discharge Address: [address] [place], [place] [address] [place] [address] [place] [address]  Discharge Contact Phone Number: [phone]    DME Needed: No    Action(s) Taken: Transport Arranged     Patient discussed in AM rounds; MC per MD for discharge home today. Home oxygen delivered at bedside.     RN ANDREE met with patient to discuss; patient is requesting assistance with transportation home. Pt's stated she has keys to her home and her daughter will be at home as well.     RN ANDREE confirmed pt's home address on file. Requested transport via pt's MTM benefit for 10:30 AM. Provided MTM flyer to patient and encouraged her to use it for non-emergency transport as needed. Pt verbalized understanding.    Received confirmation from [employer] that transport has been scheduled for 1330.     Escalations Completed: None    Medically Clear: Yes    Next Steps: F/U with medical team regarding D/C needs/ barriers.     Barriers to Discharge: Transportation    Is the patient up for discharge tomorrow: No

## 2024-07-31 NOTE — CARE PLAN
The patient is Stable - Low risk of patient condition declining or worsening    Shift Goals  Clinical Goals: blaise mortensen  Patient Goals: go home  Family Goals: aki    Progress made toward(s) clinical / shift goals:      Problem: Care Map:  Day 3 Optimal Outcome for the Heart Failure Patient  Goal: Day 3:  Optimal Care of the heart failure patient  Outcome: Progressing     Problem: Knowledge Deficit - Standard  Goal: Patient and family/care givers will demonstrate understanding of plan of care, disease process/condition, diagnostic tests and medications  Outcome: Progressing       Patient is not progressing towards the following goals:

## 2024-07-31 NOTE — DISCHARGE PLANNING
DC Transport Scheduled    Transport Company Scheduled:  [employer]  Spoke with Lizbeth at [facility] to schedule transport.  [facility] Trip #: [id number]    Scheduled Date: 7/31/2021  Scheduled Time: 1330    Destination: Patient's Home   Destination address: [address] [place] [address] [address] [place] [address]    Notified care team of scheduled transport via Vo[employer].     If there are any changes needed to the DC transportation scheduled, please contact [facility] at ext. [phone] between the hours of 7661-3213 Mon-Fri. If outside those hours, contact the ED Case Manager at ext. [phone].

## 2024-07-31 NOTE — DISCHARGE SUMMARY
Discharge Summary    CHIEF COMPLAINT ON ADMISSION  Chief Complaint   Patient presents with    Leg Swelling       Reason for Admission  Back Swelling     Admission Date  7/27/2024    CODE STATUS  Full Code    HPI & HOSPITAL COURSE    Analia Wu is a 53-year-old female with PMHx HFrEF, left ventricular thrombus, history of embolic stroke.  Admitted 7/27 for lower extremity edema.  Found to have acute on chronic heart failure exacerbation.    Echocardiogram 5/28/2024: EF 25% with global hypokinesis.  Left ventricular apical thrombus is present and semimobile.    Patient is followed closely by outpatient cardiology.  She is compliant with her Eliquis, atorvastatin, Farxiga and metoprolol.  Aldactone and lisinopril restarted during this hospitalization.  Her blood pressures remained stable after initiation.    Patient states she was taking Lasix 40 mg as needed for leg swelling.  However, she felt that when she started taking it she did not notice an improvement.  I advised patient to take 40 mg twice daily as needed for leg swelling.    Patient was aggressively diuresed during her hospitalization.  She did require home oxygen upon discharge.  She is discharged with 2 L/min supplemental O2.  This has been delivered to bedside.  Patient will follow-up with cardiology in the next 1 to 2 weeks.  Her medications have been refilled and provided to her prior to discharge.  Home health has been resumed.  She is planning to have her lab work completed in the next 3 to 5 days prior to an appointment with cardiology.    Therefore, she is discharged in fair and stable condition to home with organized home healthcare and close outpatient follow-up.    The patient met 2-midnight criteria for an inpatient stay at the time of discharge.    Discharge Date  7/31/2024    FOLLOW UP ITEMS POST DISCHARGE  Follow-up with cardiology 1 week  Follow-up with primary care physician 3 to 5 days    DISCHARGE DIAGNOSES  Principal Problem:    Acute  on chronic HFrEF (heart failure with reduced ejection fraction) (HCC) (POA: Yes)  Active Problems:    Hyperlipidemia (POA: Yes)    Benign neoplasm of rectum and anal canal (POA: Yes)    Elevated LFTs (POA: Yes)    Mood disorder (HCC) (POA: Yes)    Left ventricular thrombus (POA: Yes)    Hypokalemia (POA: Yes)    Elevated troponin (POA: Yes)    H/O ischemic right MCA stroke (POA: Yes)    Elevated bilirubin (POA: Yes)  Resolved Problems:    * No resolved hospital problems. *      FOLLOW UP  Future Appointments   Date Time Provider Department Center   8/5/2024  1:00 PM PHARMACIST-CAM B [facility] None   8/8/2024  2:30 PM EDUCATION - CARDIO [facility] None   8/8/2024  4:00 PM CARLIE Shi [facility] None     Mavis Sheldon P.A.-C.  [address]  [facility] [address]  [phone]    Go on 8/6/2024  Please go to your hospital / heart failure follow up with Mavis Sheldon P.A.-C. on Tuesday, August 6th, 2024, check in 3:10pm for a 3:40pm appointment.    Pulmonary    Follow up  Will call you to set up follow up    Cardiology    Follow up  Will call you to schedule follow up      MEDICATIONS ON DISCHARGE     Medication List        START taking these medications        Instructions   potassium chloride SA 20 MEQ Tbcr  Commonly known as: Kdur   Take 1 Tablet by mouth 2 times a day as needed (take with lasix as needed).  Dose: 20 mEq     spironolactone 25 MG Tabs  Start taking on: August 1, 2024  Commonly known as: Aldactone   Take 1 Tablet by mouth every day.  Dose: 25 mg            CHANGE how you take these medications        Instructions   furosemide 40 MG Tabs  What changed:   when to take this  reasons to take this  Commonly known as: Lasix   Doctor's comments: Take with potassium supplemental  Take 1 Tablet by mouth 2 times a day as needed (leg swelling).  Dose: 40 mg     losartan 25 MG Tabs  What changed:   how much to take  additional instructions  Commonly known as: Cozaar   Take one-half (0.5) Tablet by  "mouth every evening for 30 days.  Dose: 12.5 mg            CONTINUE taking these medications        Instructions   atorvastatin 40 MG Tabs  Commonly known as: Lipitor   Take 1 Tablet by mouth every evening.  Dose: 40 mg     dapagliflozin propanediol 10 MG Tabs  Commonly known as: Farxiga   Take 1 Tablet by mouth every day.  Dose: 10 mg     Eliquis 5 MG Tabs  Generic drug: apixaban   Take 1 Tablet by mouth 2 times a day for 90 days. Indications: DVT/PE  Dose: 5 mg     metoprolol SR 25 MG Tb24  Commonly known as: Toprol XL   Take 1 Tablet by mouth every day.  Dose: 25 mg     venlafaxine XR 75 MG Cp24  Commonly known as: Effexor XR   Take 75 mg by mouth every day.  Dose: 75 mg            STOP taking these medications      cephALEXin 500 MG Caps  Commonly known as: Keflex              Allergies  Allergies   Allergen Reactions    Latex Shortness of Breath and Itching    Other Food Hives and Rash     Splenda    Tape      \"makes skin turn red\"  Paper tape ok    Hydromorphone Vomiting and Nausea    Other Environmental      Dial soap --  Skin itching       DIET  Orders Placed This Encounter   Procedures    Diet Order Diet: Cardiac; Fluid modifications: (optional): 1800 ml Fluid Restriction     Standing Status:   Standing     Number of Occurrences:   1     Order Specific Question:   Diet:     Answer:   Cardiac [6]     Order Specific Question:   Fluid modifications: (optional)     Answer:   1800 ml Fluid Restriction [10]       ACTIVITY  As tolerated.  Weight bearing as tolerated    CONSULTATIONS  None     PROCEDURES  None     LABORATORY  Lab Results   Component Value Date    SODIUM 133 (L) 07/31/2024    POTASSIUM 3.0 (L) 07/31/2024    CHLORIDE 90 (L) 07/31/2024    CO2 29 07/31/2024    GLUCOSE 124 (H) 07/31/2024    BUN 25 (H) 07/31/2024    CREATININE 0.86 07/31/2024    CREATININE 0.8 10/05/2007        Lab Results   Component Value Date    WBC 8.1 07/29/2024    HEMOGLOBIN 12.8 07/29/2024    HEMATOCRIT 39.3 07/29/2024    " PLATELETCT 292 07/29/2024        Total time of the discharge process exceeds 48 minutes.

## 2024-07-31 NOTE — DISCHARGE PLANNING
Community Health Worker Intake    Identified no barriers  Contact information provided to Analia Samantha Wu   Has PCP appointment scheduled for 8/6/2024 @ [facility]   Inpatient assessment completed.    CHW Melida introduced community care management to the patient.     Pt lives with her daughter.    Receives SNAP benefits, utilizes the RTC bus, is aware of MTM benefits, and has scheduled follow up with PCP & CHF. Pt is receiving SNAP Benefits. Pt utilizes home oxygen and declines barriers to obtaining medication.     Pt has no needs.     Plan: CHW will follow up after DC.

## 2024-07-31 NOTE — DISCHARGE INSTRUCTIONS
HF Patient Discharge Instructions  Monitor your weight daily, and maintain a weight chart, to track your weight changes.   Activity as tolerated, unless your Doctor has ordered otherwise.  Follow a low fat, low cholesterol, low salt diet unless instructed otherwise by your Doctor. Read the labels on the back of food products and track your intake of fat, cholesterol and salt.   Fluid Restriction Yes. If a Fluid Restriction has been ordered by your Doctor, measure fluids with a measuring cup to ensure that you are not exceeding the restriction.   No smoking.  Oxygen Yes. If your Doctor has ordered that you wear Oxygen at home, it is important to wear it as ordered.  Did you receive an explanation from staff on the importance of taking each of your medications and why it is necessary to keep taking them unless your doctor says to stop? Yes  Were all of your questions answered about how to manage your heart failure and what to do if you have increased signs and symptoms after you go home? Yes  Do you feel like your heart failure care team involved you in the care treatment plan and allowed you to make decisions regarding your care while in the hospital and addressed any discharge needs you might have? Yes    See the educational handout provided at discharge for more information on monitoring your daily weight, activity and diet. This also explains more about Heart Failure, symptoms of a flare-up and some of the tests that you have undergone.     Warning Signs of a Flare-Up include:  Swelling in the ankles or lower legs.  Shortness of breath, while at rest, or while doing normal activities.   Shortness of breath at night when in bed, or coughing in bed.   Requiring more pillows to sleep at night, or needing to sit up at night to sleep.  Feeling weak, dizzy or fatigued.     When to call your Doctor:  Call your Primary Care Physician or Cardiologist if:   You experience any pain radiating to your jaw or neck.  You have  any difficulty breathing.  You experience weight gain of 3 lbs in a day or 5 lbs in a week.   You feel any palpitations or irregular heartbeats.  You become dizzy or lose consciousness.   If you have had an angiogram or had a pacemaker or AICD placed, and experience:  Bleeding, drainage or swelling at the surgical / puncture site.  Fever greater than 100.0 F  Shock from internal defibrillator.  Cool and / or numb extremities.     Please access the [facility] My HF Guide/Heart Failure Interactive Workbook:   [email or web address]

## 2024-08-05 ENCOUNTER — HOSPITAL ENCOUNTER (OUTPATIENT)
Dept: LAB | Facility: MEDICAL CENTER | Age: 54
End: 2024-08-05
Payer: MEDICAID

## 2024-08-05 ENCOUNTER — HOSPITAL ENCOUNTER (OUTPATIENT)
Dept: LAB | Facility: MEDICAL CENTER | Age: 54
End: 2024-08-05
Attending: NURSE PRACTITIONER
Payer: MEDICAID

## 2024-08-05 ENCOUNTER — NON-PROVIDER VISIT (OUTPATIENT)
Dept: CARDIOLOGY | Facility: MEDICAL CENTER | Age: 54
End: 2024-08-05
Attending: NURSE PRACTITIONER
Payer: MEDICAID

## 2024-08-05 VITALS
HEART RATE: 110 BPM | WEIGHT: 188.4 LBS | SYSTOLIC BLOOD PRESSURE: 84 MMHG | BODY MASS INDEX: 29.51 KG/M2 | DIASTOLIC BLOOD PRESSURE: 51 MMHG

## 2024-08-05 DIAGNOSIS — I50.20 ACC/AHA STAGE C SYSTOLIC HEART FAILURE (HCC): ICD-10-CM

## 2024-08-05 LAB
25(OH)D3 SERPL-MCNC: 46 NG/ML (ref 30–100)
ANION GAP SERPL CALC-SCNC: 21 MMOL/L (ref 7–16)
BASOPHILS # BLD AUTO: 0.9 % (ref 0–1.8)
BASOPHILS # BLD: 0.08 K/UL (ref 0–0.12)
BUN SERPL-MCNC: 19 MG/DL (ref 8–22)
CALCIUM SERPL-MCNC: 10.4 MG/DL (ref 8.5–10.5)
CHLORIDE SERPL-SCNC: 92 MMOL/L (ref 96–112)
CO2 SERPL-SCNC: 23 MMOL/L (ref 20–33)
CREAT SERPL-MCNC: 0.94 MG/DL (ref 0.5–1.4)
EOSINOPHIL # BLD AUTO: 0.76 K/UL (ref 0–0.51)
EOSINOPHIL NFR BLD: 8.1 % (ref 0–6.9)
ERYTHROCYTE [DISTWIDTH] IN BLOOD BY AUTOMATED COUNT: 55 FL (ref 35.9–50)
EST. AVERAGE GLUCOSE BLD GHB EST-MCNC: 148 MG/DL
GFR SERPLBLD CREATININE-BSD FMLA CKD-EPI: 72 ML/MIN/1.73 M 2
GGT SERPL-CCNC: 82 U/L (ref 7–34)
GLUCOSE SERPL-MCNC: 114 MG/DL (ref 65–99)
HBA1C MFR BLD: 6.8 % (ref 4–5.6)
HCT VFR BLD AUTO: 41.1 % (ref 37–47)
HGB BLD-MCNC: 12.7 G/DL (ref 12–16)
IMM GRANULOCYTES # BLD AUTO: 0.02 K/UL (ref 0–0.11)
IMM GRANULOCYTES NFR BLD AUTO: 0.2 % (ref 0–0.9)
LYMPHOCYTES # BLD AUTO: 1.55 K/UL (ref 1–4.8)
LYMPHOCYTES NFR BLD: 16.5 % (ref 22–41)
MCH RBC QN AUTO: 28.2 PG (ref 27–33)
MCHC RBC AUTO-ENTMCNC: 30.9 G/DL (ref 32.2–35.5)
MCV RBC AUTO: 91.3 FL (ref 81.4–97.8)
MONOCYTES # BLD AUTO: 1.04 K/UL (ref 0–0.85)
MONOCYTES NFR BLD AUTO: 11.1 % (ref 0–13.4)
NEUTROPHILS # BLD AUTO: 5.93 K/UL (ref 1.82–7.42)
NEUTROPHILS NFR BLD: 63.2 % (ref 44–72)
NRBC # BLD AUTO: 0 K/UL
NRBC BLD-RTO: 0 /100 WBC (ref 0–0.2)
PLATELET # BLD AUTO: 354 K/UL (ref 164–446)
PMV BLD AUTO: 9.8 FL (ref 9–12.9)
POTASSIUM SERPL-SCNC: 4.1 MMOL/L (ref 3.6–5.5)
RBC # BLD AUTO: 4.5 M/UL (ref 4.2–5.4)
SODIUM SERPL-SCNC: 136 MMOL/L (ref 135–145)
T4 FREE SERPL-MCNC: 1.84 NG/DL (ref 0.93–1.7)
TSH SERPL-ACNC: 1.63 UIU/ML (ref 0.35–5.5)
WBC # BLD AUTO: 9.4 K/UL (ref 4.8–10.8)

## 2024-08-05 PROCEDURE — 84439 ASSAY OF FREE THYROXINE: CPT

## 2024-08-05 PROCEDURE — 82977 ASSAY OF GGT: CPT

## 2024-08-05 PROCEDURE — 84443 ASSAY THYROID STIM HORMONE: CPT

## 2024-08-05 PROCEDURE — 83036 HEMOGLOBIN GLYCOSYLATED A1C: CPT

## 2024-08-05 PROCEDURE — 85025 COMPLETE CBC W/AUTO DIFF WBC: CPT

## 2024-08-05 PROCEDURE — 80048 BASIC METABOLIC PNL TOTAL CA: CPT

## 2024-08-05 PROCEDURE — 99212 OFFICE O/P EST SF 10 MIN: CPT | Performed by: INTERNAL MEDICINE

## 2024-08-05 PROCEDURE — 82306 VITAMIN D 25 HYDROXY: CPT

## 2024-08-05 ASSESSMENT — FIBROSIS 4 INDEX: FIB4 SCORE: 0.97

## 2024-08-05 NOTE — PROGRESS NOTES
CHF Pharmacotherapy Visit    Informed written consent was given on: 06/24/24    Analia Wu is here for CHF    HPI  Pertinent Interval History since last visit:   Recent admission 07/27-07/31/24  d/t HF exacerbation. Pt was diuresed and she reports that she continues to take furosemide 40 mg BID at home. No LE edema since d/c and since increasing furosemide dose.  Still with persistent N/V but states she is able to keep things down a little better at this time.  Has a referral to GI but states she is on the waitlist  Pt denies CP, SOB, LE edema. Pt reports occasional lightheadedness/dizziness    Most recent EF:  25% 05/28/24    Potential Barriers to Care:  Adherence:  Reports missed doses of losartan due to SBPs in the 80s with lightheadedness/dizziness (has been off x 1 week)  Reports she has also been skipping metoprolol dose when SBP is < 95  Side effects: none  Affordability: no issues, covered by Medicaid    Current CHF Medications - including dose:   Entresto or ACE/ARB: none at this time; pt is holding losartan 12.5 mg daily  Beta blocker: metoprolol ER 25 mg daily   Diuretic: furosemide 40 mg BID   Aldosterone antagonist: spironolactone 25 mg daily  SGLT2i: Farxiga 10 mg daily    Home BP and HR:  SBPs , unable to recall DBPs  HR: typically >100    Diabetes Data Review:  Lab Results   Component Value Date/Time    HBA1C 5.8 (H) 05/31/2024 04:20 AM     Current lipid medications:  Atorvastatin 40 mg daily    Lipids Data Review:   Lab Results   Component Value Date/Time    CHOLSTRLTOT 129 05/31/2024 04:20 AM    LDL 74 05/31/2024 04:20 AM    HDL 38 (A) 05/31/2024 04:20 AM    TRIGLYCERIDE 84 05/31/2024 04:20 AM       Lifestyle:  Change in weight: weight was in the low 200s prior to hospitalization. Pt reports weight the past days has been consistently 184-185 lbs.  Exercise habits: no regular exercise program at this time d/t N/V and muscle weakness. Pt will reach out to PCP to see if she would be a  "good candidate for PT.  Diet: low sodium    DATA REVIEW  Vitals:    08/05/24 1313   BP: (!) 84/51   Pulse: (!) 110         Lab Results   Component Value Date/Time    SODIUM 133 (L) 07/31/2024 03:33 AM    POTASSIUM 3.0 (L) 07/31/2024 03:33 AM    CHLORIDE 90 (L) 07/31/2024 03:33 AM    CO2 29 07/31/2024 03:33 AM    GLUCOSE 124 (H) 07/31/2024 03:33 AM    BUN 25 (H) 07/31/2024 03:33 AM    CREATININE 0.86 07/31/2024 03:33 AM    CREATININE 0.8 10/05/2007 04:50 AM     Lab Results   Component Value Date/Time    ALKPHOSPHAT 117 (H) 07/31/2024 03:33 AM    ASTSGOT 42 07/31/2024 03:33 AM    ALTSGPT 64 (H) 07/31/2024 03:33 AM    TBILIRUBIN 2.9 (H) 07/31/2024 03:33 AM    INR 1.15 (H) 05/30/2024 06:32 PM    ALBUMIN 3.3 07/31/2024 03:33 AM      No components found for: \"MICROALBUMINCREATRATIOURINE\"    Renal function:  Calculated creatinine clearance: 80 ml/min   eGFR: 100 mL/min/1.73 m2    Other:  Immunization History   Administered Date(s) Administered    Influenza (IM) Preservative Free - HISTORICAL DATA 10/10/2013, 10/10/2014    Influenza Vaccine Quad Inj (Pf) 10/19/2015, 11/01/2017, 11/08/2018    PFIZER PURPLE CAP SARS-COV-2 VACCINATION (12+) 05/21/2021, 06/15/2021    Pneumococcal Conjugate Vaccine (PCV20) 05/30/2024    Tdap Vaccine 08/22/2006, 08/01/2012     Recent Imaging Studies:    None since last visit    ASSESSMENT AND PLAN  CHF  Pt denies CP, SOB, LE edema since hospital d/c, but does report occasional lightheadedness/dizziness. Since she is no longer experiencing fluid retention, will decrease furosemide dose.  Clinic BP low and HR elevated, which is fairly similar to home vitals  Pt has not been taking losartan x 1 week d/t asymptomatic hypotension. Reasonable to continue to hold at this time, but will aim to rechallenge in the future.  Pt has also been skipping metoprolol dose when SBPs are < 95. Will have pt consistently take the medication every day to help lower HR and to optimize GDMT.    CHF medications (changes " are bolded)  Entresto or ACE/ARB: none  Beta blocker: metoprolol ER 25 mg daily   Diuretic: furosemide 40 mg in the AM and 40 mg in the PM PRN  Aldosterone antagonist: spironolactone 25 mg daily  SGLT2i: Farxiga 10 mg daily    2. Hyperlipidemia  Patient type: Secondary Prevention  Goal: LDL-C:   <70 mg/dL  LDL slightly above goal at 74 mg/dl. Could consider additional pharmacotherapy in the future or focus on lifestyle modifications for now    Lipid medications (changes are bolded)  Atorvastatin 40 mg daily    3. Lifestyle   Recommendations From Today's Visit:   Increase exercise as tolerated  Continue low sodium diet    Blood Work Ordered At Today's visit:   None. Reminded to complete BMP prior to upcoming cardiology appt    Follow-Up:   2 weeks    Gia Steve, OmerD    CC:  DANIELITO Matamoros Ruth, [email or web address]    xMedications reconciled  xFlow sheets updated

## 2024-08-05 NOTE — PATIENT INSTRUCTIONS
Take the following once daily in the morning:  Metoprolol ER 25 mg daily   Spironolactone 25 mg daily  Farxiga 10 mg daily    Continue to hold losartan for now.    Take furosemide 40 mg daily in the morning.  If you notice a weight gain of 3 lbs in one day, please take a second dose of furosemide 40 mg in the afternoon.  Take potassium when you take furosemide.

## 2024-08-08 ENCOUNTER — OFFICE VISIT (OUTPATIENT)
Dept: CARDIOLOGY | Facility: MEDICAL CENTER | Age: 54
End: 2024-08-08
Attending: NURSE PRACTITIONER
Payer: MEDICAID

## 2024-08-08 ENCOUNTER — PHARMACY VISIT (OUTPATIENT)
Dept: PHARMACY | Facility: MEDICAL CENTER | Age: 54
End: 2024-08-08
Payer: COMMERCIAL

## 2024-08-08 VITALS
BODY MASS INDEX: 29.03 KG/M2 | WEIGHT: 185 LBS | RESPIRATION RATE: 16 BRPM | HEART RATE: 104 BPM | HEIGHT: 67 IN | SYSTOLIC BLOOD PRESSURE: 85 MMHG | DIASTOLIC BLOOD PRESSURE: 61 MMHG | OXYGEN SATURATION: 99 %

## 2024-08-08 DIAGNOSIS — I51.3 LEFT VENTRICULAR THROMBUS: ICD-10-CM

## 2024-08-08 DIAGNOSIS — Z79.899 HIGH RISK MEDICATION USE: ICD-10-CM

## 2024-08-08 DIAGNOSIS — I50.9 HEART FAILURE, NYHA CLASS 2 (HCC): ICD-10-CM

## 2024-08-08 DIAGNOSIS — Z71.89 ENCOUNTER FOR EDUCATION ABOUT HEART FAILURE: ICD-10-CM

## 2024-08-08 DIAGNOSIS — I50.20 ACC/AHA STAGE C SYSTOLIC HEART FAILURE (HCC): ICD-10-CM

## 2024-08-08 DIAGNOSIS — I42.8 NON-ISCHEMIC CARDIOMYOPATHY (HCC): ICD-10-CM

## 2024-08-08 PROCEDURE — 99213 OFFICE O/P EST LOW 20 MIN: CPT | Performed by: NURSE PRACTITIONER

## 2024-08-08 PROCEDURE — 3074F SYST BP LT 130 MM HG: CPT | Performed by: NURSE PRACTITIONER

## 2024-08-08 PROCEDURE — 3078F DIAST BP <80 MM HG: CPT | Performed by: NURSE PRACTITIONER

## 2024-08-08 PROCEDURE — 99214 OFFICE O/P EST MOD 30 MIN: CPT | Performed by: NURSE PRACTITIONER

## 2024-08-08 PROCEDURE — 99999 PR NO CHARGE: CPT | Performed by: NURSE PRACTITIONER

## 2024-08-08 PROCEDURE — RXMED WILLOW AMBULATORY MEDICATION CHARGE: Performed by: STUDENT IN AN ORGANIZED HEALTH CARE EDUCATION/TRAINING PROGRAM

## 2024-08-08 PROCEDURE — 99212 OFFICE O/P EST SF 10 MIN: CPT | Performed by: NURSE PRACTITIONER

## 2024-08-08 ASSESSMENT — ENCOUNTER SYMPTOMS
NAUSEA: 1
FEVER: 0
SHORTNESS OF BREATH: 1
ORTHOPNEA: 0
MYALGIAS: 0
ABDOMINAL PAIN: 0
DIZZINESS: 1
COUGH: 0
VOMITING: 1
PALPITATIONS: 0
CLAUDICATION: 0
PND: 0

## 2024-08-08 ASSESSMENT — MINNESOTA LIVING WITH HEART FAILURE QUESTIONNAIRE (MLHF)
DIFFICULTY GOING AWAY FROM HOME: 3
DIFFICULTY WORKING TO EARN A LIVING: 3
GIVING YOU SIDE EFFECTS FROM TREATMENTS: 3
DIFFICULTY SOCIALIZING WITH FAMILY OR FRIENDS: 4
FEELING LIKE A BURDEN TO FAMILY AND FRIENDS: 5
DIFFICULTY SLEEPING WELL AT NIGHT: 3
DIFFICULTY WITH SEXUAL ACTIVITIES: 5
HAVING TO SIT OR LIE DOWN DURING THE DAY: 4
LOSS OF SELF CONTROL IN YOUR LIFE: 5
MAKING YOU SHORT OF BREATH: 5
WALKING ABOUT OR CLIMBING STAIRS DIFFICULT: 4
TIRED, FATIGUED OR LOW ON ENERGY: 4
DIFFICULTY TO CONCENTRATE OR REMEMBERING THINGS: 3
MAKING YOU WORRY: 5
MAKING YOU STAY IN A HOSPITAL: 4
SWELLING IN ANKLES OR LEGS: 2
TOTAL_SCORE: 78
MAKING YOU FEEL DEPRESSED: 5
COSTING YOU MONEY FOR MEDICAL CARE: 0
EATING LESS FOODS YOU LIKE: 5
DIFFICULTY WITH RECREATIONAL PASTIMES, SPORTS, HOBBIES: 4
WORKING AROUND THE HOUSE OR YARD DIFFICULT: 2

## 2024-08-08 ASSESSMENT — FIBROSIS 4 INDEX: FIB4 SCORE: 0.8

## 2024-08-08 NOTE — PROGRESS NOTES
Pt arrived for HF education.     Discussed in great detail dysfunction of heart muscle based on description of systolic versus diastolic versus right-sided.     Provided possible etiology based on patient chart review.    Medication purpose and function in body discussed with great detail. Described acclamation period with new medications and titrations.     Reinforced proper home weight and blood pressure monitoring to include BP 2 hours after medication. Tips provided for symptomatic low blood pressure (8 oz of water, small salty snack, laying down with feet elevated). Provided warning signs of orthostatic hypotension.     Educated on appropriate water versus total fluid intake/restriction as well as how to properly read nutrition levels for salt intake monitoring. Provided visual and verbal understanding of salt versus water intake in our bodies and how it affects HF. Additionally provided detail information of how salt is in all foods naturally at some capacity    Discussed physical activity recommendations including starting slow with gradual increases as improvement occurs. Avoid excessive cardio with high increased HR and blood pressure for extended periods of time. Patient should be able to recover and back to baseline within 30 min.    All questions answered, total time spent with patient 40 min.

## 2024-08-08 NOTE — PROGRESS NOTES
Chief Complaint   Patient presents with    Follow-Up     Dx: ACC/AHA stage C systolic heart failure (HCC)        Hyperlipidemia       Subjective     Analia Wu is a 53 y.o. female who presents today for follow up on her heart failure.     Pt was previously seen in 2012 with Dr. Abraham for congenital pulmonic stenosis.  She was lost to follow-up for unclear reasons.    Patient currently has been following with the heart failure clinic.  She was seen on 7/24/2024 in the [facility] clinic.  During that visit, patient was recommended to start taking furosemide 40 mg daily with no additional potassium she was recommended to get lab testing.    She mentioned she did not notice improvement with the diuretic and went to the hospital on 7/27/2024.  She was admitted for worsening lower extremity edema and was treated for heart failure exacerbation.  Her diuretic was increased to 40 mg twice a day.  Losartan and Aldactone were restarted in the hospital.  She got discharged on 7/31/2024.    Patient had a follow-up with pharmacotherapy earlier this week.  She was recommended to remain off of losartan as she was having hypotension.    Patient reports she has been feeling okay, mentions occasional dizziness and mild dyspnea with exertion.  She does continue to have chronic nausea vomiting and is going to be following up with GI soon.    Patient mention she was discharged with oxygen at 2 L at night.    Her home weights are now down to 182 pounds.  She was up over 200 pounds.    Additionally, patient has the following medical problems:    -Hospitalization 5/30/2024 through 6/4/2024 for left-sided weakness involving her face, arm and leg.  She was found to have a right M1 occlusion with favorable ischemic penumbra.  She underwent successful thrombectomy with TICI 2C recanalization.    -hospitalization from 5/27/2024 through 5/30/2024.  She presented with nausea and vomiting and shortness of breath.  She was found to have  "elevated troponin in NT proBNP.  She required oxygen.  She had an echo performed which showed an EF of 25% and LV apical thrombus, moderate MR, TR and RVSP of 45 mmHg.  Cardiology was consulted.  Patient had a left heart cath with no CAD.  She was treated with IV diuretics and her respiratory status improved.  She was started on GDMT and started on Eliquis for her LV thrombus.  She was encouraged to follow-up with outpatient cardiology.    -PTSD: Effexor    -Former smoker     -Perianal dysplasia with surgery    Past Medical History:   Diagnosis Date    Anesthesia     ponv,  \"shallow breathing\"    Anxiety     ASTHMA     does not have any inhalers    Cancer (HCC)     vulva    Carpal tunnel syndrome     Heart murmur     pt reports she has never had any problems    Infectious disease 2007    Mrsa infection    Pain 11-26-12    neck, left knee, 6-7/10    Panic attack     Psychiatric problem     depression, PTSD, anxiety    Seasonal allergies      Past Surgical History:   Procedure Laterality Date    RECTAL EXPLORATION  12/19/2018    Procedure: RECTAL EXPLORATION - FOR HIGH RESOLUTION ANOSCOPY;  Surgeon: Billy Gamboa M.D.;  Location: SURGERY [facility];  Service: General    ANAL FISTULECTOMY  5/24/2017    Procedure: ANAL FISTULECTOMY FOR: WIDE EXCISION ANAL DYSPLASIA;  Surgeon: Billy Gamboa M.D.;  Location: SURGERY [facility];  Service:     RECTAL EXPLORATION  9/2/2016    Procedure: RECTAL EXPLORATION eua, AND BIOPSY;  Surgeon: Billy Gamboa M.D.;  Location: [facility];  Service:     VULVECTOMY RADICAL Bilateral 9/2/2016    Procedure: VULVECTOMY RADICAL;  Surgeon: Reese Carroll M.D.;  Location: [facility];  Service:     RECTAL EXPLORATION  1/28/2015    Performed by Billy Gamboa M.D. at [facility]    EXAM UNDER ANESTHESIA  5/21/2014    Performed by Billy Gamboa M.D. at [facility]    RECTAL EXPLORATION  5/21/2014    Performed by Billy Gamboa M.D. at SURGERY " [facility]S    WIDE EXCISION  2012    Performed by Billy Villegas M.D. at SURGERY [facility]    WIDE EXCISION  2012    Performed by BILLY VILLEGAS at SURGERY [facility] ORS    WIDE EXCISION  2011    Performed by BILLY VILLEGAS at SURGERY [facility]    CERVICAL DISK AND FUSION ANTERIOR  2011    Performed by YEIMI LEVY at SURGERY [facility]    WIDE EXCISION  2010    Performed by BILLY VILLEGAS at SURGERY [facility]    HEMORRHOIDECTOMY  2010    Performed by BILLY VILLEGAS at SURGERY [facility]    VULVECTOMY PARTIAL  2007    RECTAL BIOPSY      OTHER NEUROLOGICAL SURG      neck fusion     Family History   Problem Relation Age of Onset    Diabetes Mother     Stroke Mother     Cancer Maternal Aunt     Lung Disease Neg Hx     Heart Disease Neg Hx      Social History     Socioeconomic History    Marital status: Single     Spouse name: Not on file    Number of children: Not on file    Years of education: Not on file    Highest education level: Not on file   Occupational History    Not on file   Tobacco Use    Smoking status: Former     Current packs/day: 0.00     Average packs/day: 1 pack/day for 25.0 years (25.0 ttl pk-yrs)     Types: Cigarettes     Start date: 10/4/1982     Quit date: 10/4/2007     Years since quittin.8    Smokeless tobacco: Never    Tobacco comments:        Vaping Use    Vaping status: Never Used   Substance and Sexual Activity    Alcohol use: No    Drug use: No    Sexual activity: Not Currently   Other Topics Concern    Not on file   Social History Narrative    Not on file     Social Determinants of Health     Financial Resource Strain: Not on file   Food Insecurity: No Food Insecurity (2024)    Hunger Vital Sign     Worried About Running Out of Food in the Last Year: Never true     Ran Out of Food in the Last Year: Never true   Transportation Needs: No Transportation Needs (2024)    PRAPARE - Transportation     Lack  "of Transportation (Medical): No     Lack of Transportation (Non-Medical): No   Physical Activity: Not on file   Stress: Not on file   Social Connections: Not on file   Intimate Partner Violence: Not At Risk (7/27/2024)    Humiliation, Afraid, Rape, and Kick questionnaire     Fear of Current or Ex-Partner: No     Emotionally Abused: No     Physically Abused: No     Sexually Abused: No   Housing Stability: Low Risk  (7/27/2024)    Housing Stability Vital Sign     Unable to Pay for Housing in the Last Year: No     Number of Places Lived in the Last Year: 1     Unstable Housing in the Last Year: No     Allergies   Allergen Reactions    Latex Shortness of Breath and Itching    Other Food Hives and Rash     Splenda    Tape      \"makes skin turn red\"  Paper tape ok    Hydromorphone Vomiting and Nausea    Other Environmental      Dial soap --  Skin itching     Outpatient Encounter Medications as of 8/8/2024   Medication Sig Dispense Refill    apixaban (ELIQUIS) 5mg Tab Take 1 Tablet by mouth 2 times a day. Indications: DVT/PE 60 Tablet 11    furosemide (LASIX) 40 MG Tab Take 1 Tablet by mouth 2 times a day as needed (leg swelling). 60 Tablet 3    potassium chloride SA (KDUR) 20 MEQ Tab CR Take 1 Tablet by mouth 2 times a day as needed (take with lasix as needed). 30 Tablet 3    spironolactone (ALDACTONE) 25 MG Tab Take 1 Tablet by mouth every day. 30 Tablet 3    venlafaxine XR (EFFEXOR XR) 75 MG CAPSULE SR 24 HR Take 75 mg by mouth every day.      atorvastatin (LIPITOR) 40 MG Tab Take 1 Tablet by mouth every evening. 30 Tablet 11    dapagliflozin propanediol (FARXIGA) 10 MG Tab Take 1 Tablet by mouth every day. 30 Tablet 11    metoprolol SR (TOPROL XL) 25 MG TABLET SR 24 HR Take 1 Tablet by mouth every day. 30 Tablet 11    [DISCONTINUED] losartan (COZAAR) 25 MG Tab Take one-half (0.5) Tablet by mouth every evening for 30 days. (Patient not taking: Reported on 8/8/2024) 15 Tablet 11    [DISCONTINUED] apixaban (ELIQUIS) 5mg " "Tab Take 1 Tablet by mouth 2 times a day for 90 days. Indications: DVT/PE 60 Tablet 2     No facility-administered encounter medications on file as of 8/8/2024.     Review of Systems   Constitutional:  Negative for fever and malaise/fatigue.   Respiratory:  Positive for shortness of breath (mild). Negative for cough.    Cardiovascular:  Negative for chest pain, palpitations, orthopnea, claudication, leg swelling and PND.   Gastrointestinal:  Positive for nausea and vomiting. Negative for abdominal pain.   Musculoskeletal:  Negative for myalgias.   Neurological:  Positive for dizziness.   All other systems reviewed and are negative.             Objective     BP (!) 85/61 (BP Location: Left arm, Patient Position: Sitting, BP Cuff Size: Adult)   Pulse (!) 104   Resp 16   Ht 1.702 m (5' 7\")   Wt 83.9 kg (185 lb)   LMP 12/17/2018 (Approximate)   SpO2 99%   BMI 28.98 kg/m²     Physical Exam  Vitals reviewed.   Constitutional:       Appearance: She is well-developed.   HENT:      Head: Normocephalic and atraumatic.   Eyes:      Pupils: Pupils are equal, round, and reactive to light.   Neck:      Vascular: No JVD.   Cardiovascular:      Rate and Rhythm: Normal rate and regular rhythm.      Heart sounds: Normal heart sounds.   Pulmonary:      Effort: Pulmonary effort is normal. No respiratory distress.      Breath sounds: Normal breath sounds. No wheezing or rales.   Abdominal:      General: Bowel sounds are normal.      Palpations: Abdomen is soft.   Musculoskeletal:         General: Normal range of motion.      Cervical back: Normal range of motion and neck supple.      Right lower leg: No edema.      Left lower leg: No edema.   Skin:     General: Skin is warm and dry.   Neurological:      General: No focal deficit present.      Mental Status: She is alert and oriented to person, place, and time.   Psychiatric:         Behavior: Behavior normal.            Lab Results   Component Value Date/Time    CHOLSTRLTOT 129 " 05/31/2024 04:20 AM    LDL 74 05/31/2024 04:20 AM    HDL 38 (A) 05/31/2024 04:20 AM    TRIGLYCERIDE 84 05/31/2024 04:20 AM       Lab Results   Component Value Date/Time    SODIUM 136 08/05/2024 02:16 PM    POTASSIUM 4.1 08/05/2024 02:16 PM    CHLORIDE 92 (L) 08/05/2024 02:16 PM    CO2 23 08/05/2024 02:16 PM    GLUCOSE 114 (H) 08/05/2024 02:16 PM    BUN 19 08/05/2024 02:16 PM    CREATININE 0.94 08/05/2024 02:16 PM    CREATININE 0.8 10/05/2007 04:50 AM     Lab Results   Component Value Date/Time    ALKPHOSPHAT 117 (H) 07/31/2024 03:33 AM    ASTSGOT 42 07/31/2024 03:33 AM    ALTSGPT 64 (H) 07/31/2024 03:33 AM    TBILIRUBIN 2.9 (H) 07/31/2024 03:33 AM       Echocardiogram 10/30/2012  CONCLUSIONS   Normal left ventricular size, thickness, systolic function, and   diastolic function.   Thickened mitral valve leaflets.   Mild mitral regurgitation.   Right ventricular systolic pressure is estimated to be 22-27 mmHg.   Thickened pulmonic valve leaflets.   Possible mild stenosis with peak 17 mmHg, mean 10 mmHg, trace pulmonic insufficiency.    Transthoracic Echo Report 5/28/2024  Dilated LV with severely reduced systolic function, estimated LVEF 25%.    Global hypokinesis with regional variation  Grade II LV diastolic dysfunction  LV apical thrombus is present: 0.9 x 1.5 cm, semi-mobile.  Normal RV size with mildly reduced systolic function  Reduced estimated resting stroke-volume 29ml and cardiac output per   Doppler assessment  Mildly dilated left atrium  Moderate MR  Moderate TR  Trivial pericardial fusion  Estimated RVSP 45 mmHg  Normal IVC size  Dr. Walsh notified via Voalte on 5/28/24 at 10:09 AM    Coronary angiogram 5/29/2024  HEMODYNAMICS:  Aortic pressure: 93/58 mmHg  LVEDP: 18 mmHg  No significant aortic gradient on pullback     CORONARY ANGIOGRAPHY:  The left main coronary artery : Normal-appearing large caliber vessel that bifurcates to LAD and left circumflex  The left anterior descending coronary artery : Large in  caliber transapical vessel that gives rise to several small-medium caliber diagonal branches.  Normal-appearing  The left circumflex coronary artery : Normal-appearing large caliber vessel that gives rise to a large high OM1 and large OM 2.  The right coronary artery : Large-caliber dominant vessel, normal-appearing     IMPRESSION:  1.  Nonischemic cardiomyopathy, normal-appearing epicardial coronary arteries, dominant RCA  2.  Mildly elevated resting LVEDP 18 mmHg (at the LVOT level) without significant transaortic gradient on pullback     RECOMMENDATIONS:  Ongoing goal-directed therapy for nonischemic cardiomyopathy and acute HFrEF  Primary ASCVD prevention  TR band protocol     NOTIFICATION:  The patient's daughter was attempted to be called with updates, left her voicemail message.     Referring Cardiologist - Dr. Amato - was notified.    Date 6MWT MLWHF   6/6/2024 Not done d/t tachycardia 48                     Assessment & Plan     1. ACC/AHA stage C systolic heart failure (HCC)  Basic Metabolic Panel    proBrain Natriuretic Peptide, NT      2. High risk medication use  Basic Metabolic Panel    proBrain Natriuretic Peptide, NT      3. Heart failure, NYHA class 2 (HCC)        4. Left ventricular thrombus  apixaban (ELIQUIS) 5mg Tab      5. Non-ischemic cardiomyopathy (HCC)              Medical Decision Making: Today's Assessment/Status/Plan:        HFrEF, Stage C, Class 1-2, LVEF 25%: Based on physical examination findings, patient is euvolemic. No JVD, lungs are clear to auscultation, no pitting edema in bilateral lower extremities, no ascites.   -Heart failure due to nonischemic cardiomyopathy  -Discussed Heart failure trajectory and prognosis with patient. Will continue to optimize medical therapy as tolerated. Advanced HF treatment, need for remote monitoring consideration at every visit.   -ACE-I/ARB/ARNI: Continue to hold losartan due to hypotension  -Evidence Based Beta-blocker: Continue metoprolol SR 25  mg daily  -Aldosterone Antagonist: Continue spironolactone 25 mg daily  -Diuretic: Reduce furosemide to 40 mg daily, but can increase back up to 40 mg twice a day as needed for increased weight gain, shortness of breath or swelling  -SGLT2 inhibitor: Continue Farxiga 10 mg daily  -Other: Consider as needed (Hydralazine/Isosorbide, Vericiguat, Corlanor)  -Labs: No labs ordered at this time, Will continue to closely monitor for side effects of patient's high risk medication(s) including renal function, liver function NTproBNP/cardiac markers, and electrolytes as needed  -discussed importance of exercise and regular activity  -Discussed/reviewed maintaining a low Sodium and hydration recommendations  -Repeat Echo in 3-6 months, if LVEF not >35%, then will discuss/consider ICD for primary Prevention.   -moderate MR seen on echo: Will will follow and refer to structural heart clinic for Mitraclip evaluation as needed once patient has been optimized and echo has been repeated  -Reinforced s/sx of worsening heart failure with patient and weight monitoring. Pt verbalizes understanding. Pt to call office or RTC if present.    -PUMP line number [phone] (PUMP) reviewed with patient  -Heart Failure Education:  Discussed the Definition of heart failure (linking disease, symptoms, and treatment) and causes of heart failure  Recognition of escalating symptoms and concrete plan for response to particular symptoms  Discussed the indication for ACE-I/ARB/ARNI, Beta-Blocker, Aldosterone antagonist, SGLT2 inhibitor  Risk modification for heart failure progression  Specific diet recommendations: Discussed 2000 mg sodium diet, adequate hydration.  Patient encouraged that she can slowly increase activity levels  Importance of treatment adherence  Behavioral strategies to promote treatment adherence  -Advanced care planning: Advance directive and POLST to be discussed at future visit  -Pharmacotherapy Referral: Patient referred over to  the pharmacotherapy program for further optimization of medical therapy   -Compliance Barriers: None  -Genetic testing Consideration: None  -Consideration for LVAD and/or Heart transplant as necessary      LV thrombus:  -Continue Eliquis 5 mg twice a day, she mentions problem with her Eliquis prescription at her pharmacy, will have pharmacy coordinator contact her pharmacy to see with the problem list    Stroke, s/p thrombectomy:  -Had follow-up with neurology in July  -Continue atorvastatin 40 mg daily  -Neurology recommends patient to remain on Eliquis if EF less than 35% even if thrombus has resolved.    FU in HF clinic in 3 to 4 weeks and follow-up with pharmacotherapy in 2 weeks. Sooner if needed.    Patient verbalizes understanding and agrees with the plan of care.     PLEASE NOTE: This Note was created using voice recognition Software. I have made every reasonable attempt to correct obvious errors, but I expect that there are errors of grammar and possibly content that I did not discover before finalizing the note

## 2024-08-08 NOTE — Clinical Note
Patient has about 1 week supply of Eliquis left, she went to go  her prescription from her pharmacy and they stated that she cannot  another prescription until September, is there any way you can find out what is the problem.  She may need a sample if there is some sort of coverage issue.  Thank you!

## 2024-08-09 ENCOUNTER — PATIENT OUTREACH (OUTPATIENT)
Dept: HEALTH INFORMATION MANAGEMENT | Facility: OTHER | Age: 54
End: 2024-08-09
Payer: MEDICAID

## 2024-08-09 ASSESSMENT — SOCIAL DETERMINANTS OF HEALTH (SDOH): HOW HARD IS IT FOR YOU TO PAY FOR THE VERY BASICS LIKE FOOD, HOUSING, MEDICAL CARE, AND HEATING?: NOT HARD AT ALL

## 2024-08-09 NOTE — PROGRESS NOTES
Community Health Worker Intake    Contact information provided to Analia Wu   Outpatient assessment completed.  Did the patient receive medications post discharge: Yes    CHW Melida followed up with the patient.     Pt states that she is doing well and does not have any questions or concerns.   Pt followed up with her Cardiologist yesterday and has all appropriate medications.     Plan: CHW will no longer follow.

## 2024-08-19 ENCOUNTER — APPOINTMENT (OUTPATIENT)
Dept: CARDIOLOGY | Facility: MEDICAL CENTER | Age: 54
End: 2024-08-19
Attending: NURSE PRACTITIONER
Payer: MEDICAID

## 2024-08-19 ENCOUNTER — TELEPHONE (OUTPATIENT)
Dept: CARDIOLOGY | Facility: MEDICAL CENTER | Age: 54
End: 2024-08-19
Payer: MEDICAID

## 2024-08-19 NOTE — TELEPHONE ENCOUNTER
Call placed to patient as a reminder for blood work to be completed prior to appt. Per patient she will be getting labs done this week.

## 2024-08-26 ENCOUNTER — APPOINTMENT (OUTPATIENT)
Dept: CARDIOLOGY | Facility: MEDICAL CENTER | Age: 54
End: 2024-08-26
Attending: NURSE PRACTITIONER
Payer: MEDICAID

## 2024-08-28 ENCOUNTER — APPOINTMENT (OUTPATIENT)
Dept: CARDIOLOGY | Facility: MEDICAL CENTER | Age: 54
End: 2024-08-28
Attending: NURSE PRACTITIONER
Payer: MEDICAID

## 2024-09-06 ENCOUNTER — APPOINTMENT (OUTPATIENT)
Dept: RADIOLOGY | Facility: MEDICAL CENTER | Age: 54
End: 2024-09-06
Attending: EMERGENCY MEDICINE
Payer: MEDICAID

## 2024-09-06 ENCOUNTER — PHARMACY VISIT (OUTPATIENT)
Dept: PHARMACY | Facility: MEDICAL CENTER | Age: 54
End: 2024-09-06
Payer: COMMERCIAL

## 2024-09-06 ENCOUNTER — HOSPITAL ENCOUNTER (EMERGENCY)
Facility: MEDICAL CENTER | Age: 54
End: 2024-09-06
Attending: EMERGENCY MEDICINE
Payer: MEDICAID

## 2024-09-06 VITALS
TEMPERATURE: 97.4 F | DIASTOLIC BLOOD PRESSURE: 68 MMHG | HEART RATE: 81 BPM | BODY MASS INDEX: 29.41 KG/M2 | HEIGHT: 67 IN | RESPIRATION RATE: 19 BRPM | WEIGHT: 187.39 LBS | SYSTOLIC BLOOD PRESSURE: 95 MMHG | OXYGEN SATURATION: 95 %

## 2024-09-06 DIAGNOSIS — R05.2 SUBACUTE COUGH: ICD-10-CM

## 2024-09-06 DIAGNOSIS — I50.9 ACUTE CONGESTIVE HEART FAILURE, UNSPECIFIED HEART FAILURE TYPE (HCC): ICD-10-CM

## 2024-09-06 DIAGNOSIS — I50.20 ACC/AHA STAGE C SYSTOLIC HEART FAILURE (HCC): ICD-10-CM

## 2024-09-06 LAB
ANION GAP SERPL CALC-SCNC: 21 MMOL/L (ref 7–16)
BASOPHILS # BLD AUTO: 0.8 % (ref 0–1.8)
BASOPHILS # BLD: 0.05 K/UL (ref 0–0.12)
BUN SERPL-MCNC: 14 MG/DL (ref 8–22)
CALCIUM SERPL-MCNC: 10.4 MG/DL (ref 8.5–10.5)
CHLORIDE SERPL-SCNC: 100 MMOL/L (ref 96–112)
CO2 SERPL-SCNC: 16 MMOL/L (ref 20–33)
CREAT SERPL-MCNC: 0.87 MG/DL (ref 0.5–1.4)
EKG IMPRESSION: NORMAL
EOSINOPHIL # BLD AUTO: 0.21 K/UL (ref 0–0.51)
EOSINOPHIL NFR BLD: 3.3 % (ref 0–6.9)
ERYTHROCYTE [DISTWIDTH] IN BLOOD BY AUTOMATED COUNT: 53.7 FL (ref 35.9–50)
GFR SERPLBLD CREATININE-BSD FMLA CKD-EPI: 79 ML/MIN/1.73 M 2
GLUCOSE SERPL-MCNC: 91 MG/DL (ref 65–99)
HCT VFR BLD AUTO: 40 % (ref 37–47)
HGB BLD-MCNC: 12.1 G/DL (ref 12–16)
IMM GRANULOCYTES # BLD AUTO: 0.03 K/UL (ref 0–0.11)
IMM GRANULOCYTES NFR BLD AUTO: 0.5 % (ref 0–0.9)
LYMPHOCYTES # BLD AUTO: 1.07 K/UL (ref 1–4.8)
LYMPHOCYTES NFR BLD: 16.9 % (ref 22–41)
MCH RBC QN AUTO: 25.4 PG (ref 27–33)
MCHC RBC AUTO-ENTMCNC: 30.3 G/DL (ref 32.2–35.5)
MCV RBC AUTO: 83.9 FL (ref 81.4–97.8)
MONOCYTES # BLD AUTO: 0.53 K/UL (ref 0–0.85)
MONOCYTES NFR BLD AUTO: 8.4 % (ref 0–13.4)
NEUTROPHILS # BLD AUTO: 4.44 K/UL (ref 1.82–7.42)
NEUTROPHILS NFR BLD: 70.1 % (ref 44–72)
NRBC # BLD AUTO: 0 K/UL
NRBC BLD-RTO: 0 /100 WBC (ref 0–0.2)
NT-PROBNP SERPL IA-MCNC: 5437 PG/ML (ref 0–125)
PLATELET # BLD AUTO: 302 K/UL (ref 164–446)
PMV BLD AUTO: 10.3 FL (ref 9–12.9)
POTASSIUM SERPL-SCNC: 3.9 MMOL/L (ref 3.6–5.5)
RBC # BLD AUTO: 4.77 M/UL (ref 4.2–5.4)
SODIUM SERPL-SCNC: 137 MMOL/L (ref 135–145)
TROPONIN T SERPL-MCNC: 29 NG/L (ref 6–19)
WBC # BLD AUTO: 6.3 K/UL (ref 4.8–10.8)

## 2024-09-06 PROCEDURE — 71045 X-RAY EXAM CHEST 1 VIEW: CPT

## 2024-09-06 PROCEDURE — 36415 COLL VENOUS BLD VENIPUNCTURE: CPT

## 2024-09-06 PROCEDURE — 85025 COMPLETE CBC W/AUTO DIFF WBC: CPT

## 2024-09-06 PROCEDURE — 93005 ELECTROCARDIOGRAM TRACING: CPT | Performed by: EMERGENCY MEDICINE

## 2024-09-06 PROCEDURE — 84484 ASSAY OF TROPONIN QUANT: CPT

## 2024-09-06 PROCEDURE — RXOTC WILLOW AMBULATORY OTC CHARGE: Performed by: PHARMACIST

## 2024-09-06 PROCEDURE — 80048 BASIC METABOLIC PNL TOTAL CA: CPT

## 2024-09-06 PROCEDURE — 99283 EMERGENCY DEPT VISIT LOW MDM: CPT

## 2024-09-06 PROCEDURE — 83880 ASSAY OF NATRIURETIC PEPTIDE: CPT

## 2024-09-06 PROCEDURE — RXMED WILLOW AMBULATORY MEDICATION CHARGE: Performed by: STUDENT IN AN ORGANIZED HEALTH CARE EDUCATION/TRAINING PROGRAM

## 2024-09-06 RX ORDER — FUROSEMIDE 40 MG
60 TABLET ORAL 2 TIMES DAILY PRN
Qty: 60 TABLET | Refills: 3 | Status: SHIPPED | OUTPATIENT
Start: 2024-09-06

## 2024-09-06 ASSESSMENT — FIBROSIS 4 INDEX: FIB4 SCORE: 0.8

## 2024-09-06 NOTE — ED NOTES
Pt signed and given d/c papers and reviewing results. Discussed ERP instructions on increasing Lasix dose daily at least until f/u w/ cardiology end of month, also reviewed importance of doing this f/u. Pt denies further questions/concerns. Pt ambulated out of the dept w/ steady gait, A&O x4 w/ all belongings, stable and cleared for d/c.

## 2024-09-06 NOTE — ED PROVIDER NOTES
"ER Provider Note    Scribed for Dr. Suresh Rich M.D. by Allen Shen. 9/6/2024  12:43 PM    Primary Care Provider: Mavis Sheldon P.A.-C.    CHIEF COMPLAINT  Chief Complaint   Patient presents with    Cough     3 weeks of dry cough      EXTERNAL RECORDS REVIEWED  Inpatient Notes Reviewed admission note from 7/27 which reported the patient had a weight of 206 on arrival     HPI/ROS    Analia Wu is a 53 y.o. female who presents to the ED for evaluation of foot swelling onset last night. The patient reports that she has had a dry cough for 3 weeks, but was concerned by the swelling since she took her diuretic as instructed. She notes that she uses O2 at night. Patient states that her dry weight is unknown, but weighed 186 earlier today and was 176 when she left the hospital after her admission for heart failure. She adds that she has a follow up with cardiology in about a month.    PAST MEDICAL HISTORY  Past Medical History:   Diagnosis Date    Anesthesia     ponv,  \"shallow breathing\"    Anxiety     ASTHMA     does not have any inhalers    Cancer (HCC)     vulva    Carpal tunnel syndrome     Heart murmur     pt reports she has never had any problems    Infectious disease 2007    Mrsa infection    Pain 11-26-12    neck, left knee, 6-7/10    Panic attack     Psychiatric problem     depression, PTSD, anxiety    Seasonal allergies      SURGICAL HISTORY  Past Surgical History:   Procedure Laterality Date    RECTAL EXPLORATION  12/19/2018    Procedure: RECTAL EXPLORATION - FOR HIGH RESOLUTION ANOSCOPY;  Surgeon: Billy Gamboa M.D.;  Location: [facility];  Service: General    ANAL FISTULECTOMY  5/24/2017    Procedure: ANAL FISTULECTOMY FOR: WIDE EXCISION ANAL DYSPLASIA;  Surgeon: Billy Gamboa M.D.;  Location: [facility];  Service:     RECTAL EXPLORATION  9/2/2016    Procedure: RECTAL EXPLORATION eua, AND BIOPSY;  Surgeon: Billy Gamboa M.D.;  Location: [facility];  Service:     VULVECTOMY RADICAL Bilateral 9/2/2016    Procedure: VULVECTOMY RADICAL;  Surgeon: Reese Carroll M.D.;  Location: SURGERY [facility];  Service:     RECTAL EXPLORATION  1/28/2015    Performed by Billy Villegas M.D. at SURGERY [facility]    EXAM UNDER ANESTHESIA  5/21/2014    Performed by Billy Villegas M.D. at SURGERY [facility]    RECTAL EXPLORATION  5/21/2014    Performed by Billy Villegas M.D. at SURGERY [facility]    WIDE EXCISION  12/19/2012    Performed by Billy Villegas M.D. at SURGERY [facility]    WIDE EXCISION  2/29/2012    Performed by BILLY VILLEGAS at SURGERY [facility]    WIDE EXCISION  9/7/2011    Performed by BILLY VILLEGAS at SURGERY [facility]    CERVICAL DISK AND FUSION ANTERIOR  6/27/2011    Performed by YEIMI ELVY at SURGERY [facility]    WIDE EXCISION  8/4/2010    Performed by BILYL VILLEGAS at SURGERY [facility]    HEMORRHOIDECTOMY  8/4/2010    Performed by BILLY VILLEGAS at SURGERY [facility]    VULVECTOMY PARTIAL  October 2007    RECTAL BIOPSY  2007    OTHER NEUROLOGICAL SURG      neck fusion     FAMILY HISTORY  Family History   Problem Relation Age of Onset    Diabetes Mother     Stroke Mother     Cancer Maternal Aunt     Lung Disease Neg Hx     Heart Disease Neg Hx      SOCIAL HISTORY   reports that she quit smoking about 16 years ago. Her smoking use included cigarettes. She started smoking about 41 years ago. She has a 25 pack-year smoking history. She has never used smokeless tobacco. She reports that she does not drink alcohol and does not use drugs.    CURRENT MEDICATIONS  Current Discharge Medication List        CONTINUE these medications which have NOT CHANGED    Details   apixaban (ELIQUIS) 5mg Tab Take 1 Tablet by mouth 2 times a day. Indications: DVT/PE  Qty: 60 Tablet, Refills: 11    Associated Diagnoses: Left ventricular thrombus      potassium chloride SA (KDUR) 20 MEQ Tab CR Take 1 Tablet by mouth 2 times a day as  "needed (take with lasix as needed).  Qty: 30 Tablet, Refills: 3    Associated Diagnoses: Acute on chronic HFrEF (heart failure with reduced ejection fraction) ([facility])      spironolactone (ALDACTONE) 25 MG Tab Take 1 Tablet by mouth every day.  Qty: 30 Tablet, Refills: 3    Associated Diagnoses: Acute on chronic HFrEF (heart failure with reduced ejection fraction) (HCC)      venlafaxine XR (EFFEXOR XR) 75 MG CAPSULE SR 24 HR Take 75 mg by mouth every day.      atorvastatin (LIPITOR) 40 MG Tab Take 1 Tablet by mouth every evening.  Qty: 30 Tablet, Refills: 11    Associated Diagnoses: ACC/AHA stage C systolic heart failure (HCC)      dapagliflozin propanediol (FARXIGA) 10 MG Tab Take 1 Tablet by mouth every day.  Qty: 30 Tablet, Refills: 11    Associated Diagnoses: ACC/AHA stage C systolic heart failure (HCC)      metoprolol SR (TOPROL XL) 25 MG TABLET SR 24 HR Take 1 Tablet by mouth every day.  Qty: 30 Tablet, Refills: 11    Associated Diagnoses: ACC/AHA stage C systolic heart failure (HCC)           ALLERGIES  Latex, Other food, Tape, Hydromorphone, and Other environmental    PHYSICAL EXAM  /74   Pulse (!) 117   Temp 36.2 °C (97.2 °F) (Temporal)   Resp 16   Ht 1.702 m (5' 7\")   Wt 85 kg (187 lb 6.3 oz)   LMP 12/17/2018 (Approximate)   SpO2 97%   BMI 29.35 kg/m²   Constitutional: Alert in no apparent distress.  HENT: No signs of trauma, Bilateral external ears normal, Nose normal.   Eyes: Pupils are equal and reactive, Conjunctiva normal, Non-icteric.   Neck: Normal range of motion, No tenderness, Supple,   Cardiovascular: Regular rate and rhythm, no murmurs.   Thorax & Lungs: Rales in lower lung fields. No respiratory distress, No wheezing, No chest tenderness.   Abdomen: Bowel sounds normal, Soft, No tenderness,   Skin: Warm, Dry, No erythema, No rash.   Back: No bony tenderness, No CVA tenderness.   Extremities: No tenderness, No cyanosis. Bilateral 1+ lower extremity swelling.  Psychiatric: Affect " normal     DIAGNOSTIC STUDIES & PROCEDURES    Labs:   Labs Reviewed   CBC WITH DIFFERENTIAL - Abnormal; Notable for the following components:       Result Value    MCH 25.4 (*)     MCHC 30.3 (*)     RDW 53.7 (*)     Lymphocytes 16.90 (*)     All other components within normal limits   BASIC METABOLIC PANEL - Abnormal; Notable for the following components:    Co2 16 (*)     Anion Gap 21.0 (*)     All other components within normal limits   TROPONIN - Abnormal; Notable for the following components:    Troponin T 29 (*)     All other components within normal limits   PROBRAIN NATRIURETIC PEPTIDE, NT - Abnormal; Notable for the following components:    NT-proBNP 5437 (*)     All other components within normal limits   ESTIMATED GFR     All labs reviewed by me.    EKG Interpretation:  Interpreted by me  12 Lead EKG interpreted by me to show:  RBBB and concerning ST wave. Changes are not significant from prior.    Radiology:   The attending Emergency Physician has independently interpreted the diagnostic imaging associated with this visit and is awaiting the final reading from the radiologist, which will be displayed below.    Preliminary interpretation is a follows: No obvious fluid  Radiologist interpretation:      DX-CHEST-PORTABLE (1 VIEW)   Final Result      Stable enlargement of the cardiomediastinal silhouette without acute cardiopulmonary abnormality.         COURSE & MEDICAL DECISION MAKING    ED Observation Status? No; Patient does not meet criteria for ED Observation.     INITIAL ASSESSMENT AND PLAN  Care Narrative:       12:43 PM - Patient seen and evaluated at bedside. Discussed plan of care, including obtaining lab work and imaging for further evaluation. Patient agrees to plan of care. Ordered DX-Chest-Portable, CBC w/ diff., Basic Metabolic panel, Troponin, and proBrain Natriuretic Peptide to evaluate.    2:46 PM - I reevaluated the patient at bedside. I discussed the patient's diagnostic study results  which show evidence of heart failure. I discussed plan for discharge and follow up as outlined below. The patient is stable for discharge at this time and will return for any new or worsening symptoms. Patient verbalizes understanding and support with my plan for discharge.     ADDITIONAL PROBLEM LIST AND DISPOSITION   Patient with obvious increased fluid status with weight as well as a proBNP is mildly elevated.  Troponin at baseline.  EKG nonconcerning.  The patient has no significant lecture derangements but does have a mild anion gap.  At this time I will increase the patient's Lasix.  I will follow-up with her primary care physician.  I gave strict return precautions and follow-up.               DISPOSITION AND DISCUSSIONS  I have discussed management of the patient with the following physicians and SUE's: None    Discussion of management with other Q or appropriate source(s): None     Escalation of care considered, and ultimately not performed: acute inpatient care management, however at this time, the patient is most appropriate for outpatient management.  Not hypoxic therefore do not need admit the patient.    Barriers to care at this time, including but not limited to:  No known barriers of care .     Decision tools and prescription drugs considered including, but not limited to: Medication modification increase Lasix .    The patient will return for new or worsening symptoms and is stable at the time of discharge.    The patient is referred to a primary physician for blood pressure management, diabetic screening, and for all other preventative health concerns.    DISPOSITION:  Patient will be discharged home in stable condition.    FOLLOW UP:  Mavis Sheldon P.A.-C.  [address]  [facility] [address]  [phone]          OUTPATIENT MEDICATIONS:  Current Discharge Medication List        FINAL IMPRESSION   1. ACC/AHA stage C systolic heart failure (HCC)    2. Subacute cough    3. Acute  congestive heart failure, unspecified heart failure type (HCC)       I, Allen Shen (Scribe), am scribing for, and in the presence of, Dr. Suresh Rich M.D..    Electronically signed by: Allen Shen (Rita), 9/6/2024    IDr. Suresh M.D. personally performed the services described in this documentation, as scribed by Allen Shen in my presence, and it is both accurate and complete.    The note accurately reflects work and decisions made by me.  Suresh Rich M.D.  9/6/2024  5:04 PM

## 2024-09-06 NOTE — ED TRIAGE NOTES
"Chief Complaint   Patient presents with    Cough     3 weeks of dry cough      \"Its harder to breath at night and I am on oxygen at night\"     Pt is alert/oriented and follows commands. Pt speaking in full sentences and responds appropriately to questions. No acute distress noted in triage and respirations are even and unlabored.      Pt placed in lobby and educated on triage process. Pt encouraged to alert staff for any changes in condition.    Pt speaking in clear full sentences.           "

## 2024-09-16 ENCOUNTER — PHARMACY VISIT (OUTPATIENT)
Dept: PHARMACY | Facility: MEDICAL CENTER | Age: 54
End: 2024-09-16
Payer: COMMERCIAL

## 2024-09-16 ENCOUNTER — NON-PROVIDER VISIT (OUTPATIENT)
Dept: CARDIOLOGY | Facility: MEDICAL CENTER | Age: 54
End: 2024-09-16
Attending: NURSE PRACTITIONER
Payer: MEDICAID

## 2024-09-16 VITALS
DIASTOLIC BLOOD PRESSURE: 63 MMHG | SYSTOLIC BLOOD PRESSURE: 91 MMHG | WEIGHT: 165 LBS | BODY MASS INDEX: 25.84 KG/M2 | HEART RATE: 107 BPM

## 2024-09-16 PROCEDURE — RXMED WILLOW AMBULATORY MEDICATION CHARGE: Performed by: STUDENT IN AN ORGANIZED HEALTH CARE EDUCATION/TRAINING PROGRAM

## 2024-09-16 PROCEDURE — 99212 OFFICE O/P EST SF 10 MIN: CPT

## 2024-09-16 RX ORDER — FAMOTIDINE 40 MG/1
40 TABLET, FILM COATED ORAL 2 TIMES DAILY
COMMUNITY

## 2024-09-16 ASSESSMENT — FIBROSIS 4 INDEX: FIB4 SCORE: 0.94

## 2024-09-16 NOTE — PROGRESS NOTES
CHF Pharmacotherapy Visit    Informed written consent was given on: 6/24/24    Analia Wu is here for CHF     HPI  Pertinent Interval History since last visit:   Patient was seen in the emergency room on 9/6/24 for a dry cough  Pt reports SOB yesterday, but denies chest pain  Pt reports occasional lightheadedness, dizziness that was not correlated to low BP    Most recent EF:  25% 05/28/24     Potential Barriers to Care:  Adherence: reports missed doses of furosemide, had a couple of missed doses over the last couple days. Patient is also not currently taking metoprolol 25 mg and spironolactone 25 mg.  Side effects: none reported  Affordability: none, medications are affordable.    Current CHF Medications - including dose:   Entresto or ACE/ARB: None (was holding losartan 12.5 mg)  Beta blocker: metoprolol XL 25mg daily (not taking, see above)  Diuretic: Furosemide 60 mg BID, discharged from ED on this dose, but patient began holding when she felt that she had lost too much weight. Resumed 40 mg bid this morning after having SOB yesterday.   Aldosterone antagonist: Spironolactone 25 mg (not taking, see above)  SGLT2i: Farxiga 10 mg    Home BP and HR:  Pt did not bring log  Reports BP 79/65 from yesterday, typically 80s for systolic   Reports HR of >100   Lipids Data Review:   Lab Results   Component Value Date/Time    CHOLSTRLTOT 129 05/31/2024 04:20 AM    LDL 74 05/31/2024 04:20 AM    HDL 38 (A) 05/31/2024 04:20 AM    TRIGLYCERIDE 84 05/31/2024 04:20 AM         Current lipid medications:  Atorvastatin 40 mg daily    Lifestyle:  Change in weight:  patient has lost 20 lbs since last visit  Exercise habits:  not discussed today    Diet: common adult, patient does not cook her own meals which makes it difficult for her to control her sodium intake.   Pt keeps to 1800 mg of fluid/day    DATA REVIEW  Vitals:    09/16/24 1311   BP: 91/63   Pulse: (!) 107       Lab Results   Component Value Date/Time    SODIUM  "137 09/06/2024 12:55 PM    POTASSIUM 3.9 09/06/2024 12:55 PM    CHLORIDE 100 09/06/2024 12:55 PM    CO2 16 (L) 09/06/2024 12:55 PM    GLUCOSE 91 09/06/2024 12:55 PM    BUN 14 09/06/2024 12:55 PM    CREATININE 0.87 09/06/2024 12:55 PM    CREATININE 0.8 10/05/2007 04:50 AM     Lab Results   Component Value Date/Time    ALKPHOSPHAT 117 (H) 07/31/2024 03:33 AM    ASTSGOT 42 07/31/2024 03:33 AM    ALTSGPT 64 (H) 07/31/2024 03:33 AM    TBILIRUBIN 2.9 (H) 07/31/2024 03:33 AM    INR 1.15 (H) 05/30/2024 06:32 PM    ALBUMIN 3.3 07/31/2024 03:33 AM      No components found for: \"MICROALBUMINCREATRATIOURINE\"    Renal function:  Calculated creatinine clearance: 86.406 ml/min   eGFR: 79 mL/min/1.73 m2    Other:  Immunization History   Administered Date(s) Administered    Influenza (IM) Preservative Free - HISTORICAL DATA 10/10/2013, 10/10/2014    Influenza Vaccine Quad Inj (Pf) 10/19/2015, 11/01/2017, 11/08/2018    PFIZER PURPLE CAP SARS-COV-2 VACCINATION (12+) 05/21/2021, 06/15/2021    Pneumococcal Conjugate Vaccine (PCV20) 05/30/2024    Tdap Vaccine 08/22/2006, 08/01/2012     Up to date on pneumococcal vaccine? Yes    Recent Imaging Studies:    None since last visit    ASSESSMENT AND PLAN  CHF  Pt educated on importance of reducing sodium in her diet. Goal of less than 2,000 mg per day. Also discussed fluid limit.   Pt to confirm whether she has been taking spironolactone, since home and clinic BP are running lower, will continue to hold for now.   Patient's HR remains elevated, both at home and in clinic, likely due to her holding her metoprolol. Appropriate to resume medication.   To help improve adherence, will work to make changes to one medication at a time and focus on educating patient on purpose and importance of each one.     CHF medications (changes are bolded)  Entresto or ACE/ARB: None, due to low BP  Beta blocker: Resume Metoprolol XL 25 mg daily  Diuretic: Furosemide 40 mg BID, patient to call if weight gain >3 " lbs in a day or >5 lbs in week  Aldosterone antagonist: Hold spironolactone 25 mg daily  SGLT2i: Continue Farxiga 10 mg daily    3. Hyperlipidemia  Patient type: Secondary Prevention  Goal: LDL-C:   <70 mg/dL  Patient's LDL is slightly above goal, will continue to monitor.     Lipid medications (changes are bolded)  Atorvastatin 40 mg daily    4. Lifestyle   Recommendations From Today's Visit:   Reduce sodium consumption to less than 2,000 mg/day    Continue fluid restriction of 1800 ml/day    Blood Work Ordered At Today's visit:   None    Follow-Up:   4 weeks    Teresa Douglas, PharmD  Gia Steve, PharmD, BCACP  CC:  DANIELITO Matamoros APRN

## 2024-09-16 NOTE — PATIENT INSTRUCTIONS
Please call [phone] if you have weight gain greater than 3 pounds in one day or more than 5 pounds in one week.     Start taking Metoprolol xl 25 mg daily  Continue Furosemide 40 mg twice daily  Continue Farxiga 10 mg daily

## 2024-09-25 ENCOUNTER — OFFICE VISIT (OUTPATIENT)
Dept: CARDIOLOGY | Facility: MEDICAL CENTER | Age: 54
End: 2024-09-25
Attending: NURSE PRACTITIONER
Payer: MEDICAID

## 2024-09-25 ENCOUNTER — HOSPITAL ENCOUNTER (OUTPATIENT)
Dept: LAB | Facility: MEDICAL CENTER | Age: 54
End: 2024-09-25
Attending: NURSE PRACTITIONER
Payer: MEDICAID

## 2024-09-25 VITALS
HEART RATE: 100 BPM | OXYGEN SATURATION: 98 % | BODY MASS INDEX: 25.24 KG/M2 | RESPIRATION RATE: 18 BRPM | WEIGHT: 160.8 LBS | HEIGHT: 67 IN | DIASTOLIC BLOOD PRESSURE: 62 MMHG | SYSTOLIC BLOOD PRESSURE: 88 MMHG

## 2024-09-25 DIAGNOSIS — Z79.899 HIGH RISK MEDICATION USE: ICD-10-CM

## 2024-09-25 DIAGNOSIS — I50.20 ACC/AHA STAGE C SYSTOLIC HEART FAILURE (HCC): ICD-10-CM

## 2024-09-25 DIAGNOSIS — I51.3 LEFT VENTRICULAR THROMBUS: ICD-10-CM

## 2024-09-25 DIAGNOSIS — I42.8 NON-ISCHEMIC CARDIOMYOPATHY (HCC): ICD-10-CM

## 2024-09-25 DIAGNOSIS — I50.9 HEART FAILURE, NYHA CLASS 2 (HCC): ICD-10-CM

## 2024-09-25 LAB
ANION GAP SERPL CALC-SCNC: 15 MMOL/L (ref 7–16)
BUN SERPL-MCNC: 13 MG/DL (ref 8–22)
CALCIUM SERPL-MCNC: 11.2 MG/DL (ref 8.5–10.5)
CHLORIDE SERPL-SCNC: 98 MMOL/L (ref 96–112)
CO2 SERPL-SCNC: 26 MMOL/L (ref 20–33)
CREAT SERPL-MCNC: 0.94 MG/DL (ref 0.5–1.4)
GFR SERPLBLD CREATININE-BSD FMLA CKD-EPI: 72 ML/MIN/1.73 M 2
GLUCOSE SERPL-MCNC: 86 MG/DL (ref 65–99)
NT-PROBNP SERPL IA-MCNC: 2512 PG/ML (ref 0–125)
POTASSIUM SERPL-SCNC: 4.3 MMOL/L (ref 3.6–5.5)
SODIUM SERPL-SCNC: 139 MMOL/L (ref 135–145)

## 2024-09-25 PROCEDURE — 80048 BASIC METABOLIC PNL TOTAL CA: CPT

## 2024-09-25 PROCEDURE — 36415 COLL VENOUS BLD VENIPUNCTURE: CPT

## 2024-09-25 PROCEDURE — 99212 OFFICE O/P EST SF 10 MIN: CPT | Performed by: NURSE PRACTITIONER

## 2024-09-25 PROCEDURE — 99214 OFFICE O/P EST MOD 30 MIN: CPT | Performed by: NURSE PRACTITIONER

## 2024-09-25 PROCEDURE — 83880 ASSAY OF NATRIURETIC PEPTIDE: CPT

## 2024-09-25 ASSESSMENT — ENCOUNTER SYMPTOMS
COUGH: 0
PND: 0
CLAUDICATION: 0
MYALGIAS: 0
PALPITATIONS: 0
DIZZINESS: 0
SHORTNESS OF BREATH: 0
ABDOMINAL PAIN: 0
FEVER: 0
ORTHOPNEA: 0

## 2024-09-25 ASSESSMENT — FIBROSIS 4 INDEX: FIB4 SCORE: 0.94

## 2024-09-25 NOTE — PROGRESS NOTES
Chief Complaint   Patient presents with    Follow-Up     Dx: ACC/AHA stage C systolic heart failure (HCC)        Hyperlipidemia       Subjective     Analia Wu is a 53 y.o. female who presents today for follow up on her heart failure.     Pt was previously seen in 2012 with Dr. Abraham for congenital pulmonic stenosis.  She was lost to follow-up for unclear reasons.    Patient currently has been following with the heart failure clinic.  She was seen on 8/8/2024.  During that visit, she was recommended to reduce her furosemide to 40 mg daily, but instructed to increase back up to twice a day dosing if needed for symptoms.    She did go to the ER at [facility] and at [facility].  Her ER visit on 9/6/2024, she was recommended to increase her furosemide to 60 mg twice a day.    She mentions she had reduced it back down to 40 mg twice a day because she had symptoms  And weight loss.    She did see pharmacotherapy on 9/16/2024 who recommended that she hold her spironolactone and resume her metoprolol.    She mentions today she has been holding her metoprolol for heart rate in the 70s.  She continues to take spironolactone.    Patient feels well, denies chest pain, shortness of breath, palpitations, dizziness/lightheadedness, orthopnea, PND or Edema.      She has lost weight and currently is weighing 156 pounds.  She was weighing 182 pounds at her last visit.    Additionally, patient has the following medical problems:    -Hospitalization from 7/27/2024 through 7/31/2024.  She was admitted for worsening lower extremity edema and was treated for heart failure exacerbation.  Her diuretic was increased to 40 mg twice a day.  Losartan and Aldactone were restarted in the hospital.     -Hospitalization 5/30/2024 through 6/4/2024 for left-sided weakness involving her face, arm and leg.  She was found to have a right M1 occlusion with favorable ischemic penumbra.  She underwent successful thrombectomy with TICI 2C  "recanalization.    -hospitalization from 5/27/2024 through 5/30/2024.  She presented with nausea and vomiting and shortness of breath.  She was found to have elevated troponin in NT proBNP.  She required oxygen.  She had an echo performed which showed an EF of 25% and LV apical thrombus, moderate MR, TR and RVSP of 45 mmHg.  Cardiology was consulted.  Patient had a left heart cath with no CAD.  She was treated with IV diuretics and her respiratory status improved.  She was started on GDMT and started on Eliquis for her LV thrombus.  She was encouraged to follow-up with outpatient cardiology.    -PTSD: Effexor    -Former smoker     -Perianal dysplasia with surgery    Past Medical History:   Diagnosis Date    Anesthesia     ponv,  \"shallow breathing\"    Anxiety     ASTHMA     does not have any inhalers    Cancer (HCC)     vulva    Carpal tunnel syndrome     Heart murmur     pt reports she has never had any problems    Infectious disease 2007    Mrsa infection    Pain 11-26-12    neck, left knee, 6-7/10    Panic attack     Psychiatric problem     depression, PTSD, anxiety    Seasonal allergies      Past Surgical History:   Procedure Laterality Date    RECTAL EXPLORATION  12/19/2018    Procedure: RECTAL EXPLORATION - FOR HIGH RESOLUTION ANOSCOPY;  Surgeon: Billy Gamboa M.D.;  Location: [facility];  Service: General    ANAL FISTULECTOMY  5/24/2017    Procedure: ANAL FISTULECTOMY FOR: WIDE EXCISION ANAL DYSPLASIA;  Surgeon: Billy Gamboa M.D.;  Location: SURGERY [facility];  Service:     RECTAL EXPLORATION  9/2/2016    Procedure: RECTAL EXPLORATION eua, AND BIOPSY;  Surgeon: Billy Gamboa M.D.;  Location: [facility];  Service:     VULVECTOMY RADICAL Bilateral 9/2/2016    Procedure: VULVECTOMY RADICAL;  Surgeon: Reese Carroll M.D.;  Location: [facility];  Service:     RECTAL EXPLORATION  1/28/2015    Performed by Billy Gamboa M.D. at [facility]    EXAM UNDER " ANESTHESIA  2014    Performed by Billy Villegas M.D. at SURGERY [facility] ORS    RECTAL EXPLORATION  2014    Performed by Billy Villegas M.D. at SURGERY [facility] ORS    WIDE EXCISION  2012    Performed by Billy Villegas M.D. at SURGERY [facility] ORS    WIDE EXCISION  2012    Performed by BILLY VILLEGAS at SURGERY [facility] ORS    WIDE EXCISION  2011    Performed by BILLY VILLEGAS at SURGERY [facility] ORS    CERVICAL DISK AND FUSION ANTERIOR  2011    Performed by YEIMI LEVY at SURGERY [facility] ORS    WIDE EXCISION  2010    Performed by BILLY VILLEGAS at SURGERY [facility] ORS    HEMORRHOIDECTOMY  2010    Performed by BILLY VILLEGAS at SURGERY [facility] ORS    VULVECTOMY PARTIAL  2007    RECTAL BIOPSY      OTHER NEUROLOGICAL SURG      neck fusion     Family History   Problem Relation Age of Onset    Diabetes Mother     Stroke Mother     Cancer Maternal Aunt     Lung Disease Neg Hx     Heart Disease Neg Hx      Social History     Socioeconomic History    Marital status: Single     Spouse name: Not on file    Number of children: Not on file    Years of education: Not on file    Highest education level: Not on file   Occupational History    Not on file   Tobacco Use    Smoking status: Former     Current packs/day: 0.00     Average packs/day: 1 pack/day for 25.0 years (25.0 ttl pk-yrs)     Types: Cigarettes     Start date: 10/4/1982     Quit date: 10/4/2007     Years since quittin.9    Smokeless tobacco: Never    Tobacco comments:        Vaping Use    Vaping status: Never Used   Substance and Sexual Activity    Alcohol use: No    Drug use: No    Sexual activity: Not Currently   Other Topics Concern    Not on file   Social History Narrative    Not on file     Social Determinants of Health     Financial Resource Strain: Low Risk  (2024)    Overall Financial Resource Strain (CARDIA)     Difficulty of Paying Living Expenses: Not hard at all   Food  "Insecurity: No Food Insecurity (7/27/2024)    Hunger Vital Sign     Worried About Running Out of Food in the Last Year: Never true     Ran Out of Food in the Last Year: Never true   Transportation Needs: No Transportation Needs (7/27/2024)    PRAPARE - Transportation     Lack of Transportation (Medical): No     Lack of Transportation (Non-Medical): No   Physical Activity: Not on file   Stress: Not on file   Social Connections: Not on file   Intimate Partner Violence: Not At Risk (7/27/2024)    Humiliation, Afraid, Rape, and Kick questionnaire     Fear of Current or Ex-Partner: No     Emotionally Abused: No     Physically Abused: No     Sexually Abused: No   Housing Stability: Low Risk  (7/27/2024)    Housing Stability Vital Sign     Unable to Pay for Housing in the Last Year: No     Number of Places Lived in the Last Year: 1     Unstable Housing in the Last Year: No     Allergies   Allergen Reactions    Latex Shortness of Breath and Itching    Other Food Hives and Rash     Splenda    Tape      \"makes skin turn red\"  Paper tape ok    Hydromorphone Vomiting and Nausea    Other Environmental      Dial soap --  Skin itching     Outpatient Encounter Medications as of 9/25/2024   Medication Sig Dispense Refill    furosemide (LASIX) 40 MG Tab Take 1.5 Tablets by mouth 2 times a day as needed (leg swelling). (Patient taking differently: Take 40 mg by mouth 2 times a day.) 60 Tablet 3    apixaban (ELIQUIS) 5mg Tab Take 1 Tablet by mouth 2 times a day. Indications: DVT/PE 60 Tablet 11    potassium chloride SA (KDUR) 20 MEQ Tab CR Take 1 Tablet by mouth 2 times a day as needed (take with lasix as needed). (Patient taking differently: Take 20 mEq by mouth 2 times a day.) 30 Tablet 3    spironolactone (ALDACTONE) 25 MG Tab Take 1 Tablet by mouth every day. 30 Tablet 3    atorvastatin (LIPITOR) 40 MG Tab Take 1 Tablet by mouth every evening. 30 Tablet 11    dapagliflozin propanediol (FARXIGA) 10 MG Tab Take 1 Tablet by mouth " "every day. 30 Tablet 11    metoprolol SR (TOPROL XL) 25 MG TABLET SR 24 HR Take 1 Tablet by mouth every day. 30 Tablet 11    famotidine (PEPCID) 40 MG Tab Take 40 mg by mouth 2 times a day. (Patient not taking: Reported on 9/25/2024)      venlafaxine XR (EFFEXOR XR) 75 MG CAPSULE SR 24 HR Take 75 mg by mouth every day. (Patient not taking: Reported on 9/25/2024)       No facility-administered encounter medications on file as of 9/25/2024.     Review of Systems   Constitutional:  Negative for fever and malaise/fatigue.   Respiratory:  Negative for cough and shortness of breath.    Cardiovascular:  Negative for chest pain, palpitations, orthopnea, claudication, leg swelling and PND.   Gastrointestinal:  Negative for abdominal pain.   Musculoskeletal:  Negative for myalgias.   Neurological:  Negative for dizziness.   All other systems reviewed and are negative.             Objective     BP (!) 88/62 (BP Location: Left arm, Patient Position: Sitting, BP Cuff Size: Adult)   Pulse 100   Resp 18   Ht 1.702 m (5' 7\")   Wt 72.9 kg (160 lb 12.8 oz)   LMP 12/17/2018 (Approximate)   SpO2 98%   BMI 25.18 kg/m²     Physical Exam  Vitals reviewed.   Constitutional:       Appearance: She is well-developed.   HENT:      Head: Normocephalic and atraumatic.   Eyes:      Pupils: Pupils are equal, round, and reactive to light.   Neck:      Vascular: No JVD.   Cardiovascular:      Rate and Rhythm: Normal rate and regular rhythm.      Heart sounds: Normal heart sounds.   Pulmonary:      Effort: Pulmonary effort is normal. No respiratory distress.      Breath sounds: Normal breath sounds. No wheezing or rales.   Abdominal:      General: Bowel sounds are normal.      Palpations: Abdomen is soft.   Musculoskeletal:         General: Normal range of motion.      Cervical back: Normal range of motion and neck supple.      Right lower leg: No edema.      Left lower leg: No edema.   Skin:     General: Skin is warm and dry.   Neurological: "      General: No focal deficit present.      Mental Status: She is alert and oriented to person, place, and time.   Psychiatric:         Behavior: Behavior normal.            Lab Results   Component Value Date/Time    CHOLSTRLTOT 129 05/31/2024 04:20 AM    LDL 74 05/31/2024 04:20 AM    HDL 38 (A) 05/31/2024 04:20 AM    TRIGLYCERIDE 84 05/31/2024 04:20 AM       Lab Results   Component Value Date/Time    SODIUM 137 09/06/2024 12:55 PM    POTASSIUM 3.9 09/06/2024 12:55 PM    CHLORIDE 100 09/06/2024 12:55 PM    CO2 16 (L) 09/06/2024 12:55 PM    GLUCOSE 91 09/06/2024 12:55 PM    BUN 14 09/06/2024 12:55 PM    CREATININE 0.87 09/06/2024 12:55 PM    CREATININE 0.8 10/05/2007 04:50 AM     Lab Results   Component Value Date/Time    ALKPHOSPHAT 117 (H) 07/31/2024 03:33 AM    ASTSGOT 42 07/31/2024 03:33 AM    ALTSGPT 64 (H) 07/31/2024 03:33 AM    TBILIRUBIN 2.9 (H) 07/31/2024 03:33 AM       Echocardiogram 10/30/2012  CONCLUSIONS   Normal left ventricular size, thickness, systolic function, and   diastolic function.   Thickened mitral valve leaflets.   Mild mitral regurgitation.   Right ventricular systolic pressure is estimated to be 22-27 mmHg.   Thickened pulmonic valve leaflets.   Possible mild stenosis with peak 17 mmHg, mean 10 mmHg, trace pulmonic insufficiency.    Transthoracic Echo Report 5/28/2024  Dilated LV with severely reduced systolic function, estimated LVEF 25%.    Global hypokinesis with regional variation  Grade II LV diastolic dysfunction  LV apical thrombus is present: 0.9 x 1.5 cm, semi-mobile.  Normal RV size with mildly reduced systolic function  Reduced estimated resting stroke-volume 29ml and cardiac output per   Doppler assessment  Mildly dilated left atrium  Moderate MR  Moderate TR  Trivial pericardial fusion  Estimated RVSP 45 mmHg  Normal IVC size  Dr. Walsh notified via Voalte on 5/28/24 at 10:09 AM    Coronary angiogram 5/29/2024  HEMODYNAMICS:  Aortic pressure: 93/58 mmHg  LVEDP: 18 mmHg  No  significant aortic gradient on pullback     CORONARY ANGIOGRAPHY:  The left main coronary artery : Normal-appearing large caliber vessel that bifurcates to LAD and left circumflex  The left anterior descending coronary artery : Large in caliber transapical vessel that gives rise to several small-medium caliber diagonal branches.  Normal-appearing  The left circumflex coronary artery : Normal-appearing large caliber vessel that gives rise to a large high OM1 and large OM 2.  The right coronary artery : Large-caliber dominant vessel, normal-appearing     IMPRESSION:  1.  Nonischemic cardiomyopathy, normal-appearing epicardial coronary arteries, dominant RCA  2.  Mildly elevated resting LVEDP 18 mmHg (at the LVOT level) without significant transaortic gradient on pullback     RECOMMENDATIONS:  Ongoing goal-directed therapy for nonischemic cardiomyopathy and acute HFrEF  Primary ASCVD prevention  TR band protocol     NOTIFICATION:  The patient's daughter was attempted to be called with updates, left her voicemail message.     Referring Cardiologist - Dr. Amato - was notified.    Date 6MWT MLWHF   6/6/2024 Not done d/t tachycardia 48                     Assessment & Plan     1. Heart failure, NYHA class 2 (HCC)  Basic Metabolic Panel    EC-ECHOCARDIOGRAM COMPLETE W/O CONT      2. ACC/AHA stage C systolic heart failure (HCC)  Basic Metabolic Panel    EC-ECHOCARDIOGRAM COMPLETE W/O CONT      3. High risk medication use  Basic Metabolic Panel    EC-ECHOCARDIOGRAM COMPLETE W/O CONT      4. Non-ischemic cardiomyopathy (HCC)  Basic Metabolic Panel    EC-ECHOCARDIOGRAM COMPLETE W/O CONT      5. Left ventricular thrombus                Medical Decision Making: Today's Assessment/Status/Plan:        HFrEF, Stage C, Class 1-2, LVEF 25%: Based on physical examination findings, patient is euvolemic. No JVD, lungs are clear to auscultation, no pitting edema in bilateral lower extremities, no ascites.   -Heart failure due to  nonischemic cardiomyopathy  -Discussed Heart failure trajectory and prognosis with patient. Will continue to optimize medical therapy as tolerated. Advanced HF treatment, need for remote monitoring consideration at every visit.   -ACE-I/ARB/ARNI: Continue to hold losartan due to hypotension  -Evidence Based Beta-blocker: Resume metoprolol SR 25 mg daily, discussed that normal heart rate ranges  and that she should not hold her metoprolol for heart rate in the 70s  -Aldosterone Antagonist: Continue spironolactone 25 mg daily  -Diuretic: Continue furosemide 40 mg twice a day, she mentions sometimes she will take 60 mg dose in the afternoon if she feels her symptoms have worsened  -SGLT2 inhibitor: Continue Farxiga 10 mg daily  -Other: Consider as needed (Hydralazine/Isosorbide, Vericiguat, Corlanor)  -Labs: Encouraged patient to get labs done, BMP, NT proBNP due at this time and another BMP before her follow-up in the next couple of months, Will continue to closely monitor for side effects of patient's high risk medication(s) including renal function, liver function NTproBNP/cardiac markers, and electrolytes as needed  -discussed importance of exercise and regular activity  -Discussed/reviewed maintaining a low Sodium and hydration recommendations  -Repeat Echo at this time, if LVEF not >35%, then will discuss/consider ICD for primary Prevention.   -moderate MR seen on echo: Will will follow and refer to structural heart clinic for Mitraclip evaluation as needed once patient has been optimized and echo has been repeated  -Reinforced s/sx of worsening heart failure with patient and weight monitoring. Pt verbalizes understanding. Pt to call office or RTC if present.    -PUMP line number [id number] (PUMP) reviewed with patient  -Heart Failure Education:  Discussed the Definition of heart failure (linking disease, symptoms, and treatment) and causes of heart failure  Recognition of escalating symptoms and concrete  plan for response to particular symptoms  Risk modification for heart failure progression  Specific diet recommendations: Discussed 2000 mg sodium diet, adequate hydration.  Patient encouraged that she can slowly increase activity levels  Importance of treatment adherence  Behavioral strategies to promote treatment adherence  -Advanced care planning: Advance directive and POLST to be discussed at future visit  -Pharmacotherapy Referral: Patient referred over to the pharmacotherapy program for further optimization of medical therapy   -Compliance Barriers: None  -Genetic testing Consideration: None  -Consideration for LVAD and/or Heart transplant as necessary      LV thrombus:  -Continue Eliquis 5 mg twice a day, she mentions problem with her Eliquis prescription at her pharmacy, will have pharmacy coordinator contact her pharmacy to see with the problem list    Stroke, s/p thrombectomy:  -Had follow-up with neurology in July  -Continue atorvastatin 40 mg daily  -Neurology recommends patient to remain on Eliquis if EF less than 35% even if thrombus has resolved.    FU in HF clinic in 2 to 3 months after echo and labs and follow-up with pharmacotherapy in 3 weeks. Sooner if needed.    Patient verbalizes understanding and agrees with the plan of care.     PLEASE NOTE: This Note was created using voice recognition Software. I have made every reasonable attempt to correct obvious errors, but I expect that there are errors of grammar and possibly content that I did not discover before finalizing the note

## 2024-09-26 ENCOUNTER — TELEPHONE (OUTPATIENT)
Dept: CARDIOLOGY | Facility: MEDICAL CENTER | Age: 54
End: 2024-09-26
Payer: MEDICAID

## 2024-09-26 DIAGNOSIS — I50.23 ACUTE ON CHRONIC HFREF (HEART FAILURE WITH REDUCED EJECTION FRACTION) (HCC): ICD-10-CM

## 2024-09-26 NOTE — TELEPHONE ENCOUNTER
----- Message from Nurse Practitioner CARLIE Shi sent at 9/26/2024  4:05 PM PDT -----  Please send patient for another BMP in 2 weeks to follow her calcium levels, if they remain elevated I will send her back to PCP for evaluation

## 2024-09-26 NOTE — TELEPHONE ENCOUNTER
Placed lab order.    Called and spoke with patient, discussed AMIE recommendations. Patient verbalized understanding

## 2024-09-26 NOTE — RESULT ENCOUNTER NOTE
Please send patient for another BMP in 2 weeks to follow her calcium levels, if they remain elevated I will send her back to PCP for evaluation

## 2024-10-04 DIAGNOSIS — I50.23 ACUTE ON CHRONIC HFREF (HEART FAILURE WITH REDUCED EJECTION FRACTION) (HCC): ICD-10-CM

## 2024-10-04 RX ORDER — POTASSIUM CHLORIDE 1500 MG/1
20 TABLET, EXTENDED RELEASE ORAL 2 TIMES DAILY PRN
Qty: 30 TABLET | Refills: 3 | Status: SHIPPED | OUTPATIENT
Start: 2024-10-04

## 2024-10-14 ENCOUNTER — HOSPITAL ENCOUNTER (OUTPATIENT)
Dept: LAB | Facility: MEDICAL CENTER | Age: 54
End: 2024-10-14
Attending: NURSE PRACTITIONER
Payer: MEDICAID

## 2024-10-14 ENCOUNTER — NON-PROVIDER VISIT (OUTPATIENT)
Dept: CARDIOLOGY | Facility: MEDICAL CENTER | Age: 54
End: 2024-10-14
Attending: NURSE PRACTITIONER
Payer: MEDICAID

## 2024-10-14 VITALS
HEIGHT: 67 IN | WEIGHT: 154 LBS | DIASTOLIC BLOOD PRESSURE: 58 MMHG | BODY MASS INDEX: 24.17 KG/M2 | SYSTOLIC BLOOD PRESSURE: 89 MMHG | HEART RATE: 90 BPM

## 2024-10-14 DIAGNOSIS — I50.23 ACUTE ON CHRONIC HFREF (HEART FAILURE WITH REDUCED EJECTION FRACTION) (HCC): ICD-10-CM

## 2024-10-14 DIAGNOSIS — E78.5 HYPERLIPIDEMIA, UNSPECIFIED HYPERLIPIDEMIA TYPE: ICD-10-CM

## 2024-10-14 LAB
ANION GAP SERPL CALC-SCNC: 15 MMOL/L (ref 7–16)
BUN SERPL-MCNC: 31 MG/DL (ref 8–22)
CALCIUM SERPL-MCNC: 11.8 MG/DL (ref 8.5–10.5)
CHLORIDE SERPL-SCNC: 99 MMOL/L (ref 96–112)
CO2 SERPL-SCNC: 28 MMOL/L (ref 20–33)
CREAT SERPL-MCNC: 1.11 MG/DL (ref 0.5–1.4)
GFR SERPLBLD CREATININE-BSD FMLA CKD-EPI: 59 ML/MIN/1.73 M 2
GLUCOSE SERPL-MCNC: 104 MG/DL (ref 65–99)
POTASSIUM SERPL-SCNC: 4.3 MMOL/L (ref 3.6–5.5)
SODIUM SERPL-SCNC: 142 MMOL/L (ref 135–145)

## 2024-10-14 PROCEDURE — 36415 COLL VENOUS BLD VENIPUNCTURE: CPT

## 2024-10-14 PROCEDURE — 80048 BASIC METABOLIC PNL TOTAL CA: CPT

## 2024-10-14 PROCEDURE — 99212 OFFICE O/P EST SF 10 MIN: CPT

## 2024-10-14 ASSESSMENT — FIBROSIS 4 INDEX: FIB4 SCORE: 0.94

## 2024-10-28 ENCOUNTER — TELEPHONE (OUTPATIENT)
Dept: CARDIOLOGY | Facility: MEDICAL CENTER | Age: 54
End: 2024-10-28
Payer: MEDICAID

## 2024-11-15 ENCOUNTER — HOSPITAL ENCOUNTER (OUTPATIENT)
Dept: CARDIOLOGY | Facility: MEDICAL CENTER | Age: 54
End: 2024-11-15
Attending: INTERNAL MEDICINE
Payer: MEDICAID

## 2024-11-15 DIAGNOSIS — Z79.899 HIGH RISK MEDICATION USE: ICD-10-CM

## 2024-11-15 DIAGNOSIS — I42.8 NON-ISCHEMIC CARDIOMYOPATHY (HCC): ICD-10-CM

## 2024-11-15 DIAGNOSIS — I50.9 HEART FAILURE, NYHA CLASS 2 (HCC): ICD-10-CM

## 2024-11-15 DIAGNOSIS — I50.20 ACC/AHA STAGE C SYSTOLIC HEART FAILURE (HCC): ICD-10-CM

## 2024-11-15 LAB
LV EJECT FRACT  99904: 35
LV EJECT FRACT MOD 2C 99903: 45.51
LV EJECT FRACT MOD 4C 99902: 29.15
LV EJECT FRACT MOD BP 99901: 37.69

## 2024-11-15 PROCEDURE — 93306 TTE W/DOPPLER COMPLETE: CPT | Mod: 26 | Performed by: INTERNAL MEDICINE

## 2024-11-15 PROCEDURE — 93306 TTE W/DOPPLER COMPLETE: CPT

## 2024-11-15 PROCEDURE — 700117 HCHG RX CONTRAST REV CODE 255: Mod: UD | Performed by: INTERNAL MEDICINE

## 2024-11-15 RX ADMIN — HUMAN ALBUMIN MICROSPHERES AND PERFLUTREN 3 ML: 10; .22 INJECTION, SOLUTION INTRAVENOUS at 12:30

## 2024-11-18 ENCOUNTER — APPOINTMENT (OUTPATIENT)
Dept: CARDIOLOGY | Facility: MEDICAL CENTER | Age: 54
End: 2024-11-18
Attending: NURSE PRACTITIONER
Payer: MEDICAID

## 2024-11-26 ENCOUNTER — NON-PROVIDER VISIT (OUTPATIENT)
Dept: CARDIOLOGY | Facility: MEDICAL CENTER | Age: 54
End: 2024-11-26
Attending: NURSE PRACTITIONER
Payer: MEDICAID

## 2024-11-26 VITALS
BODY MASS INDEX: 24.17 KG/M2 | DIASTOLIC BLOOD PRESSURE: 54 MMHG | HEIGHT: 67 IN | WEIGHT: 154 LBS | HEART RATE: 87 BPM | SYSTOLIC BLOOD PRESSURE: 85 MMHG

## 2024-11-26 DIAGNOSIS — E11.9 TYPE 2 DIABETES MELLITUS WITHOUT COMPLICATION, WITHOUT LONG-TERM CURRENT USE OF INSULIN (HCC): ICD-10-CM

## 2024-11-26 DIAGNOSIS — E78.5 DYSLIPIDEMIA: ICD-10-CM

## 2024-11-26 DIAGNOSIS — I50.23 ACUTE ON CHRONIC HFREF (HEART FAILURE WITH REDUCED EJECTION FRACTION) (HCC): ICD-10-CM

## 2024-11-26 PROCEDURE — 99212 OFFICE O/P EST SF 10 MIN: CPT

## 2024-11-26 RX ORDER — POTASSIUM CHLORIDE 1500 MG/1
20 TABLET, EXTENDED RELEASE ORAL 2 TIMES DAILY PRN
Qty: 60 TABLET | Refills: 3 | Status: SHIPPED | OUTPATIENT
Start: 2024-11-26

## 2024-11-26 ASSESSMENT — FIBROSIS 4 INDEX: FIB4 SCORE: 0.96

## 2024-11-26 NOTE — PROGRESS NOTES
CHF Pharmacotherapy Visit    Informed written consent was given on: 6/24/24    Analia Wu is here for CHF     HPI  Pertinent Interval History since last visit:   Pt reports no SOB, but does complain of a headache  Pt still reports occasional lightheadedness, dizziness that was not correlated to low BP    Most recent EF:   05/28/24 10:11 11/15/24 11:33   Left Ventrical Ejection Fraction 25 35       Potential Barriers to Care:  Adherence: complete   Side effects: none reported other than dizziness   Affordability: none, medications are affordable.    Current CHF Medications - including dose:   Entresto or ACE/ARB: None, due to low BP  Beta blocker:  Metoprolol XL 25 mg daily  Diuretic: if needed - Furosemide 40 mg twice daily - using very infrequent.   Aldosterone antagonist: spironolactone 25 mg daily  SGLT2i: Continue Farxiga 10 mg daily    Home BP and HR:  Pt did not bring log, but reports they are low     Lipids Data Review:   Lab Results   Component Value Date/Time    CHOLSTRLTOT 129 05/31/2024 04:20 AM    LDL 74 05/31/2024 04:20 AM    HDL 38 (A) 05/31/2024 04:20 AM    TRIGLYCERIDE 84 05/31/2024 04:20 AM        Latest Reference Range & Units 08/05/24 14:15   Glycohemoglobin 4.0 - 5.6 % 6.8 (H)     Current lipid medications:  Atorvastatin 40 mg daily    Lifestyle:  Change in weight:  patient has lost 10 lbs since last visit  Exercise habits: minimal exercise   Diet: common adult, patient does not cook her own meals which makes it difficult for her to control her sodium intake. Trying to have low sodium on days she can control her diet  Pt keeps to 1800 mg of fluid/day    DATA REVIEW  Vitals:    11/26/24 1245   BP: (!) 85/54   Pulse: 87     Lab Results   Component Value Date/Time    SODIUM 142 10/14/2024 02:00 PM    POTASSIUM 4.3 10/14/2024 02:00 PM    CHLORIDE 99 10/14/2024 02:00 PM    CO2 28 10/14/2024 02:00 PM    GLUCOSE 104 (H) 10/14/2024 02:00 PM    BUN 31 (H) 10/14/2024 02:00 PM    CREATININE 1.11  "10/14/2024 02:00 PM    CREATININE 0.8 10/05/2007 04:50 AM     Lab Results   Component Value Date/Time    ALKPHOSPHAT 117 (H) 07/31/2024 03:33 AM    ASTSGOT 42 07/31/2024 03:33 AM    ALTSGPT 64 (H) 07/31/2024 03:33 AM    TBILIRUBIN 2.9 (H) 07/31/2024 03:33 AM    INR 1.15 (H) 05/30/2024 06:32 PM    ALBUMIN 3.3 07/31/2024 03:33 AM      No components found for: \"MICROALBUMINCREATRATIOURINE\"    Renal function:  Calculated creatinine clearance: 65 ml/min   eGFR: 59 mL/min/1.73 m2    Other:  Immunization History   Administered Date(s) Administered    Influenza (IM) Preservative Free - HISTORICAL DATA 10/10/2013, 10/10/2014    Influenza Vaccine Quad Inj (Pf) 10/19/2015, 11/01/2017, 11/08/2018    PFIZER PURPLE CAP SARS-COV-2 VACCINATION (12+) 05/21/2021, 06/15/2021    Pneumococcal Conjugate Vaccine (PCV20) 05/30/2024    Tdap Vaccine 08/22/2006, 08/01/2012     Up to date on pneumococcal vaccine? Yes    Recent Imaging Studies:    None since last visit    ASSESSMENT AND PLAN  CHF and TYPE 2 DIABETES MELLITUS   Pt educated on importance of reducing sodium in her diet. Goal of less than 2,000 mg per day. Also discussed fluid limit.     CHF medications (changes are bolded)  Entresto or ACE/ARB: None, due to low BP  Beta blocker: Continue Metoprolol XL 25 mg daily  Diuretic: Furosemide 40 mg twice daily if needed.    patient to call if weight gain >3 lbs in a day or >5 lbs in week  Aldosterone antagonist: decrease to spironolactone 12.5 mg daily  Due to low bp in clinic today and reported low BPs at home   SGLT2i: Continue Farxiga 10 mg daily    Hyperlipidemia  Patient type: Secondary Prevention  Goal: LDL-C:   <70 mg/dL  Patient's LDL is slightly above goal, will continue to monitor.     Lipid medications (changes are bolded)  Atorvastatin 40 mg daily    4. Lifestyle   Recommendations From Today's Visit:   Reduce sodium consumption to less than 2,000 mg/day as possible  Continue fluid restriction of 1800 ml/day  More regular home " BP monitoring    Blood Work Ordered At Today's visit:   As ordered today     Follow-Up:   8 weeks    Donald Durham, PharmD  CC:  DANIELITO Matamoros APRN

## 2024-11-27 ENCOUNTER — OFFICE VISIT (OUTPATIENT)
Dept: CARDIOLOGY | Facility: MEDICAL CENTER | Age: 54
End: 2024-11-27
Attending: NURSE PRACTITIONER
Payer: MEDICAID

## 2024-11-27 VITALS
OXYGEN SATURATION: 96 % | HEIGHT: 67 IN | WEIGHT: 154 LBS | DIASTOLIC BLOOD PRESSURE: 60 MMHG | BODY MASS INDEX: 24.17 KG/M2 | SYSTOLIC BLOOD PRESSURE: 84 MMHG | HEART RATE: 87 BPM | RESPIRATION RATE: 16 BRPM

## 2024-11-27 DIAGNOSIS — Z79.899 HIGH RISK MEDICATION USE: ICD-10-CM

## 2024-11-27 DIAGNOSIS — I51.3 LEFT VENTRICULAR THROMBUS: ICD-10-CM

## 2024-11-27 DIAGNOSIS — I50.9 HEART FAILURE, NYHA CLASS 2 (HCC): ICD-10-CM

## 2024-11-27 DIAGNOSIS — I42.8 NON-ISCHEMIC CARDIOMYOPATHY (HCC): ICD-10-CM

## 2024-11-27 DIAGNOSIS — I50.20 ACC/AHA STAGE C SYSTOLIC HEART FAILURE (HCC): ICD-10-CM

## 2024-11-27 PROCEDURE — 99212 OFFICE O/P EST SF 10 MIN: CPT | Performed by: NURSE PRACTITIONER

## 2024-11-27 ASSESSMENT — ENCOUNTER SYMPTOMS
SHORTNESS OF BREATH: 0
PND: 0
PALPITATIONS: 0
ABDOMINAL PAIN: 0
ORTHOPNEA: 0
DIZZINESS: 1
FEVER: 0
MYALGIAS: 0
COUGH: 0
CLAUDICATION: 0

## 2024-11-27 ASSESSMENT — FIBROSIS 4 INDEX: FIB4 SCORE: 0.96

## 2024-11-27 NOTE — PROGRESS NOTES
Chief Complaint   Patient presents with    Congestive Heart Failure     F/V DX:ACC/AHA stage C systolic heart failure (HCC)           Subjective     Analia Wu is a 54 y.o. female who presents today for follow up on her heart failure.     Pt was previously seen in 2012 with Dr. Abraham for congenital pulmonic stenosis.  She was lost to follow-up for unclear reasons.    Patient currently has been following with the heart failure clinic.  She was seen on 9/25/2024 with myself.  She was sent for repeat echo and lab testing.    Patient feels well, denies chest pain, shortness of breath, palpitations, orthopnea, PND or Edema.  She mentions occasionally feeling lightheaded especially if she stands too quickly.    She mentions she is only taking her diuretics as needed at this time.    She has lost a little bit more weight and currently is weighing 147 pounds.  She mentions she has lost almost 50 pounds.    Additionally, patient has the following medical problems:    -Hospitalization from 7/27/2024 through 7/31/2024.  She was admitted for worsening lower extremity edema and was treated for heart failure exacerbation.  Her diuretic was increased to 40 mg twice a day.  Losartan and Aldactone were restarted in the hospital.     -Hospitalization 5/30/2024 through 6/4/2024 for left-sided weakness involving her face, arm and leg.  She was found to have a right M1 occlusion with favorable ischemic penumbra.  She underwent successful thrombectomy with TICI 2C recanalization.    -hospitalization from 5/27/2024 through 5/30/2024.  She presented with nausea and vomiting and shortness of breath.  She was found to have elevated troponin in NT proBNP.  She required oxygen.  She had an echo performed which showed an EF of 25% and LV apical thrombus, moderate MR, TR and RVSP of 45 mmHg.  Cardiology was consulted.  Patient had a left heart cath with no CAD.  She was treated with IV diuretics and her respiratory status improved.   "She was started on GDMT and started on Eliquis for her LV thrombus.  She was encouraged to follow-up with outpatient cardiology.    -PTSD: Effexor    -Former smoker     -Perianal dysplasia with surgery    Past Medical History:   Diagnosis Date    Anesthesia     ponv,  \"shallow breathing\"    Anxiety     ASTHMA     does not have any inhalers    Cancer (HCC)     vulva    Carpal tunnel syndrome     Heart murmur     pt reports she has never had any problems    Infectious disease 2007    Mrsa infection    Pain 11-26-12    neck, left knee, 6-7/10    Panic attack     Psychiatric problem     depression, PTSD, anxiety    Seasonal allergies      Past Surgical History:   Procedure Laterality Date    RECTAL EXPLORATION  12/19/2018    Procedure: RECTAL EXPLORATION - FOR HIGH RESOLUTION ANOSCOPY;  Surgeon: Billy Villegas M.D.;  Location: SURGERY [facility];  Service: General    ANAL FISTULECTOMY  5/24/2017    Procedure: ANAL FISTULECTOMY FOR: WIDE EXCISION ANAL DYSPLASIA;  Surgeon: Billy Villegas M.D.;  Location: SURGERY [facility];  Service:     RECTAL EXPLORATION  9/2/2016    Procedure: RECTAL EXPLORATION eua, AND BIOPSY;  Surgeon: Billy Villegas M.D.;  Location: SURGERY [facility];  Service:     VULVECTOMY RADICAL Bilateral 9/2/2016    Procedure: VULVECTOMY RADICAL;  Surgeon: Reese Carroll M.D.;  Location: SURGERY [facility];  Service:     RECTAL EXPLORATION  1/28/2015    Performed by Billy Villegas M.D. at SURGERY [facility]    EXAM UNDER ANESTHESIA  5/21/2014    Performed by Billy Villegas M.D. at SURGERY [facility]    RECTAL EXPLORATION  5/21/2014    Performed by Billy Villegas M.D. at SURGERY [facility]    WIDE EXCISION  12/19/2012    Performed by Billy Villegas M.D. at SURGERY [facility]    WIDE EXCISION  2/29/2012    Performed by BILLY VILLEGAS at SURGERY [facility]    WIDE EXCISION  9/7/2011    Performed by BILLY VILLEGAS at SURGERY [facility]    CERVICAL DISK AND FUSION ANTERIOR  " 2011    Performed by YEIMI LEVY at SUR[facility]ERY [facility]    WIDE EXCISION  2010    Performed by BRYANT VILLEGAS at SUR[facility]ERY [facility]    HEMORRHOIDECTOMY  2010    Performed by BRYANT VILLEGAS at SUR[facility]ERY [facility]    VULVECTOMY PARTIAL  2007    RECTAL BIOPSY      OTHER NEUROLOGICAL SURG      neck fusion     Family History   Problem Relation Age of Onset    Diabetes Mother     Stroke Mother     Cancer Maternal Aunt     Lung Disease Neg Hx     Heart Disease Neg Hx      Social History     Socioeconomic History    Marital status: Single     Spouse name: Not on file    Number of children: Not on file    Years of education: Not on file    Highest education level: Not on file   Occupational History    Not on file   Tobacco Use    Smoking status: Former     Current packs/day: 0.00     Average packs/day: 1 pack/day for 25.0 years (25.0 ttl pk-yrs)     Types: Cigarettes     Start date: 10/4/1982     Quit date: 10/4/2007     Years since quittin.1    Smokeless tobacco: Never    Tobacco comments:        Vaping Use    Vaping status: Never Used   Substance and Sexual Activity    Alcohol use: No    Drug use: No    Sexual activity: Not Currently   Other Topics Concern    Not on file   Social History Narrative    Not on file     Social Drivers of Health     Financial Resource Strain: Low Risk  (2024)    Overall Financial Resource Strain (CARDIA)     Difficulty of Paying Living Expenses: Not hard at all   Food Insecurity: No Food Insecurity (2024)    Hunger Vital Sign     Worried About Running Out of Food in the Last Year: Never true     Ran Out of Food in the Last Year: Never true   Transportation Needs: No Transportation Needs (2024)    PRAPARE - Transportation     Lack of Transportation (Medical): No     Lack of Transportation (Non-Medical): No   Physical Activity: Not on file   Stress: Not on file   Social Connections: Not on file   Intimate Partner Violence: Not At Risk  "(7/27/2024)    Humiliation, Afraid, Rape, and Kick questionnaire     Fear of Current or Ex-Partner: No     Emotionally Abused: No     Physically Abused: No     Sexually Abused: No   Housing Stability: Low Risk  (7/27/2024)    Housing Stability Vital Sign     Unable to Pay for Housing in the Last Year: No     Number of Places Lived in the Last Year: 1     Unstable Housing in the Last Year: No     Allergies   Allergen Reactions    Latex Shortness of Breath and Itching    Other Food Hives and Rash     Splenda    Tape      \"makes skin turn red\"  Paper tape ok    Hydromorphone Vomiting and Nausea    Other Environmental      Dial soap --  Skin itching     Outpatient Encounter Medications as of 11/27/2024   Medication Sig Dispense Refill    potassium chloride SA (KDUR) 20 MEQ Tab CR Take 1 Tablet by mouth 2 times a day as needed (take with lasix as needed). 60 Tablet 3    furosemide (LASIX) 40 MG Tab Take 1.5 Tablets by mouth 2 times a day as needed (leg swelling). (Patient taking differently: Take 40 mg by mouth 2 times a day.) 60 Tablet 3    apixaban (ELIQUIS) 5mg Tab Take 1 Tablet by mouth 2 times a day. Indications: DVT/PE 60 Tablet 11    spironolactone (ALDACTONE) 25 MG Tab Take 1 Tablet by mouth every day. (Patient taking differently: Take 12.5 mg by mouth every day.) 30 Tablet 3    atorvastatin (LIPITOR) 40 MG Tab Take 1 Tablet by mouth every evening. 30 Tablet 11    dapagliflozin propanediol (FARXIGA) 10 MG Tab Take 1 Tablet by mouth every day. 30 Tablet 11    metoprolol SR (TOPROL XL) 25 MG TABLET SR 24 HR Take 1 Tablet by mouth every day. 30 Tablet 11    [DISCONTINUED] famotidine (PEPCID) 40 MG Tab Take 40 mg by mouth 2 times a day. (Patient not taking: Reported on 11/27/2024)       No facility-administered encounter medications on file as of 11/27/2024.     Review of Systems   Constitutional:  Negative for fever and malaise/fatigue.   Respiratory:  Negative for cough and shortness of breath.    Cardiovascular:  " "Negative for chest pain, palpitations, orthopnea, claudication, leg swelling and PND.   Gastrointestinal:  Negative for abdominal pain.   Musculoskeletal:  Negative for myalgias.   Neurological:  Positive for dizziness.   All other systems reviewed and are negative.             Objective     BP (!) 84/60 (BP Location: Left arm, Patient Position: Sitting, BP Cuff Size: Adult)   Pulse 87   Resp 16   Ht 1.702 m (5' 7\")   Wt 69.9 kg (154 lb)   LMP 12/17/2018 (Approximate)   SpO2 96%   BMI 24.12 kg/m²     Physical Exam  Vitals reviewed.   Constitutional:       Appearance: She is well-developed.   HENT:      Head: Normocephalic and atraumatic.   Eyes:      Pupils: Pupils are equal, round, and reactive to light.   Neck:      Vascular: No JVD.   Cardiovascular:      Rate and Rhythm: Normal rate and regular rhythm.      Heart sounds: Normal heart sounds.   Pulmonary:      Effort: Pulmonary effort is normal. No respiratory distress.      Breath sounds: Normal breath sounds. No wheezing or rales.   Abdominal:      General: Bowel sounds are normal.      Palpations: Abdomen is soft.   Musculoskeletal:         General: Normal range of motion.      Cervical back: Normal range of motion and neck supple.      Right lower leg: No edema.      Left lower leg: No edema.   Skin:     General: Skin is warm and dry.   Neurological:      General: No focal deficit present.      Mental Status: She is alert and oriented to person, place, and time.   Psychiatric:         Behavior: Behavior normal.            Lab Results   Component Value Date/Time    CHOLSTRLTOT 129 05/31/2024 04:20 AM    LDL 74 05/31/2024 04:20 AM    HDL 38 (A) 05/31/2024 04:20 AM    TRIGLYCERIDE 84 05/31/2024 04:20 AM       Lab Results   Component Value Date/Time    SODIUM 142 10/14/2024 02:00 PM    POTASSIUM 4.3 10/14/2024 02:00 PM    CHLORIDE 99 10/14/2024 02:00 PM    CO2 28 10/14/2024 02:00 PM    GLUCOSE 104 (H) 10/14/2024 02:00 PM    BUN 31 (H) 10/14/2024 02:00 PM "    CREATININE 1.11 10/14/2024 02:00 PM    CREATININE 0.8 10/05/2007 04:50 AM     Lab Results   Component Value Date/Time    ALKPHOSPHAT 117 (H) 07/31/2024 03:33 AM    ASTSGOT 42 07/31/2024 03:33 AM    ALTSGPT 64 (H) 07/31/2024 03:33 AM    TBILIRUBIN 2.9 (H) 07/31/2024 03:33 AM       Echocardiogram 10/30/2012  CONCLUSIONS   Normal left ventricular size, thickness, systolic function, and   diastolic function.   Thickened mitral valve leaflets.   Mild mitral regurgitation.   Right ventricular systolic pressure is estimated to be 22-27 mmHg.   Thickened pulmonic valve leaflets.   Possible mild stenosis with peak 17 mmHg, mean 10 mmHg, trace pulmonic insufficiency.    Transthoracic Echo Report 5/28/2024  Dilated LV with severely reduced systolic function, estimated LVEF 25%.    Global hypokinesis with regional variation  Grade II LV diastolic dysfunction  LV apical thrombus is present: 0.9 x 1.5 cm, semi-mobile.  Normal RV size with mildly reduced systolic function  Reduced estimated resting stroke-volume 29ml and cardiac output per   Doppler assessment  Mildly dilated left atrium  Moderate MR  Moderate TR  Trivial pericardial fusion  Estimated RVSP 45 mmHg  Normal IVC size  Dr. Walsh notified via Voalte on 5/28/24 at 10:09 AM    Coronary angiogram 5/29/2024  HEMODYNAMICS:  Aortic pressure: 93/58 mmHg  LVEDP: 18 mmHg  No significant aortic gradient on pullback     CORONARY ANGIOGRAPHY:  The left main coronary artery : Normal-appearing large caliber vessel that bifurcates to LAD and left circumflex  The left anterior descending coronary artery : Large in caliber transapical vessel that gives rise to several small-medium caliber diagonal branches.  Normal-appearing  The left circumflex coronary artery : Normal-appearing large caliber vessel that gives rise to a large high OM1 and large OM 2.  The right coronary artery : Large-caliber dominant vessel, normal-appearing     IMPRESSION:  1.  Nonischemic cardiomyopathy,  normal-appearing epicardial coronary arteries, dominant RCA  2.  Mildly elevated resting LVEDP 18 mmHg (at the LVOT level) without significant transaortic gradient on pullback     RECOMMENDATIONS:  Ongoing goal-directed therapy for nonischemic cardiomyopathy and acute HFrEF  Primary ASCVD prevention  TR band protocol     NOTIFICATION:  The patient's daughter was attempted to be called with updates, left her voicemail message.     Referring Cardiologist - Dr. Amato - was notified.    Date 6MWT MLWHF   6/6/2024 Not done d/t tachycardia 48                     Assessment & Plan     1. ACC/AHA stage C systolic heart failure (HCC)        2. Heart failure, NYHA class 2 (HCC)        3. Non-ischemic cardiomyopathy (HCC)        4. Left ventricular thrombus        5. High risk medication use                  Medical Decision Making: Today's Assessment/Status/Plan:        HFrEF, Stage C, Class 1-2, LVEF improved to 35% from 25%: Based on physical examination findings, patient is euvolemic. No JVD, lungs are clear to auscultation, no pitting edema in bilateral lower extremities, no ascites.   -Heart failure due to nonischemic cardiomyopathy  -Discussed Heart failure trajectory and prognosis with patient. Will continue to optimize medical therapy as tolerated. Advanced HF treatment, need for remote monitoring consideration at every visit.   -ACE-I/ARB/ARNI: Continue to hold losartan due to hypotension  -Evidence Based Beta-blocker: Continue metoprolol SR 25 mg daily  -Aldosterone Antagonist: Continue spironolactone 12.5 mg daily  -Diuretic: Continue furosemide 40 mg twice a day PRN with potassium 20 mEq twice a day PRN for weight gain, increased shortness of breath or swelling  -SGLT2 inhibitor: Continue Farxiga 10 mg daily  -Other: Consider as needed (Hydralazine/Isosorbide, Vericiguat, Corlanor)  -Labs: No additional labs at this time, Will continue to closely monitor for side effects of patient's high risk medication(s)  including renal function, liver function NTproBNP/cardiac markers, and electrolytes as needed  -discussed importance of exercise and regular activity  -Discussed/reviewed maintaining a low Sodium and hydration recommendations  -Repeat echo shows EF at 35%.  No indication for ICD at this time  -moderate MR seen on echo: Will will follow and refer to structural heart clinic for Mitraclip evaluation as needed once patient has been optimized and echo has been repeated  -Reinforced s/sx of worsening heart failure with patient and weight monitoring. Pt verbalizes understanding. Pt to call office or RTC if present.    -PUMP line number [phone] (PUMP) reviewed with patient  -Heart Failure Education:  Discussed the Definition of heart failure (linking disease, symptoms, and treatment) and causes of heart failure  Recognition of escalating symptoms and concrete plan for response to particular symptoms  Risk modification for heart failure progression  Specific diet recommendations: Discussed 2000 mg sodium diet, adequate hydration.  Patient encouraged that she can slowly increase activity levels  Importance of treatment adherence  Behavioral strategies to promote treatment adherence  -Advanced care planning: Advance directive and POLST to be discussed at future visit  -Pharmacotherapy Referral: Patient referred over to the pharmacotherapy program for further optimization of medical therapy   -Compliance Barriers: None  -Genetic testing Consideration: None  -Consideration for LVAD and/or Heart transplant as necessary      LV thrombus:  -Recent echo shows no thrombus present, but we will keep her on Eliquis due to her stroke  -Continue Eliquis 5 mg twice a day    Stroke, s/p thrombectomy:  -Had follow-up with neurology in July  -Continue atorvastatin 40 mg daily  -Neurology recommends patient to remain on Eliquis if EF less than 35% even if thrombus has resolved.  Will continue her on Eliquis for now until her EF is above  35%  -Will discuss repeating another echo at her next visit    FU in HF clinic in 3 months. Sooner if needed.    Patient verbalizes understanding and agrees with the plan of care.     PLEASE NOTE: This Note was created using voice recognition Software. I have made every reasonable attempt to correct obvious errors, but I expect that there are errors of grammar and possibly content that I did not discover before finalizing the note

## 2025-01-21 DIAGNOSIS — I50.23 ACUTE ON CHRONIC HFREF (HEART FAILURE WITH REDUCED EJECTION FRACTION) (HCC): ICD-10-CM

## 2025-01-21 RX ORDER — SPIRONOLACTONE 25 MG/1
12.5 TABLET ORAL DAILY
Qty: 45 TABLET | Refills: 3 | Status: SHIPPED | OUTPATIENT
Start: 2025-01-21

## 2025-01-21 NOTE — TELEPHONE ENCOUNTER
AMIE    Received request via: Patient    Was the patient seen in the last year in this department? Yes    Does the patient have an active prescription (recently filled or refills available) for medication(s) requested? No    Pharmacy Name:     [facility] [address] [place] [address], [place] - [address] [[id number]]    spironolactone (ALDACTONE) 25 MG Tab    The patient is completely out of this medication.     Does the patient have [facility] and need 100-day supply? (This applies to ALL medications) Patient does not have [facility]    Thank you,  Stevan LEMA

## 2025-01-27 ENCOUNTER — APPOINTMENT (OUTPATIENT)
Dept: CARDIOLOGY | Facility: MEDICAL CENTER | Age: 55
End: 2025-01-27
Attending: NURSE PRACTITIONER
Payer: MEDICAID

## 2025-01-30 ENCOUNTER — APPOINTMENT (OUTPATIENT)
Dept: CARDIOLOGY | Facility: MEDICAL CENTER | Age: 55
End: 2025-01-30
Attending: NURSE PRACTITIONER
Payer: MEDICAID

## 2025-01-30 NOTE — PROGRESS NOTES
"CHF Pharmacotherapy Visit    Date Referral Placed: 6/6/24    Analia Wu is here for CHF    HPI  Pertinent Interval History since last visit:   ***    Most recent EF:  Lab Results   Component Value Date/Time    LVEF 35 11/15/2024 1133    LVEF 25 05/28/2024 1011       Potential Barriers to Care:  Adherence: {ACTIONS; DENIES/REPORTS:77845} missed doses***  Side effects: {NONE:17020}  Affordability: ***  Others: ***    Current CHF Medications - including dose:   Entresto or ACE/ARB: None - previously on losartan d/t hypotension  Beta blocker: Metoprolol SR 25 mg once daily   Diuretic: Furosemide 40 mg, 1.5 tabs BID  Aldosterone antagonist: Spironolactone 25 mg, 1/2 tab daily  SGLT2i: Farxiga 10 mg once daily    Home Vitals:  BP ~ ***  HR~ ***        Lifestyle:  Change in weight: {weight change stable/free txt:555734}  Exercise habits: {EXERCISE PATTERN:21}   Diet: {DIET TYPES:46184}    DATA REVIEW  There were no vitals filed for this visit.    Lab Results   Component Value Date/Time    SODIUM 142 10/14/2024 02:00 PM    POTASSIUM 4.3 10/14/2024 02:00 PM    CHLORIDE 99 10/14/2024 02:00 PM    CO2 28 10/14/2024 02:00 PM    GLUCOSE 104 (H) 10/14/2024 02:00 PM    BUN 31 (H) 10/14/2024 02:00 PM    CREATININE 1.11 10/14/2024 02:00 PM    CREATININE 0.8 10/05/2007 04:50 AM     Lab Results   Component Value Date/Time    ALKPHOSPHAT 117 (H) 07/31/2024 03:33 AM    ASTSGOT 42 07/31/2024 03:33 AM    ALTSGPT 64 (H) 07/31/2024 03:33 AM    TBILIRUBIN 2.9 (H) 07/31/2024 03:33 AM    INR 1.15 (H) 05/30/2024 06:32 PM    ALBUMIN 3.3 07/31/2024 03:33 AM      No results found for: \"MALBCRT\", \"MICROALBUR\"    Renal function:  Calculated creatinine clearance: ~ 63 mL/min   eGFR: 59 mL/min/1.73 m2  GFR (CKD-EPI)   Date Value Ref Range Status   10/14/2024 59 (A) >60 mL/min/1.73 m 2 Final     Comment:     Estimated Glomerular Filtration Rate is calculated using  race neutral CKD-EPI 2021 equation per NKF-ASN recommendations.   "         Other:  Immunization History   Administered Date(s) Administered    Influenza Vaccine Quad Inj (Pf) 10/19/2015, 11/01/2017, 11/08/2018    Influenza split virus trivalent (PF) 10/10/2013, 10/10/2014    PFIZER PURPLE CAP SARS-COV-2 VACCINATION (12+) 05/21/2021, 06/15/2021    Pneumococcal Conjugate Vaccine (PCV20) 05/30/2024    Tdap Vaccine 08/22/2006, 08/01/2012       Recent Imaging Studies:    None since last visit    ASSESSMENT AND PLAN  -CHF  Provided basic education on CHF, preventing exacerbation, documenting daily weights and BP, importance of medication adherence  Counseled pt regarding MOA, SE, and proper administration of CHF GDMT. ***delete if this is a f/u visit***  ***    CHF medications (changes are bolded)  Entresto or ACE/ARB: ***  Beta blocker: ***  Diuretic:***  Aldosterone antagonist: ***  SGLT2i: ***      -Lifestyle   Recommendations From Today's Visit:   Continue to limit fluid intake to 2L/day and Na+ intake to 2gm/day  Continue to eat DASH/MED style diet.   Continue to exercise as tolerated.     Blood Work/Studies Ordered At Today's visit:   {NONE:59297}    Follow-Up:   {FOLLOWUP:79965}    Jovan Polanco, PharmD, BCACP    CC:  Mavis Sheldon P.A.-C.

## 2025-02-06 ENCOUNTER — NON-PROVIDER VISIT (OUTPATIENT)
Dept: CARDIOLOGY | Facility: MEDICAL CENTER | Age: 55
End: 2025-02-06
Attending: NURSE PRACTITIONER
Payer: MEDICAID

## 2025-02-06 VITALS
DIASTOLIC BLOOD PRESSURE: 62 MMHG | HEART RATE: 71 BPM | WEIGHT: 148 LBS | SYSTOLIC BLOOD PRESSURE: 107 MMHG | BODY MASS INDEX: 23.18 KG/M2

## 2025-02-06 DIAGNOSIS — I50.23 ACUTE ON CHRONIC HFREF (HEART FAILURE WITH REDUCED EJECTION FRACTION) (HCC): ICD-10-CM

## 2025-02-06 PROCEDURE — 99212 OFFICE O/P EST SF 10 MIN: CPT | Performed by: PHARMACIST

## 2025-02-06 RX ORDER — METOPROLOL SUCCINATE 50 MG/1
50 TABLET, EXTENDED RELEASE ORAL DAILY
Qty: 30 TABLET | Refills: 11 | Status: SHIPPED | OUTPATIENT
Start: 2025-02-06

## 2025-02-06 ASSESSMENT — FIBROSIS 4 INDEX: FIB4 SCORE: 0.96

## 2025-02-06 NOTE — PROGRESS NOTES
CHF Pharmacotherapy Visit    Date Referral Placed: 6/6/24    Analia Wu is here for CHF    HPI  Pertinent Interval History since last visit:    w/ Lindsey - no noted medication changes.    Most recent EF:  Lab Results   Component Value Date/Time    LVEF 35 11/15/2024 1133    LVEF 25 05/28/2024 1011       Potential Barriers to Care:  Adherence: denies missed doses overall  Side effects: none  Affordability: Pt w/ Medicaid  Others: No    Current CHF Medications - including dose:   Entresto or ACE/ARB: None - previously on losartan but DC'd d/t hypotension  Beta blocker: Metoprolol SR 25 mg once daily   Diuretic: Furosemide 40 mg, 1.5 tabs BID - using PRN (knows to only use potassium when using furosemide)  Aldosterone antagonist: Spironolactone 25 mg, 1/2 tab daily  SGLT2i: Farxiga 10 mg once daily    Home Vitals: Not monitoring routinely. Discussed importance of doing so and keeping a log.  BP ~ 103/50's  HR~ 83    Lifestyle:  Change in weight:  Down ~ 6 lbs since last visit  Exercise habits: no regular exercise program. Occasional dancing. Discussed dedicated walking.  Diet: low sodium    DATA REVIEW  Vitals:    02/06/25 1000   BP: 107/62   Pulse: 71       Lab Results   Component Value Date/Time    SODIUM 142 10/14/2024 02:00 PM    POTASSIUM 4.3 10/14/2024 02:00 PM    CHLORIDE 99 10/14/2024 02:00 PM    CO2 28 10/14/2024 02:00 PM    GLUCOSE 104 (H) 10/14/2024 02:00 PM    BUN 31 (H) 10/14/2024 02:00 PM    CREATININE 1.11 10/14/2024 02:00 PM    CREATININE 0.8 10/05/2007 04:50 AM     Lab Results   Component Value Date/Time    ALKPHOSPHAT 117 (H) 07/31/2024 03:33 AM    ASTSGOT 42 07/31/2024 03:33 AM    ALTSGPT 64 (H) 07/31/2024 03:33 AM    TBILIRUBIN 2.9 (H) 07/31/2024 03:33 AM    INR 1.15 (H) 05/30/2024 06:32 PM    ALBUMIN 3.3 07/31/2024 03:33 AM          Renal function:  Calculated creatinine clearance: ~ 62 mL/min     GFR (CKD-EPI)   Date Value Ref Range Status   10/14/2024 59 (A) >60 mL/min/1.73 m 2 Final      Comment:     Estimated Glomerular Filtration Rate is calculated using  race neutral CKD-EPI 2021 equation per NKF-ASN recommendations.           Other:  Immunization History   Administered Date(s) Administered    Influenza Vaccine Quad Inj (Pf) 10/19/2015, 11/01/2017, 11/08/2018    Influenza split virus trivalent (PF) 10/10/2013, 10/10/2014    PFIZER PURPLE CAP SARS-COV-2 VACCINATION (12+) 05/21/2021, 06/15/2021    Pneumococcal Conjugate Vaccine (PCV20) 05/30/2024    Tdap Vaccine 08/22/2006, 08/01/2012       Recent Imaging Studies:    None since last visit    ASSESSMENT AND PLAN  -CHF  Pt presents to clinic doing well since last visit.  Pt denies SOB, LE edema, or dizziness sx.  She was able to reduce spironolactone dose as instructed at previous visit w/ no issues.  Pt has not been routinely monitoring home vitals. She's amenable to start doing so and keeping a log.  Clinic vitals at goal today.    CHF medications (changes are bolded)  Entresto or ACE/ARB: None - previously on losartan but DC'd d/t hypotension  Beta blocker: INCREASE metoprolol SR to 50 mg once daily   Diuretic: Furosemide 40 mg, 1.5 tabs BID - using PRN (knows to only use potassium when using furosemide)  Aldosterone antagonist: Spironolactone 25 mg, 1/2 tab daily  SGLT2i: Farxiga 10 mg once daily      -Lifestyle   Recommendations From Today's Visit:   Continue to limit fluid intake to 2L/day and Na+ intake to 2gm/day  Continue to eat DASH/MED style diet.   Continue to exercise as tolerated.     Blood Work/Studies Ordered At Today's visit:   None - CMP orders in place    Follow-Up:   3 weeks with Lindsey, and 5 weeks w/ pharmacotherapy    Jovan Polanco, PharmD, BCACP    CC:  Mavis Sheldon P.A.-C.

## 2025-02-10 ENCOUNTER — TELEPHONE (OUTPATIENT)
Dept: CARDIOLOGY | Facility: MEDICAL CENTER | Age: 55
End: 2025-02-10
Payer: MEDICAID

## 2025-02-10 NOTE — LETTER
PROCEDURE/SURGERY CLEARANCE FORM      Encounter Date: 2/10/2025    Patient: Analia Wu  YOB: 1970    CARDIOLOGIST:  CARLIE Shi    REFERRING DOCTOR:  No ref. provider found    The following procedure/surgery: EDG and Colonoscopy with Deep (propofol) Sedation                                           PROCEDURE/SURGERY CLEARANCE FORM    Date: 2/11/2025   Patient Name: Analia Wu    Dear Surgeon or Proceduralist,      Thank you for your request for cardiac stratification of our mutual patient Analia Wu 1970. We have reviewed their [facility] records; and to the best of our understanding this patient has not had stenting, ablation, watchman, cardiothoracic surgery or hospitalization for cardiovascular reasons in the past 6 months.  Analia Wu has been seen within the past 15 months and is considered to have non-modifiable cardiac risk for this low-risk procedure/surgery. They may proceed from a cardiovascular standpoint and may hold their antiplatelet/anticoagulation as briefly as possible. Please have patient resume this medication when hemodynamically stable to do so.     Aspirin or Prasugrel   - hold 7 days prior to procedure/surgery, resume when hemodynamically stable      Clopidrogrel or Ticagrelor  - hold 7 days for all neurological procedures, hold 5 days prior to all other procedure/surgery,  resume when hemodynamically stable     Warfarin - hold 7 days for all neurological procedures, hold 5 days prior to all other procedure/surgery and coordinate with [facility] ([phone]) INR testing and dose management.      Pradaxa/Xarelto/Eliquis/Savesya - hold 1 day prior to procedure for low bleeding risk procedure, 2 days for high bleeding risk procedure, or consider holding 3 days or longer for patients with reduced kidney function (CrCl <30mL/min) or spinal/cranial surgeries/procedures.      If they have a mechanical heart  valve, please coordinate with [facility] ([phone]) the proper management of their anticoagulant in the periprocedural or perioperative period.      Some patients have higher risk for cardiovascular complications or holding medication. If our patient has had prior complications of holding antiplatelet or anticoagulants in the past and we have seen them after these events, we have addressed these concerns with the patient. They are at an unknown degree of increased risk for recurrent complication.  You may hold anticoagulation/antiplatelets for the procedure or surgery if the benefits of the procedure or surgery outweigh this nonmodifiable risk.      If Analia Wu 1970 has new symptoms of heart failure decompensation, unstable arrythmia, or angina please reach out and we will assess the patient.      If you have other patient-specific concerns, please feel free to reach out to the patient's cardiologist directly at [phone].     Thank you,       [facility]          Electronically Signed       MD Signature   GOMEZ Shi

## 2025-02-12 NOTE — TELEPHONE ENCOUNTER
"Last OV: 11.27.2024  Proposed Surgery:  EDG and Colonoscopy with Deep (propofol) Sedation   Surgery Date: 04.18.2025  Requesting Office Name:  [facility]   Fax Number: [phone]  Preference of Location (default is surgery center unless specified by Cardiologist or SUE)  Prior Clearance Addressed: No    Is this a general clearance? YES   Anticoags/Antiplatelets: Apixaban   Anticoags/Antiplatelet managed by Cardiology? YES    Outstanding Cardiac Imaging : No  Ablation, Cardioversion, Stent, Cardiac Devices, Catheterization, Watchman: Yes  Date : 05.29.2024   TAVR/Valve, Mitral Clip, Watchman (including open heart),: N/A   Recent Cardiac Hospitalization: No            When: N/A  History (cardiac history):   Past Medical History:   Diagnosis Date    Anesthesia     ponv,  \"shallow breathing\"    Anxiety     ASTHMA     does not have any inhalers    Cancer (HCC)     vulva    Carpal tunnel syndrome     Heart murmur     pt reports she has never had any problems    Infectious disease 2007    Mrsa infection    Pain 11-26-12    neck, left knee, 6-7/10    Panic attack     Psychiatric problem     depression, PTSD, anxiety    Seasonal allergies            Is this a dental clearance? NO  Ablation, Cardioversion, Watchman, Stents, Cath, Devices within the last 3 months? No   If yes- Send dental wait letter, do not forward to provider for review.     TAVR / Valve, Mitral clip within the last 6 months? No  If yes- Send dental wait letter, do not forward to provider for review.     If completing a general clearance, continue per protocol.           Surgical Clearance Letter Sent: YES   **Scan clearance request letter into [facility].**   "

## 2025-02-26 ENCOUNTER — OFFICE VISIT (OUTPATIENT)
Dept: CARDIOLOGY | Facility: MEDICAL CENTER | Age: 55
End: 2025-02-26
Attending: NURSE PRACTITIONER
Payer: MEDICAID

## 2025-02-26 VITALS
BODY MASS INDEX: 22.82 KG/M2 | WEIGHT: 145.4 LBS | RESPIRATION RATE: 16 BRPM | HEIGHT: 67 IN | OXYGEN SATURATION: 97 % | DIASTOLIC BLOOD PRESSURE: 56 MMHG | HEART RATE: 76 BPM | SYSTOLIC BLOOD PRESSURE: 98 MMHG

## 2025-02-26 DIAGNOSIS — I51.3 LEFT VENTRICULAR THROMBUS: ICD-10-CM

## 2025-02-26 DIAGNOSIS — Z86.73 H/O ISCHEMIC RIGHT MCA STROKE: ICD-10-CM

## 2025-02-26 DIAGNOSIS — Z79.899 HIGH RISK MEDICATION USE: ICD-10-CM

## 2025-02-26 DIAGNOSIS — I50.9 HEART FAILURE, NYHA CLASS 2 (HCC): ICD-10-CM

## 2025-02-26 DIAGNOSIS — I42.8 NON-ISCHEMIC CARDIOMYOPATHY (HCC): ICD-10-CM

## 2025-02-26 DIAGNOSIS — I50.20 ACC/AHA STAGE C SYSTOLIC HEART FAILURE (HCC): ICD-10-CM

## 2025-02-26 PROCEDURE — 99212 OFFICE O/P EST SF 10 MIN: CPT | Performed by: NURSE PRACTITIONER

## 2025-02-26 RX ORDER — LOSARTAN POTASSIUM 25 MG/1
12.5 TABLET ORAL DAILY
Qty: 45 TABLET | Refills: 3 | Status: SHIPPED | OUTPATIENT
Start: 2025-02-26

## 2025-02-26 RX ORDER — GABAPENTIN 300 MG/1
CAPSULE ORAL
COMMUNITY

## 2025-02-26 RX ORDER — VENLAFAXINE HYDROCHLORIDE 75 MG/1
75 CAPSULE, EXTENDED RELEASE ORAL EVERY MORNING
COMMUNITY
Start: 2024-12-11

## 2025-02-26 ASSESSMENT — ENCOUNTER SYMPTOMS
PND: 0
PALPITATIONS: 0
MYALGIAS: 0
FEVER: 0
COUGH: 0
CLAUDICATION: 0
ABDOMINAL PAIN: 0
DIZZINESS: 0
SHORTNESS OF BREATH: 0
ORTHOPNEA: 0

## 2025-02-26 ASSESSMENT — FIBROSIS 4 INDEX: FIB4 SCORE: 0.96

## 2025-02-26 NOTE — PROGRESS NOTES
Chief Complaint   Patient presents with    Congestive Heart Failure     F/V Dx: ACC/AHA stage C systolic heart failure (HCC)    Hyperlipidemia       Subjective     Analia Wu is a 54 y.o. female who presents today for follow up on her heart failure.     Pt was previously seen in 2012 with Dr. Abraham for congenital pulmonic stenosis.  She was lost to follow-up for unclear reasons.    Patient currently has been following with the heart failure clinic.  She was seen on 11/27/2024 with myself.  No changes were made during that visit.    Patient does continue to follow with pharmacotherapy.  At her last visit, her metoprolol was increased to 50 mg daily and she was tolerating the dose.    Patient feels well, denies chest pain, shortness of breath, palpitations, orthopnea, PND, Edema or dizziness/lightheadedness.  She does mention having occasional episode of chest pressure symptom.  She did have a GI illness last week likely due to food poisoning.    She continues only to take her diuretics as needed    Her home weights are ranging between 138-140 pounds.    She is not exercising at home.    Additionally, patient has the following medical problems:    -Hospitalization from 7/27/2024 through 7/31/2024.  She was admitted for worsening lower extremity edema and was treated for heart failure exacerbation.  Her diuretic was increased to 40 mg twice a day.  Losartan and Aldactone were restarted in the hospital.     -Hospitalization 5/30/2024 through 6/4/2024 for left-sided weakness involving her face, arm and leg.  She was found to have a right M1 occlusion with favorable ischemic penumbra.  She underwent successful thrombectomy with TICI 2C recanalization.    -hospitalization from 5/27/2024 through 5/30/2024.  She presented with nausea and vomiting and shortness of breath.  She was found to have elevated troponin in NT proBNP.  She required oxygen.  She had an echo performed which showed an EF of 25% and LV apical  "thrombus, moderate MR, TR and RVSP of 45 mmHg.  Cardiology was consulted.  Patient had a left heart cath with no CAD.  She was treated with IV diuretics and her respiratory status improved.  She was started on GDMT and started on Eliquis for her LV thrombus.  She was encouraged to follow-up with outpatient cardiology.    -PTSD: Effexor    -Former smoker     -Perianal dysplasia with surgery    Past Medical History:   Diagnosis Date    Anesthesia     ponv,  \"shallow breathing\"    Anxiety     ASTHMA     does not have any inhalers    Cancer (HCC)     vulva    Carpal tunnel syndrome     Heart murmur     pt reports she has never had any problems    Infectious disease 2007    Mrsa infection    Pain 11-26-12    neck, left knee, 6-7/10    Panic attack     Psychiatric problem     depression, PTSD, anxiety    Seasonal allergies      Past Surgical History:   Procedure Laterality Date    RECTAL EXPLORATION  12/19/2018    Procedure: RECTAL EXPLORATION - FOR HIGH RESOLUTION ANOSCOPY;  Surgeon: Billy Gamboa M.D.;  Location: [facility];  Service: General    ANAL FISTULECTOMY  5/24/2017    Procedure: ANAL FISTULECTOMY FOR: WIDE EXCISION ANAL DYSPLASIA;  Surgeon: Billy Gamboa M.D.;  Location: SURGERY [facility];  Service:     RECTAL EXPLORATION  9/2/2016    Procedure: RECTAL EXPLORATION eua, AND BIOPSY;  Surgeon: Billy Gamboa M.D.;  Location: [facility];  Service:     VULVECTOMY RADICAL Bilateral 9/2/2016    Procedure: VULVECTOMY RADICAL;  Surgeon: Reese Carroll M.D.;  Location: [facility];  Service:     RECTAL EXPLORATION  1/28/2015    Performed by Billy Gamboa M.D. at [facility]    EXAM UNDER ANESTHESIA  5/21/2014    Performed by Billy Gamboa M.D. at [facility]    RECTAL EXPLORATION  5/21/2014    Performed by Billy Gamboa M.D. at [facility]    WIDE EXCISION  12/19/2012    Performed by Billy Gamboa M.D. at [facility]    WIDE EXCISION "  2012    Performed by BRYANT VILLEGAS at SURGERY [facility] ORS    WIDE EXCISION  2011    Performed by BRYANT VILLEGAS at SURGERY [facility] ORS    CERVICAL DISK AND FUSION ANTERIOR  2011    Performed by YEIMI LEVY at SURGERY [facility] ORS    WIDE EXCISION  2010    Performed by BRYANT VILLEGAS at SURGERY [facility] ORS    HEMORRHOIDECTOMY  2010    Performed by BRYANT VILLEGAS at SURGERY [facility] ORS    VULVECTOMY PARTIAL  2007    RECTAL BIOPSY      OTHER NEUROLOGICAL SURG      neck fusion     Family History   Problem Relation Age of Onset    Diabetes Mother     Stroke Mother     Cancer Maternal Aunt     Lung Disease Neg Hx     Heart Disease Neg Hx      Social History     Socioeconomic History    Marital status: Single     Spouse name: Not on file    Number of children: Not on file    Years of education: Not on file    Highest education level: Not on file   Occupational History    Not on file   Tobacco Use    Smoking status: Former     Current packs/day: 0.00     Average packs/day: 1 pack/day for 25.0 years (25.0 ttl pk-yrs)     Types: Cigarettes     Start date: 10/4/1982     Quit date: 10/4/2007     Years since quittin.4    Smokeless tobacco: Never    Tobacco comments:        Vaping Use    Vaping status: Never Used   Substance and Sexual Activity    Alcohol use: No    Drug use: No    Sexual activity: Not Currently   Other Topics Concern    Not on file   Social History Narrative    Not on file     Social Drivers of Health     Financial Resource Strain: Low Risk  (2024)    Overall Financial Resource Strain (CARDIA)     Difficulty of Paying Living Expenses: Not hard at all   Food Insecurity: No Food Insecurity (2024)    Hunger Vital Sign     Worried About Running Out of Food in the Last Year: Never true     Ran Out of Food in the Last Year: Never true   Transportation Needs: No Transportation Needs (2024)    PRAPARE - Transportation     Lack of  "Transportation (Medical): No     Lack of Transportation (Non-Medical): No   Physical Activity: Not on file   Stress: Not on file   Social Connections: Not on file   Intimate Partner Violence: Not At Risk (7/27/2024)    Humiliation, Afraid, Rape, and Kick questionnaire     Fear of Current or Ex-Partner: No     Emotionally Abused: No     Physically Abused: No     Sexually Abused: No   Housing Stability: Low Risk  (7/27/2024)    Housing Stability Vital Sign     Unable to Pay for Housing in the Last Year: No     Number of Places Lived in the Last Year: 1     Unstable Housing in the Last Year: No     Allergies   Allergen Reactions    Latex Shortness of Breath and Itching    Other Food Hives and Rash     Splenda    Tape      \"makes skin turn red\"  Paper tape ok    Hydromorphone Vomiting and Nausea    Other Environmental      Dial soap --  Skin itching     Outpatient Encounter Medications as of 2/26/2025   Medication Sig Dispense Refill    gabapentin (NEURONTIN) 300 MG Cap 0      venlafaxine XR (EFFEXOR XR) 75 MG CAPSULE SR 24 HR Take 75 mg by mouth every morning.      losartan (COZAAR) 25 MG Tab Take 0.5 Tablets by mouth every day. 45 Tablet 3    metoprolol SR (TOPROL XL) 50 MG TABLET SR 24 HR Take 1 Tablet by mouth every day. 30 Tablet 11    spironolactone (ALDACTONE) 25 MG Tab Take 0.5 Tablets by mouth every day. 45 Tablet 3    potassium chloride SA (KDUR) 20 MEQ Tab CR Take 1 Tablet by mouth 2 times a day as needed (take with lasix as needed). 60 Tablet 3    furosemide (LASIX) 40 MG Tab Take 1.5 Tablets by mouth 2 times a day as needed (leg swelling). (Patient taking differently: Take 40 mg by mouth 2 times a day.) 60 Tablet 3    apixaban (ELIQUIS) 5mg Tab Take 1 Tablet by mouth 2 times a day. Indications: DVT/PE 60 Tablet 11    atorvastatin (LIPITOR) 40 MG Tab Take 1 Tablet by mouth every evening. 30 Tablet 11    dapagliflozin propanediol (FARXIGA) 10 MG Tab Take 1 Tablet by mouth every day. 30 Tablet 11     No " "facility-administered encounter medications on file as of 2/26/2025.     Review of Systems   Constitutional:  Negative for fever and malaise/fatigue.   Respiratory:  Negative for cough and shortness of breath.    Cardiovascular:  Negative for chest pain, palpitations, orthopnea, claudication, leg swelling and PND.   Gastrointestinal:  Negative for abdominal pain.   Musculoskeletal:  Negative for myalgias.   Neurological:  Negative for dizziness.   All other systems reviewed and are negative.             Objective     BP 98/56 (BP Location: Left arm, Patient Position: Sitting, BP Cuff Size: Adult)   Pulse 76   Resp 16   Ht 1.702 m (5' 7.01\")   Wt 66 kg (145 lb 6.4 oz)   LMP 12/17/2018 (Approximate)   SpO2 97%   BMI 22.77 kg/m²     Physical Exam  Vitals reviewed.   Constitutional:       Appearance: She is well-developed.   HENT:      Head: Normocephalic and atraumatic.   Eyes:      Pupils: Pupils are equal, round, and reactive to light.   Neck:      Vascular: No JVD.   Cardiovascular:      Rate and Rhythm: Normal rate and regular rhythm.      Heart sounds: Normal heart sounds.   Pulmonary:      Effort: Pulmonary effort is normal. No respiratory distress.      Breath sounds: Normal breath sounds. No wheezing or rales.   Abdominal:      General: Bowel sounds are normal.      Palpations: Abdomen is soft.   Musculoskeletal:         General: Normal range of motion.      Cervical back: Normal range of motion and neck supple.      Right lower leg: No edema.      Left lower leg: No edema.   Skin:     General: Skin is warm and dry.   Neurological:      General: No focal deficit present.      Mental Status: She is alert and oriented to person, place, and time.   Psychiatric:         Behavior: Behavior normal.            Lab Results   Component Value Date/Time    CHOLSTRLTOT 129 05/31/2024 04:20 AM    LDL 74 05/31/2024 04:20 AM    HDL 38 (A) 05/31/2024 04:20 AM    TRIGLYCERIDE 84 05/31/2024 04:20 AM       Lab Results "   Component Value Date/Time    SODIUM 142 10/14/2024 02:00 PM    POTASSIUM 4.3 10/14/2024 02:00 PM    CHLORIDE 99 10/14/2024 02:00 PM    CO2 28 10/14/2024 02:00 PM    GLUCOSE 104 (H) 10/14/2024 02:00 PM    BUN 31 (H) 10/14/2024 02:00 PM    CREATININE 1.11 10/14/2024 02:00 PM    CREATININE 0.8 10/05/2007 04:50 AM     Lab Results   Component Value Date/Time    ALKPHOSPHAT 117 (H) 07/31/2024 03:33 AM    ASTSGOT 42 07/31/2024 03:33 AM    ALTSGPT 64 (H) 07/31/2024 03:33 AM    TBILIRUBIN 2.9 (H) 07/31/2024 03:33 AM       Echocardiogram 10/30/2012  CONCLUSIONS   Normal left ventricular size, thickness, systolic function, and   diastolic function.   Thickened mitral valve leaflets.   Mild mitral regurgitation.   Right ventricular systolic pressure is estimated to be 22-27 mmHg.   Thickened pulmonic valve leaflets.   Possible mild stenosis with peak 17 mmHg, mean 10 mmHg, trace pulmonic insufficiency.    Transthoracic Echo Report 5/28/2024  Dilated LV with severely reduced systolic function, estimated LVEF 25%.    Global hypokinesis with regional variation  Grade II LV diastolic dysfunction  LV apical thrombus is present: 0.9 x 1.5 cm, semi-mobile.  Normal RV size with mildly reduced systolic function  Reduced estimated resting stroke-volume 29ml and cardiac output per   Doppler assessment  Mildly dilated left atrium  Moderate MR  Moderate TR  Trivial pericardial fusion  Estimated RVSP 45 mmHg  Normal IVC size  Dr. Walsh notified via Voalte on 5/28/24 at 10:09 AM    Coronary angiogram 5/29/2024  HEMODYNAMICS:  Aortic pressure: 93/58 mmHg  LVEDP: 18 mmHg  No significant aortic gradient on pullback     CORONARY ANGIOGRAPHY:  The left main coronary artery : Normal-appearing large caliber vessel that bifurcates to LAD and left circumflex  The left anterior descending coronary artery : Large in caliber transapical vessel that gives rise to several small-medium caliber diagonal branches.  Normal-appearing  The left circumflex  coronary artery : Normal-appearing large caliber vessel that gives rise to a large high OM1 and large OM 2.  The right coronary artery : Large-caliber dominant vessel, normal-appearing     IMPRESSION:  1.  Nonischemic cardiomyopathy, normal-appearing epicardial coronary arteries, dominant RCA  2.  Mildly elevated resting LVEDP 18 mmHg (at the LVOT level) without significant transaortic gradient on pullback     RECOMMENDATIONS:  Ongoing goal-directed therapy for nonischemic cardiomyopathy and acute HFrEF  Primary ASCVD prevention  TR band protocol     NOTIFICATION:  The patient's daughter was attempted to be called with updates, left her voicemail message.     Referring Cardiologist - Dr. Amato - was notified.    Transthoracic Echo Report 11/15/2024  Compared to the prior study on 05/28/2024, there is improvement in left ventricular systolic function and resolution of left ventricular   thrombus.  Reduced left ventricular systolic function. The ejection fraction is   measured to be 35%.  No left ventricular thrombus seen.  Moderate mitral regurgitation.  Moderate tricuspid regurgitation.  Right ventricular systolic pressure is estimated to be 39 mmHg      Date 6MWT MLWHF   6/6/2024 Not done d/t tachycardia 48                     Assessment & Plan     1. ACC/AHA stage C systolic heart failure (HCC)  losartan (COZAAR) 25 MG Tab    EC-ECHOCARDIOGRAM COMPLETE W/O CONT      2. Heart failure, NYHA class 2 (HCC)  losartan (COZAAR) 25 MG Tab    EC-ECHOCARDIOGRAM COMPLETE W/O CONT      3. Non-ischemic cardiomyopathy (HCC)  losartan (COZAAR) 25 MG Tab    EC-ECHOCARDIOGRAM COMPLETE W/O CONT      4. High risk medication use  losartan (COZAAR) 25 MG Tab    EC-ECHOCARDIOGRAM COMPLETE W/O CONT      5. Left ventricular thrombus        6. H/O ischemic right MCA stroke                    Medical Decision Making: Today's Assessment/Status/Plan:        HFrEF, Stage C, Class 1-2, LVEF improved to 35% from 25%: Based on physical  examination findings, patient is euvolemic. No JVD, lungs are clear to auscultation, no pitting edema in bilateral lower extremities, no ascites.   -Heart failure due to nonischemic cardiomyopathy  -Discussed Heart failure trajectory and prognosis with patient. Will continue to optimize medical therapy as tolerated. Advanced HF treatment, need for remote monitoring consideration at every visit.   -ACE-I/ARB/ARNI: Recommend patient to try restarting losartan at 12.5 mg daily, she will contact our office if she has difficulty with maintaining blood pressure or heart symptoms with the restart of losartan  -Evidence Based Beta-blocker: Continue metoprolol SR 25 mg daily  -Aldosterone Antagonist: Continue spironolactone 12.5 mg daily  -Diuretic: Continue furosemide 40 mg twice a day PRN with potassium 20 mEq twice a day PRN for weight gain, increased shortness of breath or swelling  -SGLT2 inhibitor: Continue Farxiga 10 mg daily  -Other: Consider as needed (Hydralazine/Isosorbide, Vericiguat, Corlanor)  -Labs: Patient does have labs due for pharmacotherapy in the next couple of weeks, Will continue to closely monitor for side effects of patient's high risk medication(s) including renal function, liver function NTproBNP/cardiac markers, and electrolytes as needed  -discussed importance of exercise and regular activity  -Discussed/reviewed maintaining a low Sodium and hydration recommendations  -Last echo showed EF at 35%.  No indication for ICD at this time, recommend repeating another echo in 6 months  -moderate MR seen on echo: Will will follow and refer to structural heart clinic for Mitraclip evaluation as needed once patient has been optimized and echo has been repeated  -Reinforced s/sx of worsening heart failure with patient and weight monitoring. Pt verbalizes understanding. Pt to call office or RTC if present.    -PUMP line number [phone] (PUMP) reviewed with patient  -Heart Failure Education:  Discussed the  Definition of heart failure (linking disease, symptoms, and treatment) and causes of heart failure  Recognition of escalating symptoms and concrete plan for response to particular symptoms  Risk modification for heart failure progression  Specific diet recommendations: Discussed 2000 mg sodium diet, adequate hydration.  Patient encouraged that she can slowly increase activity levels  Importance of treatment adherence  Behavioral strategies to promote treatment adherence  -Advanced care planning: Advance directive and POLST to be discussed at future visit  -Pharmacotherapy Referral: Patient referred over to the pharmacotherapy program for further optimization of medical therapy   -Compliance Barriers: None  -Genetic testing Consideration: None  -Consideration for LVAD and/or Heart transplant as necessary      LV thrombus:  -Recent echo shows no thrombus present, but we will keep her on Eliquis due to her stroke  -Continue Eliquis 5 mg twice a day    Stroke, s/p thrombectomy:  -Had follow-up with neurology in July  -Continue atorvastatin 40 mg daily  -Neurology recommends patient to remain on Eliquis if EF less than 35% even if thrombus has resolved.  Will continue her on Eliquis for now until her EF is above 35%  -Repeat echo in 6 months    FU in HF clinic in 6 months, she does have follow-up with pharmacotherapy in about 2 weeks. Sooner if needed.    Patient verbalizes understanding and agrees with the plan of care.     PLEASE NOTE: This Note was created using voice recognition Software. I have made every reasonable attempt to correct obvious errors, but I expect that there are errors of grammar and possibly content that I did not discover before finalizing the note

## 2025-03-13 ENCOUNTER — APPOINTMENT (OUTPATIENT)
Dept: CARDIOLOGY | Facility: MEDICAL CENTER | Age: 55
End: 2025-03-13
Attending: NURSE PRACTITIONER
Payer: MEDICAID

## 2025-03-18 ENCOUNTER — NON-PROVIDER VISIT (OUTPATIENT)
Dept: CARDIOLOGY | Facility: MEDICAL CENTER | Age: 55
End: 2025-03-18
Attending: NURSE PRACTITIONER
Payer: MEDICAID

## 2025-03-18 VITALS
DIASTOLIC BLOOD PRESSURE: 51 MMHG | HEART RATE: 86 BPM | BODY MASS INDEX: 22.55 KG/M2 | WEIGHT: 144 LBS | SYSTOLIC BLOOD PRESSURE: 92 MMHG

## 2025-03-18 PROCEDURE — 99212 OFFICE O/P EST SF 10 MIN: CPT

## 2025-03-18 ASSESSMENT — FIBROSIS 4 INDEX: FIB4 SCORE: 0.96

## 2025-03-18 NOTE — PROGRESS NOTES
CHF Pharmacotherapy Visit    Informed written consent was given on: 6/24/24    Analia Wu is here for CHF     HPI  Pertinent Interval History since last visit:   Pt still reports occasional lightheadedness.   She reports she did not take her blood pressure medications today, normally takes them about 10:30 AM  She reports frequent insomnia.    Most recent EF:   05/28/24 10:11 11/15/24 11:33   Left Ventrical Ejection Fraction 25 35     Potential Barriers to Care:  Adherence: complete   Side effects: none reported other than dizziness   Affordability: none, medications are affordable.    Home BP and HR:  Not regularly checking blood pressures    Lipids Data Review:   Lab Results   Component Value Date/Time    CHOLSTRLTOT 129 05/31/2024 04:20 AM    LDL 74 05/31/2024 04:20 AM    HDL 38 (A) 05/31/2024 04:20 AM    TRIGLYCERIDE 84 05/31/2024 04:20 AM        Latest Reference Range & Units 08/05/24 14:15   Glycohemoglobin 4.0 - 5.6 % 6.8 (H)       Lifestyle:  Change in weight: stable   Exercise habits: minimal exercise   Diet: common adult, patient does not cook her own meals which makes it difficult for her to control her sodium intake. Trying to have low sodium on days she can control her diet  Pt keeps to 1800 mg of fluid/day    DATA REVIEW  Vitals:    03/18/25 1101   BP: 92/51   Pulse: 86       Lab Results   Component Value Date/Time    SODIUM 142 10/14/2024 02:00 PM    POTASSIUM 4.3 10/14/2024 02:00 PM    CHLORIDE 99 10/14/2024 02:00 PM    CO2 28 10/14/2024 02:00 PM    GLUCOSE 104 (H) 10/14/2024 02:00 PM    BUN 31 (H) 10/14/2024 02:00 PM    CREATININE 1.11 10/14/2024 02:00 PM    CREATININE 0.8 10/05/2007 04:50 AM     Lab Results   Component Value Date/Time    ALKPHOSPHAT 117 (H) 07/31/2024 03:33 AM    ASTSGOT 42 07/31/2024 03:33 AM    ALTSGPT 64 (H) 07/31/2024 03:33 AM    TBILIRUBIN 2.9 (H) 07/31/2024 03:33 AM    INR 1.15 (H) 05/30/2024 06:32 PM    ALBUMIN 3.3 07/31/2024 03:33 AM      No components found for:  "\"MICROALBUMINCREATRATIOURINE\"    Renal function:   Latest Reference Range & Units 08/05/24 14:16 09/06/24 12:55 09/25/24 14:11 10/14/24 14:00   Creatinine 0.50 - 1.40 mg/dL 0.94 0.87 0.94 1.11   GFR (CKD-EPI) >60 mL/min/1.73 m 2 72 79 72 59 !       Other:  Immunization History   Administered Date(s) Administered    Influenza (IM) Preservative Free - HISTORICAL DATA 10/10/2013, 10/10/2014    Influenza Vaccine Quad Inj (Pf) 10/19/2015, 11/01/2017, 11/08/2018    PFIZER PURPLE CAP SARS-COV-2 VACCINATION (12+) 05/21/2021, 06/15/2021    Pneumococcal Conjugate Vaccine (PCV20) 05/30/2024    Tdap Vaccine 08/22/2006, 08/01/2012     Up to date on pneumococcal vaccine? Yes    Recent Imaging Studies:    None since last visit    ASSESSMENT AND PLAN  CHF and TYPE 2 DIABETES MELLITUS   Pt educated on importance of reducing sodium in her diet. Goal of less than 2,000 mg per day. Also discussed fluid limit.   No obvious edema or shortness of breath  Recommend she check her blood pressures regularly at home  With her low blood pressure today I do not want to increase any of her medications, especially considering she has not taken her blood pressure medication    CHF medications (changes are bolded)  Entresto or ACE/ARB:  continue Losartan 12.5mg daily   Beta blocker: Continue Metoprolol XL 50 mg daily  Diuretic: Furosemide 40 mg twice daily if needed - did not need it since January.   Aldosterone antagonist: continue taking spironolactone 12.5 mg daily  SGLT2i: Continue Farxiga 10 mg daily    Hyperlipidemia  Patient type: Secondary Prevention  Goal: LDL-C:   <70 mg/dL  Patient's LDL is slightly above goal, will continue to monitor.     Lipid medications (changes are bolded)  Continue taking Atorvastatin 40 mg daily  Consider Zetia at next appointment    4. Lifestyle   Recommendations From Today's Visit:   Reduce sodium consumption to less than 2,000 mg/day as possible  Continue fluid restriction of 1800 ml/day  More regular home BP " monitoring    Blood Work Ordered At Today's visit:   As ordered today     Follow-Up:   8 weeks    Donald Durham, PharmD  CC:  DANIELITO Matamoros APRN

## 2025-05-20 ENCOUNTER — NON-PROVIDER VISIT (OUTPATIENT)
Dept: CARDIOLOGY | Facility: MEDICAL CENTER | Age: 55
End: 2025-05-20
Attending: NURSE PRACTITIONER
Payer: MEDICAID

## 2025-05-20 VITALS
HEIGHT: 67 IN | SYSTOLIC BLOOD PRESSURE: 108 MMHG | HEART RATE: 100 BPM | BODY MASS INDEX: 23.07 KG/M2 | WEIGHT: 147 LBS | DIASTOLIC BLOOD PRESSURE: 67 MMHG

## 2025-05-20 DIAGNOSIS — I50.23 ACUTE ON CHRONIC HFREF (HEART FAILURE WITH REDUCED EJECTION FRACTION) (HCC): ICD-10-CM

## 2025-05-20 DIAGNOSIS — E78.5 DYSLIPIDEMIA: Primary | ICD-10-CM

## 2025-05-20 PROCEDURE — 99212 OFFICE O/P EST SF 10 MIN: CPT

## 2025-05-20 RX ORDER — METOPROLOL SUCCINATE 50 MG/1
75 TABLET, EXTENDED RELEASE ORAL DAILY
Qty: 135 TABLET | Refills: 1 | Status: SHIPPED | OUTPATIENT
Start: 2025-05-20

## 2025-05-20 RX ORDER — EZETIMIBE 10 MG/1
10 TABLET ORAL DAILY
Qty: 100 TABLET | Refills: 1 | Status: SHIPPED | OUTPATIENT
Start: 2025-05-20

## 2025-05-20 ASSESSMENT — FIBROSIS 4 INDEX: FIB4 SCORE: 0.96

## 2025-05-20 NOTE — PROGRESS NOTES
CHF Pharmacotherapy Visit    Informed written consent was given on: 6/24/24    Analia Wu is here for CHF     HPI  Pertinent Interval History since last visit:   Pt still reports occasional lightheadedness.   She reports she did not take her blood pressure medications today, normally takes them about 10:30 AM  She reports frequent insomnia.    Most recent EF:   05/28/24 10:11 11/15/24 11:33   Left Ventrical Ejection Fraction 25 35     Potential Barriers to Care:  Adherence: complete   Side effects: none reported other than dizziness   Affordability: none, medications are affordable.    Home BP and HR:  Not regularly checking blood pressures    Lipids Data Review:   Lab Results   Component Value Date/Time    CHOLSTRLTOT 129 05/31/2024 04:20 AM    LDL 74 05/31/2024 04:20 AM    HDL 38 (A) 05/31/2024 04:20 AM    TRIGLYCERIDE 84 05/31/2024 04:20 AM        Latest Reference Range & Units 08/05/24 14:15   Glycohemoglobin 4.0 - 5.6 % 6.8 (H)       Lifestyle:  Change in weight: stable   Exercise habits: minimal exercise   Diet: common adult, patient does not cook her own meals which makes it difficult for her to control her sodium intake. Trying to have low sodium on days she can control her diet  Pt keeps to 1800 mg of fluid/day    DATA REVIEW  Vitals:    05/20/25 1051   BP: 108/67   Pulse: 100         Lab Results   Component Value Date/Time    SODIUM 142 10/14/2024 02:00 PM    POTASSIUM 4.3 10/14/2024 02:00 PM    CHLORIDE 99 10/14/2024 02:00 PM    CO2 28 10/14/2024 02:00 PM    GLUCOSE 104 (H) 10/14/2024 02:00 PM    BUN 31 (H) 10/14/2024 02:00 PM    CREATININE 1.11 10/14/2024 02:00 PM    CREATININE 0.8 10/05/2007 04:50 AM     Lab Results   Component Value Date/Time    ALKPHOSPHAT 117 (H) 07/31/2024 03:33 AM    ASTSGOT 42 07/31/2024 03:33 AM    ALTSGPT 64 (H) 07/31/2024 03:33 AM    TBILIRUBIN 2.9 (H) 07/31/2024 03:33 AM    INR 1.15 (H) 05/30/2024 06:32 PM    ALBUMIN 3.3 07/31/2024 03:33 AM      No components found  "for: \"MICROALBUMINCREATRATIOURINE\"    Renal function:   Latest Reference Range & Units 08/05/24 14:16 09/06/24 12:55 09/25/24 14:11 10/14/24 14:00   Creatinine 0.50 - 1.40 mg/dL 0.94 0.87 0.94 1.11   GFR (CKD-EPI) >60 mL/min/1.73 m 2 72 79 72 59 !       Other:  Immunization History   Administered Date(s) Administered    Influenza (IM) Preservative Free - HISTORICAL DATA 10/10/2013, 10/10/2014    Influenza Vaccine Quad Inj (Pf) 10/19/2015, 11/01/2017, 11/08/2018    PFIZER PURPLE CAP SARS-COV-2 VACCINATION (12+) 05/21/2021, 06/15/2021    Pneumococcal Conjugate Vaccine (PCV20) 05/30/2024    Tdap Vaccine 08/22/2006, 08/01/2012     Up to date on pneumococcal vaccine? Yes    Recent Imaging Studies:    None since last visit    ASSESSMENT AND PLAN  CHF and TYPE 2 DIABETES MELLITUS   Pt educated on importance of reducing sodium in her diet. Goal of less than 2,000 mg per day. Also discussed fluid limit.   No obvious edema or shortness of breath  Recommend she check her blood pressures regularly at home  With her low blood pressure today I do not want to increase any of her medications, especially considering she has not taken her blood pressure medication    CHF medications (changes are bolded)  Entresto or ACE/ARB:  continue Losartan 12.5mg daily   Beta blocker: increase to  Metoprolol XL 75 mg daily  Diuretic: Furosemide 40 mg twice daily if needed - did not need it since January.   Aldosterone antagonist: continue taking spironolactone 12.5 mg daily  SGLT2i: Continue Farxiga 10 mg daily    Hyperlipidemia  Patient type: Secondary Prevention  Goal: LDL-C:   <70 mg/dL  Patient's LDL is slightly above goal, will continue to monitor.     Lipid medications (changes are bolded)  Continue taking Atorvastatin 40 mg daily  Start Zetia 10mg daily     4. Lifestyle   Recommendations From Today's Visit:   Reduce sodium consumption to less than 2,000 mg/day as possible  Continue fluid restriction of 1800 ml/day  More regular home BP " monitoring    Blood Work Ordered At Today's visit:   As ordered today     Follow-Up:   8 weeks    Donald Durham, PharmD  CC:  DANIELITO Matamoros APRN

## 2025-06-09 DIAGNOSIS — I50.20 ACC/AHA STAGE C SYSTOLIC HEART FAILURE (HCC): ICD-10-CM

## 2025-06-12 RX ORDER — ATORVASTATIN CALCIUM 40 MG/1
40 TABLET, FILM COATED ORAL EVERY EVENING
Qty: 90 TABLET | Refills: 0 | Status: SHIPPED | OUTPATIENT
Start: 2025-06-12

## 2025-06-12 NOTE — TELEPHONE ENCOUNTER
Is the patient due for a refill? Yes    Was the patient seen the last 15 months? Yes    Date of last office visit: 02.26.2025    Does the patient have an upcoming appointment?  Yes   If yes, When? 08.26.2025    Provider to refill:AMIE    Does the patient have senior living [facility] and need 100-day supply? (This applies to ALL medications) Patient does not have SCP

## 2025-06-28 DIAGNOSIS — I50.20 ACC/AHA STAGE C SYSTOLIC HEART FAILURE (HCC): ICD-10-CM

## 2025-06-30 RX ORDER — DAPAGLIFLOZIN 10 MG/1
10 TABLET, FILM COATED ORAL DAILY
Qty: 90 TABLET | Refills: 1 | Status: SHIPPED | OUTPATIENT
Start: 2025-06-30

## 2025-06-30 NOTE — TELEPHONE ENCOUNTER
Is the patient due for a refill? Yes    Was the patient seen the last 15 months? No    Date of last office visit: 2-26-25    Does the patient have an upcoming appointment?  Yes   If yes, When? 89-26-25    Provider to refill:AMIE    Does the patient have detention [facility] and need 100-day supply? (This applies to ALL medications) Patient does not have SCP

## 2025-07-08 DIAGNOSIS — I50.20 ACC/AHA STAGE C SYSTOLIC HEART FAILURE (HCC): ICD-10-CM

## 2025-07-08 RX ORDER — ATORVASTATIN CALCIUM 40 MG/1
40 TABLET, FILM COATED ORAL EVERY EVENING
Qty: 90 TABLET | Refills: 0 | OUTPATIENT
Start: 2025-07-08

## 2025-07-08 NOTE — TELEPHONE ENCOUNTER
Is the patient due for a refill? Yes    Was the patient seen the last 15 months? Yes    Date of last office visit: 2/26/25    Does the patient have an upcoming appointment?  Yes   If yes, When? 8/26/25    Provider to refill:AMIE    Does the patient have [facility] and need 100-day supply? (This applies to ALL medications) Patient does not have SCP

## 2025-07-16 DIAGNOSIS — I50.20 ACC/AHA STAGE C SYSTOLIC HEART FAILURE (HCC): ICD-10-CM

## 2025-07-16 RX ORDER — ATORVASTATIN CALCIUM 40 MG/1
40 TABLET, FILM COATED ORAL EVERY EVENING
Qty: 90 TABLET | Refills: 1 | Status: SHIPPED | OUTPATIENT
Start: 2025-07-16

## 2025-07-16 NOTE — TELEPHONE ENCOUNTER
Is the patient due for a refill? Yes    Was the patient seen the last 15 months? Yes    Date of last office visit: 02.26.2025    Does the patient have an upcoming appointment?  Yes   If yes, When? 08.26.2025    Provider to refill:AMIE    Does the patient have skilled nursing [facility] and need 100-day supply? (This applies to ALL medications) Patient does not have SCP

## 2025-07-22 ENCOUNTER — APPOINTMENT (OUTPATIENT)
Dept: CARDIOLOGY | Facility: MEDICAL CENTER | Age: 55
End: 2025-07-22
Attending: INTERNAL MEDICINE
Payer: MEDICAID

## 2025-07-24 ENCOUNTER — HOSPITAL ENCOUNTER (OUTPATIENT)
Dept: CARDIOLOGY | Facility: MEDICAL CENTER | Age: 55
End: 2025-07-24
Attending: INTERNAL MEDICINE
Payer: MEDICAID

## 2025-07-24 DIAGNOSIS — I50.20 ACC/AHA STAGE C SYSTOLIC HEART FAILURE (HCC): ICD-10-CM

## 2025-07-24 DIAGNOSIS — I50.9 HEART FAILURE, NYHA CLASS 2 (HCC): ICD-10-CM

## 2025-07-24 DIAGNOSIS — Z79.899 HIGH RISK MEDICATION USE: ICD-10-CM

## 2025-07-24 DIAGNOSIS — I42.8 NON-ISCHEMIC CARDIOMYOPATHY (HCC): ICD-10-CM

## 2025-07-24 PROCEDURE — 700117 HCHG RX CONTRAST REV CODE 255: Performed by: INTERNAL MEDICINE

## 2025-07-24 PROCEDURE — 93306 TTE W/DOPPLER COMPLETE: CPT

## 2025-07-24 RX ADMIN — HUMAN ALBUMIN MICROSPHERES AND PERFLUTREN 3 ML: 10; .22 INJECTION, SOLUTION INTRAVENOUS at 15:45

## 2025-07-29 LAB
LV EJECT FRACT  99904: 35
LV EJECT FRACT MOD 2C 99903: 42.99
LV EJECT FRACT MOD 4C 99902: 31.04
LV EJECT FRACT MOD BP 99901: 38.65

## 2025-07-29 PROCEDURE — 93306 TTE W/DOPPLER COMPLETE: CPT | Mod: 26 | Performed by: INTERNAL MEDICINE

## 2025-07-30 ENCOUNTER — RESULTS FOLLOW-UP (OUTPATIENT)
Dept: CARDIOLOGY | Facility: MEDICAL CENTER | Age: 55
End: 2025-07-30
Payer: MEDICAID

## 2025-08-19 ENCOUNTER — TELEPHONE (OUTPATIENT)
Dept: CARDIOLOGY | Facility: MEDICAL CENTER | Age: 55
End: 2025-08-19
Payer: MEDICAID

## 2025-08-21 ENCOUNTER — HOSPITAL ENCOUNTER (OUTPATIENT)
Dept: LAB | Facility: MEDICAL CENTER | Age: 55
End: 2025-08-21
Attending: NURSE PRACTITIONER
Payer: MEDICAID

## 2025-08-21 DIAGNOSIS — E11.9 TYPE 2 DIABETES MELLITUS WITHOUT COMPLICATION, WITHOUT LONG-TERM CURRENT USE OF INSULIN (HCC): ICD-10-CM

## 2025-08-21 DIAGNOSIS — E78.5 DYSLIPIDEMIA: ICD-10-CM

## 2025-08-21 LAB
ALBUMIN SERPL BCP-MCNC: 4.7 G/DL (ref 3.2–4.9)
ALBUMIN/GLOB SERPL: 1.7 G/DL
ALP SERPL-CCNC: 106 U/L (ref 30–99)
ALT SERPL-CCNC: 24 U/L (ref 2–50)
ANION GAP SERPL CALC-SCNC: 13 MMOL/L (ref 7–16)
AST SERPL-CCNC: 29 U/L (ref 12–45)
BILIRUB SERPL-MCNC: 0.6 MG/DL (ref 0.1–1.5)
BUN SERPL-MCNC: 22 MG/DL (ref 8–22)
CALCIUM ALBUM COR SERPL-MCNC: 9.9 MG/DL (ref 8.5–10.5)
CALCIUM SERPL-MCNC: 10.5 MG/DL (ref 8.5–10.5)
CHLORIDE SERPL-SCNC: 104 MMOL/L (ref 96–112)
CHOLEST SERPL-MCNC: 137 MG/DL (ref 100–199)
CO2 SERPL-SCNC: 26 MMOL/L (ref 20–33)
CREAT SERPL-MCNC: 1.07 MG/DL (ref 0.5–1.4)
CREAT UR-MCNC: 250 MG/DL
EST. AVERAGE GLUCOSE BLD GHB EST-MCNC: 111 MG/DL
GFR SERPLBLD CREATININE-BSD FMLA CKD-EPI: 61 ML/MIN/1.73 M 2
GLOBULIN SER CALC-MCNC: 2.8 G/DL (ref 1.9–3.5)
GLUCOSE SERPL-MCNC: 97 MG/DL (ref 65–99)
HBA1C MFR BLD: 5.5 % (ref 4–5.6)
HDLC SERPL-MCNC: 68 MG/DL
LDLC SERPL CALC-MCNC: 56 MG/DL
MICROALBUMIN UR-MCNC: 3.8 MG/DL
MICROALBUMIN/CREAT UR: 15 MG/G (ref 0–30)
POTASSIUM SERPL-SCNC: 4.3 MMOL/L (ref 3.6–5.5)
PROT SERPL-MCNC: 7.5 G/DL (ref 6–8.2)
SODIUM SERPL-SCNC: 143 MMOL/L (ref 135–145)
TRIGL SERPL-MCNC: 66 MG/DL (ref 0–149)

## 2025-08-21 PROCEDURE — 80061 LIPID PANEL: CPT

## 2025-08-21 PROCEDURE — 82570 ASSAY OF URINE CREATININE: CPT

## 2025-08-21 PROCEDURE — 36415 COLL VENOUS BLD VENIPUNCTURE: CPT

## 2025-08-21 PROCEDURE — 82172 ASSAY OF APOLIPOPROTEIN: CPT

## 2025-08-21 PROCEDURE — 82043 UR ALBUMIN QUANTITATIVE: CPT

## 2025-08-21 PROCEDURE — 80053 COMPREHEN METABOLIC PANEL: CPT

## 2025-08-21 PROCEDURE — 83036 HEMOGLOBIN GLYCOSYLATED A1C: CPT

## 2025-08-24 LAB — APO B100 SERPL-MCNC: 52 MG/DL (ref 60–117)

## 2025-08-26 ENCOUNTER — OFFICE VISIT (OUTPATIENT)
Dept: CARDIOLOGY | Facility: MEDICAL CENTER | Age: 55
End: 2025-08-26
Attending: NURSE PRACTITIONER
Payer: MEDICAID

## 2025-08-26 VITALS
SYSTOLIC BLOOD PRESSURE: 98 MMHG | OXYGEN SATURATION: 90 % | HEART RATE: 87 BPM | HEIGHT: 67 IN | BODY MASS INDEX: 23.23 KG/M2 | WEIGHT: 148 LBS | DIASTOLIC BLOOD PRESSURE: 58 MMHG

## 2025-08-26 DIAGNOSIS — Z86.73 H/O ISCHEMIC RIGHT MCA STROKE: ICD-10-CM

## 2025-08-26 DIAGNOSIS — E11.9 TYPE 2 DIABETES MELLITUS WITHOUT COMPLICATION, WITHOUT LONG-TERM CURRENT USE OF INSULIN (HCC): ICD-10-CM

## 2025-08-26 DIAGNOSIS — I50.20 ACC/AHA STAGE C SYSTOLIC HEART FAILURE (HCC): Primary | ICD-10-CM

## 2025-08-26 DIAGNOSIS — E66.811 CLASS 1 OBESITY WITH SERIOUS COMORBIDITY IN ADULT, UNSPECIFIED BMI, UNSPECIFIED OBESITY TYPE: ICD-10-CM

## 2025-08-26 DIAGNOSIS — I42.8 NON-ISCHEMIC CARDIOMYOPATHY (HCC): ICD-10-CM

## 2025-08-26 DIAGNOSIS — I50.21 ACUTE SYSTOLIC HEART FAILURE (HCC): ICD-10-CM

## 2025-08-26 DIAGNOSIS — I50.23 ACUTE ON CHRONIC HFREF (HEART FAILURE WITH REDUCED EJECTION FRACTION) (HCC): ICD-10-CM

## 2025-08-26 DIAGNOSIS — I50.9 HEART FAILURE, NYHA CLASS 2 (HCC): ICD-10-CM

## 2025-08-26 DIAGNOSIS — R06.02 SOB (SHORTNESS OF BREATH): ICD-10-CM

## 2025-08-26 DIAGNOSIS — E78.5 HYPERLIPIDEMIA, UNSPECIFIED HYPERLIPIDEMIA TYPE: ICD-10-CM

## 2025-08-26 DIAGNOSIS — E78.5 DYSLIPIDEMIA: ICD-10-CM

## 2025-08-26 DIAGNOSIS — Z79.899 HIGH RISK MEDICATION USE: ICD-10-CM

## 2025-08-26 DIAGNOSIS — I51.3 LEFT VENTRICULAR THROMBUS: ICD-10-CM

## 2025-08-26 PROCEDURE — 99213 OFFICE O/P EST LOW 20 MIN: CPT | Mod: 25

## 2025-08-26 PROCEDURE — 99212 OFFICE O/P EST SF 10 MIN: CPT | Performed by: NURSE PRACTITIONER

## 2025-08-26 PROCEDURE — 94618 PULMONARY STRESS TESTING: CPT

## 2025-08-26 PROCEDURE — 99214 OFFICE O/P EST MOD 30 MIN: CPT

## 2025-08-26 RX ORDER — PANTOPRAZOLE SODIUM 20 MG/1
90 TABLET, DELAYED RELEASE ORAL
COMMUNITY
Start: 2025-08-25

## 2025-08-26 ASSESSMENT — MINNESOTA LIVING WITH HEART FAILURE QUESTIONNAIRE (MLHF)
COSTING YOU MONEY FOR MEDICAL CARE: 0
DIFFICULTY SLEEPING WELL AT NIGHT: 5
LOSS OF SELF CONTROL IN YOUR LIFE: 2
MAKING YOU WORRY: 3
DIFFICULTY WITH RECREATIONAL PASTIMES, SPORTS, HOBBIES: 3
WALKING ABOUT OR CLIMBING STAIRS DIFFICULT: 2
FEELING LIKE A BURDEN TO FAMILY AND FRIENDS: 3
DIFFICULTY WORKING TO EARN A LIVING: 3
TIRED, FATIGUED OR LOW ON ENERGY: 4
TOTAL_SCORE: 52
HAVING TO SIT OR LIE DOWN DURING THE DAY: 3
WORKING AROUND THE HOUSE OR YARD DIFFICULT: 2
DIFFICULTY TO CONCENTRATE OR REMEMBERING THINGS: 2
MAKING YOU FEEL DEPRESSED: 4
EATING LESS FOODS YOU LIKE: 1
MAKING YOU STAY IN A HOSPITAL: 0
SWELLING IN ANKLES OR LEGS: 2
GIVING YOU SIDE EFFECTS FROM TREATMENTS: 0
DIFFICULTY WITH SEXUAL ACTIVITIES: 3
DIFFICULTY GOING AWAY FROM HOME: 2
DIFFICULTY SOCIALIZING WITH FAMILY OR FRIENDS: 5
MAKING YOU SHORT OF BREATH: 3

## 2025-08-26 ASSESSMENT — FIBROSIS 4 INDEX: FIB4 SCORE: 1.08

## 2025-08-26 ASSESSMENT — ENCOUNTER SYMPTOMS
PALPITATIONS: 0
FEVER: 0
ORTHOPNEA: 0
SHORTNESS OF BREATH: 1
COUGH: 0
PND: 0
MYALGIAS: 0
ABDOMINAL PAIN: 0
CLAUDICATION: 0
DIZZINESS: 1

## 2025-08-26 ASSESSMENT — 6 MINUTE WALK TEST (6MWT): TOTAL DISTANCE WALKED (METERS): 365.8

## (undated) DEVICE — CANISTER SUCTION 3000ML MECHANICAL FILTER AUTO SHUTOFF MEDI-VAC NONSTERILE LF DISP  (40EA/CA)

## (undated) DEVICE — SLEEVE VASO CALF MED - (10PR/CA)

## (undated) DEVICE — PACK MINOR BASIN - (2EA/CA)

## (undated) DEVICE — GLOVE BIOGEL INDICATOR SZ 8.5 SURGICAL PF LTX - (50/BX 4BX/CA)

## (undated) DEVICE — SENSOR SPO2 NEO LNCS ADHESIVE (20/BX) SEE USER NOTES

## (undated) DEVICE — SUTURE GENERAL

## (undated) DEVICE — NEPTUNE 4 PORT MANIFOLD - (20/PK)

## (undated) DEVICE — MASK ANESTHESIA ADULT  - (100/CA)

## (undated) DEVICE — SUTURE 2-0 VICRYL PLUS SH - 27 INCH (36/BX)

## (undated) DEVICE — SODIUM CHL IRRIGATION 0.9% 1000ML (12EA/CA)

## (undated) DEVICE — PROTECTOR ULNA NERVE - (36PR/CA)

## (undated) DEVICE — GAUZE FLUFF STERILE 2-PLY 36 X 36 (100EA/CA)

## (undated) DEVICE — ELECTRODE DUAL RETURN W/ CORD - (50/PK)

## (undated) DEVICE — HEAD HOLDER JUNIOR/ADULT

## (undated) DEVICE — SUCTION INSTRUMENT YANKAUER BULBOUS TIP W/O VENT (50EA/CA)

## (undated) DEVICE — KIT ROOM DECONTAMINATION

## (undated) DEVICE — SLEEVE, VASO, THIGH, MED

## (undated) DEVICE — BRIEF STRETCH MATERNITY M/L - FITS 20-60IN (5EA/BG 20BG/CA)

## (undated) DEVICE — MASK, LARYNGEAL AIRWAY #4

## (undated) DEVICE — GLOVE BIOGEL PI INDICATOR SZ 7.5 SURGICAL PF LF -(50/BX 4BX/CA)

## (undated) DEVICE — SET EXTENSION WITH 2 PORTS (48EA/CA) ***PART #2C8610 IS A SUBSTITUTE*****

## (undated) DEVICE — KIT ANESTHESIA W/CIRCUIT & 3/LT BAG W/FILTER (20EA/CA)

## (undated) DEVICE — SUTURE 2-0 SILK 12 X 18" (36PK/BX)"

## (undated) DEVICE — GLOVE BIOGEL SZ 7.5 SURGICAL PF LTX - (50PR/BX 4BX/CA)

## (undated) DEVICE — GOWN WARMING STANDARD FLEX - (30/CA)

## (undated) DEVICE — JELLY, KY 2 0Z STERILE

## (undated) DEVICE — VESSELOOP MINI BLUE STERILE - SURG-I-LOOP (10EA/BX)

## (undated) DEVICE — SET LEADWIRE 5 LEAD BEDSIDE DISPOSABLE ECG (1SET OF 5/EA)

## (undated) DEVICE — TRAY SRGPRP PVP IOD WT PRP - (20/CA)

## (undated) DEVICE — DRAPE LARGE 3 QUARTER - (20/CA)

## (undated) DEVICE — GLOVE SZ 7 BIOGEL PI MICRO - PF LF (50PR/BX 4BX/CA)

## (undated) DEVICE — DRAPE UNDER BUTTOCK LRG (40/CA)

## (undated) DEVICE — TUBING CLEARLINK DUO-VENT - C-FLO (48EA/CA)

## (undated) DEVICE — ELECTRODE 850 FOAM ADHESIVE - HYDROGEL RADIOTRNSPRNT (50/PK)

## (undated) DEVICE — SUTURE 2-0 CHROMIC GUT SH 27 (36PK/BX)"

## (undated) DEVICE — BAG SPONGE COUNT 10.25 X 32 - BLUE (250/CA)

## (undated) DEVICE — JELLY SURGILUBE STERILE TUBE 4.25 OZ (1/EA)

## (undated) DEVICE — LACTATED RINGERS INJ 1000 ML - (14EA/CA 60CA/PF)

## (undated) DEVICE — LEGGING LITHOTOMY 31 X 48 IN - (2EA/PK 20PK/CA)

## (undated) DEVICE — KIT SIGMOIDOSCOPE W/BULB AND - SUCTION (1/PK 10PK/CA)

## (undated) DEVICE — GLOVE, BIOGEL ECLIPSE, SZ 7.0, PF LTX (50/BX)

## (undated) DEVICE — BLADE SURGICAL #15 - (50/BX 3BX/CA)

## (undated) DEVICE — TRAY SKIN SCRUB PVP WET (20EA/CA) PART #DYND70356 DISCONTINUED

## (undated) DEVICE — TOWEL STOP TIMEOUT SAFETY FLAG (40EA/CA)

## (undated) DEVICE — LEAD SET 6 DISP. EKG NIHON KOHDEN